# Patient Record
Sex: MALE | Race: OTHER | HISPANIC OR LATINO | URBAN - METROPOLITAN AREA
[De-identification: names, ages, dates, MRNs, and addresses within clinical notes are randomized per-mention and may not be internally consistent; named-entity substitution may affect disease eponyms.]

---

## 2021-01-01 ENCOUNTER — INPATIENT (INPATIENT)
Facility: HOSPITAL | Age: 41
LOS: 6 days | Discharge: HOME | DRG: 433 | End: 2021-04-27
Attending: INTERNAL MEDICINE | Admitting: HOSPITALIST
Payer: MEDICAID

## 2021-01-01 ENCOUNTER — INPATIENT (INPATIENT)
Facility: HOSPITAL | Age: 41
LOS: 16 days | DRG: 870 | End: 2021-06-13
Attending: STUDENT IN AN ORGANIZED HEALTH CARE EDUCATION/TRAINING PROGRAM | Admitting: STUDENT IN AN ORGANIZED HEALTH CARE EDUCATION/TRAINING PROGRAM
Payer: MEDICAID

## 2021-01-01 VITALS
WEIGHT: 160.06 LBS | TEMPERATURE: 99 F | DIASTOLIC BLOOD PRESSURE: 67 MMHG | OXYGEN SATURATION: 100 % | HEART RATE: 112 BPM | HEIGHT: 61 IN | RESPIRATION RATE: 16 BRPM | SYSTOLIC BLOOD PRESSURE: 102 MMHG

## 2021-01-01 VITALS
SYSTOLIC BLOOD PRESSURE: 119 MMHG | HEART RATE: 92 BPM | DIASTOLIC BLOOD PRESSURE: 72 MMHG | WEIGHT: 164.91 LBS | TEMPERATURE: 98 F | RESPIRATION RATE: 22 BRPM | OXYGEN SATURATION: 96 % | HEIGHT: 62 IN

## 2021-01-01 VITALS
SYSTOLIC BLOOD PRESSURE: 116 MMHG | TEMPERATURE: 98 F | RESPIRATION RATE: 18 BRPM | DIASTOLIC BLOOD PRESSURE: 75 MMHG | OXYGEN SATURATION: 98 % | HEART RATE: 96 BPM

## 2021-01-01 DIAGNOSIS — R17 UNSPECIFIED JAUNDICE: ICD-10-CM

## 2021-01-01 DIAGNOSIS — D72.825 BANDEMIA: ICD-10-CM

## 2021-01-01 DIAGNOSIS — E87.1 HYPO-OSMOLALITY AND HYPONATREMIA: ICD-10-CM

## 2021-01-01 DIAGNOSIS — D69.6 THROMBOCYTOPENIA, UNSPECIFIED: ICD-10-CM

## 2021-01-01 DIAGNOSIS — F10.230 ALCOHOL DEPENDENCE WITH WITHDRAWAL, UNCOMPLICATED: ICD-10-CM

## 2021-01-01 DIAGNOSIS — L03.90 CELLULITIS, UNSPECIFIED: ICD-10-CM

## 2021-01-01 DIAGNOSIS — A41.9 SEPSIS, UNSPECIFIED ORGANISM: ICD-10-CM

## 2021-01-01 DIAGNOSIS — N17.9 ACUTE KIDNEY FAILURE, UNSPECIFIED: ICD-10-CM

## 2021-01-01 DIAGNOSIS — M79.89 OTHER SPECIFIED SOFT TISSUE DISORDERS: ICD-10-CM

## 2021-01-01 DIAGNOSIS — R39.9 UNSPECIFIED SYMPTOMS AND SIGNS INVOLVING THE GENITOURINARY SYSTEM: ICD-10-CM

## 2021-01-01 DIAGNOSIS — D64.9 ANEMIA, UNSPECIFIED: ICD-10-CM

## 2021-01-01 DIAGNOSIS — K72.90 HEPATIC FAILURE, UNSPECIFIED WITHOUT COMA: ICD-10-CM

## 2021-01-01 DIAGNOSIS — K70.31 ALCOHOLIC CIRRHOSIS OF LIVER WITH ASCITES: ICD-10-CM

## 2021-01-01 DIAGNOSIS — Z29.9 ENCOUNTER FOR PROPHYLACTIC MEASURES, UNSPECIFIED: ICD-10-CM

## 2021-01-01 LAB
-  AMPICILLIN: SIGNIFICANT CHANGE UP
-  DAPTOMYCIN: SIGNIFICANT CHANGE UP
-  GENTAMICIN SYNERGY: SIGNIFICANT CHANGE UP
-  LINEZOLID: SIGNIFICANT CHANGE UP
-  STREPTOMYCIN SYNERGY: SIGNIFICANT CHANGE UP
-  VANCOMYCIN: SIGNIFICANT CHANGE UP
A1AT SERPL-MCNC: 172 MG/DL — SIGNIFICANT CHANGE UP (ref 90–200)
ALBUMIN FLD-MCNC: 0.3 G/DL — SIGNIFICANT CHANGE UP
ALBUMIN FLD-MCNC: 0.4 G/DL — SIGNIFICANT CHANGE UP
ALBUMIN FLD-MCNC: 0.4 G/DL — SIGNIFICANT CHANGE UP
ALBUMIN FLD-MCNC: 1 G/DL — SIGNIFICANT CHANGE UP
ALBUMIN SERPL ELPH-MCNC: 1.7 G/DL — LOW (ref 3.3–5)
ALBUMIN SERPL ELPH-MCNC: 2 G/DL — LOW (ref 3.3–5)
ALBUMIN SERPL ELPH-MCNC: 2.1 G/DL — LOW (ref 3.3–5)
ALBUMIN SERPL ELPH-MCNC: 2.2 G/DL — LOW (ref 3.3–5)
ALBUMIN SERPL ELPH-MCNC: 2.2 G/DL — LOW (ref 3.3–5)
ALBUMIN SERPL ELPH-MCNC: 2.3 G/DL — LOW (ref 3.3–5)
ALBUMIN SERPL ELPH-MCNC: 2.5 G/DL — LOW (ref 3.3–5)
ALBUMIN SERPL ELPH-MCNC: 2.5 G/DL — LOW (ref 3.3–5)
ALBUMIN SERPL ELPH-MCNC: 2.6 G/DL — LOW (ref 3.3–5)
ALBUMIN SERPL ELPH-MCNC: 2.6 G/DL — LOW (ref 3.3–5)
ALBUMIN SERPL ELPH-MCNC: 2.7 G/DL — LOW (ref 3.3–5)
ALBUMIN SERPL ELPH-MCNC: 2.8 G/DL — LOW (ref 3.3–5)
ALBUMIN SERPL ELPH-MCNC: 2.9 G/DL — LOW (ref 3.3–5)
ALBUMIN SERPL ELPH-MCNC: 3 G/DL — LOW (ref 3.3–5)
ALBUMIN SERPL ELPH-MCNC: 3.1 G/DL — LOW (ref 3.3–5)
ALBUMIN SERPL ELPH-MCNC: 3.2 G/DL — LOW (ref 3.3–5)
ALBUMIN SERPL ELPH-MCNC: 3.3 G/DL — SIGNIFICANT CHANGE UP (ref 3.3–5)
ALP SERPL-CCNC: 125 U/L — HIGH (ref 40–120)
ALP SERPL-CCNC: 125 U/L — HIGH (ref 40–120)
ALP SERPL-CCNC: 126 U/L — HIGH (ref 40–120)
ALP SERPL-CCNC: 146 U/L — HIGH (ref 40–120)
ALP SERPL-CCNC: 147 U/L — HIGH (ref 40–120)
ALP SERPL-CCNC: 149 U/L — HIGH (ref 40–120)
ALP SERPL-CCNC: 156 U/L — HIGH (ref 40–120)
ALP SERPL-CCNC: 158 U/L — HIGH (ref 40–120)
ALP SERPL-CCNC: 166 U/L — HIGH (ref 40–120)
ALP SERPL-CCNC: 171 U/L — HIGH (ref 40–120)
ALP SERPL-CCNC: 172 U/L — HIGH (ref 40–120)
ALP SERPL-CCNC: 173 U/L — HIGH (ref 40–120)
ALP SERPL-CCNC: 183 U/L — HIGH (ref 40–120)
ALP SERPL-CCNC: 184 U/L — HIGH (ref 40–120)
ALP SERPL-CCNC: 191 U/L — HIGH (ref 40–120)
ALP SERPL-CCNC: 193 U/L — HIGH (ref 40–120)
ALP SERPL-CCNC: 197 U/L — HIGH (ref 40–120)
ALP SERPL-CCNC: 199 U/L — HIGH (ref 40–120)
ALP SERPL-CCNC: 201 U/L — HIGH (ref 40–120)
ALP SERPL-CCNC: 208 U/L — HIGH (ref 40–120)
ALP SERPL-CCNC: 214 U/L — HIGH (ref 40–120)
ALP SERPL-CCNC: 227 U/L — HIGH (ref 40–120)
ALP SERPL-CCNC: 242 U/L — HIGH (ref 40–120)
ALP SERPL-CCNC: 260 U/L — HIGH (ref 40–120)
ALP SERPL-CCNC: 276 U/L — HIGH (ref 40–120)
ALP SERPL-CCNC: 277 U/L — HIGH (ref 40–120)
ALP SERPL-CCNC: 284 U/L — HIGH (ref 40–120)
ALP SERPL-CCNC: 296 U/L — HIGH (ref 40–120)
ALP SERPL-CCNC: 304 U/L — HIGH (ref 40–120)
ALP SERPL-CCNC: 304 U/L — HIGH (ref 40–120)
ALT FLD-CCNC: 102 U/L — HIGH (ref 10–45)
ALT FLD-CCNC: 104 U/L — HIGH (ref 10–45)
ALT FLD-CCNC: 105 U/L — HIGH (ref 10–45)
ALT FLD-CCNC: 123 U/L — HIGH (ref 10–45)
ALT FLD-CCNC: 123 U/L — HIGH (ref 10–45)
ALT FLD-CCNC: 134 U/L — HIGH (ref 10–45)
ALT FLD-CCNC: 140 U/L — HIGH (ref 10–45)
ALT FLD-CCNC: 212 U/L — HIGH (ref 10–45)
ALT FLD-CCNC: 24 U/L — SIGNIFICANT CHANGE UP (ref 10–45)
ALT FLD-CCNC: 27 U/L — SIGNIFICANT CHANGE UP (ref 10–45)
ALT FLD-CCNC: 29 U/L — SIGNIFICANT CHANGE UP (ref 10–45)
ALT FLD-CCNC: 32 U/L — SIGNIFICANT CHANGE UP (ref 10–45)
ALT FLD-CCNC: 33 U/L — SIGNIFICANT CHANGE UP (ref 10–45)
ALT FLD-CCNC: 33 U/L — SIGNIFICANT CHANGE UP (ref 10–45)
ALT FLD-CCNC: 34 U/L — SIGNIFICANT CHANGE UP (ref 10–45)
ALT FLD-CCNC: 37 U/L — SIGNIFICANT CHANGE UP (ref 10–45)
ALT FLD-CCNC: 39 U/L — SIGNIFICANT CHANGE UP (ref 10–45)
ALT FLD-CCNC: 43 U/L — SIGNIFICANT CHANGE UP (ref 10–45)
ALT FLD-CCNC: 45 U/L — SIGNIFICANT CHANGE UP (ref 10–45)
ALT FLD-CCNC: 46 U/L — HIGH (ref 10–45)
ALT FLD-CCNC: 54 U/L — HIGH (ref 10–45)
ALT FLD-CCNC: 59 U/L — HIGH (ref 10–45)
ALT FLD-CCNC: 65 U/L — HIGH (ref 10–45)
ALT FLD-CCNC: 65 U/L — HIGH (ref 10–45)
ALT FLD-CCNC: 69 U/L — HIGH (ref 10–45)
ALT FLD-CCNC: 76 U/L — HIGH (ref 10–45)
ALT FLD-CCNC: 85 U/L — HIGH (ref 10–45)
ALT FLD-CCNC: 92 U/L — HIGH (ref 10–45)
ALT FLD-CCNC: 95 U/L — HIGH (ref 10–45)
ALT FLD-CCNC: 97 U/L — HIGH (ref 10–45)
ALT FLD-CCNC: 98 U/L — HIGH (ref 10–45)
ALT FLD-CCNC: <5 U/L — LOW (ref 10–45)
AMMONIA BLD-MCNC: 125 UMOL/L — HIGH (ref 11–55)
AMMONIA BLD-MCNC: 140 UMOL/L — HIGH (ref 11–55)
AMMONIA BLD-MCNC: 145 UMOL/L — HIGH (ref 11–55)
AMMONIA BLD-MCNC: 157 UMOL/L — HIGH (ref 11–55)
AMMONIA BLD-MCNC: 184 UMOL/L — HIGH (ref 11–55)
AMMONIA BLD-MCNC: 214 UMOL/L — HIGH (ref 11–55)
AMMONIA BLD-MCNC: 44 UMOL/L — SIGNIFICANT CHANGE UP (ref 11–55)
AMMONIA BLD-MCNC: 78 UMOL/L — HIGH (ref 11–55)
AMMONIA BLD-MCNC: 92 UMOL/L — HIGH (ref 11–55)
AMPHET UR-MCNC: NEGATIVE — SIGNIFICANT CHANGE UP
ANA PAT FLD IF-IMP: ABNORMAL
ANA TITR SER: ABNORMAL
ANA TITR SER: NEGATIVE — SIGNIFICANT CHANGE UP
ANION GAP SERPL CALC-SCNC: 10 MMOL/L — SIGNIFICANT CHANGE UP (ref 5–17)
ANION GAP SERPL CALC-SCNC: 10 MMOL/L — SIGNIFICANT CHANGE UP (ref 5–17)
ANION GAP SERPL CALC-SCNC: 11 MMOL/L — SIGNIFICANT CHANGE UP (ref 5–17)
ANION GAP SERPL CALC-SCNC: 12 MMOL/L — SIGNIFICANT CHANGE UP (ref 5–17)
ANION GAP SERPL CALC-SCNC: 13 MMOL/L — SIGNIFICANT CHANGE UP (ref 5–17)
ANION GAP SERPL CALC-SCNC: 14 MMOL/L — SIGNIFICANT CHANGE UP (ref 5–17)
ANION GAP SERPL CALC-SCNC: 15 MMOL/L — SIGNIFICANT CHANGE UP (ref 5–17)
ANION GAP SERPL CALC-SCNC: 16 MMOL/L — SIGNIFICANT CHANGE UP (ref 5–17)
ANION GAP SERPL CALC-SCNC: 17 MMOL/L — SIGNIFICANT CHANGE UP (ref 5–17)
ANION GAP SERPL CALC-SCNC: 18 MMOL/L — HIGH (ref 5–17)
ANION GAP SERPL CALC-SCNC: 19 MMOL/L — HIGH (ref 5–17)
ANION GAP SERPL CALC-SCNC: 20 MMOL/L — HIGH (ref 5–17)
ANION GAP SERPL CALC-SCNC: 21 MMOL/L — HIGH (ref 5–17)
ANION GAP SERPL CALC-SCNC: 21 MMOL/L — HIGH (ref 5–17)
ANION GAP SERPL CALC-SCNC: 23 MMOL/L — HIGH (ref 5–17)
ANION GAP SERPL CALC-SCNC: 23 MMOL/L — HIGH (ref 5–17)
ANION GAP SERPL CALC-SCNC: 24 MMOL/L — HIGH (ref 5–17)
ANION GAP SERPL CALC-SCNC: 25 MMOL/L — HIGH (ref 5–17)
ANION GAP SERPL CALC-SCNC: 26 MMOL/L — HIGH (ref 5–17)
ANION GAP SERPL CALC-SCNC: 28 MMOL/L — HIGH (ref 5–17)
ANION GAP SERPL CALC-SCNC: 32 MMOL/L — HIGH (ref 5–17)
ANION GAP SERPL CALC-SCNC: 33 MMOL/L — HIGH (ref 5–17)
ANION GAP SERPL CALC-SCNC: 8 MMOL/L — SIGNIFICANT CHANGE UP (ref 5–17)
ANION GAP SERPL CALC-SCNC: 9 MMOL/L — SIGNIFICANT CHANGE UP (ref 5–17)
ANISOCYTOSIS BLD QL: SLIGHT — SIGNIFICANT CHANGE UP
APPEARANCE UR: ABNORMAL
APPEARANCE UR: CLEAR — SIGNIFICANT CHANGE UP
APTT BLD: 36.5 SEC — HIGH (ref 27.5–35.5)
APTT BLD: 37.3 SEC — HIGH (ref 27.5–35.5)
APTT BLD: 37.8 SEC — HIGH (ref 27.5–35.5)
APTT BLD: 40.6 SEC — HIGH (ref 27.5–35.5)
APTT BLD: 42.6 SEC — HIGH (ref 27.5–35.5)
APTT BLD: 42.7 SEC — HIGH (ref 27.5–35.5)
APTT BLD: 43.4 SEC — HIGH (ref 27.5–35.5)
APTT BLD: 44.7 SEC — HIGH (ref 27.5–35.5)
APTT BLD: 47.4 SEC — HIGH (ref 27.5–35.5)
APTT BLD: 49 SEC — HIGH (ref 27.5–35.5)
APTT BLD: 49.9 SEC — HIGH (ref 27.5–35.5)
APTT BLD: 51.1 SEC — HIGH (ref 27.5–35.5)
APTT BLD: 51.7 SEC — HIGH (ref 27.5–35.5)
APTT BLD: 53.4 SEC — HIGH (ref 27.5–35.5)
APTT BLD: 56.3 SEC — HIGH (ref 27.5–35.5)
APTT BLD: 62.8 SEC — HIGH (ref 27.5–35.5)
APTT BLD: 64.2 SEC — HIGH (ref 27.5–35.5)
APTT BLD: 70.2 SEC — HIGH (ref 27.5–35.5)
APTT BLD: 70.7 SEC — HIGH (ref 27.5–35.5)
AST SERPL-CCNC: 102 U/L — HIGH (ref 10–40)
AST SERPL-CCNC: 106 U/L — HIGH (ref 10–40)
AST SERPL-CCNC: 115 U/L — HIGH (ref 10–40)
AST SERPL-CCNC: 117 U/L — HIGH (ref 10–40)
AST SERPL-CCNC: 118 U/L — HIGH (ref 10–40)
AST SERPL-CCNC: 123 U/L — HIGH (ref 10–40)
AST SERPL-CCNC: 123 U/L — HIGH (ref 10–40)
AST SERPL-CCNC: 144 U/L — HIGH (ref 10–40)
AST SERPL-CCNC: 145 U/L — HIGH (ref 10–40)
AST SERPL-CCNC: 146 U/L — HIGH (ref 10–40)
AST SERPL-CCNC: 159 U/L — HIGH (ref 10–40)
AST SERPL-CCNC: 163 U/L — HIGH (ref 10–40)
AST SERPL-CCNC: 164 U/L — HIGH (ref 10–40)
AST SERPL-CCNC: 170 U/L — HIGH (ref 10–40)
AST SERPL-CCNC: 184 U/L — HIGH (ref 10–40)
AST SERPL-CCNC: 205 U/L — HIGH (ref 10–40)
AST SERPL-CCNC: 223 U/L — HIGH (ref 10–40)
AST SERPL-CCNC: 248 U/L — HIGH (ref 10–40)
AST SERPL-CCNC: 393 U/L — HIGH (ref 10–40)
AST SERPL-CCNC: 56 U/L — HIGH (ref 10–40)
AST SERPL-CCNC: 66 U/L — HIGH (ref 10–40)
AST SERPL-CCNC: 67 U/L — HIGH (ref 10–40)
AST SERPL-CCNC: 681 U/L — HIGH (ref 10–40)
AST SERPL-CCNC: 70 U/L — HIGH (ref 10–40)
AST SERPL-CCNC: 71 U/L — HIGH (ref 10–40)
AST SERPL-CCNC: 79 U/L — HIGH (ref 10–40)
AST SERPL-CCNC: 79 U/L — HIGH (ref 10–40)
AST SERPL-CCNC: 81 U/L — HIGH (ref 10–40)
AST SERPL-CCNC: 84 U/L — HIGH (ref 10–40)
AST SERPL-CCNC: 96 U/L — HIGH (ref 10–40)
AST SERPL-CCNC: 96 U/L — HIGH (ref 10–40)
AST SERPL-CCNC: <5 U/L — LOW (ref 10–40)
AUTO DIFF PNL BLD: NEGATIVE — SIGNIFICANT CHANGE UP
B PERT IGG+IGM PNL SER: ABNORMAL
B PERT IGG+IGM PNL SER: CLEAR — SIGNIFICANT CHANGE UP
B PERT IGG+IGM PNL SER: CLEAR — SIGNIFICANT CHANGE UP
BACTERIA # UR AUTO: 0 — SIGNIFICANT CHANGE UP
BACTERIA # UR AUTO: NEGATIVE — SIGNIFICANT CHANGE UP
BACTERIA # UR AUTO: NEGATIVE — SIGNIFICANT CHANGE UP
BARBITURATES UR SCN-MCNC: NEGATIVE — SIGNIFICANT CHANGE UP
BASE EXCESS BLDA CALC-SCNC: 0.2 MMOL/L — SIGNIFICANT CHANGE UP (ref -2–2)
BASE EXCESS BLDA CALC-SCNC: 1.2 MMOL/L — SIGNIFICANT CHANGE UP (ref -2–2)
BASE EXCESS BLDA CALC-SCNC: 1.3 MMOL/L — SIGNIFICANT CHANGE UP (ref -2–2)
BASE EXCESS BLDV CALC-SCNC: -1.6 MMOL/L — SIGNIFICANT CHANGE UP (ref -2–2)
BASOPHILS # BLD AUTO: 0 K/UL — SIGNIFICANT CHANGE UP (ref 0–0.2)
BASOPHILS # BLD AUTO: 0.12 K/UL — SIGNIFICANT CHANGE UP (ref 0–0.2)
BASOPHILS # BLD AUTO: 0.13 K/UL — SIGNIFICANT CHANGE UP (ref 0–0.2)
BASOPHILS # BLD AUTO: 0.13 K/UL — SIGNIFICANT CHANGE UP (ref 0–0.2)
BASOPHILS # BLD AUTO: 0.28 K/UL — HIGH (ref 0–0.2)
BASOPHILS NFR BLD AUTO: 0 % — SIGNIFICANT CHANGE UP (ref 0–2)
BASOPHILS NFR BLD AUTO: 0.6 % — SIGNIFICANT CHANGE UP (ref 0–2)
BASOPHILS NFR BLD AUTO: 0.6 % — SIGNIFICANT CHANGE UP (ref 0–2)
BASOPHILS NFR BLD AUTO: 0.7 % — SIGNIFICANT CHANGE UP (ref 0–2)
BASOPHILS NFR BLD AUTO: 0.9 % — SIGNIFICANT CHANGE UP (ref 0–2)
BENZODIAZ UR-MCNC: NEGATIVE — SIGNIFICANT CHANGE UP
BILIRUB DIRECT SERPL-MCNC: >10 MG/DL — HIGH (ref 0–0.2)
BILIRUB INDIRECT FLD-MCNC: <16 MG/DL — HIGH (ref 0.2–1)
BILIRUB SERPL-MCNC: 10 MG/DL — HIGH (ref 0.2–1.2)
BILIRUB SERPL-MCNC: 10.6 MG/DL — HIGH (ref 0.2–1.2)
BILIRUB SERPL-MCNC: 11.3 MG/DL — HIGH (ref 0.2–1.2)
BILIRUB SERPL-MCNC: 11.7 MG/DL — HIGH (ref 0.2–1.2)
BILIRUB SERPL-MCNC: 12.5 MG/DL — HIGH (ref 0.2–1.2)
BILIRUB SERPL-MCNC: 14.2 MG/DL — HIGH (ref 0.2–1.2)
BILIRUB SERPL-MCNC: 15.9 MG/DL — HIGH (ref 0.2–1.2)
BILIRUB SERPL-MCNC: 16.9 MG/DL — HIGH (ref 0.2–1.2)
BILIRUB SERPL-MCNC: 17.1 MG/DL — HIGH (ref 0.2–1.2)
BILIRUB SERPL-MCNC: 17.4 MG/DL — HIGH (ref 0.2–1.2)
BILIRUB SERPL-MCNC: 18 MG/DL — HIGH (ref 0.2–1.2)
BILIRUB SERPL-MCNC: 18.5 MG/DL — HIGH (ref 0.2–1.2)
BILIRUB SERPL-MCNC: 19 MG/DL — HIGH (ref 0.2–1.2)
BILIRUB SERPL-MCNC: 19.1 MG/DL — HIGH (ref 0.2–1.2)
BILIRUB SERPL-MCNC: 19.2 MG/DL — HIGH (ref 0.2–1.2)
BILIRUB SERPL-MCNC: 19.3 MG/DL — HIGH (ref 0.2–1.2)
BILIRUB SERPL-MCNC: 19.7 MG/DL — HIGH (ref 0.2–1.2)
BILIRUB SERPL-MCNC: 19.8 MG/DL — HIGH (ref 0.2–1.2)
BILIRUB SERPL-MCNC: 19.9 MG/DL — HIGH (ref 0.2–1.2)
BILIRUB SERPL-MCNC: 20.4 MG/DL — HIGH (ref 0.2–1.2)
BILIRUB SERPL-MCNC: 20.8 MG/DL — HIGH (ref 0.2–1.2)
BILIRUB SERPL-MCNC: 21 MG/DL — HIGH (ref 0.2–1.2)
BILIRUB SERPL-MCNC: 21.9 MG/DL — HIGH (ref 0.2–1.2)
BILIRUB SERPL-MCNC: 22.3 MG/DL — HIGH (ref 0.2–1.2)
BILIRUB SERPL-MCNC: 22.5 MG/DL — HIGH (ref 0.2–1.2)
BILIRUB SERPL-MCNC: 22.8 MG/DL — HIGH (ref 0.2–1.2)
BILIRUB SERPL-MCNC: 23.1 MG/DL — HIGH (ref 0.2–1.2)
BILIRUB SERPL-MCNC: 24 MG/DL — HIGH (ref 0.2–1.2)
BILIRUB SERPL-MCNC: 24.4 MG/DL — HIGH (ref 0.2–1.2)
BILIRUB SERPL-MCNC: 25.5 MG/DL — HIGH (ref 0.2–1.2)
BILIRUB SERPL-MCNC: 26 MG/DL — HIGH (ref 0.2–1.2)
BILIRUB SERPL-MCNC: 26 MG/DL — HIGH (ref 0.2–1.2)
BILIRUB SERPL-MCNC: 9.8 MG/DL — HIGH (ref 0.2–1.2)
BILIRUB UR-MCNC: ABNORMAL
BILIRUB UR-MCNC: NEGATIVE — SIGNIFICANT CHANGE UP
BLD GP AB SCN SERPL QL: NEGATIVE — SIGNIFICANT CHANGE UP
BLD GP AB SCN SERPL QL: NEGATIVE — SIGNIFICANT CHANGE UP
BUN SERPL-MCNC: 10 MG/DL — SIGNIFICANT CHANGE UP (ref 7–23)
BUN SERPL-MCNC: 102 MG/DL — HIGH (ref 7–23)
BUN SERPL-MCNC: 102 MG/DL — HIGH (ref 7–23)
BUN SERPL-MCNC: 103 MG/DL — HIGH (ref 7–23)
BUN SERPL-MCNC: 11 MG/DL — SIGNIFICANT CHANGE UP (ref 7–23)
BUN SERPL-MCNC: 11 MG/DL — SIGNIFICANT CHANGE UP (ref 7–23)
BUN SERPL-MCNC: 116 MG/DL — HIGH (ref 7–23)
BUN SERPL-MCNC: 12 MG/DL — SIGNIFICANT CHANGE UP (ref 7–23)
BUN SERPL-MCNC: 15 MG/DL — SIGNIFICANT CHANGE UP (ref 7–23)
BUN SERPL-MCNC: 16 MG/DL — SIGNIFICANT CHANGE UP (ref 7–23)
BUN SERPL-MCNC: 17 MG/DL — SIGNIFICANT CHANGE UP (ref 7–23)
BUN SERPL-MCNC: 19 MG/DL — SIGNIFICANT CHANGE UP (ref 7–23)
BUN SERPL-MCNC: 20 MG/DL — SIGNIFICANT CHANGE UP (ref 7–23)
BUN SERPL-MCNC: 21 MG/DL — SIGNIFICANT CHANGE UP (ref 7–23)
BUN SERPL-MCNC: 22 MG/DL — SIGNIFICANT CHANGE UP (ref 7–23)
BUN SERPL-MCNC: 23 MG/DL — SIGNIFICANT CHANGE UP (ref 7–23)
BUN SERPL-MCNC: 23 MG/DL — SIGNIFICANT CHANGE UP (ref 7–23)
BUN SERPL-MCNC: 24 MG/DL — HIGH (ref 7–23)
BUN SERPL-MCNC: 26 MG/DL — HIGH (ref 7–23)
BUN SERPL-MCNC: 29 MG/DL — HIGH (ref 7–23)
BUN SERPL-MCNC: 29 MG/DL — HIGH (ref 7–23)
BUN SERPL-MCNC: 32 MG/DL — HIGH (ref 7–23)
BUN SERPL-MCNC: 32 MG/DL — HIGH (ref 7–23)
BUN SERPL-MCNC: 34 MG/DL — HIGH (ref 7–23)
BUN SERPL-MCNC: 35 MG/DL — HIGH (ref 7–23)
BUN SERPL-MCNC: 38 MG/DL — HIGH (ref 7–23)
BUN SERPL-MCNC: 42 MG/DL — HIGH (ref 7–23)
BUN SERPL-MCNC: 43 MG/DL — HIGH (ref 7–23)
BUN SERPL-MCNC: 45 MG/DL — HIGH (ref 7–23)
BUN SERPL-MCNC: 47 MG/DL — HIGH (ref 7–23)
BUN SERPL-MCNC: 50 MG/DL — HIGH (ref 7–23)
BUN SERPL-MCNC: 54 MG/DL — HIGH (ref 7–23)
BUN SERPL-MCNC: 59 MG/DL — HIGH (ref 7–23)
BUN SERPL-MCNC: 60 MG/DL — HIGH (ref 7–23)
BUN SERPL-MCNC: 61 MG/DL — HIGH (ref 7–23)
BUN SERPL-MCNC: 68 MG/DL — HIGH (ref 7–23)
BUN SERPL-MCNC: 68 MG/DL — HIGH (ref 7–23)
BUN SERPL-MCNC: 70 MG/DL — HIGH (ref 7–23)
BUN SERPL-MCNC: 72 MG/DL — HIGH (ref 7–23)
BUN SERPL-MCNC: 81 MG/DL — HIGH (ref 7–23)
BUN SERPL-MCNC: 84 MG/DL — HIGH (ref 7–23)
BUN SERPL-MCNC: 84 MG/DL — HIGH (ref 7–23)
BUN SERPL-MCNC: 88 MG/DL — HIGH (ref 7–23)
BUN SERPL-MCNC: 9 MG/DL — SIGNIFICANT CHANGE UP (ref 7–23)
BUN SERPL-MCNC: 98 MG/DL — HIGH (ref 7–23)
BURR CELLS BLD QL SMEAR: PRESENT — SIGNIFICANT CHANGE UP
C-ANCA SER-ACNC: NEGATIVE — SIGNIFICANT CHANGE UP
C3 SERPL-MCNC: 25 MG/DL — LOW (ref 81–157)
C4 SERPL-MCNC: 2 MG/DL — LOW (ref 13–39)
CA-I SERPL-SCNC: 0.88 MMOL/L — LOW (ref 1.12–1.3)
CALCIUM SERPL-MCNC: 7.1 MG/DL — LOW (ref 8.4–10.5)
CALCIUM SERPL-MCNC: 7.2 MG/DL — LOW (ref 8.4–10.5)
CALCIUM SERPL-MCNC: 7.2 MG/DL — LOW (ref 8.4–10.5)
CALCIUM SERPL-MCNC: 7.3 MG/DL — LOW (ref 8.4–10.5)
CALCIUM SERPL-MCNC: 7.4 MG/DL — LOW (ref 8.4–10.5)
CALCIUM SERPL-MCNC: 7.5 MG/DL — LOW (ref 8.4–10.5)
CALCIUM SERPL-MCNC: 7.6 MG/DL — LOW (ref 8.4–10.5)
CALCIUM SERPL-MCNC: 7.7 MG/DL — LOW (ref 8.4–10.5)
CALCIUM SERPL-MCNC: 7.8 MG/DL — LOW (ref 8.4–10.5)
CALCIUM SERPL-MCNC: 7.9 MG/DL — LOW (ref 8.4–10.5)
CALCIUM SERPL-MCNC: 8 MG/DL — LOW (ref 8.4–10.5)
CALCIUM SERPL-MCNC: 8.1 MG/DL — LOW (ref 8.4–10.5)
CALCIUM SERPL-MCNC: 8.3 MG/DL — LOW (ref 8.4–10.5)
CALCIUM SERPL-MCNC: 8.3 MG/DL — LOW (ref 8.4–10.5)
CALCIUM SERPL-MCNC: 8.4 MG/DL — SIGNIFICANT CHANGE UP (ref 8.4–10.5)
CERULOPLASMIN SERPL-MCNC: 25 MG/DL — SIGNIFICANT CHANGE UP (ref 15–30)
CHLORIDE BLDV-SCNC: 111 MMOL/L — HIGH (ref 96–108)
CHLORIDE SERPL-SCNC: 100 MMOL/L — SIGNIFICANT CHANGE UP (ref 96–108)
CHLORIDE SERPL-SCNC: 101 MMOL/L — SIGNIFICANT CHANGE UP (ref 96–108)
CHLORIDE SERPL-SCNC: 102 MMOL/L — SIGNIFICANT CHANGE UP (ref 96–108)
CHLORIDE SERPL-SCNC: 103 MMOL/L — SIGNIFICANT CHANGE UP (ref 96–108)
CHLORIDE SERPL-SCNC: 105 MMOL/L — SIGNIFICANT CHANGE UP (ref 96–108)
CHLORIDE SERPL-SCNC: 106 MMOL/L — SIGNIFICANT CHANGE UP (ref 96–108)
CHLORIDE SERPL-SCNC: 107 MMOL/L — SIGNIFICANT CHANGE UP (ref 96–108)
CHLORIDE SERPL-SCNC: 107 MMOL/L — SIGNIFICANT CHANGE UP (ref 96–108)
CHLORIDE SERPL-SCNC: 90 MMOL/L — LOW (ref 96–108)
CHLORIDE SERPL-SCNC: 92 MMOL/L — LOW (ref 96–108)
CHLORIDE SERPL-SCNC: 92 MMOL/L — LOW (ref 96–108)
CHLORIDE SERPL-SCNC: 93 MMOL/L — LOW (ref 96–108)
CHLORIDE SERPL-SCNC: 93 MMOL/L — LOW (ref 96–108)
CHLORIDE SERPL-SCNC: 94 MMOL/L — LOW (ref 96–108)
CHLORIDE SERPL-SCNC: 95 MMOL/L — LOW (ref 96–108)
CHLORIDE SERPL-SCNC: 96 MMOL/L — SIGNIFICANT CHANGE UP (ref 96–108)
CHLORIDE SERPL-SCNC: 97 MMOL/L — SIGNIFICANT CHANGE UP (ref 96–108)
CHLORIDE SERPL-SCNC: 97 MMOL/L — SIGNIFICANT CHANGE UP (ref 96–108)
CHLORIDE SERPL-SCNC: 98 MMOL/L — SIGNIFICANT CHANGE UP (ref 96–108)
CHLORIDE SERPL-SCNC: 99 MMOL/L — SIGNIFICANT CHANGE UP (ref 96–108)
CMV DNA CSF QL NAA+PROBE: SIGNIFICANT CHANGE UP
CMV DNA CSF QL NAA+PROBE: SIGNIFICANT CHANGE UP
CO2 BLDA-SCNC: 27 MMOL/L — SIGNIFICANT CHANGE UP (ref 22–30)
CO2 BLDA-SCNC: 28 MMOL/L — SIGNIFICANT CHANGE UP (ref 22–30)
CO2 BLDA-SCNC: 29 MMOL/L — SIGNIFICANT CHANGE UP (ref 22–30)
CO2 BLDV-SCNC: 24 MMOL/L — SIGNIFICANT CHANGE UP (ref 22–30)
CO2 SERPL-SCNC: 11 MMOL/L — LOW (ref 22–31)
CO2 SERPL-SCNC: 12 MMOL/L — LOW (ref 22–31)
CO2 SERPL-SCNC: 13 MMOL/L — LOW (ref 22–31)
CO2 SERPL-SCNC: 14 MMOL/L — LOW (ref 22–31)
CO2 SERPL-SCNC: 15 MMOL/L — LOW (ref 22–31)
CO2 SERPL-SCNC: 16 MMOL/L — LOW (ref 22–31)
CO2 SERPL-SCNC: 17 MMOL/L — LOW (ref 22–31)
CO2 SERPL-SCNC: 17 MMOL/L — LOW (ref 22–31)
CO2 SERPL-SCNC: 18 MMOL/L — LOW (ref 22–31)
CO2 SERPL-SCNC: 19 MMOL/L — LOW (ref 22–31)
CO2 SERPL-SCNC: 20 MMOL/L — LOW (ref 22–31)
CO2 SERPL-SCNC: 21 MMOL/L — LOW (ref 22–31)
CO2 SERPL-SCNC: 22 MMOL/L — SIGNIFICANT CHANGE UP (ref 22–31)
CO2 SERPL-SCNC: 23 MMOL/L — SIGNIFICANT CHANGE UP (ref 22–31)
CO2 SERPL-SCNC: 25 MMOL/L — SIGNIFICANT CHANGE UP (ref 22–31)
CO2 SERPL-SCNC: 25 MMOL/L — SIGNIFICANT CHANGE UP (ref 22–31)
CO2 SERPL-SCNC: 26 MMOL/L — SIGNIFICANT CHANGE UP (ref 22–31)
CO2 SERPL-SCNC: <10 MMOL/L — CRITICAL LOW (ref 22–31)
CO2 SERPL-SCNC: <10 MMOL/L — CRITICAL LOW (ref 22–31)
COCAINE METAB.OTHER UR-MCNC: NEGATIVE — SIGNIFICANT CHANGE UP
COLOR FLD: YELLOW — SIGNIFICANT CHANGE UP
COLOR SPEC: ABNORMAL
COLOR SPEC: ABNORMAL
COLOR SPEC: YELLOW — SIGNIFICANT CHANGE UP
COLOR SPEC: YELLOW — SIGNIFICANT CHANGE UP
COVID-19 SPIKE DOMAIN AB INTERP: POSITIVE
COVID-19 SPIKE DOMAIN AB INTERP: POSITIVE
COVID-19 SPIKE DOMAIN ANTIBODY RESULT: >250 U/ML — HIGH
COVID-19 SPIKE DOMAIN ANTIBODY RESULT: >250 U/ML — HIGH
CREAT ?TM UR-MCNC: 111 MG/DL — SIGNIFICANT CHANGE UP
CREAT ?TM UR-MCNC: 49 MG/DL — SIGNIFICANT CHANGE UP
CREAT SERPL-MCNC: 0.45 MG/DL — LOW (ref 0.5–1.3)
CREAT SERPL-MCNC: 0.47 MG/DL — LOW (ref 0.5–1.3)
CREAT SERPL-MCNC: 0.49 MG/DL — LOW (ref 0.5–1.3)
CREAT SERPL-MCNC: 0.49 MG/DL — LOW (ref 0.5–1.3)
CREAT SERPL-MCNC: 0.5 MG/DL — SIGNIFICANT CHANGE UP (ref 0.5–1.3)
CREAT SERPL-MCNC: 0.52 MG/DL — SIGNIFICANT CHANGE UP (ref 0.5–1.3)
CREAT SERPL-MCNC: 0.53 MG/DL — SIGNIFICANT CHANGE UP (ref 0.5–1.3)
CREAT SERPL-MCNC: 0.55 MG/DL — SIGNIFICANT CHANGE UP (ref 0.5–1.3)
CREAT SERPL-MCNC: 0.71 MG/DL — SIGNIFICANT CHANGE UP (ref 0.5–1.3)
CREAT SERPL-MCNC: 0.75 MG/DL — SIGNIFICANT CHANGE UP (ref 0.5–1.3)
CREAT SERPL-MCNC: 0.87 MG/DL — SIGNIFICANT CHANGE UP (ref 0.5–1.3)
CREAT SERPL-MCNC: 0.89 MG/DL — SIGNIFICANT CHANGE UP (ref 0.5–1.3)
CREAT SERPL-MCNC: 0.99 MG/DL — SIGNIFICANT CHANGE UP (ref 0.5–1.3)
CREAT SERPL-MCNC: 1.02 MG/DL — SIGNIFICANT CHANGE UP (ref 0.5–1.3)
CREAT SERPL-MCNC: 1.02 MG/DL — SIGNIFICANT CHANGE UP (ref 0.5–1.3)
CREAT SERPL-MCNC: 1.03 MG/DL — SIGNIFICANT CHANGE UP (ref 0.5–1.3)
CREAT SERPL-MCNC: 1.04 MG/DL — SIGNIFICANT CHANGE UP (ref 0.5–1.3)
CREAT SERPL-MCNC: 1.04 MG/DL — SIGNIFICANT CHANGE UP (ref 0.5–1.3)
CREAT SERPL-MCNC: 1.06 MG/DL — SIGNIFICANT CHANGE UP (ref 0.5–1.3)
CREAT SERPL-MCNC: 1.07 MG/DL — SIGNIFICANT CHANGE UP (ref 0.5–1.3)
CREAT SERPL-MCNC: 1.08 MG/DL — SIGNIFICANT CHANGE UP (ref 0.5–1.3)
CREAT SERPL-MCNC: 1.12 MG/DL — SIGNIFICANT CHANGE UP (ref 0.5–1.3)
CREAT SERPL-MCNC: 1.13 MG/DL — SIGNIFICANT CHANGE UP (ref 0.5–1.3)
CREAT SERPL-MCNC: 1.18 MG/DL — SIGNIFICANT CHANGE UP (ref 0.5–1.3)
CREAT SERPL-MCNC: 1.41 MG/DL — HIGH (ref 0.5–1.3)
CREAT SERPL-MCNC: 1.47 MG/DL — HIGH (ref 0.5–1.3)
CREAT SERPL-MCNC: 1.57 MG/DL — HIGH (ref 0.5–1.3)
CREAT SERPL-MCNC: 1.72 MG/DL — HIGH (ref 0.5–1.3)
CREAT SERPL-MCNC: 1.83 MG/DL — HIGH (ref 0.5–1.3)
CREAT SERPL-MCNC: 1.92 MG/DL — HIGH (ref 0.5–1.3)
CREAT SERPL-MCNC: 1.93 MG/DL — HIGH (ref 0.5–1.3)
CREAT SERPL-MCNC: 2.03 MG/DL — HIGH (ref 0.5–1.3)
CREAT SERPL-MCNC: 2.15 MG/DL — HIGH (ref 0.5–1.3)
CREAT SERPL-MCNC: 2.2 MG/DL — HIGH (ref 0.5–1.3)
CREAT SERPL-MCNC: 2.21 MG/DL — HIGH (ref 0.5–1.3)
CREAT SERPL-MCNC: 2.21 MG/DL — HIGH (ref 0.5–1.3)
CREAT SERPL-MCNC: 2.25 MG/DL — HIGH (ref 0.5–1.3)
CREAT SERPL-MCNC: 2.3 MG/DL — HIGH (ref 0.5–1.3)
CREAT SERPL-MCNC: 2.31 MG/DL — HIGH (ref 0.5–1.3)
CREAT SERPL-MCNC: 2.4 MG/DL — HIGH (ref 0.5–1.3)
CREAT SERPL-MCNC: 2.42 MG/DL — HIGH (ref 0.5–1.3)
CREAT SERPL-MCNC: 2.45 MG/DL — HIGH (ref 0.5–1.3)
CREAT SERPL-MCNC: 2.6 MG/DL — HIGH (ref 0.5–1.3)
CREAT SERPL-MCNC: 2.69 MG/DL — HIGH (ref 0.5–1.3)
CREAT SERPL-MCNC: 2.79 MG/DL — HIGH (ref 0.5–1.3)
CREAT SERPL-MCNC: 2.89 MG/DL — HIGH (ref 0.5–1.3)
CREAT SERPL-MCNC: 3.07 MG/DL — HIGH (ref 0.5–1.3)
CREAT SERPL-MCNC: 3.13 MG/DL — HIGH (ref 0.5–1.3)
CREAT SERPL-MCNC: 3.43 MG/DL — HIGH (ref 0.5–1.3)
CRP SERPL-MCNC: 91 MG/L — HIGH (ref 0–4)
CRYOGLOB SERPL-MCNC: NEGATIVE — SIGNIFICANT CHANGE UP
CULTURE RESULTS: NO GROWTH — SIGNIFICANT CHANGE UP
CULTURE RESULTS: SIGNIFICANT CHANGE UP
DACRYOCYTES BLD QL SMEAR: SLIGHT — SIGNIFICANT CHANGE UP
DAT POLY-SP REAG RBC QL: NEGATIVE — SIGNIFICANT CHANGE UP
DIFF PNL FLD: ABNORMAL
DIFF PNL FLD: NEGATIVE — SIGNIFICANT CHANGE UP
E FAECIUM DNA BLD POS QL NAA+NON-PROBE: SIGNIFICANT CHANGE UP
EBV EA AB SER IA-ACNC: 5.7 U/ML — SIGNIFICANT CHANGE UP
EBV EA AB TITR SER IF: POSITIVE
EBV EA IGG SER-ACNC: NEGATIVE — SIGNIFICANT CHANGE UP
EBV NA IGG SER IA-ACNC: 94.4 U/ML — HIGH
EBV PATRN SPEC IB-IMP: SIGNIFICANT CHANGE UP
EBV VCA IGG AVIDITY SER QL IA: POSITIVE
EBV VCA IGM SER IA-ACNC: 73.1 U/ML — HIGH
EBV VCA IGM SER IA-ACNC: >750 U/ML — HIGH
EBV VCA IGM TITR FLD: POSITIVE
EOSINOPHIL # BLD AUTO: 0 K/UL — SIGNIFICANT CHANGE UP (ref 0–0.5)
EOSINOPHIL # BLD AUTO: 0 K/UL — SIGNIFICANT CHANGE UP (ref 0–0.5)
EOSINOPHIL # BLD AUTO: 0.08 K/UL — SIGNIFICANT CHANGE UP (ref 0–0.5)
EOSINOPHIL # BLD AUTO: 0.11 K/UL — SIGNIFICANT CHANGE UP (ref 0–0.5)
EOSINOPHIL # BLD AUTO: 0.12 K/UL — SIGNIFICANT CHANGE UP (ref 0–0.5)
EOSINOPHIL # BLD AUTO: 0.18 K/UL — SIGNIFICANT CHANGE UP (ref 0–0.5)
EOSINOPHIL # BLD AUTO: 0.53 K/UL — HIGH (ref 0–0.5)
EOSINOPHIL # BLD AUTO: 0.93 K/UL — HIGH (ref 0–0.5)
EOSINOPHIL NFR BLD AUTO: 0 % — SIGNIFICANT CHANGE UP (ref 0–6)
EOSINOPHIL NFR BLD AUTO: 0 % — SIGNIFICANT CHANGE UP (ref 0–6)
EOSINOPHIL NFR BLD AUTO: 0.4 % — SIGNIFICANT CHANGE UP (ref 0–6)
EOSINOPHIL NFR BLD AUTO: 0.6 % — SIGNIFICANT CHANGE UP (ref 0–6)
EOSINOPHIL NFR BLD AUTO: 0.6 % — SIGNIFICANT CHANGE UP (ref 0–6)
EOSINOPHIL NFR BLD AUTO: 0.9 % — SIGNIFICANT CHANGE UP (ref 0–6)
EOSINOPHIL NFR BLD AUTO: 1.7 % — SIGNIFICANT CHANGE UP (ref 0–6)
EOSINOPHIL NFR BLD AUTO: 3.5 % — SIGNIFICANT CHANGE UP (ref 0–6)
EOSINOPHIL NFR URNS MANUAL: NEGATIVE — SIGNIFICANT CHANGE UP
EPI CELLS # UR: 0 /HPF — SIGNIFICANT CHANGE UP
ERYTHROCYTE [SEDIMENTATION RATE] IN BLOOD: 22 MM/HR — HIGH (ref 0–15)
ETHANOL SERPL-MCNC: 11 MG/DL — HIGH (ref 0–10)
FERRITIN SERPL-MCNC: 537 NG/ML — HIGH (ref 30–400)
FLUID INTAKE SUBSTANCE CLASS: SIGNIFICANT CHANGE UP
FLUID SEGMENTED GRANULOCYTES: 1 % — SIGNIFICANT CHANGE UP
FLUID SEGMENTED GRANULOCYTES: 2 % — SIGNIFICANT CHANGE UP
FLUID SEGMENTED GRANULOCYTES: 3 % — SIGNIFICANT CHANGE UP
FLUID SEGMENTED GRANULOCYTES: 68 % — SIGNIFICANT CHANGE UP
FLUID SEGMENTED GRANULOCYTES: 7 % — SIGNIFICANT CHANGE UP
FOLATE SERPL-MCNC: 6.6 NG/ML — SIGNIFICANT CHANGE UP
FUNGITELL B-D-GLUCAN,  BRONCHIAL LAVAGE: SIGNIFICANT CHANGE UP
FUNGITELL: >500 PG/ML — HIGH
FUNGITELL: >500 PG/ML — HIGH
GALACTOMANNAN AG SERPL-ACNC: 0.04 INDEX — SIGNIFICANT CHANGE UP (ref 0–0.49)
GALACTOMANNAN AG SERPL-ACNC: 0.27 INDEX — SIGNIFICANT CHANGE UP (ref 0–0.49)
GAMMA INTERFERON BACKGROUND BLD IA-ACNC: 0.06 IU/ML — SIGNIFICANT CHANGE UP
GAS PNL BLDA: SIGNIFICANT CHANGE UP
GAS PNL BLDV: 138 MMOL/L — SIGNIFICANT CHANGE UP (ref 135–145)
GAS PNL BLDV: SIGNIFICANT CHANGE UP
GAS PNL BLDV: SIGNIFICANT CHANGE UP
GLUCOSE BLDC GLUCOMTR-MCNC: 102 MG/DL — HIGH (ref 70–99)
GLUCOSE BLDC GLUCOMTR-MCNC: 139 MG/DL — HIGH (ref 70–99)
GLUCOSE BLDC GLUCOMTR-MCNC: 147 MG/DL — HIGH (ref 70–99)
GLUCOSE BLDC GLUCOMTR-MCNC: 148 MG/DL — HIGH (ref 70–99)
GLUCOSE BLDC GLUCOMTR-MCNC: 150 MG/DL — HIGH (ref 70–99)
GLUCOSE BLDC GLUCOMTR-MCNC: 153 MG/DL — HIGH (ref 70–99)
GLUCOSE BLDC GLUCOMTR-MCNC: 155 MG/DL — HIGH (ref 70–99)
GLUCOSE BLDC GLUCOMTR-MCNC: 162 MG/DL — HIGH (ref 70–99)
GLUCOSE BLDC GLUCOMTR-MCNC: 165 MG/DL — HIGH (ref 70–99)
GLUCOSE BLDC GLUCOMTR-MCNC: 178 MG/DL — HIGH (ref 70–99)
GLUCOSE BLDC GLUCOMTR-MCNC: 180 MG/DL — HIGH (ref 70–99)
GLUCOSE BLDC GLUCOMTR-MCNC: 185 MG/DL — HIGH (ref 70–99)
GLUCOSE BLDC GLUCOMTR-MCNC: 185 MG/DL — HIGH (ref 70–99)
GLUCOSE BLDC GLUCOMTR-MCNC: 189 MG/DL — HIGH (ref 70–99)
GLUCOSE BLDC GLUCOMTR-MCNC: 203 MG/DL — HIGH (ref 70–99)
GLUCOSE BLDC GLUCOMTR-MCNC: 205 MG/DL — HIGH (ref 70–99)
GLUCOSE BLDC GLUCOMTR-MCNC: 219 MG/DL — HIGH (ref 70–99)
GLUCOSE BLDC GLUCOMTR-MCNC: 219 MG/DL — HIGH (ref 70–99)
GLUCOSE BLDC GLUCOMTR-MCNC: 224 MG/DL — HIGH (ref 70–99)
GLUCOSE BLDC GLUCOMTR-MCNC: 228 MG/DL — HIGH (ref 70–99)
GLUCOSE BLDC GLUCOMTR-MCNC: 229 MG/DL — HIGH (ref 70–99)
GLUCOSE BLDC GLUCOMTR-MCNC: 230 MG/DL — HIGH (ref 70–99)
GLUCOSE BLDC GLUCOMTR-MCNC: 236 MG/DL — HIGH (ref 70–99)
GLUCOSE BLDC GLUCOMTR-MCNC: 236 MG/DL — HIGH (ref 70–99)
GLUCOSE BLDC GLUCOMTR-MCNC: 253 MG/DL — HIGH (ref 70–99)
GLUCOSE BLDC GLUCOMTR-MCNC: 267 MG/DL — HIGH (ref 70–99)
GLUCOSE BLDC GLUCOMTR-MCNC: 271 MG/DL — HIGH (ref 70–99)
GLUCOSE BLDC GLUCOMTR-MCNC: 290 MG/DL — HIGH (ref 70–99)
GLUCOSE BLDC GLUCOMTR-MCNC: 308 MG/DL — HIGH (ref 70–99)
GLUCOSE BLDC GLUCOMTR-MCNC: 322 MG/DL — HIGH (ref 70–99)
GLUCOSE BLDC GLUCOMTR-MCNC: 328 MG/DL — HIGH (ref 70–99)
GLUCOSE BLDC GLUCOMTR-MCNC: 333 MG/DL — HIGH (ref 70–99)
GLUCOSE BLDC GLUCOMTR-MCNC: 362 MG/DL — HIGH (ref 70–99)
GLUCOSE BLDC GLUCOMTR-MCNC: 75 MG/DL — SIGNIFICANT CHANGE UP (ref 70–99)
GLUCOSE BLDC GLUCOMTR-MCNC: 78 MG/DL — SIGNIFICANT CHANGE UP (ref 70–99)
GLUCOSE BLDC GLUCOMTR-MCNC: 98 MG/DL — SIGNIFICANT CHANGE UP (ref 70–99)
GLUCOSE BLDV-MCNC: 106 MG/DL — HIGH (ref 70–99)
GLUCOSE FLD-MCNC: 117 MG/DL — SIGNIFICANT CHANGE UP
GLUCOSE FLD-MCNC: 119 MG/DL — SIGNIFICANT CHANGE UP
GLUCOSE FLD-MCNC: 125 MG/DL — SIGNIFICANT CHANGE UP
GLUCOSE FLD-MCNC: 192 MG/DL — SIGNIFICANT CHANGE UP
GLUCOSE SERPL-MCNC: 102 MG/DL — HIGH (ref 70–99)
GLUCOSE SERPL-MCNC: 104 MG/DL — HIGH (ref 70–99)
GLUCOSE SERPL-MCNC: 104 MG/DL — HIGH (ref 70–99)
GLUCOSE SERPL-MCNC: 105 MG/DL — HIGH (ref 70–99)
GLUCOSE SERPL-MCNC: 120 MG/DL — HIGH (ref 70–99)
GLUCOSE SERPL-MCNC: 121 MG/DL — HIGH (ref 70–99)
GLUCOSE SERPL-MCNC: 128 MG/DL — HIGH (ref 70–99)
GLUCOSE SERPL-MCNC: 132 MG/DL — HIGH (ref 70–99)
GLUCOSE SERPL-MCNC: 135 MG/DL — HIGH (ref 70–99)
GLUCOSE SERPL-MCNC: 140 MG/DL — HIGH (ref 70–99)
GLUCOSE SERPL-MCNC: 141 MG/DL — HIGH (ref 70–99)
GLUCOSE SERPL-MCNC: 142 MG/DL — HIGH (ref 70–99)
GLUCOSE SERPL-MCNC: 143 MG/DL — HIGH (ref 70–99)
GLUCOSE SERPL-MCNC: 147 MG/DL — HIGH (ref 70–99)
GLUCOSE SERPL-MCNC: 147 MG/DL — HIGH (ref 70–99)
GLUCOSE SERPL-MCNC: 148 MG/DL — HIGH (ref 70–99)
GLUCOSE SERPL-MCNC: 149 MG/DL — HIGH (ref 70–99)
GLUCOSE SERPL-MCNC: 149 MG/DL — HIGH (ref 70–99)
GLUCOSE SERPL-MCNC: 154 MG/DL — HIGH (ref 70–99)
GLUCOSE SERPL-MCNC: 167 MG/DL — HIGH (ref 70–99)
GLUCOSE SERPL-MCNC: 172 MG/DL — HIGH (ref 70–99)
GLUCOSE SERPL-MCNC: 177 MG/DL — HIGH (ref 70–99)
GLUCOSE SERPL-MCNC: 177 MG/DL — HIGH (ref 70–99)
GLUCOSE SERPL-MCNC: 181 MG/DL — HIGH (ref 70–99)
GLUCOSE SERPL-MCNC: 183 MG/DL — HIGH (ref 70–99)
GLUCOSE SERPL-MCNC: 186 MG/DL — HIGH (ref 70–99)
GLUCOSE SERPL-MCNC: 196 MG/DL — HIGH (ref 70–99)
GLUCOSE SERPL-MCNC: 205 MG/DL — HIGH (ref 70–99)
GLUCOSE SERPL-MCNC: 217 MG/DL — HIGH (ref 70–99)
GLUCOSE SERPL-MCNC: 225 MG/DL — HIGH (ref 70–99)
GLUCOSE SERPL-MCNC: 227 MG/DL — HIGH (ref 70–99)
GLUCOSE SERPL-MCNC: 234 MG/DL — HIGH (ref 70–99)
GLUCOSE SERPL-MCNC: 238 MG/DL — HIGH (ref 70–99)
GLUCOSE SERPL-MCNC: 244 MG/DL — HIGH (ref 70–99)
GLUCOSE SERPL-MCNC: 253 MG/DL — HIGH (ref 70–99)
GLUCOSE SERPL-MCNC: 297 MG/DL — HIGH (ref 70–99)
GLUCOSE SERPL-MCNC: 343 MG/DL — HIGH (ref 70–99)
GLUCOSE SERPL-MCNC: 357 MG/DL — HIGH (ref 70–99)
GLUCOSE SERPL-MCNC: 73 MG/DL — SIGNIFICANT CHANGE UP (ref 70–99)
GLUCOSE SERPL-MCNC: 74 MG/DL — SIGNIFICANT CHANGE UP (ref 70–99)
GLUCOSE SERPL-MCNC: 75 MG/DL — SIGNIFICANT CHANGE UP (ref 70–99)
GLUCOSE SERPL-MCNC: 76 MG/DL — SIGNIFICANT CHANGE UP (ref 70–99)
GLUCOSE SERPL-MCNC: 79 MG/DL — SIGNIFICANT CHANGE UP (ref 70–99)
GLUCOSE SERPL-MCNC: 80 MG/DL — SIGNIFICANT CHANGE UP (ref 70–99)
GLUCOSE SERPL-MCNC: 81 MG/DL — SIGNIFICANT CHANGE UP (ref 70–99)
GLUCOSE SERPL-MCNC: 82 MG/DL — SIGNIFICANT CHANGE UP (ref 70–99)
GLUCOSE SERPL-MCNC: 87 MG/DL — SIGNIFICANT CHANGE UP (ref 70–99)
GLUCOSE SERPL-MCNC: 88 MG/DL — SIGNIFICANT CHANGE UP (ref 70–99)
GLUCOSE SERPL-MCNC: 89 MG/DL — SIGNIFICANT CHANGE UP (ref 70–99)
GLUCOSE SERPL-MCNC: 89 MG/DL — SIGNIFICANT CHANGE UP (ref 70–99)
GLUCOSE SERPL-MCNC: 90 MG/DL — SIGNIFICANT CHANGE UP (ref 70–99)
GLUCOSE SERPL-MCNC: 98 MG/DL — SIGNIFICANT CHANGE UP (ref 70–99)
GLUCOSE SERPL-MCNC: 98 MG/DL — SIGNIFICANT CHANGE UP (ref 70–99)
GLUCOSE UR QL: NEGATIVE — SIGNIFICANT CHANGE UP
GRAM STN FLD: SIGNIFICANT CHANGE UP
H CAPSUL AG SER IA-MCNC: SIGNIFICANT CHANGE UP
HAPTOGLOB SERPL-MCNC: 91 MG/DL — SIGNIFICANT CHANGE UP (ref 34–200)
HAV IGM SER-ACNC: SIGNIFICANT CHANGE UP
HAV IGM SER-ACNC: SIGNIFICANT CHANGE UP
HBV CORE AB SER-ACNC: SIGNIFICANT CHANGE UP
HBV CORE IGM SER-ACNC: SIGNIFICANT CHANGE UP
HBV CORE IGM SER-ACNC: SIGNIFICANT CHANGE UP
HBV SURFACE AB SER-ACNC: SIGNIFICANT CHANGE UP
HBV SURFACE AG SER-ACNC: SIGNIFICANT CHANGE UP
HBV SURFACE AG SER-ACNC: SIGNIFICANT CHANGE UP
HCO3 BLDA-SCNC: 26 MMOL/L — SIGNIFICANT CHANGE UP (ref 21–29)
HCO3 BLDA-SCNC: 27 MMOL/L — SIGNIFICANT CHANGE UP (ref 21–29)
HCO3 BLDA-SCNC: 27 MMOL/L — SIGNIFICANT CHANGE UP (ref 21–29)
HCO3 BLDV-SCNC: 22 MMOL/L — SIGNIFICANT CHANGE UP (ref 21–29)
HCT VFR BLD CALC: 20 % — CRITICAL LOW (ref 39–50)
HCT VFR BLD CALC: 21.3 % — LOW (ref 39–50)
HCT VFR BLD CALC: 22.6 % — LOW (ref 39–50)
HCT VFR BLD CALC: 23 % — LOW (ref 39–50)
HCT VFR BLD CALC: 23.2 % — LOW (ref 39–50)
HCT VFR BLD CALC: 23.8 % — LOW (ref 39–50)
HCT VFR BLD CALC: 23.8 % — LOW (ref 39–50)
HCT VFR BLD CALC: 24.1 % — LOW (ref 39–50)
HCT VFR BLD CALC: 24.2 % — LOW (ref 39–50)
HCT VFR BLD CALC: 24.3 % — LOW (ref 39–50)
HCT VFR BLD CALC: 24.4 % — LOW (ref 39–50)
HCT VFR BLD CALC: 24.6 % — LOW (ref 39–50)
HCT VFR BLD CALC: 24.6 % — LOW (ref 39–50)
HCT VFR BLD CALC: 24.7 % — LOW (ref 39–50)
HCT VFR BLD CALC: 24.7 % — LOW (ref 39–50)
HCT VFR BLD CALC: 25 % — LOW (ref 39–50)
HCT VFR BLD CALC: 25.1 % — LOW (ref 39–50)
HCT VFR BLD CALC: 25.3 % — LOW (ref 39–50)
HCT VFR BLD CALC: 25.7 % — LOW (ref 39–50)
HCT VFR BLD CALC: 26.1 % — LOW (ref 39–50)
HCT VFR BLD CALC: 27.3 % — LOW (ref 39–50)
HCT VFR BLD CALC: 27.4 % — LOW (ref 39–50)
HCT VFR BLD CALC: 29.6 % — LOW (ref 39–50)
HCT VFR BLD CALC: 31.9 % — LOW (ref 39–50)
HCT VFR BLD CALC: 31.9 % — LOW (ref 39–50)
HCT VFR BLD CALC: 32.1 % — LOW (ref 39–50)
HCT VFR BLD CALC: 32.4 % — LOW (ref 39–50)
HCT VFR BLD CALC: 32.5 % — LOW (ref 39–50)
HCT VFR BLD CALC: 32.6 % — LOW (ref 39–50)
HCT VFR BLD CALC: 33.9 % — LOW (ref 39–50)
HCT VFR BLDA CALC: 21 % — CRITICAL LOW (ref 39–50)
HCV AB S/CO SERPL IA: 0.17 S/CO — SIGNIFICANT CHANGE UP (ref 0–0.99)
HCV AB S/CO SERPL IA: 0.25 S/CO — SIGNIFICANT CHANGE UP (ref 0–0.99)
HCV AB SERPL-IMP: SIGNIFICANT CHANGE UP
HCV AB SERPL-IMP: SIGNIFICANT CHANGE UP
HGB BLD CALC-MCNC: 6.7 G/DL — CRITICAL LOW (ref 13–17)
HGB BLD-MCNC: 10.5 G/DL — LOW (ref 13–17)
HGB BLD-MCNC: 10.6 G/DL — LOW (ref 13–17)
HGB BLD-MCNC: 10.8 G/DL — LOW (ref 13–17)
HGB BLD-MCNC: 11 G/DL — LOW (ref 13–17)
HGB BLD-MCNC: 11.4 G/DL — LOW (ref 13–17)
HGB BLD-MCNC: 6.7 G/DL — CRITICAL LOW (ref 13–17)
HGB BLD-MCNC: 6.8 G/DL — CRITICAL LOW (ref 13–17)
HGB BLD-MCNC: 7.1 G/DL — LOW (ref 13–17)
HGB BLD-MCNC: 7.5 G/DL — LOW (ref 13–17)
HGB BLD-MCNC: 7.6 G/DL — LOW (ref 13–17)
HGB BLD-MCNC: 7.7 G/DL — LOW (ref 13–17)
HGB BLD-MCNC: 7.8 G/DL — LOW (ref 13–17)
HGB BLD-MCNC: 7.8 G/DL — LOW (ref 13–17)
HGB BLD-MCNC: 7.9 G/DL — LOW (ref 13–17)
HGB BLD-MCNC: 8 G/DL — LOW (ref 13–17)
HGB BLD-MCNC: 8 G/DL — LOW (ref 13–17)
HGB BLD-MCNC: 8.2 G/DL — LOW (ref 13–17)
HGB BLD-MCNC: 8.3 G/DL — LOW (ref 13–17)
HGB BLD-MCNC: 8.5 G/DL — LOW (ref 13–17)
HGB BLD-MCNC: 8.8 G/DL — LOW (ref 13–17)
HGB BLD-MCNC: 9 G/DL — LOW (ref 13–17)
HGB BLD-MCNC: 9.9 G/DL — LOW (ref 13–17)
HIV 1 & 2 AB SERPL IA.RAPID: SIGNIFICANT CHANGE UP
HOROWITZ INDEX BLDA+IHG-RTO: 75 — SIGNIFICANT CHANGE UP
HOROWITZ INDEX BLDA+IHG-RTO: 80 — SIGNIFICANT CHANGE UP
HOROWITZ INDEX BLDA+IHG-RTO: 95 — SIGNIFICANT CHANGE UP
HOROWITZ INDEX BLDV+IHG-RTO: 100 — SIGNIFICANT CHANGE UP
HYALINE CASTS # UR AUTO: 1 /LPF — SIGNIFICANT CHANGE UP (ref 0–2)
HYALINE CASTS # UR AUTO: 1 /LPF — SIGNIFICANT CHANGE UP (ref 0–2)
HYALINE CASTS # UR AUTO: 4 /LPF — HIGH (ref 0–2)
IGA FLD-MCNC: 606 MG/DL — HIGH (ref 84–499)
IGG FLD-MCNC: 1215 MG/DL — SIGNIFICANT CHANGE UP (ref 610–1660)
IGM SERPL-MCNC: 131 MG/DL — SIGNIFICANT CHANGE UP (ref 35–242)
IMM GRANULOCYTES NFR BLD AUTO: 4.1 % — HIGH (ref 0–1.5)
IMM GRANULOCYTES NFR BLD AUTO: 4.7 % — HIGH (ref 0–1.5)
IMM GRANULOCYTES NFR BLD AUTO: 7.5 % — HIGH (ref 0–1.5)
INR BLD: 1.28 RATIO — HIGH (ref 0.88–1.16)
INR BLD: 1.33 RATIO — HIGH (ref 0.88–1.16)
INR BLD: 1.4 RATIO — HIGH (ref 0.88–1.16)
INR BLD: 1.45 RATIO — HIGH (ref 0.88–1.16)
INR BLD: 1.47 RATIO — HIGH (ref 0.88–1.16)
INR BLD: 1.64 RATIO — HIGH (ref 0.88–1.16)
INR BLD: 1.69 RATIO — HIGH (ref 0.88–1.16)
INR BLD: 1.69 RATIO — HIGH (ref 0.88–1.16)
INR BLD: 1.7 RATIO — HIGH (ref 0.88–1.16)
INR BLD: 1.85 RATIO — HIGH (ref 0.88–1.16)
INR BLD: 1.86 RATIO — HIGH (ref 0.88–1.16)
INR BLD: 1.91 RATIO — HIGH (ref 0.88–1.16)
INR BLD: 2.11 RATIO — HIGH (ref 0.88–1.16)
INR BLD: 2.17 RATIO — HIGH (ref 0.88–1.16)
INR BLD: 2.19 RATIO — HIGH (ref 0.88–1.16)
INR BLD: 2.22 RATIO — HIGH (ref 0.88–1.16)
INR BLD: 2.26 RATIO — HIGH (ref 0.88–1.16)
INR BLD: 2.3 RATIO — HIGH (ref 0.88–1.16)
INR BLD: 2.37 RATIO — HIGH (ref 0.88–1.16)
INR BLD: 2.4 RATIO — HIGH (ref 0.88–1.16)
INR BLD: 2.4 RATIO — HIGH (ref 0.88–1.16)
INR BLD: 2.45 RATIO — HIGH (ref 0.88–1.16)
INR BLD: 2.54 RATIO — HIGH (ref 0.88–1.16)
INR BLD: 2.64 RATIO — HIGH (ref 0.88–1.16)
INR BLD: 2.87 RATIO — HIGH (ref 0.88–1.16)
INR BLD: 3.26 RATIO — HIGH (ref 0.88–1.16)
IRON SATN MFR SERPL: 50 % — SIGNIFICANT CHANGE UP (ref 16–55)
IRON SATN MFR SERPL: 66 UG/DL — SIGNIFICANT CHANGE UP (ref 45–165)
KAPPA LC SER QL IFE: 3.7 MG/DL — HIGH (ref 0.33–1.94)
KAPPA/LAMBDA FREE LIGHT CHAIN RATIO, SERUM: 1.04 RATIO — SIGNIFICANT CHANGE UP (ref 0.26–1.65)
KETONES UR-MCNC: NEGATIVE — SIGNIFICANT CHANGE UP
LACTATE BLDV-MCNC: 2.7 MMOL/L — HIGH (ref 0.7–2)
LAMBDA LC SER QL IFE: 3.57 MG/DL — HIGH (ref 0.57–2.63)
LDH SERPL L TO P-CCNC: 196 U/L — SIGNIFICANT CHANGE UP (ref 50–242)
LDH SERPL L TO P-CCNC: 216 U/L — SIGNIFICANT CHANGE UP (ref 50–242)
LDH SERPL L TO P-CCNC: 303 U/L — SIGNIFICANT CHANGE UP
LDH SERPL L TO P-CCNC: 346 U/L — HIGH (ref 50–242)
LDH SERPL L TO P-CCNC: 67 U/L — SIGNIFICANT CHANGE UP
LDH SERPL L TO P-CCNC: 73 U/L — SIGNIFICANT CHANGE UP
LDH SERPL L TO P-CCNC: 77 U/L — SIGNIFICANT CHANGE UP
LDH SERPL L TO P-CCNC: 78 U/L — SIGNIFICANT CHANGE UP
LEGIONELLA AG UR QL: NEGATIVE — SIGNIFICANT CHANGE UP
LEPTOSPIRA AB TITR SER: NEGATIVE — SIGNIFICANT CHANGE UP
LEUKOCYTE ESTERASE UR-ACNC: NEGATIVE — SIGNIFICANT CHANGE UP
LIDOCAIN IGE QN: 343 U/L — HIGH (ref 7–60)
LKM AB SER-ACNC: <20.1 UNITS — SIGNIFICANT CHANGE UP (ref 0–20)
LYMPHOCYTES # BLD AUTO: 0.27 K/UL — LOW (ref 1–3.3)
LYMPHOCYTES # BLD AUTO: 0.71 K/UL — LOW (ref 1–3.3)
LYMPHOCYTES # BLD AUTO: 1 % — LOW (ref 13–44)
LYMPHOCYTES # BLD AUTO: 1.17 K/UL — SIGNIFICANT CHANGE UP (ref 1–3.3)
LYMPHOCYTES # BLD AUTO: 1.41 K/UL — SIGNIFICANT CHANGE UP (ref 1–3.3)
LYMPHOCYTES # BLD AUTO: 1.51 K/UL — SIGNIFICANT CHANGE UP (ref 1–3.3)
LYMPHOCYTES # BLD AUTO: 1.61 K/UL — SIGNIFICANT CHANGE UP (ref 1–3.3)
LYMPHOCYTES # BLD AUTO: 2.01 K/UL — SIGNIFICANT CHANGE UP (ref 1–3.3)
LYMPHOCYTES # BLD AUTO: 2.02 K/UL — SIGNIFICANT CHANGE UP (ref 1–3.3)
LYMPHOCYTES # BLD AUTO: 3.6 % — LOW (ref 13–44)
LYMPHOCYTES # BLD AUTO: 4.4 % — LOW (ref 13–44)
LYMPHOCYTES # BLD AUTO: 5.2 % — LOW (ref 13–44)
LYMPHOCYTES # BLD AUTO: 5.2 % — LOW (ref 13–44)
LYMPHOCYTES # BLD AUTO: 7.8 % — LOW (ref 13–44)
LYMPHOCYTES # BLD AUTO: 9.3 % — LOW (ref 13–44)
LYMPHOCYTES # BLD AUTO: 9.9 % — LOW (ref 13–44)
LYMPHOCYTES # FLD: 23 % — SIGNIFICANT CHANGE UP
LYMPHOCYTES # FLD: 28 % — SIGNIFICANT CHANGE UP
LYMPHOCYTES # FLD: 32 % — SIGNIFICANT CHANGE UP
LYMPHOCYTES # FLD: 50 % — SIGNIFICANT CHANGE UP
LYMPHOCYTES # FLD: 60 % — SIGNIFICANT CHANGE UP
M TB IFN-G BLD-IMP: NEGATIVE — SIGNIFICANT CHANGE UP
M TB IFN-G CD4+ BCKGRND COR BLD-ACNC: 0 IU/ML — SIGNIFICANT CHANGE UP
M TB IFN-G CD4+CD8+ BCKGRND COR BLD-ACNC: 0.01 IU/ML — SIGNIFICANT CHANGE UP
MACROCYTES BLD QL: SIGNIFICANT CHANGE UP
MAGNESIUM SERPL-MCNC: 1.6 MG/DL — SIGNIFICANT CHANGE UP (ref 1.6–2.6)
MAGNESIUM SERPL-MCNC: 1.7 MG/DL — SIGNIFICANT CHANGE UP (ref 1.6–2.6)
MAGNESIUM SERPL-MCNC: 1.8 MG/DL — SIGNIFICANT CHANGE UP (ref 1.6–2.6)
MAGNESIUM SERPL-MCNC: 1.9 MG/DL — SIGNIFICANT CHANGE UP (ref 1.6–2.6)
MAGNESIUM SERPL-MCNC: 2 MG/DL — SIGNIFICANT CHANGE UP (ref 1.6–2.6)
MAGNESIUM SERPL-MCNC: 2 MG/DL — SIGNIFICANT CHANGE UP (ref 1.6–2.6)
MAGNESIUM SERPL-MCNC: 2.1 MG/DL — SIGNIFICANT CHANGE UP (ref 1.6–2.6)
MAGNESIUM SERPL-MCNC: 2.2 MG/DL — SIGNIFICANT CHANGE UP (ref 1.6–2.6)
MAGNESIUM SERPL-MCNC: 2.2 MG/DL — SIGNIFICANT CHANGE UP (ref 1.6–2.6)
MAGNESIUM SERPL-MCNC: 2.3 MG/DL — SIGNIFICANT CHANGE UP (ref 1.6–2.6)
MAGNESIUM SERPL-MCNC: 2.4 MG/DL — SIGNIFICANT CHANGE UP (ref 1.6–2.6)
MAGNESIUM SERPL-MCNC: 2.6 MG/DL — SIGNIFICANT CHANGE UP (ref 1.6–2.6)
MAGNESIUM SERPL-MCNC: 2.7 MG/DL — HIGH (ref 1.6–2.6)
MANUAL SMEAR VERIFICATION: SIGNIFICANT CHANGE UP
MCHC RBC-ENTMCNC: 30.1 GM/DL — LOW (ref 32–36)
MCHC RBC-ENTMCNC: 30.2 PG — SIGNIFICANT CHANGE UP (ref 27–34)
MCHC RBC-ENTMCNC: 30.3 GM/DL — LOW (ref 32–36)
MCHC RBC-ENTMCNC: 30.4 PG — SIGNIFICANT CHANGE UP (ref 27–34)
MCHC RBC-ENTMCNC: 30.8 PG — SIGNIFICANT CHANGE UP (ref 27–34)
MCHC RBC-ENTMCNC: 30.9 GM/DL — LOW (ref 32–36)
MCHC RBC-ENTMCNC: 30.9 PG — SIGNIFICANT CHANGE UP (ref 27–34)
MCHC RBC-ENTMCNC: 31.1 PG — SIGNIFICANT CHANGE UP (ref 27–34)
MCHC RBC-ENTMCNC: 31.1 PG — SIGNIFICANT CHANGE UP (ref 27–34)
MCHC RBC-ENTMCNC: 31.2 GM/DL — LOW (ref 32–36)
MCHC RBC-ENTMCNC: 31.2 PG — SIGNIFICANT CHANGE UP (ref 27–34)
MCHC RBC-ENTMCNC: 31.3 PG — SIGNIFICANT CHANGE UP (ref 27–34)
MCHC RBC-ENTMCNC: 31.3 PG — SIGNIFICANT CHANGE UP (ref 27–34)
MCHC RBC-ENTMCNC: 31.4 PG — SIGNIFICANT CHANGE UP (ref 27–34)
MCHC RBC-ENTMCNC: 31.5 GM/DL — LOW (ref 32–36)
MCHC RBC-ENTMCNC: 31.5 PG — SIGNIFICANT CHANGE UP (ref 27–34)
MCHC RBC-ENTMCNC: 31.5 PG — SIGNIFICANT CHANGE UP (ref 27–34)
MCHC RBC-ENTMCNC: 31.6 GM/DL — LOW (ref 32–36)
MCHC RBC-ENTMCNC: 31.7 PG — SIGNIFICANT CHANGE UP (ref 27–34)
MCHC RBC-ENTMCNC: 31.7 PG — SIGNIFICANT CHANGE UP (ref 27–34)
MCHC RBC-ENTMCNC: 31.8 PG — SIGNIFICANT CHANGE UP (ref 27–34)
MCHC RBC-ENTMCNC: 31.8 PG — SIGNIFICANT CHANGE UP (ref 27–34)
MCHC RBC-ENTMCNC: 31.9 GM/DL — LOW (ref 32–36)
MCHC RBC-ENTMCNC: 31.9 PG — SIGNIFICANT CHANGE UP (ref 27–34)
MCHC RBC-ENTMCNC: 32 GM/DL — SIGNIFICANT CHANGE UP (ref 32–36)
MCHC RBC-ENTMCNC: 32.1 GM/DL — SIGNIFICANT CHANGE UP (ref 32–36)
MCHC RBC-ENTMCNC: 32.1 PG — SIGNIFICANT CHANGE UP (ref 27–34)
MCHC RBC-ENTMCNC: 32.1 PG — SIGNIFICANT CHANGE UP (ref 27–34)
MCHC RBC-ENTMCNC: 32.2 PG — SIGNIFICANT CHANGE UP (ref 27–34)
MCHC RBC-ENTMCNC: 32.3 GM/DL — SIGNIFICANT CHANGE UP (ref 32–36)
MCHC RBC-ENTMCNC: 32.3 GM/DL — SIGNIFICANT CHANGE UP (ref 32–36)
MCHC RBC-ENTMCNC: 32.3 PG — SIGNIFICANT CHANGE UP (ref 27–34)
MCHC RBC-ENTMCNC: 32.4 PG — SIGNIFICANT CHANGE UP (ref 27–34)
MCHC RBC-ENTMCNC: 32.5 GM/DL — SIGNIFICANT CHANGE UP (ref 32–36)
MCHC RBC-ENTMCNC: 32.5 GM/DL — SIGNIFICANT CHANGE UP (ref 32–36)
MCHC RBC-ENTMCNC: 32.5 PG — SIGNIFICANT CHANGE UP (ref 27–34)
MCHC RBC-ENTMCNC: 32.6 GM/DL — SIGNIFICANT CHANGE UP (ref 32–36)
MCHC RBC-ENTMCNC: 32.7 GM/DL — SIGNIFICANT CHANGE UP (ref 32–36)
MCHC RBC-ENTMCNC: 32.7 GM/DL — SIGNIFICANT CHANGE UP (ref 32–36)
MCHC RBC-ENTMCNC: 32.7 PG — SIGNIFICANT CHANGE UP (ref 27–34)
MCHC RBC-ENTMCNC: 32.7 PG — SIGNIFICANT CHANGE UP (ref 27–34)
MCHC RBC-ENTMCNC: 32.8 GM/DL — SIGNIFICANT CHANGE UP (ref 32–36)
MCHC RBC-ENTMCNC: 32.8 PG — SIGNIFICANT CHANGE UP (ref 27–34)
MCHC RBC-ENTMCNC: 32.8 PG — SIGNIFICANT CHANGE UP (ref 27–34)
MCHC RBC-ENTMCNC: 32.9 GM/DL — SIGNIFICANT CHANGE UP (ref 32–36)
MCHC RBC-ENTMCNC: 33 GM/DL — SIGNIFICANT CHANGE UP (ref 32–36)
MCHC RBC-ENTMCNC: 33.1 GM/DL — SIGNIFICANT CHANGE UP (ref 32–36)
MCHC RBC-ENTMCNC: 33.2 GM/DL — SIGNIFICANT CHANGE UP (ref 32–36)
MCHC RBC-ENTMCNC: 33.2 GM/DL — SIGNIFICANT CHANGE UP (ref 32–36)
MCHC RBC-ENTMCNC: 33.3 GM/DL — SIGNIFICANT CHANGE UP (ref 32–36)
MCHC RBC-ENTMCNC: 33.4 GM/DL — SIGNIFICANT CHANGE UP (ref 32–36)
MCHC RBC-ENTMCNC: 33.5 GM/DL — SIGNIFICANT CHANGE UP (ref 32–36)
MCHC RBC-ENTMCNC: 33.5 GM/DL — SIGNIFICANT CHANGE UP (ref 32–36)
MCHC RBC-ENTMCNC: 33.6 GM/DL — SIGNIFICANT CHANGE UP (ref 32–36)
MCHC RBC-ENTMCNC: 33.7 PG — SIGNIFICANT CHANGE UP (ref 27–34)
MCHC RBC-ENTMCNC: 34.5 GM/DL — SIGNIFICANT CHANGE UP (ref 32–36)
MCV RBC AUTO: 100 FL — SIGNIFICANT CHANGE UP (ref 80–100)
MCV RBC AUTO: 100.4 FL — HIGH (ref 80–100)
MCV RBC AUTO: 101.2 FL — HIGH (ref 80–100)
MCV RBC AUTO: 101.2 FL — HIGH (ref 80–100)
MCV RBC AUTO: 101.3 FL — HIGH (ref 80–100)
MCV RBC AUTO: 101.6 FL — HIGH (ref 80–100)
MCV RBC AUTO: 101.6 FL — HIGH (ref 80–100)
MCV RBC AUTO: 102.8 FL — HIGH (ref 80–100)
MCV RBC AUTO: 105 FL — HIGH (ref 80–100)
MCV RBC AUTO: 105.6 FL — HIGH (ref 80–100)
MCV RBC AUTO: 89.7 FL — SIGNIFICANT CHANGE UP (ref 80–100)
MCV RBC AUTO: 90.4 FL — SIGNIFICANT CHANGE UP (ref 80–100)
MCV RBC AUTO: 91.8 FL — SIGNIFICANT CHANGE UP (ref 80–100)
MCV RBC AUTO: 93 FL — SIGNIFICANT CHANGE UP (ref 80–100)
MCV RBC AUTO: 93.1 FL — SIGNIFICANT CHANGE UP (ref 80–100)
MCV RBC AUTO: 93.9 FL — SIGNIFICANT CHANGE UP (ref 80–100)
MCV RBC AUTO: 94.2 FL — SIGNIFICANT CHANGE UP (ref 80–100)
MCV RBC AUTO: 94.4 FL — SIGNIFICANT CHANGE UP (ref 80–100)
MCV RBC AUTO: 96.2 FL — SIGNIFICANT CHANGE UP (ref 80–100)
MCV RBC AUTO: 96.4 FL — SIGNIFICANT CHANGE UP (ref 80–100)
MCV RBC AUTO: 96.4 FL — SIGNIFICANT CHANGE UP (ref 80–100)
MCV RBC AUTO: 96.5 FL — SIGNIFICANT CHANGE UP (ref 80–100)
MCV RBC AUTO: 96.8 FL — SIGNIFICANT CHANGE UP (ref 80–100)
MCV RBC AUTO: 96.9 FL — SIGNIFICANT CHANGE UP (ref 80–100)
MCV RBC AUTO: 98 FL — SIGNIFICANT CHANGE UP (ref 80–100)
MCV RBC AUTO: 98.2 FL — SIGNIFICANT CHANGE UP (ref 80–100)
MCV RBC AUTO: 98.8 FL — SIGNIFICANT CHANGE UP (ref 80–100)
MCV RBC AUTO: 99.6 FL — SIGNIFICANT CHANGE UP (ref 80–100)
MELD SCORE WITH DIALYSIS: 18 POINTS — SIGNIFICANT CHANGE UP
MELD SCORE WITH DIALYSIS: 22 POINTS — SIGNIFICANT CHANGE UP
MELD SCORE WITH DIALYSIS: 23 POINTS — SIGNIFICANT CHANGE UP
MESOTHL CELL # FLD: 10 % — SIGNIFICANT CHANGE UP
MESOTHL CELL # FLD: 11 % — SIGNIFICANT CHANGE UP
MESOTHL CELL # FLD: 3 % — SIGNIFICANT CHANGE UP
MESOTHL CELL # FLD: 4 % — SIGNIFICANT CHANGE UP
METAMYELOCYTES # FLD: 2.7 % — HIGH (ref 0–0)
METHADONE UR-MCNC: NEGATIVE — SIGNIFICANT CHANGE UP
METHOD TYPE: SIGNIFICANT CHANGE UP
METHOD TYPE: SIGNIFICANT CHANGE UP
MITOCHONDRIA AB SER-ACNC: SIGNIFICANT CHANGE UP
MONOCYTES # BLD AUTO: 0.81 K/UL — SIGNIFICANT CHANGE UP (ref 0–0.9)
MONOCYTES # BLD AUTO: 1.46 K/UL — HIGH (ref 0–0.9)
MONOCYTES # BLD AUTO: 1.49 K/UL — HIGH (ref 0–0.9)
MONOCYTES # BLD AUTO: 1.5 K/UL — HIGH (ref 0–0.9)
MONOCYTES # BLD AUTO: 1.61 K/UL — HIGH (ref 0–0.9)
MONOCYTES # BLD AUTO: 1.65 K/UL — HIGH (ref 0–0.9)
MONOCYTES # BLD AUTO: 1.65 K/UL — HIGH (ref 0–0.9)
MONOCYTES # BLD AUTO: 2.13 K/UL — HIGH (ref 0–0.9)
MONOCYTES NFR BLD AUTO: 10.8 % — SIGNIFICANT CHANGE UP (ref 2–14)
MONOCYTES NFR BLD AUTO: 3 % — SIGNIFICANT CHANGE UP (ref 2–14)
MONOCYTES NFR BLD AUTO: 5.2 % — SIGNIFICANT CHANGE UP (ref 2–14)
MONOCYTES NFR BLD AUTO: 6.1 % — SIGNIFICANT CHANGE UP (ref 2–14)
MONOCYTES NFR BLD AUTO: 6.2 % — SIGNIFICANT CHANGE UP (ref 2–14)
MONOCYTES NFR BLD AUTO: 6.9 % — SIGNIFICANT CHANGE UP (ref 2–14)
MONOCYTES NFR BLD AUTO: 7.3 % — SIGNIFICANT CHANGE UP (ref 2–14)
MONOCYTES NFR BLD AUTO: 7.5 % — SIGNIFICANT CHANGE UP (ref 2–14)
MONOS+MACROS # FLD: 26 % — SIGNIFICANT CHANGE UP
MONOS+MACROS # FLD: 4 % — SIGNIFICANT CHANGE UP
MONOS+MACROS # FLD: 40 % — SIGNIFICANT CHANGE UP
MONOS+MACROS # FLD: 56 % — SIGNIFICANT CHANGE UP
MONOS+MACROS # FLD: 72 % — SIGNIFICANT CHANGE UP
MRSA PCR RESULT.: SIGNIFICANT CHANGE UP
MRSA PCR RESULT.: SIGNIFICANT CHANGE UP
MYELOCYTES NFR BLD: 1.8 % — HIGH (ref 0–0)
MYELOCYTES NFR BLD: 4.4 % — HIGH (ref 0–0)
NEUTROPHILS # BLD AUTO: 14.74 K/UL — HIGH (ref 1.8–7.4)
NEUTROPHILS # BLD AUTO: 15.69 K/UL — HIGH (ref 1.8–7.4)
NEUTROPHILS # BLD AUTO: 16.35 K/UL — HIGH (ref 1.8–7.4)
NEUTROPHILS # BLD AUTO: 17 K/UL — HIGH (ref 1.8–7.4)
NEUTROPHILS # BLD AUTO: 20.54 K/UL — HIGH (ref 1.8–7.4)
NEUTROPHILS # BLD AUTO: 23.99 K/UL — HIGH (ref 1.8–7.4)
NEUTROPHILS # BLD AUTO: 24.87 K/UL — HIGH (ref 1.8–7.4)
NEUTROPHILS # BLD AUTO: 26.93 K/UL — HIGH (ref 1.8–7.4)
NEUTROPHILS NFR BLD AUTO: 72.6 % — SIGNIFICANT CHANGE UP (ref 43–77)
NEUTROPHILS NFR BLD AUTO: 73.9 % — SIGNIFICANT CHANGE UP (ref 43–77)
NEUTROPHILS NFR BLD AUTO: 75.9 % — SIGNIFICANT CHANGE UP (ref 43–77)
NEUTROPHILS NFR BLD AUTO: 76.9 % — SIGNIFICANT CHANGE UP (ref 43–77)
NEUTROPHILS NFR BLD AUTO: 78.7 % — HIGH (ref 43–77)
NEUTROPHILS NFR BLD AUTO: 83.5 % — HIGH (ref 43–77)
NEUTROPHILS NFR BLD AUTO: 85.2 % — HIGH (ref 43–77)
NEUTROPHILS NFR BLD AUTO: 91 % — HIGH (ref 43–77)
NEUTS BAND # BLD: 3.5 % — SIGNIFICANT CHANGE UP (ref 0–8)
NEUTS BAND # BLD: 3.5 % — SIGNIFICANT CHANGE UP (ref 0–8)
NEUTS BAND # BLD: 4.4 % — SIGNIFICANT CHANGE UP (ref 0–8)
NEUTS BAND # BLD: 9 % — HIGH (ref 0–8)
NIGHT BLUE STAIN TISS: SIGNIFICANT CHANGE UP
NIGHT BLUE STAIN TISS: SIGNIFICANT CHANGE UP
NITRITE UR-MCNC: NEGATIVE — SIGNIFICANT CHANGE UP
NON-GYNECOLOGICAL CYTOLOGY STUDY: SIGNIFICANT CHANGE UP
NRBC # BLD: 0 /100 WBCS — SIGNIFICANT CHANGE UP (ref 0–0)
NRBC # BLD: 1 /100 WBCS — HIGH (ref 0–0)
NRBC # BLD: 3 /100 WBCS — HIGH (ref 0–0)
NRBC # BLD: 3 /100 WBCS — HIGH (ref 0–0)
NRBC # BLD: 4 /100 WBCS — HIGH (ref 0–0)
NRBC # BLD: 4 /100 WBCS — HIGH (ref 0–0)
NRBC # BLD: 5 /100 WBCS — HIGH (ref 0–0)
OPIATES UR-MCNC: NEGATIVE — SIGNIFICANT CHANGE UP
ORGANISM # SPEC MICROSCOPIC CNT: SIGNIFICANT CHANGE UP
OSMOLALITY SERPL: 287 MOSMOL/KG — SIGNIFICANT CHANGE UP (ref 275–300)
OTHER CELLS CSF MANUAL: 8 ML/DL — LOW (ref 18–22)
OVALOCYTES BLD QL SMEAR: SLIGHT — SIGNIFICANT CHANGE UP
OXYCODONE UR-MCNC: NEGATIVE — SIGNIFICANT CHANGE UP
P JIROVECII DNA L RESP QL NAA+NON-PROBE: POSITIVE
P-ANCA SER-ACNC: NEGATIVE — SIGNIFICANT CHANGE UP
PCO2 BLDA: 49 MMHG — HIGH (ref 32–46)
PCO2 BLDA: 52 MMHG — HIGH (ref 32–46)
PCO2 BLDA: 54 MMHG — HIGH (ref 32–46)
PCO2 BLDV: 38 MMHG — SIGNIFICANT CHANGE UP (ref 35–50)
PCP SPEC-MCNC: SIGNIFICANT CHANGE UP
PCP UR-MCNC: NEGATIVE — SIGNIFICANT CHANGE UP
PH BLDA: 7.32 — LOW (ref 7.35–7.45)
PH BLDA: 7.33 — LOW (ref 7.35–7.45)
PH BLDA: 7.34 — LOW (ref 7.35–7.45)
PH BLDV: 7.4 — SIGNIFICANT CHANGE UP (ref 7.35–7.45)
PH UR: 6 — SIGNIFICANT CHANGE UP (ref 5–8)
PH UR: 6 — SIGNIFICANT CHANGE UP (ref 5–8)
PH UR: 6.5 — SIGNIFICANT CHANGE UP (ref 5–8)
PH UR: 7.5 — SIGNIFICANT CHANGE UP (ref 5–8)
PHOSPHATE SERPL-MCNC: 1.7 MG/DL — LOW (ref 2.5–4.5)
PHOSPHATE SERPL-MCNC: 1.8 MG/DL — LOW (ref 2.5–4.5)
PHOSPHATE SERPL-MCNC: 2.1 MG/DL — LOW (ref 2.5–4.5)
PHOSPHATE SERPL-MCNC: 2.3 MG/DL — LOW (ref 2.5–4.5)
PHOSPHATE SERPL-MCNC: 2.4 MG/DL — LOW (ref 2.5–4.5)
PHOSPHATE SERPL-MCNC: 2.5 MG/DL — SIGNIFICANT CHANGE UP (ref 2.5–4.5)
PHOSPHATE SERPL-MCNC: 2.6 MG/DL — SIGNIFICANT CHANGE UP (ref 2.5–4.5)
PHOSPHATE SERPL-MCNC: 2.9 MG/DL — SIGNIFICANT CHANGE UP (ref 2.5–4.5)
PHOSPHATE SERPL-MCNC: 2.9 MG/DL — SIGNIFICANT CHANGE UP (ref 2.5–4.5)
PHOSPHATE SERPL-MCNC: 3 MG/DL — SIGNIFICANT CHANGE UP (ref 2.5–4.5)
PHOSPHATE SERPL-MCNC: 3.4 MG/DL — SIGNIFICANT CHANGE UP (ref 2.5–4.5)
PHOSPHATE SERPL-MCNC: 3.5 MG/DL — SIGNIFICANT CHANGE UP (ref 2.5–4.5)
PHOSPHATE SERPL-MCNC: 3.5 MG/DL — SIGNIFICANT CHANGE UP (ref 2.5–4.5)
PHOSPHATE SERPL-MCNC: 3.8 MG/DL — SIGNIFICANT CHANGE UP (ref 2.5–4.5)
PHOSPHATE SERPL-MCNC: 3.8 MG/DL — SIGNIFICANT CHANGE UP (ref 2.5–4.5)
PHOSPHATE SERPL-MCNC: 3.9 MG/DL — SIGNIFICANT CHANGE UP (ref 2.5–4.5)
PHOSPHATE SERPL-MCNC: 4 MG/DL — SIGNIFICANT CHANGE UP (ref 2.5–4.5)
PHOSPHATE SERPL-MCNC: 4 MG/DL — SIGNIFICANT CHANGE UP (ref 2.5–4.5)
PHOSPHATE SERPL-MCNC: 4.1 MG/DL — SIGNIFICANT CHANGE UP (ref 2.5–4.5)
PHOSPHATE SERPL-MCNC: 4.2 MG/DL — SIGNIFICANT CHANGE UP (ref 2.5–4.5)
PHOSPHATE SERPL-MCNC: 4.5 MG/DL — SIGNIFICANT CHANGE UP (ref 2.5–4.5)
PHOSPHATE SERPL-MCNC: 4.8 MG/DL — HIGH (ref 2.5–4.5)
PHOSPHATE SERPL-MCNC: 4.8 MG/DL — HIGH (ref 2.5–4.5)
PHOSPHATE SERPL-MCNC: 5.1 MG/DL — HIGH (ref 2.5–4.5)
PHOSPHATE SERPL-MCNC: 5.5 MG/DL — HIGH (ref 2.5–4.5)
PHOSPHATE SERPL-MCNC: 5.5 MG/DL — HIGH (ref 2.5–4.5)
PHOSPHATE SERPL-MCNC: 5.8 MG/DL — HIGH (ref 2.5–4.5)
PHOSPHATE SERPL-MCNC: 5.8 MG/DL — HIGH (ref 2.5–4.5)
PHOSPHATE SERPL-MCNC: 5.9 MG/DL — HIGH (ref 2.5–4.5)
PHOSPHATE SERPL-MCNC: 5.9 MG/DL — HIGH (ref 2.5–4.5)
PHOSPHATE SERPL-MCNC: 6 MG/DL — HIGH (ref 2.5–4.5)
PHOSPHATE SERPL-MCNC: 6.2 MG/DL — HIGH (ref 2.5–4.5)
PHOSPHATE SERPL-MCNC: 6.4 MG/DL — HIGH (ref 2.5–4.5)
PHOSPHATE SERPL-MCNC: 6.4 MG/DL — HIGH (ref 2.5–4.5)
PHOSPHATE SERPL-MCNC: 6.5 MG/DL — HIGH (ref 2.5–4.5)
PHOSPHATE SERPL-MCNC: 6.6 MG/DL — HIGH (ref 2.5–4.5)
PHOSPHATE SERPL-MCNC: 7 MG/DL — HIGH (ref 2.5–4.5)
PHOSPHATE SERPL-MCNC: 7.8 MG/DL — HIGH (ref 2.5–4.5)
PHOSPHATIDYLETHANOL (PETH) - RESULT: NEGATIVE NG/ML — SIGNIFICANT CHANGE UP
PLAT MORPH BLD: NORMAL — SIGNIFICANT CHANGE UP
PLATELET # BLD AUTO: 112 K/UL — LOW (ref 150–400)
PLATELET # BLD AUTO: 121 K/UL — LOW (ref 150–400)
PLATELET # BLD AUTO: 121 K/UL — LOW (ref 150–400)
PLATELET # BLD AUTO: 123 K/UL — LOW (ref 150–400)
PLATELET # BLD AUTO: 142 K/UL — LOW (ref 150–400)
PLATELET # BLD AUTO: 142 K/UL — LOW (ref 150–400)
PLATELET # BLD AUTO: 166 K/UL — SIGNIFICANT CHANGE UP (ref 150–400)
PLATELET # BLD AUTO: 219 K/UL — SIGNIFICANT CHANGE UP (ref 150–400)
PLATELET # BLD AUTO: 227 K/UL — SIGNIFICANT CHANGE UP (ref 150–400)
PLATELET # BLD AUTO: 231 K/UL — SIGNIFICANT CHANGE UP (ref 150–400)
PLATELET # BLD AUTO: 233 K/UL — SIGNIFICANT CHANGE UP (ref 150–400)
PLATELET # BLD AUTO: 245 K/UL — SIGNIFICANT CHANGE UP (ref 150–400)
PLATELET # BLD AUTO: 249 K/UL — SIGNIFICANT CHANGE UP (ref 150–400)
PLATELET # BLD AUTO: 255 K/UL — SIGNIFICANT CHANGE UP (ref 150–400)
PLATELET # BLD AUTO: 257 K/UL — SIGNIFICANT CHANGE UP (ref 150–400)
PLATELET # BLD AUTO: 268 K/UL — SIGNIFICANT CHANGE UP (ref 150–400)
PLATELET # BLD AUTO: 274 K/UL — SIGNIFICANT CHANGE UP (ref 150–400)
PLATELET # BLD AUTO: 280 K/UL — SIGNIFICANT CHANGE UP (ref 150–400)
PLATELET # BLD AUTO: 282 K/UL — SIGNIFICANT CHANGE UP (ref 150–400)
PLATELET # BLD AUTO: 295 K/UL — SIGNIFICANT CHANGE UP (ref 150–400)
PLATELET # BLD AUTO: 297 K/UL — SIGNIFICANT CHANGE UP (ref 150–400)
PLATELET # BLD AUTO: 307 K/UL — SIGNIFICANT CHANGE UP (ref 150–400)
PLATELET # BLD AUTO: 361 K/UL — SIGNIFICANT CHANGE UP (ref 150–400)
PLATELET # BLD AUTO: 66 K/UL — LOW (ref 150–400)
PLATELET # BLD AUTO: 73 K/UL — LOW (ref 150–400)
PLATELET # BLD AUTO: 76 K/UL — LOW (ref 150–400)
PLATELET # BLD AUTO: 77 K/UL — LOW (ref 150–400)
PLATELET # BLD AUTO: 90 K/UL — LOW (ref 150–400)
PLATELET # BLD AUTO: 93 K/UL — LOW (ref 150–400)
PLATELET # BLD AUTO: 96 K/UL — LOW (ref 150–400)
PO2 BLDA: 109 MMHG — HIGH (ref 74–108)
PO2 BLDA: 71 MMHG — LOW (ref 74–108)
PO2 BLDA: 90 MMHG — SIGNIFICANT CHANGE UP (ref 74–108)
PO2 BLDV: 62 MMHG — HIGH (ref 25–45)
POIKILOCYTOSIS BLD QL AUTO: SIGNIFICANT CHANGE UP
POIKILOCYTOSIS BLD QL AUTO: SLIGHT — SIGNIFICANT CHANGE UP
POLYCHROMASIA BLD QL SMEAR: SLIGHT — SIGNIFICANT CHANGE UP
POLYCHROMASIA BLD QL SMEAR: SLIGHT — SIGNIFICANT CHANGE UP
POTASSIUM BLDV-SCNC: 2.8 MMOL/L — CRITICAL LOW (ref 3.5–5.3)
POTASSIUM SERPL-MCNC: 2.8 MMOL/L — CRITICAL LOW (ref 3.5–5.3)
POTASSIUM SERPL-MCNC: 2.9 MMOL/L — CRITICAL LOW (ref 3.5–5.3)
POTASSIUM SERPL-MCNC: 3 MMOL/L — LOW (ref 3.5–5.3)
POTASSIUM SERPL-MCNC: 3 MMOL/L — LOW (ref 3.5–5.3)
POTASSIUM SERPL-MCNC: 3.2 MMOL/L — LOW (ref 3.5–5.3)
POTASSIUM SERPL-MCNC: 3.3 MMOL/L — LOW (ref 3.5–5.3)
POTASSIUM SERPL-MCNC: 3.3 MMOL/L — LOW (ref 3.5–5.3)
POTASSIUM SERPL-MCNC: 3.4 MMOL/L — LOW (ref 3.5–5.3)
POTASSIUM SERPL-MCNC: 3.5 MMOL/L — SIGNIFICANT CHANGE UP (ref 3.5–5.3)
POTASSIUM SERPL-MCNC: 3.6 MMOL/L — SIGNIFICANT CHANGE UP (ref 3.5–5.3)
POTASSIUM SERPL-MCNC: 3.7 MMOL/L — SIGNIFICANT CHANGE UP (ref 3.5–5.3)
POTASSIUM SERPL-MCNC: 3.8 MMOL/L — SIGNIFICANT CHANGE UP (ref 3.5–5.3)
POTASSIUM SERPL-MCNC: 3.9 MMOL/L — SIGNIFICANT CHANGE UP (ref 3.5–5.3)
POTASSIUM SERPL-MCNC: 4 MMOL/L — SIGNIFICANT CHANGE UP (ref 3.5–5.3)
POTASSIUM SERPL-MCNC: 4 MMOL/L — SIGNIFICANT CHANGE UP (ref 3.5–5.3)
POTASSIUM SERPL-MCNC: 4.1 MMOL/L — SIGNIFICANT CHANGE UP (ref 3.5–5.3)
POTASSIUM SERPL-MCNC: 4.1 MMOL/L — SIGNIFICANT CHANGE UP (ref 3.5–5.3)
POTASSIUM SERPL-MCNC: 4.2 MMOL/L — SIGNIFICANT CHANGE UP (ref 3.5–5.3)
POTASSIUM SERPL-MCNC: 4.3 MMOL/L — SIGNIFICANT CHANGE UP (ref 3.5–5.3)
POTASSIUM SERPL-MCNC: 4.5 MMOL/L — SIGNIFICANT CHANGE UP (ref 3.5–5.3)
POTASSIUM SERPL-MCNC: 4.5 MMOL/L — SIGNIFICANT CHANGE UP (ref 3.5–5.3)
POTASSIUM SERPL-MCNC: 4.7 MMOL/L — SIGNIFICANT CHANGE UP (ref 3.5–5.3)
POTASSIUM SERPL-SCNC: 2.8 MMOL/L — CRITICAL LOW (ref 3.5–5.3)
POTASSIUM SERPL-SCNC: 2.9 MMOL/L — CRITICAL LOW (ref 3.5–5.3)
POTASSIUM SERPL-SCNC: 3 MMOL/L — LOW (ref 3.5–5.3)
POTASSIUM SERPL-SCNC: 3 MMOL/L — LOW (ref 3.5–5.3)
POTASSIUM SERPL-SCNC: 3.2 MMOL/L — LOW (ref 3.5–5.3)
POTASSIUM SERPL-SCNC: 3.3 MMOL/L — LOW (ref 3.5–5.3)
POTASSIUM SERPL-SCNC: 3.3 MMOL/L — LOW (ref 3.5–5.3)
POTASSIUM SERPL-SCNC: 3.4 MMOL/L — LOW (ref 3.5–5.3)
POTASSIUM SERPL-SCNC: 3.5 MMOL/L — SIGNIFICANT CHANGE UP (ref 3.5–5.3)
POTASSIUM SERPL-SCNC: 3.6 MMOL/L — SIGNIFICANT CHANGE UP (ref 3.5–5.3)
POTASSIUM SERPL-SCNC: 3.7 MMOL/L — SIGNIFICANT CHANGE UP (ref 3.5–5.3)
POTASSIUM SERPL-SCNC: 3.8 MMOL/L — SIGNIFICANT CHANGE UP (ref 3.5–5.3)
POTASSIUM SERPL-SCNC: 3.9 MMOL/L — SIGNIFICANT CHANGE UP (ref 3.5–5.3)
POTASSIUM SERPL-SCNC: 4 MMOL/L — SIGNIFICANT CHANGE UP (ref 3.5–5.3)
POTASSIUM SERPL-SCNC: 4 MMOL/L — SIGNIFICANT CHANGE UP (ref 3.5–5.3)
POTASSIUM SERPL-SCNC: 4.1 MMOL/L — SIGNIFICANT CHANGE UP (ref 3.5–5.3)
POTASSIUM SERPL-SCNC: 4.1 MMOL/L — SIGNIFICANT CHANGE UP (ref 3.5–5.3)
POTASSIUM SERPL-SCNC: 4.2 MMOL/L — SIGNIFICANT CHANGE UP (ref 3.5–5.3)
POTASSIUM SERPL-SCNC: 4.3 MMOL/L — SIGNIFICANT CHANGE UP (ref 3.5–5.3)
POTASSIUM SERPL-SCNC: 4.5 MMOL/L — SIGNIFICANT CHANGE UP (ref 3.5–5.3)
POTASSIUM SERPL-SCNC: 4.5 MMOL/L — SIGNIFICANT CHANGE UP (ref 3.5–5.3)
POTASSIUM SERPL-SCNC: 4.7 MMOL/L — SIGNIFICANT CHANGE UP (ref 3.5–5.3)
POTASSIUM UR-SCNC: 56 MMOL/L — SIGNIFICANT CHANGE UP
PROCALCITONIN SERPL-MCNC: 0.6 NG/ML — HIGH (ref 0.02–0.1)
PROCALCITONIN SERPL-MCNC: 0.91 NG/ML — HIGH (ref 0.02–0.1)
PROT FLD-MCNC: 0.7 G/DL — SIGNIFICANT CHANGE UP
PROT FLD-MCNC: 0.8 G/DL — SIGNIFICANT CHANGE UP
PROT FLD-MCNC: 0.8 G/DL — SIGNIFICANT CHANGE UP
PROT FLD-MCNC: 1.9 G/DL — SIGNIFICANT CHANGE UP
PROT SERPL-MCNC: 5 G/DL — LOW (ref 6–8.3)
PROT SERPL-MCNC: 5.1 G/DL — LOW (ref 6–8.3)
PROT SERPL-MCNC: 5.2 G/DL — LOW (ref 6–8.3)
PROT SERPL-MCNC: 5.3 G/DL — LOW (ref 6–8.3)
PROT SERPL-MCNC: 5.5 G/DL — LOW (ref 6–8.3)
PROT SERPL-MCNC: 5.6 G/DL — LOW (ref 6–8.3)
PROT SERPL-MCNC: 5.7 G/DL — LOW (ref 6–8.3)
PROT SERPL-MCNC: 5.8 G/DL — LOW (ref 6–8.3)
PROT SERPL-MCNC: 5.9 G/DL — LOW (ref 6–8.3)
PROT SERPL-MCNC: 6 G/DL — SIGNIFICANT CHANGE UP (ref 6–8.3)
PROT SERPL-MCNC: 6.1 G/DL — SIGNIFICANT CHANGE UP (ref 6–8.3)
PROT SERPL-MCNC: 6.2 G/DL — SIGNIFICANT CHANGE UP (ref 6–8.3)
PROT SERPL-MCNC: 6.2 G/DL — SIGNIFICANT CHANGE UP (ref 6–8.3)
PROT UR-MCNC: ABNORMAL
PROT UR-MCNC: NEGATIVE — SIGNIFICANT CHANGE UP
PROTHROM AB SERPL-ACNC: 15.2 SEC — HIGH (ref 10.6–13.6)
PROTHROM AB SERPL-ACNC: 15.8 SEC — HIGH (ref 10.6–13.6)
PROTHROM AB SERPL-ACNC: 16.5 SEC — HIGH (ref 10.6–13.6)
PROTHROM AB SERPL-ACNC: 17.1 SEC — HIGH (ref 10.6–13.6)
PROTHROM AB SERPL-ACNC: 17.3 SEC — HIGH (ref 10.6–13.6)
PROTHROM AB SERPL-ACNC: 19.2 SEC — HIGH (ref 10.6–13.6)
PROTHROM AB SERPL-ACNC: 19.8 SEC — HIGH (ref 10.6–13.6)
PROTHROM AB SERPL-ACNC: 19.8 SEC — HIGH (ref 10.6–13.6)
PROTHROM AB SERPL-ACNC: 19.9 SEC — HIGH (ref 10.6–13.6)
PROTHROM AB SERPL-ACNC: 21.6 SEC — HIGH (ref 10.6–13.6)
PROTHROM AB SERPL-ACNC: 21.7 SEC — HIGH (ref 10.6–13.6)
PROTHROM AB SERPL-ACNC: 22.2 SEC — HIGH (ref 10.6–13.6)
PROTHROM AB SERPL-ACNC: 24.4 SEC — HIGH (ref 10.6–13.6)
PROTHROM AB SERPL-ACNC: 25.1 SEC — HIGH (ref 10.6–13.6)
PROTHROM AB SERPL-ACNC: 25.3 SEC — HIGH (ref 10.6–13.6)
PROTHROM AB SERPL-ACNC: 25.7 SEC — HIGH (ref 10.6–13.6)
PROTHROM AB SERPL-ACNC: 26.1 SEC — HIGH (ref 10.6–13.6)
PROTHROM AB SERPL-ACNC: 26.5 SEC — HIGH (ref 10.6–13.6)
PROTHROM AB SERPL-ACNC: 27.3 SEC — HIGH (ref 10.6–13.6)
PROTHROM AB SERPL-ACNC: 27.6 SEC — HIGH (ref 10.6–13.6)
PROTHROM AB SERPL-ACNC: 27.6 SEC — HIGH (ref 10.6–13.6)
PROTHROM AB SERPL-ACNC: 28.2 SEC — HIGH (ref 10.6–13.6)
PROTHROM AB SERPL-ACNC: 29.2 SEC — HIGH (ref 10.6–13.6)
PROTHROM AB SERPL-ACNC: 30.3 SEC — HIGH (ref 10.6–13.6)
PROTHROM AB SERPL-ACNC: 32.8 SEC — HIGH (ref 10.6–13.6)
PROTHROM AB SERPL-ACNC: 37 SEC — HIGH (ref 10.6–13.6)
QUANT TB PLUS MITOGEN MINUS NIL: 0.7 IU/ML — SIGNIFICANT CHANGE UP
RAPID RVP RESULT: SIGNIFICANT CHANGE UP
RBC # BLD: 2.04 M/UL — LOW (ref 4.2–5.8)
RBC # BLD: 2.14 M/UL — LOW (ref 4.2–5.8)
RBC # BLD: 2.17 M/UL — LOW (ref 4.2–5.8)
RBC # BLD: 2.32 M/UL — LOW (ref 4.2–5.8)
RBC # BLD: 2.37 M/UL — LOW (ref 4.2–5.8)
RBC # BLD: 2.38 M/UL — LOW (ref 4.2–5.8)
RBC # BLD: 2.41 M/UL — LOW (ref 4.2–5.8)
RBC # BLD: 2.43 M/UL — LOW (ref 4.2–5.8)
RBC # BLD: 2.43 M/UL — LOW (ref 4.2–5.8)
RBC # BLD: 2.44 M/UL — LOW (ref 4.2–5.8)
RBC # BLD: 2.45 M/UL — LOW (ref 4.2–5.8)
RBC # BLD: 2.45 M/UL — LOW (ref 4.2–5.8)
RBC # BLD: 2.46 M/UL — LOW (ref 4.2–5.8)
RBC # BLD: 2.46 M/UL — LOW (ref 4.2–5.8)
RBC # BLD: 2.51 M/UL — LOW (ref 4.2–5.8)
RBC # BLD: 2.51 M/UL — LOW (ref 4.2–5.8)
RBC # BLD: 2.54 M/UL — LOW (ref 4.2–5.8)
RBC # BLD: 2.57 M/UL — LOW (ref 4.2–5.8)
RBC # BLD: 2.59 M/UL — LOW (ref 4.2–5.8)
RBC # BLD: 2.61 M/UL — LOW (ref 4.2–5.8)
RBC # BLD: 2.63 M/UL — LOW (ref 4.2–5.8)
RBC # BLD: 2.83 M/UL — LOW (ref 4.2–5.8)
RBC # BLD: 3.07 M/UL — LOW (ref 4.2–5.8)
RBC # BLD: 3.38 M/UL — LOW (ref 4.2–5.8)
RBC # BLD: 3.44 M/UL — LOW (ref 4.2–5.8)
RBC # BLD: 3.45 M/UL — LOW (ref 4.2–5.8)
RBC # BLD: 3.49 M/UL — LOW (ref 4.2–5.8)
RBC # BLD: 3.53 M/UL — LOW (ref 4.2–5.8)
RBC # BLD: 3.53 M/UL — LOW (ref 4.2–5.8)
RBC # BLD: 3.55 M/UL — LOW (ref 4.2–5.8)
RBC # BLD: 3.78 M/UL — LOW (ref 4.2–5.8)
RBC # FLD: 15.7 % — HIGH (ref 10.3–14.5)
RBC # FLD: 15.9 % — HIGH (ref 10.3–14.5)
RBC # FLD: 15.9 % — HIGH (ref 10.3–14.5)
RBC # FLD: 16 % — HIGH (ref 10.3–14.5)
RBC # FLD: 16 % — HIGH (ref 10.3–14.5)
RBC # FLD: 16.1 % — HIGH (ref 10.3–14.5)
RBC # FLD: 16.1 % — HIGH (ref 10.3–14.5)
RBC # FLD: 16.2 % — HIGH (ref 10.3–14.5)
RBC # FLD: 16.5 % — HIGH (ref 10.3–14.5)
RBC # FLD: 16.7 % — HIGH (ref 10.3–14.5)
RBC # FLD: 16.9 % — HIGH (ref 10.3–14.5)
RBC # FLD: 17.8 % — HIGH (ref 10.3–14.5)
RBC # FLD: 19.1 % — HIGH (ref 10.3–14.5)
RBC # FLD: 19.2 % — HIGH (ref 10.3–14.5)
RBC # FLD: 19.5 % — HIGH (ref 10.3–14.5)
RBC # FLD: 19.6 % — HIGH (ref 10.3–14.5)
RBC # FLD: 19.8 % — HIGH (ref 10.3–14.5)
RBC # FLD: 20 % — HIGH (ref 10.3–14.5)
RBC # FLD: 20.3 % — HIGH (ref 10.3–14.5)
RBC # FLD: 20.4 % — HIGH (ref 10.3–14.5)
RBC # FLD: 20.5 % — HIGH (ref 10.3–14.5)
RBC # FLD: 20.5 % — HIGH (ref 10.3–14.5)
RBC # FLD: 20.6 % — HIGH (ref 10.3–14.5)
RBC # FLD: 20.7 % — HIGH (ref 10.3–14.5)
RBC # FLD: 21.2 % — HIGH (ref 10.3–14.5)
RBC # FLD: 21.4 % — HIGH (ref 10.3–14.5)
RBC # FLD: 21.4 % — HIGH (ref 10.3–14.5)
RBC # FLD: 21.6 % — HIGH (ref 10.3–14.5)
RBC BLD AUTO: ABNORMAL
RBC BLD AUTO: ABNORMAL
RBC BLD AUTO: SIGNIFICANT CHANGE UP
RBC BLD AUTO: SIGNIFICANT CHANGE UP
RBC CASTS # UR COMP ASSIST: 2 /HPF — SIGNIFICANT CHANGE UP (ref 0–4)
RCV VOL RI: 103 /UL — HIGH (ref 0–0)
RCV VOL RI: 133 /UL — HIGH (ref 0–0)
RCV VOL RI: 180 /UL — HIGH (ref 0–0)
RCV VOL RI: 223 /UL — HIGH (ref 0–0)
RCV VOL RI: 47 /UL — HIGH (ref 0–0)
RETICS #: 204.4 K/UL — HIGH (ref 25–125)
RETICS/RBC NFR: 5.8 % — HIGH (ref 0.5–2.5)
RH IG SCN BLD-IMP: POSITIVE — SIGNIFICANT CHANGE UP
RH IG SCN BLD-IMP: POSITIVE — SIGNIFICANT CHANGE UP
RHEUMATOID FACT SERPL-ACNC: <10 IU/ML — SIGNIFICANT CHANGE UP (ref 0–13)
S AUREUS DNA NOSE QL NAA+PROBE: SIGNIFICANT CHANGE UP
S AUREUS DNA NOSE QL NAA+PROBE: SIGNIFICANT CHANGE UP
SAO2 % BLDA: 91 % — LOW (ref 92–96)
SAO2 % BLDA: 95 % — SIGNIFICANT CHANGE UP (ref 92–96)
SAO2 % BLDA: 97 % — HIGH (ref 92–96)
SAO2 % BLDV: 89 % — HIGH (ref 67–88)
SARS-COV-2 IGG+IGM SERPL QL IA: >250 U/ML — HIGH
SARS-COV-2 IGG+IGM SERPL QL IA: >250 U/ML — HIGH
SARS-COV-2 IGG+IGM SERPL QL IA: POSITIVE
SARS-COV-2 IGG+IGM SERPL QL IA: POSITIVE
SARS-COV-2 RNA SPEC QL NAA+PROBE: SIGNIFICANT CHANGE UP
SCHISTOCYTES BLD QL AUTO: SLIGHT — SIGNIFICANT CHANGE UP
SMOOTH MUSCLE AB SER-ACNC: ABNORMAL
SODIUM SERPL-SCNC: 124 MMOL/L — LOW (ref 135–145)
SODIUM SERPL-SCNC: 125 MMOL/L — LOW (ref 135–145)
SODIUM SERPL-SCNC: 127 MMOL/L — LOW (ref 135–145)
SODIUM SERPL-SCNC: 129 MMOL/L — LOW (ref 135–145)
SODIUM SERPL-SCNC: 131 MMOL/L — LOW (ref 135–145)
SODIUM SERPL-SCNC: 131 MMOL/L — LOW (ref 135–145)
SODIUM SERPL-SCNC: 132 MMOL/L — LOW (ref 135–145)
SODIUM SERPL-SCNC: 133 MMOL/L — LOW (ref 135–145)
SODIUM SERPL-SCNC: 134 MMOL/L — LOW (ref 135–145)
SODIUM SERPL-SCNC: 135 MMOL/L — SIGNIFICANT CHANGE UP (ref 135–145)
SODIUM SERPL-SCNC: 136 MMOL/L — SIGNIFICANT CHANGE UP (ref 135–145)
SODIUM SERPL-SCNC: 137 MMOL/L — SIGNIFICANT CHANGE UP (ref 135–145)
SODIUM SERPL-SCNC: 138 MMOL/L — SIGNIFICANT CHANGE UP (ref 135–145)
SODIUM SERPL-SCNC: 139 MMOL/L — SIGNIFICANT CHANGE UP (ref 135–145)
SODIUM SERPL-SCNC: 140 MMOL/L — SIGNIFICANT CHANGE UP (ref 135–145)
SODIUM SERPL-SCNC: 141 MMOL/L — SIGNIFICANT CHANGE UP (ref 135–145)
SODIUM SERPL-SCNC: 143 MMOL/L — SIGNIFICANT CHANGE UP (ref 135–145)
SODIUM SERPL-SCNC: 143 MMOL/L — SIGNIFICANT CHANGE UP (ref 135–145)
SODIUM UR-SCNC: 20 MMOL/L — SIGNIFICANT CHANGE UP
SODIUM UR-SCNC: 8 MMOL/L — SIGNIFICANT CHANGE UP
SP GR SPEC: 1.01 — LOW (ref 1.01–1.02)
SP GR SPEC: 1.02 — SIGNIFICANT CHANGE UP (ref 1.01–1.02)
SP GR SPEC: 1.02 — SIGNIFICANT CHANGE UP (ref 1.01–1.02)
SP GR SPEC: 1.05 — HIGH (ref 1.01–1.02)
SPECIMEN SOURCE: SIGNIFICANT CHANGE UP
SPHEROCYTES BLD QL SMEAR: SLIGHT — SIGNIFICANT CHANGE UP
STRONGYLOIDES AB SER-ACNC: NEGATIVE — SIGNIFICANT CHANGE UP
T GONDII IGG SER QL: 54.9 IU/ML — HIGH
T GONDII IGG SER QL: POSITIVE
T GONDII IGM SER QL: <3 AU/ML — SIGNIFICANT CHANGE UP
T GONDII IGM SER QL: NEGATIVE — SIGNIFICANT CHANGE UP
TARGETS BLD QL SMEAR: SIGNIFICANT CHANGE UP
THC UR QL: NEGATIVE — SIGNIFICANT CHANGE UP
TIBC SERPL-MCNC: 132 UG/DL — LOW (ref 220–430)
TOTAL NUCLEATED CELL COUNT, BODY FLUID: 110 /UL — SIGNIFICANT CHANGE UP
TOTAL NUCLEATED CELL COUNT, BODY FLUID: 23 /UL — SIGNIFICANT CHANGE UP
TOTAL NUCLEATED CELL COUNT, BODY FLUID: 26 /UL — SIGNIFICANT CHANGE UP
TOTAL NUCLEATED CELL COUNT, BODY FLUID: 375 /UL — SIGNIFICANT CHANGE UP
TOTAL NUCLEATED CELL COUNT, BODY FLUID: 76 /UL — SIGNIFICANT CHANGE UP
TRIGL SERPL-MCNC: 305 MG/DL — HIGH
TUBE TYPE: SIGNIFICANT CHANGE UP
UIBC SERPL-MCNC: 67 UG/DL — LOW (ref 110–370)
URATE UR-MCNC: 66.9 MG/DL — SIGNIFICANT CHANGE UP
UROBILINOGEN FLD QL: NEGATIVE — SIGNIFICANT CHANGE UP
VANCOMYCIN TROUGH SERPL-MCNC: 18.4 UG/ML — SIGNIFICANT CHANGE UP (ref 10–20)
VANCOMYCIN TROUGH SERPL-MCNC: 22.7 UG/ML — HIGH (ref 10–20)
VARIANT LYMPHS # BLD: 1.8 % — SIGNIFICANT CHANGE UP (ref 0–6)
VIT B12 SERPL-MCNC: 1146 PG/ML — SIGNIFICANT CHANGE UP (ref 232–1245)
VIT B12 SERPL-MCNC: 1675 PG/ML — HIGH (ref 232–1245)
WBC # BLD: 19.38 K/UL — HIGH (ref 3.8–10.5)
WBC # BLD: 19.41 K/UL — HIGH (ref 3.8–10.5)
WBC # BLD: 19.72 K/UL — HIGH (ref 3.8–10.5)
WBC # BLD: 20.24 K/UL — HIGH (ref 3.8–10.5)
WBC # BLD: 20.41 K/UL — HIGH (ref 3.8–10.5)
WBC # BLD: 20.42 K/UL — HIGH (ref 3.8–10.5)
WBC # BLD: 21.59 K/UL — HIGH (ref 3.8–10.5)
WBC # BLD: 24.17 K/UL — HIGH (ref 3.8–10.5)
WBC # BLD: 24.58 K/UL — HIGH (ref 3.8–10.5)
WBC # BLD: 25.2 K/UL — HIGH (ref 3.8–10.5)
WBC # BLD: 25.97 K/UL — HIGH (ref 3.8–10.5)
WBC # BLD: 26.09 K/UL — HIGH (ref 3.8–10.5)
WBC # BLD: 26.27 K/UL — HIGH (ref 3.8–10.5)
WBC # BLD: 26.53 K/UL — HIGH (ref 3.8–10.5)
WBC # BLD: 26.68 K/UL — HIGH (ref 3.8–10.5)
WBC # BLD: 27.03 K/UL — HIGH (ref 3.8–10.5)
WBC # BLD: 27.05 K/UL — HIGH (ref 3.8–10.5)
WBC # BLD: 27.47 K/UL — HIGH (ref 3.8–10.5)
WBC # BLD: 30.95 K/UL — HIGH (ref 3.8–10.5)
WBC # BLD: 33.08 K/UL — HIGH (ref 3.8–10.5)
WBC # BLD: 33.84 K/UL — HIGH (ref 3.8–10.5)
WBC # BLD: 35.1 K/UL — HIGH (ref 3.8–10.5)
WBC # BLD: 37.64 K/UL — HIGH (ref 3.8–10.5)
WBC # BLD: 42.16 K/UL — CRITICAL HIGH (ref 3.8–10.5)
WBC # BLD: 44.88 K/UL — CRITICAL HIGH (ref 3.8–10.5)
WBC # BLD: 63.98 K/UL — CRITICAL HIGH (ref 3.8–10.5)
WBC # BLD: 64.2 K/UL — CRITICAL HIGH (ref 3.8–10.5)
WBC # BLD: 65.28 K/UL — CRITICAL HIGH (ref 3.8–10.5)
WBC # BLD: 65.8 K/UL — CRITICAL HIGH (ref 3.8–10.5)
WBC # BLD: 68.98 K/UL — CRITICAL HIGH (ref 3.8–10.5)
WBC # FLD AUTO: 19.38 K/UL — HIGH (ref 3.8–10.5)
WBC # FLD AUTO: 19.41 K/UL — HIGH (ref 3.8–10.5)
WBC # FLD AUTO: 19.72 K/UL — HIGH (ref 3.8–10.5)
WBC # FLD AUTO: 20.24 K/UL — HIGH (ref 3.8–10.5)
WBC # FLD AUTO: 20.41 K/UL — HIGH (ref 3.8–10.5)
WBC # FLD AUTO: 20.42 K/UL — HIGH (ref 3.8–10.5)
WBC # FLD AUTO: 21.59 K/UL — HIGH (ref 3.8–10.5)
WBC # FLD AUTO: 24.17 K/UL — HIGH (ref 3.8–10.5)
WBC # FLD AUTO: 24.58 K/UL — HIGH (ref 3.8–10.5)
WBC # FLD AUTO: 25.2 K/UL — HIGH (ref 3.8–10.5)
WBC # FLD AUTO: 25.97 K/UL — HIGH (ref 3.8–10.5)
WBC # FLD AUTO: 26.09 K/UL — HIGH (ref 3.8–10.5)
WBC # FLD AUTO: 26.27 K/UL — HIGH (ref 3.8–10.5)
WBC # FLD AUTO: 26.53 K/UL — HIGH (ref 3.8–10.5)
WBC # FLD AUTO: 26.68 K/UL — HIGH (ref 3.8–10.5)
WBC # FLD AUTO: 27.03 K/UL — HIGH (ref 3.8–10.5)
WBC # FLD AUTO: 27.05 K/UL — HIGH (ref 3.8–10.5)
WBC # FLD AUTO: 27.47 K/UL — HIGH (ref 3.8–10.5)
WBC # FLD AUTO: 30.95 K/UL — HIGH (ref 3.8–10.5)
WBC # FLD AUTO: 33.08 K/UL — HIGH (ref 3.8–10.5)
WBC # FLD AUTO: 33.84 K/UL — HIGH (ref 3.8–10.5)
WBC # FLD AUTO: 35.1 K/UL — HIGH (ref 3.8–10.5)
WBC # FLD AUTO: 37.64 K/UL — HIGH (ref 3.8–10.5)
WBC # FLD AUTO: 42.16 K/UL — CRITICAL HIGH (ref 3.8–10.5)
WBC # FLD AUTO: 44.88 K/UL — CRITICAL HIGH (ref 3.8–10.5)
WBC # FLD AUTO: 63.98 K/UL — CRITICAL HIGH (ref 3.8–10.5)
WBC # FLD AUTO: 64.2 K/UL — CRITICAL HIGH (ref 3.8–10.5)
WBC # FLD AUTO: 65.28 K/UL — CRITICAL HIGH (ref 3.8–10.5)
WBC # FLD AUTO: 65.8 K/UL — CRITICAL HIGH (ref 3.8–10.5)
WBC # FLD AUTO: 68.98 K/UL — CRITICAL HIGH (ref 3.8–10.5)
WBC UR QL: 0 /HPF — SIGNIFICANT CHANGE UP (ref 0–5)

## 2021-01-01 PROCEDURE — 99233 SBSQ HOSP IP/OBS HIGH 50: CPT

## 2021-01-01 PROCEDURE — 99233 SBSQ HOSP IP/OBS HIGH 50: CPT | Mod: GC

## 2021-01-01 PROCEDURE — 85610 PROTHROMBIN TIME: CPT

## 2021-01-01 PROCEDURE — 99232 SBSQ HOSP IP/OBS MODERATE 35: CPT

## 2021-01-01 PROCEDURE — 82945 GLUCOSE OTHER FLUID: CPT

## 2021-01-01 PROCEDURE — 76705 ECHO EXAM OF ABDOMEN: CPT

## 2021-01-01 PROCEDURE — 99232 SBSQ HOSP IP/OBS MODERATE 35: CPT | Mod: GC

## 2021-01-01 PROCEDURE — 74183 MRI ABD W/O CNTR FLWD CNTR: CPT

## 2021-01-01 PROCEDURE — 99239 HOSP IP/OBS DSCHRG MGMT >30: CPT

## 2021-01-01 PROCEDURE — 99291 CRITICAL CARE FIRST HOUR: CPT | Mod: 25

## 2021-01-01 PROCEDURE — 93010 ELECTROCARDIOGRAM REPORT: CPT

## 2021-01-01 PROCEDURE — 87040 BLOOD CULTURE FOR BACTERIA: CPT

## 2021-01-01 PROCEDURE — 86706 HEP B SURFACE ANTIBODY: CPT

## 2021-01-01 PROCEDURE — 86704 HEP B CORE ANTIBODY TOTAL: CPT

## 2021-01-01 PROCEDURE — 99223 1ST HOSP IP/OBS HIGH 75: CPT

## 2021-01-01 PROCEDURE — 73630 X-RAY EXAM OF FOOT: CPT | Mod: 26,LT

## 2021-01-01 PROCEDURE — 83690 ASSAY OF LIPASE: CPT

## 2021-01-01 PROCEDURE — 81001 URINALYSIS AUTO W/SCOPE: CPT

## 2021-01-01 PROCEDURE — 86038 ANTINUCLEAR ANTIBODIES: CPT

## 2021-01-01 PROCEDURE — 99291 CRITICAL CARE FIRST HOUR: CPT

## 2021-01-01 PROCEDURE — 87075 CULTR BACTERIA EXCEPT BLOOD: CPT

## 2021-01-01 PROCEDURE — 99285 EMERGENCY DEPT VISIT HI MDM: CPT | Mod: 25

## 2021-01-01 PROCEDURE — U0005: CPT

## 2021-01-01 PROCEDURE — 83930 ASSAY OF BLOOD OSMOLALITY: CPT

## 2021-01-01 PROCEDURE — 82746 ASSAY OF FOLIC ACID SERUM: CPT

## 2021-01-01 PROCEDURE — 82248 BILIRUBIN DIRECT: CPT

## 2021-01-01 PROCEDURE — 76604 US EXAM CHEST: CPT | Mod: 26,GC

## 2021-01-01 PROCEDURE — 88305 TISSUE EXAM BY PATHOLOGIST: CPT | Mod: 26

## 2021-01-01 PROCEDURE — G1004: CPT

## 2021-01-01 PROCEDURE — 86664 EPSTEIN-BARR NUCLEAR ANTIGEN: CPT

## 2021-01-01 PROCEDURE — 86376 MICROSOMAL ANTIBODY EACH: CPT

## 2021-01-01 PROCEDURE — 43753 TX GASTRO INTUB W/ASP: CPT | Mod: GC

## 2021-01-01 PROCEDURE — 99254 IP/OBS CNSLTJ NEW/EST MOD 60: CPT | Mod: GC

## 2021-01-01 PROCEDURE — 36800 INSERTION OF CANNULA: CPT | Mod: GC,59

## 2021-01-01 PROCEDURE — 87205 SMEAR GRAM STAIN: CPT

## 2021-01-01 PROCEDURE — C1729: CPT

## 2021-01-01 PROCEDURE — 93971 EXTREMITY STUDY: CPT | Mod: 26,LT

## 2021-01-01 PROCEDURE — 74177 CT ABD & PELVIS W/CONTRAST: CPT

## 2021-01-01 PROCEDURE — 86703 HIV-1/HIV-2 1 RESULT ANTBDY: CPT

## 2021-01-01 PROCEDURE — 87102 FUNGUS ISOLATION CULTURE: CPT

## 2021-01-01 PROCEDURE — 96374 THER/PROPH/DIAG INJ IV PUSH: CPT | Mod: XU

## 2021-01-01 PROCEDURE — 85730 THROMBOPLASTIN TIME PARTIAL: CPT

## 2021-01-01 PROCEDURE — 88112 CYTOPATH CELL ENHANCE TECH: CPT | Mod: 26

## 2021-01-01 PROCEDURE — 76705 ECHO EXAM OF ABDOMEN: CPT | Mod: 26,RT

## 2021-01-01 PROCEDURE — 49082 ABD PARACENTESIS: CPT

## 2021-01-01 PROCEDURE — 74018 RADEX ABDOMEN 1 VIEW: CPT | Mod: 26

## 2021-01-01 PROCEDURE — 84295 ASSAY OF SERUM SODIUM: CPT

## 2021-01-01 PROCEDURE — 84100 ASSAY OF PHOSPHORUS: CPT

## 2021-01-01 PROCEDURE — 84157 ASSAY OF PROTEIN OTHER: CPT

## 2021-01-01 PROCEDURE — 85025 COMPLETE CBC W/AUTO DIFF WBC: CPT

## 2021-01-01 PROCEDURE — 99223 1ST HOSP IP/OBS HIGH 75: CPT | Mod: GC

## 2021-01-01 PROCEDURE — 82784 ASSAY IGA/IGD/IGG/IGM EACH: CPT

## 2021-01-01 PROCEDURE — 71045 X-RAY EXAM CHEST 1 VIEW: CPT | Mod: 26

## 2021-01-01 PROCEDURE — 49083 ABD PARACENTESIS W/IMAGING: CPT

## 2021-01-01 PROCEDURE — 36620 INSERTION CATHETER ARTERY: CPT | Mod: GC

## 2021-01-01 PROCEDURE — 93970 EXTREMITY STUDY: CPT | Mod: 26

## 2021-01-01 PROCEDURE — 86255 FLUORESCENT ANTIBODY SCREEN: CPT

## 2021-01-01 PROCEDURE — 82728 ASSAY OF FERRITIN: CPT

## 2021-01-01 PROCEDURE — 87086 URINE CULTURE/COLONY COUNT: CPT

## 2021-01-01 PROCEDURE — A9585: CPT

## 2021-01-01 PROCEDURE — 36620 INSERTION CATHETER ARTERY: CPT | Mod: GC,59

## 2021-01-01 PROCEDURE — 85045 AUTOMATED RETICULOCYTE COUNT: CPT

## 2021-01-01 PROCEDURE — 83540 ASSAY OF IRON: CPT

## 2021-01-01 PROCEDURE — 88305 TISSUE EXAM BY PATHOLOGIST: CPT

## 2021-01-01 PROCEDURE — 93010 ELECTROCARDIOGRAM REPORT: CPT | Mod: 59

## 2021-01-01 PROCEDURE — 74177 CT ABD & PELVIS W/CONTRAST: CPT | Mod: 26,MG

## 2021-01-01 PROCEDURE — 80307 DRUG TEST PRSMV CHEM ANLYZR: CPT

## 2021-01-01 PROCEDURE — 86769 SARS-COV-2 COVID-19 ANTIBODY: CPT

## 2021-01-01 PROCEDURE — 93306 TTE W/DOPPLER COMPLETE: CPT | Mod: 26

## 2021-01-01 PROCEDURE — 83735 ASSAY OF MAGNESIUM: CPT

## 2021-01-01 PROCEDURE — 71045 X-RAY EXAM CHEST 1 VIEW: CPT | Mod: 26,77

## 2021-01-01 PROCEDURE — 86381 MITOCHONDRIAL ANTIBODY EACH: CPT

## 2021-01-01 PROCEDURE — 93976 VASCULAR STUDY: CPT | Mod: 26

## 2021-01-01 PROCEDURE — 99255 IP/OBS CONSLTJ NEW/EST HI 80: CPT

## 2021-01-01 PROCEDURE — 89051 BODY FLUID CELL COUNT: CPT

## 2021-01-01 PROCEDURE — 80053 COMPREHEN METABOLIC PANEL: CPT

## 2021-01-01 PROCEDURE — 76705 ECHO EXAM OF ABDOMEN: CPT | Mod: 26,GC

## 2021-01-01 PROCEDURE — 82390 ASSAY OF CERULOPLASMIN: CPT

## 2021-01-01 PROCEDURE — 86663 EPSTEIN-BARR ANTIBODY: CPT

## 2021-01-01 PROCEDURE — 74177 CT ABD & PELVIS W/CONTRAST: CPT | Mod: 26

## 2021-01-01 PROCEDURE — 71275 CT ANGIOGRAPHY CHEST: CPT | Mod: 26

## 2021-01-01 PROCEDURE — U0003: CPT

## 2021-01-01 PROCEDURE — 49083 ABD PARACENTESIS W/IMAGING: CPT | Mod: GC

## 2021-01-01 PROCEDURE — 99253 IP/OBS CNSLTJ NEW/EST LOW 45: CPT | Mod: GC

## 2021-01-01 PROCEDURE — 85027 COMPLETE CBC AUTOMATED: CPT

## 2021-01-01 PROCEDURE — 86665 EPSTEIN-BARR CAPSID VCA: CPT

## 2021-01-01 PROCEDURE — 88112 CYTOPATH CELL ENHANCE TECH: CPT

## 2021-01-01 PROCEDURE — 82103 ALPHA-1-ANTITRYPSIN TOTAL: CPT

## 2021-01-01 PROCEDURE — 93308 TTE F-UP OR LMTD: CPT | Mod: 26,GC

## 2021-01-01 PROCEDURE — 80048 BASIC METABOLIC PNL TOTAL CA: CPT

## 2021-01-01 PROCEDURE — 87070 CULTURE OTHR SPECIMN AEROBIC: CPT

## 2021-01-01 PROCEDURE — 82247 BILIRUBIN TOTAL: CPT

## 2021-01-01 PROCEDURE — 80074 ACUTE HEPATITIS PANEL: CPT

## 2021-01-01 PROCEDURE — 82565 ASSAY OF CREATININE: CPT

## 2021-01-01 PROCEDURE — 71045 X-RAY EXAM CHEST 1 VIEW: CPT

## 2021-01-01 PROCEDURE — 99285 EMERGENCY DEPT VISIT HI MDM: CPT

## 2021-01-01 PROCEDURE — 31624 DX BRONCHOSCOPE/LAVAGE: CPT | Mod: GC

## 2021-01-01 PROCEDURE — 83615 LACTATE (LD) (LDH) ENZYME: CPT

## 2021-01-01 PROCEDURE — 71045 X-RAY EXAM CHEST 1 VIEW: CPT | Mod: 26,76

## 2021-01-01 PROCEDURE — 76705 ECHO EXAM OF ABDOMEN: CPT | Mod: 26

## 2021-01-01 PROCEDURE — 82607 VITAMIN B-12: CPT

## 2021-01-01 PROCEDURE — 74183 MRI ABD W/O CNTR FLWD CNTR: CPT | Mod: 26

## 2021-01-01 PROCEDURE — 76705 ECHO EXAM OF ABDOMEN: CPT | Mod: 26,59

## 2021-01-01 PROCEDURE — 83550 IRON BINDING TEST: CPT

## 2021-01-01 PROCEDURE — P9047: CPT

## 2021-01-01 PROCEDURE — 82042 OTHER SOURCE ALBUMIN QUAN EA: CPT

## 2021-01-01 RX ORDER — LACTULOSE 10 G/15ML
30 SOLUTION ORAL EVERY 6 HOURS
Refills: 0 | Status: DISCONTINUED | OUTPATIENT
Start: 2021-01-01 | End: 2021-01-01

## 2021-01-01 RX ORDER — DAPTOMYCIN 500 MG/10ML
600 INJECTION, POWDER, LYOPHILIZED, FOR SOLUTION INTRAVENOUS
Refills: 0 | Status: DISCONTINUED | OUTPATIENT
Start: 2021-01-01 | End: 2021-01-01

## 2021-01-01 RX ORDER — PHYTONADIONE (VIT K1) 5 MG
5 TABLET ORAL ONCE
Refills: 0 | Status: COMPLETED | OUTPATIENT
Start: 2021-01-01 | End: 2021-01-01

## 2021-01-01 RX ORDER — MEROPENEM 1 G/30ML
INJECTION INTRAVENOUS
Refills: 0 | Status: DISCONTINUED | OUTPATIENT
Start: 2021-01-01 | End: 2021-01-01

## 2021-01-01 RX ORDER — THIAMINE MONONITRATE (VIT B1) 100 MG
100 TABLET ORAL DAILY
Refills: 0 | Status: DISCONTINUED | OUTPATIENT
Start: 2021-01-01 | End: 2021-01-01

## 2021-01-01 RX ORDER — CHOLESTYRAMINE 4 G/9G
4 POWDER, FOR SUSPENSION ORAL
Refills: 0 | Status: DISCONTINUED | OUTPATIENT
Start: 2021-01-01 | End: 2021-01-01

## 2021-01-01 RX ORDER — DIPHENHYDRAMINE HCL 50 MG
25 CAPSULE ORAL ONCE
Refills: 0 | Status: DISCONTINUED | OUTPATIENT
Start: 2021-01-01 | End: 2021-01-01

## 2021-01-01 RX ORDER — KETAMINE HYDROCHLORIDE 100 MG/ML
70 INJECTION INTRAMUSCULAR; INTRAVENOUS ONCE
Refills: 0 | Status: DISCONTINUED | OUTPATIENT
Start: 2021-01-01 | End: 2021-01-01

## 2021-01-01 RX ORDER — IBUPROFEN 200 MG
600 TABLET ORAL ONCE
Refills: 0 | Status: COMPLETED | OUTPATIENT
Start: 2021-01-01 | End: 2021-01-01

## 2021-01-01 RX ORDER — DIPHENHYDRAMINE HCL 50 MG
25 CAPSULE ORAL ONCE
Refills: 0 | Status: COMPLETED | OUTPATIENT
Start: 2021-01-01 | End: 2021-01-01

## 2021-01-01 RX ORDER — VANCOMYCIN HCL 1 G
750 VIAL (EA) INTRAVENOUS ONCE
Refills: 0 | Status: COMPLETED | OUTPATIENT
Start: 2021-01-01 | End: 2021-01-01

## 2021-01-01 RX ORDER — HEPARIN SODIUM 5000 [USP'U]/ML
5000 INJECTION INTRAVENOUS; SUBCUTANEOUS EVERY 8 HOURS
Refills: 0 | Status: DISCONTINUED | OUTPATIENT
Start: 2021-01-01 | End: 2021-01-01

## 2021-01-01 RX ORDER — MIDAZOLAM HYDROCHLORIDE 1 MG/ML
0.02 INJECTION, SOLUTION INTRAMUSCULAR; INTRAVENOUS
Qty: 100 | Refills: 0 | Status: DISCONTINUED | OUTPATIENT
Start: 2021-01-01 | End: 2021-01-01

## 2021-01-01 RX ORDER — DEXTROSE 50 % IN WATER 50 %
25 SYRINGE (ML) INTRAVENOUS ONCE
Refills: 0 | Status: COMPLETED | OUTPATIENT
Start: 2021-01-01 | End: 2021-01-01

## 2021-01-01 RX ORDER — FUROSEMIDE 40 MG
40 TABLET ORAL DAILY
Refills: 0 | Status: DISCONTINUED | OUTPATIENT
Start: 2021-01-01 | End: 2021-01-01

## 2021-01-01 RX ORDER — CEFTRIAXONE 500 MG/1
2000 INJECTION, POWDER, FOR SOLUTION INTRAMUSCULAR; INTRAVENOUS ONCE
Refills: 0 | Status: COMPLETED | OUTPATIENT
Start: 2021-01-01 | End: 2021-01-01

## 2021-01-01 RX ORDER — CASPOFUNGIN ACETATE 7 MG/ML
50 INJECTION, POWDER, LYOPHILIZED, FOR SOLUTION INTRAVENOUS EVERY 24 HOURS
Refills: 0 | Status: DISCONTINUED | OUTPATIENT
Start: 2021-01-01 | End: 2021-01-01

## 2021-01-01 RX ORDER — CALCIUM GLUCONATE 100 MG/ML
1 VIAL (ML) INTRAVENOUS ONCE
Refills: 0 | Status: COMPLETED | OUTPATIENT
Start: 2021-01-01 | End: 2021-01-01

## 2021-01-01 RX ORDER — ALBUMIN HUMAN 25 %
50 VIAL (ML) INTRAVENOUS
Refills: 0 | Status: COMPLETED | OUTPATIENT
Start: 2021-01-01 | End: 2021-01-01

## 2021-01-01 RX ORDER — ALBUMIN HUMAN 25 %
50 VIAL (ML) INTRAVENOUS ONCE
Refills: 0 | Status: COMPLETED | OUTPATIENT
Start: 2021-01-01 | End: 2021-01-01

## 2021-01-01 RX ORDER — MEROPENEM 1 G/30ML
1000 INJECTION INTRAVENOUS ONCE
Refills: 0 | Status: COMPLETED | OUTPATIENT
Start: 2021-01-01 | End: 2021-01-01

## 2021-01-01 RX ORDER — BUMETANIDE 0.25 MG/ML
2 INJECTION INTRAMUSCULAR; INTRAVENOUS ONCE
Refills: 0 | Status: COMPLETED | OUTPATIENT
Start: 2021-01-01 | End: 2021-01-01

## 2021-01-01 RX ORDER — PANTOPRAZOLE SODIUM 20 MG/1
40 TABLET, DELAYED RELEASE ORAL DAILY
Refills: 0 | Status: DISCONTINUED | OUTPATIENT
Start: 2021-01-01 | End: 2021-01-01

## 2021-01-01 RX ORDER — LACTULOSE 10 G/15ML
30 SOLUTION ORAL EVERY 12 HOURS
Refills: 0 | Status: DISCONTINUED | OUTPATIENT
Start: 2021-01-01 | End: 2021-01-01

## 2021-01-01 RX ORDER — VASOPRESSIN 20 [USP'U]/ML
0.04 INJECTION INTRAVENOUS
Qty: 50 | Refills: 0 | Status: DISCONTINUED | OUTPATIENT
Start: 2021-01-01 | End: 2021-01-01

## 2021-01-01 RX ORDER — AZITHROMYCIN 500 MG/1
500 TABLET, FILM COATED ORAL EVERY 24 HOURS
Refills: 0 | Status: DISCONTINUED | OUTPATIENT
Start: 2021-01-01 | End: 2021-01-01

## 2021-01-01 RX ORDER — SODIUM,POTASSIUM PHOSPHATES 278-250MG
2 POWDER IN PACKET (EA) ORAL ONCE
Refills: 0 | Status: COMPLETED | OUTPATIENT
Start: 2021-01-01 | End: 2021-01-01

## 2021-01-01 RX ORDER — POTASSIUM CHLORIDE 20 MEQ
40 PACKET (EA) ORAL ONCE
Refills: 0 | Status: COMPLETED | OUTPATIENT
Start: 2021-01-01 | End: 2021-01-01

## 2021-01-01 RX ORDER — POTASSIUM CHLORIDE 20 MEQ
10 PACKET (EA) ORAL
Refills: 0 | Status: COMPLETED | OUTPATIENT
Start: 2021-01-01 | End: 2021-01-01

## 2021-01-01 RX ORDER — MEROPENEM 1 G/30ML
500 INJECTION INTRAVENOUS EVERY 8 HOURS
Refills: 0 | Status: DISCONTINUED | OUTPATIENT
Start: 2021-01-01 | End: 2021-01-01

## 2021-01-01 RX ORDER — HYDROCORTISONE 20 MG
100 TABLET ORAL ONCE
Refills: 0 | Status: DISCONTINUED | OUTPATIENT
Start: 2021-01-01 | End: 2021-01-01

## 2021-01-01 RX ORDER — DIPHENHYDRAMINE HCL 50 MG
50 CAPSULE ORAL AT BEDTIME
Refills: 0 | Status: DISCONTINUED | OUTPATIENT
Start: 2021-01-01 | End: 2021-01-01

## 2021-01-01 RX ORDER — MEROPENEM 1 G/30ML
1000 INJECTION INTRAVENOUS EVERY 8 HOURS
Refills: 0 | Status: DISCONTINUED | OUTPATIENT
Start: 2021-01-01 | End: 2021-01-01

## 2021-01-01 RX ORDER — SODIUM,POTASSIUM PHOSPHATES 278-250MG
1 POWDER IN PACKET (EA) ORAL ONCE
Refills: 0 | Status: DISCONTINUED | OUTPATIENT
Start: 2021-01-01 | End: 2021-01-01

## 2021-01-01 RX ORDER — INSULIN HUMAN 100 [IU]/ML
6 INJECTION, SOLUTION SUBCUTANEOUS ONCE
Refills: 0 | Status: COMPLETED | OUTPATIENT
Start: 2021-01-01 | End: 2021-01-01

## 2021-01-01 RX ORDER — INSULIN LISPRO 100/ML
8 VIAL (ML) SUBCUTANEOUS ONCE
Refills: 0 | Status: DISCONTINUED | OUTPATIENT
Start: 2021-01-01 | End: 2021-01-01

## 2021-01-01 RX ORDER — POTASSIUM CHLORIDE 20 MEQ
40 PACKET (EA) ORAL EVERY 4 HOURS
Refills: 0 | Status: COMPLETED | OUTPATIENT
Start: 2021-01-01 | End: 2021-01-01

## 2021-01-01 RX ORDER — ALBUMIN HUMAN 25 %
500 VIAL (ML) INTRAVENOUS ONCE
Refills: 0 | Status: COMPLETED | OUTPATIENT
Start: 2021-01-01 | End: 2021-01-01

## 2021-01-01 RX ORDER — SODIUM BICARBONATE 1 MEQ/ML
0.07 SYRINGE (ML) INTRAVENOUS
Qty: 50 | Refills: 0 | Status: DISCONTINUED | OUTPATIENT
Start: 2021-01-01 | End: 2021-01-01

## 2021-01-01 RX ORDER — CEFTRIAXONE 500 MG/1
2000 INJECTION, POWDER, FOR SOLUTION INTRAMUSCULAR; INTRAVENOUS EVERY 24 HOURS
Refills: 0 | Status: DISCONTINUED | OUTPATIENT
Start: 2021-01-01 | End: 2021-01-01

## 2021-01-01 RX ORDER — VANCOMYCIN HCL 1 G
750 VIAL (EA) INTRAVENOUS EVERY 12 HOURS
Refills: 0 | Status: DISCONTINUED | OUTPATIENT
Start: 2021-01-01 | End: 2021-01-01

## 2021-01-01 RX ORDER — FENTANYL CITRATE 50 UG/ML
50 INJECTION INTRAVENOUS ONCE
Refills: 0 | Status: DISCONTINUED | OUTPATIENT
Start: 2021-01-01 | End: 2021-01-01

## 2021-01-01 RX ORDER — POTASSIUM CHLORIDE 20 MEQ
10 PACKET (EA) ORAL ONCE
Refills: 0 | Status: COMPLETED | OUTPATIENT
Start: 2021-01-01 | End: 2021-01-01

## 2021-01-01 RX ORDER — CISATRACURIUM BESYLATE 2 MG/ML
3 INJECTION INTRAVENOUS
Qty: 200 | Refills: 0 | Status: DISCONTINUED | OUTPATIENT
Start: 2021-01-01 | End: 2021-01-01

## 2021-01-01 RX ORDER — FUROSEMIDE 40 MG
40 TABLET ORAL ONCE
Refills: 0 | Status: COMPLETED | OUTPATIENT
Start: 2021-01-01 | End: 2021-01-01

## 2021-01-01 RX ORDER — CASPOFUNGIN ACETATE 7 MG/ML
70 INJECTION, POWDER, LYOPHILIZED, FOR SOLUTION INTRAVENOUS ONCE
Refills: 0 | Status: COMPLETED | OUTPATIENT
Start: 2021-01-01 | End: 2021-01-01

## 2021-01-01 RX ORDER — IBUPROFEN 200 MG
400 TABLET ORAL ONCE
Refills: 0 | Status: COMPLETED | OUTPATIENT
Start: 2021-01-01 | End: 2021-01-01

## 2021-01-01 RX ORDER — SODIUM CHLORIDE 9 MG/ML
3 INJECTION INTRAMUSCULAR; INTRAVENOUS; SUBCUTANEOUS EVERY 8 HOURS
Refills: 0 | Status: DISCONTINUED | OUTPATIENT
Start: 2021-01-01 | End: 2021-01-01

## 2021-01-01 RX ORDER — POTASSIUM CHLORIDE 20 MEQ
20 PACKET (EA) ORAL
Refills: 0 | Status: COMPLETED | OUTPATIENT
Start: 2021-01-01 | End: 2021-01-01

## 2021-01-01 RX ORDER — CASPOFUNGIN ACETATE 7 MG/ML
INJECTION, POWDER, LYOPHILIZED, FOR SOLUTION INTRAVENOUS
Refills: 0 | Status: DISCONTINUED | OUTPATIENT
Start: 2021-01-01 | End: 2021-01-01

## 2021-01-01 RX ORDER — CALCIUM GLUCONATE 100 MG/ML
2 VIAL (ML) INTRAVENOUS ONCE
Refills: 0 | Status: COMPLETED | OUTPATIENT
Start: 2021-01-01 | End: 2021-01-01

## 2021-01-01 RX ORDER — CHLORHEXIDINE GLUCONATE 213 G/1000ML
15 SOLUTION TOPICAL EVERY 12 HOURS
Refills: 0 | Status: DISCONTINUED | OUTPATIENT
Start: 2021-01-01 | End: 2021-01-01

## 2021-01-01 RX ORDER — ACETAMINOPHEN 500 MG
650 TABLET ORAL EVERY 6 HOURS
Refills: 0 | Status: DISCONTINUED | OUTPATIENT
Start: 2021-01-01 | End: 2021-01-01

## 2021-01-01 RX ORDER — PIPERACILLIN AND TAZOBACTAM 4; .5 G/20ML; G/20ML
3.38 INJECTION, POWDER, LYOPHILIZED, FOR SOLUTION INTRAVENOUS ONCE
Refills: 0 | Status: COMPLETED | OUTPATIENT
Start: 2021-01-01 | End: 2021-01-01

## 2021-01-01 RX ORDER — SPIRONOLACTONE 25 MG/1
100 TABLET, FILM COATED ORAL DAILY
Refills: 0 | Status: DISCONTINUED | OUTPATIENT
Start: 2021-01-01 | End: 2021-01-01

## 2021-01-01 RX ORDER — INSULIN LISPRO 100/ML
VIAL (ML) SUBCUTANEOUS EVERY 6 HOURS
Refills: 0 | Status: DISCONTINUED | OUTPATIENT
Start: 2021-01-01 | End: 2021-01-01

## 2021-01-01 RX ORDER — POTASSIUM PHOSPHATE, MONOBASIC POTASSIUM PHOSPHATE, DIBASIC 236; 224 MG/ML; MG/ML
30 INJECTION, SOLUTION INTRAVENOUS ONCE
Refills: 0 | Status: COMPLETED | OUTPATIENT
Start: 2021-01-01 | End: 2021-01-01

## 2021-01-01 RX ORDER — NOREPINEPHRINE BITARTRATE/D5W 8 MG/250ML
0.05 PLASTIC BAG, INJECTION (ML) INTRAVENOUS
Qty: 8 | Refills: 0 | Status: DISCONTINUED | OUTPATIENT
Start: 2021-01-01 | End: 2021-01-01

## 2021-01-01 RX ORDER — MIDAZOLAM HYDROCHLORIDE 1 MG/ML
4 INJECTION, SOLUTION INTRAMUSCULAR; INTRAVENOUS ONCE
Refills: 0 | Status: DISCONTINUED | OUTPATIENT
Start: 2021-01-01 | End: 2021-01-01

## 2021-01-01 RX ORDER — LACTULOSE 10 G/15ML
30 SOLUTION ORAL EVERY 4 HOURS
Refills: 0 | Status: DISCONTINUED | OUTPATIENT
Start: 2021-01-01 | End: 2021-01-01

## 2021-01-01 RX ORDER — ALBUMIN HUMAN 25 %
50 VIAL (ML) INTRAVENOUS EVERY 6 HOURS
Refills: 0 | Status: DISCONTINUED | OUTPATIENT
Start: 2021-01-01 | End: 2021-01-01

## 2021-01-01 RX ORDER — MEROPENEM 1 G/30ML
500 INJECTION INTRAVENOUS EVERY 12 HOURS
Refills: 0 | Status: DISCONTINUED | OUTPATIENT
Start: 2021-01-01 | End: 2021-01-01

## 2021-01-01 RX ORDER — AZITHROMYCIN 500 MG/1
500 TABLET, FILM COATED ORAL ONCE
Refills: 0 | Status: COMPLETED | OUTPATIENT
Start: 2021-01-01 | End: 2021-01-01

## 2021-01-01 RX ORDER — HUMAN INSULIN 100 [IU]/ML
4 INJECTION, SUSPENSION SUBCUTANEOUS EVERY 6 HOURS
Refills: 0 | Status: DISCONTINUED | OUTPATIENT
Start: 2021-01-01 | End: 2021-01-01

## 2021-01-01 RX ORDER — LACTULOSE 10 G/15ML
30 SOLUTION ORAL EVERY 8 HOURS
Refills: 0 | Status: DISCONTINUED | OUTPATIENT
Start: 2021-01-01 | End: 2021-01-01

## 2021-01-01 RX ORDER — VANCOMYCIN HCL 1 G
1000 VIAL (EA) INTRAVENOUS ONCE
Refills: 0 | Status: COMPLETED | OUTPATIENT
Start: 2021-01-01 | End: 2021-01-01

## 2021-01-01 RX ORDER — PHENYLEPHRINE HYDROCHLORIDE 10 MG/ML
0.1 INJECTION INTRAVENOUS
Qty: 160 | Refills: 0 | Status: DISCONTINUED | OUTPATIENT
Start: 2021-01-01 | End: 2021-01-01

## 2021-01-01 RX ORDER — SODIUM CHLORIDE 9 MG/ML
1000 INJECTION, SOLUTION INTRAVENOUS
Refills: 0 | Status: DISCONTINUED | OUTPATIENT
Start: 2021-01-01 | End: 2021-01-01

## 2021-01-01 RX ORDER — VANCOMYCIN HCL 1 G
125 VIAL (EA) INTRAVENOUS EVERY 6 HOURS
Refills: 0 | Status: DISCONTINUED | OUTPATIENT
Start: 2021-01-01 | End: 2021-01-01

## 2021-01-01 RX ORDER — BUMETANIDE 0.25 MG/ML
2 INJECTION INTRAMUSCULAR; INTRAVENOUS
Qty: 20 | Refills: 0 | Status: DISCONTINUED | OUTPATIENT
Start: 2021-01-01 | End: 2021-01-01

## 2021-01-01 RX ORDER — POTASSIUM CHLORIDE 20 MEQ
40 PACKET (EA) ORAL ONCE
Refills: 0 | Status: DISCONTINUED | OUTPATIENT
Start: 2021-01-01 | End: 2021-01-01

## 2021-01-01 RX ORDER — FOLIC ACID 0.8 MG
1 TABLET ORAL
Qty: 30 | Refills: 0
Start: 2021-01-01 | End: 2021-01-01

## 2021-01-01 RX ORDER — PHYTONADIONE (VIT K1) 5 MG
10 TABLET ORAL ONCE
Refills: 0 | Status: COMPLETED | OUTPATIENT
Start: 2021-01-01 | End: 2021-01-01

## 2021-01-01 RX ORDER — FOLIC ACID 0.8 MG
1 TABLET ORAL DAILY
Refills: 0 | Status: DISCONTINUED | OUTPATIENT
Start: 2021-01-01 | End: 2021-01-01

## 2021-01-01 RX ORDER — FUROSEMIDE 40 MG
1 TABLET ORAL
Qty: 0 | Refills: 0 | DISCHARGE
Start: 2021-01-01

## 2021-01-01 RX ORDER — ALBUMIN HUMAN 25 %
100 VIAL (ML) INTRAVENOUS EVERY 6 HOURS
Refills: 0 | Status: DISCONTINUED | OUTPATIENT
Start: 2021-01-01 | End: 2021-01-01

## 2021-01-01 RX ORDER — SPIRONOLACTONE 25 MG/1
4 TABLET, FILM COATED ORAL
Qty: 0 | Refills: 0 | DISCHARGE
Start: 2021-01-01

## 2021-01-01 RX ORDER — PHYTONADIONE (VIT K1) 5 MG
10 TABLET ORAL DAILY
Refills: 0 | Status: COMPLETED | OUTPATIENT
Start: 2021-01-01 | End: 2021-01-01

## 2021-01-01 RX ORDER — SODIUM BICARBONATE 1 MEQ/ML
0.26 SYRINGE (ML) INTRAVENOUS
Qty: 150 | Refills: 0 | Status: DISCONTINUED | OUTPATIENT
Start: 2021-01-01 | End: 2021-01-01

## 2021-01-01 RX ORDER — LIDOCAINE 4 G/100G
1 CREAM TOPICAL ONCE
Refills: 0 | Status: COMPLETED | OUTPATIENT
Start: 2021-01-01 | End: 2021-01-01

## 2021-01-01 RX ORDER — CHLORHEXIDINE GLUCONATE 213 G/1000ML
1 SOLUTION TOPICAL
Refills: 0 | Status: DISCONTINUED | OUTPATIENT
Start: 2021-01-01 | End: 2021-01-01

## 2021-01-01 RX ORDER — VANCOMYCIN HCL 1 G
VIAL (EA) INTRAVENOUS
Refills: 0 | Status: DISCONTINUED | OUTPATIENT
Start: 2021-01-01 | End: 2021-01-01

## 2021-01-01 RX ORDER — CISATRACURIUM BESYLATE 2 MG/ML
20 INJECTION INTRAVENOUS ONCE
Refills: 0 | Status: COMPLETED | OUTPATIENT
Start: 2021-01-01 | End: 2021-01-01

## 2021-01-01 RX ORDER — NOREPINEPHRINE BITARTRATE/D5W 8 MG/250ML
0.2 PLASTIC BAG, INJECTION (ML) INTRAVENOUS
Qty: 32 | Refills: 0 | Status: DISCONTINUED | OUTPATIENT
Start: 2021-01-01 | End: 2021-01-01

## 2021-01-01 RX ORDER — SODIUM BICARBONATE 1 MEQ/ML
50 SYRINGE (ML) INTRAVENOUS
Refills: 0 | Status: COMPLETED | OUTPATIENT
Start: 2021-01-01 | End: 2021-01-01

## 2021-01-01 RX ORDER — VANCOMYCIN HCL 1 G
1250 VIAL (EA) INTRAVENOUS EVERY 8 HOURS
Refills: 0 | Status: DISCONTINUED | OUTPATIENT
Start: 2021-01-01 | End: 2021-01-01

## 2021-01-01 RX ORDER — VANCOMYCIN HCL 1 G
1000 VIAL (EA) INTRAVENOUS EVERY 12 HOURS
Refills: 0 | Status: DISCONTINUED | OUTPATIENT
Start: 2021-01-01 | End: 2021-01-01

## 2021-01-01 RX ORDER — INSULIN LISPRO 100/ML
6 VIAL (ML) SUBCUTANEOUS ONCE
Refills: 0 | Status: COMPLETED | OUTPATIENT
Start: 2021-01-01 | End: 2021-01-01

## 2021-01-01 RX ORDER — SPIRONOLACTONE 25 MG/1
4 TABLET, FILM COATED ORAL
Qty: 120 | Refills: 0
Start: 2021-01-01 | End: 2021-01-01

## 2021-01-01 RX ORDER — FENTANYL CITRATE 50 UG/ML
100 INJECTION INTRAVENOUS ONCE
Refills: 0 | Status: DISCONTINUED | OUTPATIENT
Start: 2021-01-01 | End: 2021-01-01

## 2021-01-01 RX ORDER — FUROSEMIDE 40 MG
1 TABLET ORAL
Qty: 30 | Refills: 0
Start: 2021-01-01 | End: 2021-01-01

## 2021-01-01 RX ORDER — MAGNESIUM SULFATE 500 MG/ML
1 VIAL (ML) INJECTION ONCE
Refills: 0 | Status: COMPLETED | OUTPATIENT
Start: 2021-01-01 | End: 2021-01-01

## 2021-01-01 RX ORDER — VANCOMYCIN HCL 1 G
1000 VIAL (EA) INTRAVENOUS EVERY 8 HOURS
Refills: 0 | Status: DISCONTINUED | OUTPATIENT
Start: 2021-01-01 | End: 2021-01-01

## 2021-01-01 RX ORDER — SODIUM ZIRCONIUM CYCLOSILICATE 10 G/10G
10 POWDER, FOR SUSPENSION ORAL ONCE
Refills: 0 | Status: COMPLETED | OUTPATIENT
Start: 2021-01-01 | End: 2021-01-01

## 2021-01-01 RX ORDER — SODIUM CHLORIDE 9 MG/ML
10 INJECTION INTRAMUSCULAR; INTRAVENOUS; SUBCUTANEOUS
Refills: 0 | Status: DISCONTINUED | OUTPATIENT
Start: 2021-01-01 | End: 2021-01-01

## 2021-01-01 RX ORDER — INSULIN LISPRO 100/ML
VIAL (ML) SUBCUTANEOUS EVERY 4 HOURS
Refills: 0 | Status: DISCONTINUED | OUTPATIENT
Start: 2021-01-01 | End: 2021-01-01

## 2021-01-01 RX ORDER — LACTULOSE 10 G/15ML
20 SOLUTION ORAL
Refills: 0 | Status: DISCONTINUED | OUTPATIENT
Start: 2021-01-01 | End: 2021-01-01

## 2021-01-01 RX ORDER — ALBUMIN HUMAN 25 %
250 VIAL (ML) INTRAVENOUS EVERY 4 HOURS
Refills: 0 | Status: DISCONTINUED | OUTPATIENT
Start: 2021-01-01 | End: 2021-01-01

## 2021-01-01 RX ORDER — DAPTOMYCIN 500 MG/10ML
725 INJECTION, POWDER, LYOPHILIZED, FOR SOLUTION INTRAVENOUS EVERY 24 HOURS
Refills: 0 | Status: DISCONTINUED | OUTPATIENT
Start: 2021-01-01 | End: 2021-01-01

## 2021-01-01 RX ORDER — IPRATROPIUM/ALBUTEROL SULFATE 18-103MCG
3 AEROSOL WITH ADAPTER (GRAM) INHALATION EVERY 6 HOURS
Refills: 0 | Status: DISCONTINUED | OUTPATIENT
Start: 2021-01-01 | End: 2021-01-01

## 2021-01-01 RX ORDER — VANCOMYCIN HCL 1 G
750 VIAL (EA) INTRAVENOUS EVERY 8 HOURS
Refills: 0 | Status: DISCONTINUED | OUTPATIENT
Start: 2021-01-01 | End: 2021-01-01

## 2021-01-01 RX ORDER — LACTULOSE 10 G/15ML
10 SOLUTION ORAL THREE TIMES A DAY
Refills: 0 | Status: DISCONTINUED | OUTPATIENT
Start: 2021-01-01 | End: 2021-01-01

## 2021-01-01 RX ORDER — HYDROCORTISONE 20 MG
100 TABLET ORAL EVERY 8 HOURS
Refills: 0 | Status: DISCONTINUED | OUTPATIENT
Start: 2021-01-01 | End: 2021-01-01

## 2021-01-01 RX ORDER — DEXTROSE 50 % IN WATER 50 %
50 SYRINGE (ML) INTRAVENOUS ONCE
Refills: 0 | Status: COMPLETED | OUTPATIENT
Start: 2021-01-01 | End: 2021-01-01

## 2021-01-01 RX ORDER — CISATRACURIUM BESYLATE 2 MG/ML
3.01 INJECTION INTRAVENOUS
Qty: 200 | Refills: 0 | Status: DISCONTINUED | OUTPATIENT
Start: 2021-01-01 | End: 2021-01-01

## 2021-01-01 RX ORDER — ATOVAQUONE 750 MG/5ML
750 SUSPENSION ORAL EVERY 12 HOURS
Refills: 0 | Status: DISCONTINUED | OUTPATIENT
Start: 2021-01-01 | End: 2021-01-01

## 2021-01-01 RX ORDER — SODIUM CHLORIDE 9 MG/ML
3 INJECTION INTRAMUSCULAR; INTRAVENOUS; SUBCUTANEOUS EVERY 6 HOURS
Refills: 0 | Status: DISCONTINUED | OUTPATIENT
Start: 2021-01-01 | End: 2021-01-01

## 2021-01-01 RX ORDER — MAGNESIUM SULFATE 500 MG/ML
2 VIAL (ML) INJECTION ONCE
Refills: 0 | Status: COMPLETED | OUTPATIENT
Start: 2021-01-01 | End: 2021-01-01

## 2021-01-01 RX ORDER — DEXTROSE 50 % IN WATER 50 %
50 SYRINGE (ML) INTRAVENOUS
Refills: 0 | Status: DISCONTINUED | OUTPATIENT
Start: 2021-01-01 | End: 2021-01-01

## 2021-01-01 RX ORDER — EPINEPHRINE 0.3 MG/.3ML
0.3 INJECTION INTRAMUSCULAR; SUBCUTANEOUS ONCE
Refills: 0 | Status: DISCONTINUED | OUTPATIENT
Start: 2021-01-01 | End: 2021-01-01

## 2021-01-01 RX ORDER — CEFTRIAXONE 500 MG/1
INJECTION, POWDER, FOR SOLUTION INTRAMUSCULAR; INTRAVENOUS
Refills: 0 | Status: DISCONTINUED | OUTPATIENT
Start: 2021-01-01 | End: 2021-01-01

## 2021-01-01 RX ORDER — INSULIN HUMAN 100 [IU]/ML
2 INJECTION, SOLUTION SUBCUTANEOUS
Qty: 100 | Refills: 0 | Status: DISCONTINUED | OUTPATIENT
Start: 2021-01-01 | End: 2021-01-01

## 2021-01-01 RX ORDER — AZITHROMYCIN 500 MG/1
TABLET, FILM COATED ORAL
Refills: 0 | Status: DISCONTINUED | OUTPATIENT
Start: 2021-01-01 | End: 2021-01-01

## 2021-01-01 RX ORDER — PROPOFOL 10 MG/ML
50 INJECTION, EMULSION INTRAVENOUS
Qty: 1000 | Refills: 0 | Status: DISCONTINUED | OUTPATIENT
Start: 2021-01-01 | End: 2021-01-01

## 2021-01-01 RX ORDER — ACETAMINOPHEN 500 MG
1000 TABLET ORAL ONCE
Refills: 0 | Status: DISCONTINUED | OUTPATIENT
Start: 2021-01-01 | End: 2021-01-01

## 2021-01-01 RX ORDER — FAMOTIDINE 10 MG/ML
20 INJECTION INTRAVENOUS ONCE
Refills: 0 | Status: DISCONTINUED | OUTPATIENT
Start: 2021-01-01 | End: 2021-01-01

## 2021-01-01 RX ORDER — SODIUM BICARBONATE 1 MEQ/ML
1300 SYRINGE (ML) INTRAVENOUS THREE TIMES A DAY
Refills: 0 | Status: DISCONTINUED | OUTPATIENT
Start: 2021-01-01 | End: 2021-01-01

## 2021-01-01 RX ORDER — PIPERACILLIN AND TAZOBACTAM 4; .5 G/20ML; G/20ML
3.38 INJECTION, POWDER, LYOPHILIZED, FOR SOLUTION INTRAVENOUS EVERY 8 HOURS
Refills: 0 | Status: DISCONTINUED | OUTPATIENT
Start: 2021-01-01 | End: 2021-01-01

## 2021-01-01 RX ORDER — ALBUMIN HUMAN 25 %
100 VIAL (ML) INTRAVENOUS EVERY 8 HOURS
Refills: 0 | Status: COMPLETED | OUTPATIENT
Start: 2021-01-01 | End: 2021-01-01

## 2021-01-01 RX ORDER — FENTANYL CITRATE 50 UG/ML
2 INJECTION INTRAVENOUS
Qty: 5000 | Refills: 0 | Status: DISCONTINUED | OUTPATIENT
Start: 2021-01-01 | End: 2021-01-01

## 2021-01-01 RX ORDER — POTASSIUM CHLORIDE 20 MEQ
40 PACKET (EA) ORAL EVERY 4 HOURS
Refills: 0 | Status: DISCONTINUED | OUTPATIENT
Start: 2021-01-01 | End: 2021-01-01

## 2021-01-01 RX ORDER — SODIUM BICARBONATE 1 MEQ/ML
0.15 SYRINGE (ML) INTRAVENOUS
Qty: 150 | Refills: 0 | Status: DISCONTINUED | OUTPATIENT
Start: 2021-01-01 | End: 2021-01-01

## 2021-01-01 RX ORDER — PROPOFOL 10 MG/ML
50.05 INJECTION, EMULSION INTRAVENOUS
Qty: 1000 | Refills: 0 | Status: DISCONTINUED | OUTPATIENT
Start: 2021-01-01 | End: 2021-01-01

## 2021-01-01 RX ORDER — ACETAMINOPHEN 500 MG
650 TABLET ORAL ONCE
Refills: 0 | Status: COMPLETED | OUTPATIENT
Start: 2021-01-01 | End: 2021-01-01

## 2021-01-01 RX ORDER — NOREPINEPHRINE BITARTRATE/D5W 8 MG/250ML
0.05 PLASTIC BAG, INJECTION (ML) INTRAVENOUS
Qty: 16 | Refills: 0 | Status: DISCONTINUED | OUTPATIENT
Start: 2021-01-01 | End: 2021-01-01

## 2021-01-01 RX ORDER — PHYTONADIONE (VIT K1) 5 MG
5 TABLET ORAL ONCE
Refills: 0 | Status: DISCONTINUED | OUTPATIENT
Start: 2021-01-01 | End: 2021-01-01

## 2021-01-01 RX ORDER — KETAMINE HYDROCHLORIDE 100 MG/ML
0.25 INJECTION INTRAMUSCULAR; INTRAVENOUS
Qty: 1000 | Refills: 0 | Status: DISCONTINUED | OUTPATIENT
Start: 2021-01-01 | End: 2021-01-01

## 2021-01-01 RX ORDER — CITRIC ACID/SODIUM CITRATE 300-500 MG
30 SOLUTION, ORAL ORAL EVERY 6 HOURS
Refills: 0 | Status: DISCONTINUED | OUTPATIENT
Start: 2021-01-01 | End: 2021-01-01

## 2021-01-01 RX ORDER — ENOXAPARIN SODIUM 100 MG/ML
40 INJECTION SUBCUTANEOUS DAILY
Refills: 0 | Status: DISCONTINUED | OUTPATIENT
Start: 2021-01-01 | End: 2021-01-01

## 2021-01-01 RX ORDER — POTASSIUM CHLORIDE 20 MEQ
10 PACKET (EA) ORAL ONCE
Refills: 0 | Status: DISCONTINUED | OUTPATIENT
Start: 2021-01-01 | End: 2021-01-01

## 2021-01-01 RX ORDER — INSULIN HUMAN 100 [IU]/ML
5 INJECTION, SOLUTION SUBCUTANEOUS ONCE
Refills: 0 | Status: COMPLETED | OUTPATIENT
Start: 2021-01-01 | End: 2021-01-01

## 2021-01-01 RX ADMIN — Medication 520 MILLIGRAM(S): at 09:28

## 2021-01-01 RX ADMIN — Medication 650 MILLIGRAM(S): at 13:10

## 2021-01-01 RX ADMIN — Medication 400 MILLIGRAM(S): at 17:40

## 2021-01-01 RX ADMIN — POTASSIUM PHOSPHATE, MONOBASIC POTASSIUM PHOSPHATE, DIBASIC 83.33 MILLIMOLE(S): 236; 224 INJECTION, SOLUTION INTRAVENOUS at 13:33

## 2021-01-01 RX ADMIN — CHLORHEXIDINE GLUCONATE 1 APPLICATION(S): 213 SOLUTION TOPICAL at 05:18

## 2021-01-01 RX ADMIN — HEPARIN SODIUM 5000 UNIT(S): 5000 INJECTION INTRAVENOUS; SUBCUTANEOUS at 21:21

## 2021-01-01 RX ADMIN — Medication 100 MILLIGRAM(S): at 13:20

## 2021-01-01 RX ADMIN — SPIRONOLACTONE 100 MILLIGRAM(S): 25 TABLET, FILM COATED ORAL at 05:42

## 2021-01-01 RX ADMIN — MIDAZOLAM HYDROCHLORIDE 4 MILLIGRAM(S): 1 INJECTION, SOLUTION INTRAMUSCULAR; INTRAVENOUS at 19:52

## 2021-01-01 RX ADMIN — Medication 4: at 05:11

## 2021-01-01 RX ADMIN — SPIRONOLACTONE 100 MILLIGRAM(S): 25 TABLET, FILM COATED ORAL at 05:57

## 2021-01-01 RX ADMIN — Medication 3 MILLILITER(S): at 00:02

## 2021-01-01 RX ADMIN — LACTULOSE 30 GRAM(S): 10 SOLUTION ORAL at 05:05

## 2021-01-01 RX ADMIN — Medication 1 MILLIGRAM(S): at 11:37

## 2021-01-01 RX ADMIN — CHLORHEXIDINE GLUCONATE 15 MILLILITER(S): 213 SOLUTION TOPICAL at 05:53

## 2021-01-01 RX ADMIN — Medication 40 MILLIEQUIVALENT(S): at 12:15

## 2021-01-01 RX ADMIN — CHLORHEXIDINE GLUCONATE 15 MILLILITER(S): 213 SOLUTION TOPICAL at 05:31

## 2021-01-01 RX ADMIN — KETAMINE HYDROCHLORIDE 1.82 MG/KG/HR: 100 INJECTION INTRAMUSCULAR; INTRAVENOUS at 19:53

## 2021-01-01 RX ADMIN — Medication 2 DROP(S): at 21:22

## 2021-01-01 RX ADMIN — Medication 8: at 11:22

## 2021-01-01 RX ADMIN — MEROPENEM 100 MILLIGRAM(S): 1 INJECTION INTRAVENOUS at 17:42

## 2021-01-01 RX ADMIN — MIDAZOLAM HYDROCHLORIDE 4 MILLIGRAM(S): 1 INJECTION, SOLUTION INTRAMUSCULAR; INTRAVENOUS at 22:45

## 2021-01-01 RX ADMIN — Medication 102 MILLIGRAM(S): at 09:42

## 2021-01-01 RX ADMIN — AZITHROMYCIN 250 MILLIGRAM(S): 500 TABLET, FILM COATED ORAL at 16:57

## 2021-01-01 RX ADMIN — Medication 40 MILLIGRAM(S): at 14:44

## 2021-01-01 RX ADMIN — Medication 1 MILLIGRAM(S): at 11:11

## 2021-01-01 RX ADMIN — KETAMINE HYDROCHLORIDE 1.82 MG/KG/HR: 100 INJECTION INTRAMUSCULAR; INTRAVENOUS at 09:26

## 2021-01-01 RX ADMIN — PANTOPRAZOLE SODIUM 40 MILLIGRAM(S): 20 TABLET, DELAYED RELEASE ORAL at 12:00

## 2021-01-01 RX ADMIN — Medication 30 MILLILITER(S): at 00:26

## 2021-01-01 RX ADMIN — LACTULOSE 30 GRAM(S): 10 SOLUTION ORAL at 13:38

## 2021-01-01 RX ADMIN — PIPERACILLIN AND TAZOBACTAM 25 GRAM(S): 4; .5 INJECTION, POWDER, LYOPHILIZED, FOR SOLUTION INTRAVENOUS at 21:14

## 2021-01-01 RX ADMIN — BUMETANIDE 20 MG/HR: 0.25 INJECTION INTRAMUSCULAR; INTRAVENOUS at 17:33

## 2021-01-01 RX ADMIN — KETAMINE HYDROCHLORIDE 1.82 MG/KG/HR: 100 INJECTION INTRAMUSCULAR; INTRAVENOUS at 16:03

## 2021-01-01 RX ADMIN — Medication 50 MILLIEQUIVALENT(S): at 15:01

## 2021-01-01 RX ADMIN — LACTULOSE 30 GRAM(S): 10 SOLUTION ORAL at 14:02

## 2021-01-01 RX ADMIN — Medication 1 TABLET(S): at 12:50

## 2021-01-01 RX ADMIN — Medication 650 MILLIGRAM(S): at 18:45

## 2021-01-01 RX ADMIN — Medication 2: at 23:09

## 2021-01-01 RX ADMIN — HEPARIN SODIUM 5000 UNIT(S): 5000 INJECTION INTRAVENOUS; SUBCUTANEOUS at 21:55

## 2021-01-01 RX ADMIN — PIPERACILLIN AND TAZOBACTAM 200 GRAM(S): 4; .5 INJECTION, POWDER, LYOPHILIZED, FOR SOLUTION INTRAVENOUS at 06:38

## 2021-01-01 RX ADMIN — HEPARIN SODIUM 5000 UNIT(S): 5000 INJECTION INTRAVENOUS; SUBCUTANEOUS at 13:36

## 2021-01-01 RX ADMIN — Medication 100 MILLIGRAM(S): at 12:04

## 2021-01-01 RX ADMIN — POTASSIUM PHOSPHATE, MONOBASIC POTASSIUM PHOSPHATE, DIBASIC 83.33 MILLIMOLE(S): 236; 224 INJECTION, SOLUTION INTRAVENOUS at 09:53

## 2021-01-01 RX ADMIN — HUMAN INSULIN 4 UNIT(S): 100 INJECTION, SUSPENSION SUBCUTANEOUS at 11:22

## 2021-01-01 RX ADMIN — Medication 2 DROP(S): at 17:51

## 2021-01-01 RX ADMIN — Medication 2 DROP(S): at 01:08

## 2021-01-01 RX ADMIN — LACTULOSE 30 GRAM(S): 10 SOLUTION ORAL at 13:39

## 2021-01-01 RX ADMIN — MEROPENEM 100 MILLIGRAM(S): 1 INJECTION INTRAVENOUS at 17:50

## 2021-01-01 RX ADMIN — LACTULOSE 30 GRAM(S): 10 SOLUTION ORAL at 17:18

## 2021-01-01 RX ADMIN — LIDOCAINE 1 PATCH: 4 CREAM TOPICAL at 00:05

## 2021-01-01 RX ADMIN — Medication 1 MILLIGRAM(S): at 12:31

## 2021-01-01 RX ADMIN — MEROPENEM 100 MILLIGRAM(S): 1 INJECTION INTRAVENOUS at 17:14

## 2021-01-01 RX ADMIN — ENOXAPARIN SODIUM 40 MILLIGRAM(S): 100 INJECTION SUBCUTANEOUS at 13:21

## 2021-01-01 RX ADMIN — Medication 4: at 21:23

## 2021-01-01 RX ADMIN — LACTULOSE 30 GRAM(S): 10 SOLUTION ORAL at 01:27

## 2021-01-01 RX ADMIN — Medication 2: at 13:03

## 2021-01-01 RX ADMIN — Medication 1 TABLET(S): at 12:52

## 2021-01-01 RX ADMIN — Medication 650 MILLIGRAM(S): at 23:52

## 2021-01-01 RX ADMIN — Medication 4: at 18:13

## 2021-01-01 RX ADMIN — AZITHROMYCIN 250 MILLIGRAM(S): 500 TABLET, FILM COATED ORAL at 15:24

## 2021-01-01 RX ADMIN — SPIRONOLACTONE 100 MILLIGRAM(S): 25 TABLET, FILM COATED ORAL at 10:56

## 2021-01-01 RX ADMIN — Medication 472.5 MILLIGRAM(S): at 23:21

## 2021-01-01 RX ADMIN — Medication 40 MILLIGRAM(S): at 10:57

## 2021-01-01 RX ADMIN — CHLORHEXIDINE GLUCONATE 15 MILLILITER(S): 213 SOLUTION TOPICAL at 05:43

## 2021-01-01 RX ADMIN — Medication 4: at 14:31

## 2021-01-01 RX ADMIN — Medication 400 MILLIGRAM(S): at 00:03

## 2021-01-01 RX ADMIN — Medication 166.67 MILLIGRAM(S): at 23:44

## 2021-01-01 RX ADMIN — Medication 2 DROP(S): at 17:09

## 2021-01-01 RX ADMIN — Medication 1 MILLIGRAM(S): at 12:50

## 2021-01-01 RX ADMIN — PIPERACILLIN AND TAZOBACTAM 25 GRAM(S): 4; .5 INJECTION, POWDER, LYOPHILIZED, FOR SOLUTION INTRAVENOUS at 11:07

## 2021-01-01 RX ADMIN — Medication 1 MILLIGRAM(S): at 12:04

## 2021-01-01 RX ADMIN — Medication 1 MILLIGRAM(S): at 14:01

## 2021-01-01 RX ADMIN — Medication 50 MILLILITER(S): at 05:10

## 2021-01-01 RX ADMIN — PROPOFOL 21.8 MICROGRAM(S)/KG/MIN: 10 INJECTION, EMULSION INTRAVENOUS at 19:53

## 2021-01-01 RX ADMIN — Medication 2 DROP(S): at 05:31

## 2021-01-01 RX ADMIN — Medication 100 MILLIGRAM(S): at 11:21

## 2021-01-01 RX ADMIN — HEPARIN SODIUM 5000 UNIT(S): 5000 INJECTION INTRAVENOUS; SUBCUTANEOUS at 21:24

## 2021-01-01 RX ADMIN — Medication 40 MILLIGRAM(S): at 18:28

## 2021-01-01 RX ADMIN — Medication 1 TABLET(S): at 12:44

## 2021-01-01 RX ADMIN — LACTULOSE 20 GRAM(S): 10 SOLUTION ORAL at 06:10

## 2021-01-01 RX ADMIN — Medication 40 MILLIGRAM(S): at 05:43

## 2021-01-01 RX ADMIN — Medication 6: at 13:37

## 2021-01-01 RX ADMIN — Medication 4: at 02:27

## 2021-01-01 RX ADMIN — PROPOFOL 21.8 MICROGRAM(S)/KG/MIN: 10 INJECTION, EMULSION INTRAVENOUS at 21:48

## 2021-01-01 RX ADMIN — KETAMINE HYDROCHLORIDE 1.82 MG/KG/HR: 100 INJECTION INTRAMUSCULAR; INTRAVENOUS at 11:28

## 2021-01-01 RX ADMIN — FENTANYL CITRATE 7.26 MICROGRAM(S)/KG/HR: 50 INJECTION INTRAVENOUS at 04:31

## 2021-01-01 RX ADMIN — Medication 40 MILLIEQUIVALENT(S): at 08:22

## 2021-01-01 RX ADMIN — Medication 100 GRAM(S): at 21:49

## 2021-01-01 RX ADMIN — Medication 50 MILLILITER(S): at 07:18

## 2021-01-01 RX ADMIN — CHLORHEXIDINE GLUCONATE 15 MILLILITER(S): 213 SOLUTION TOPICAL at 17:28

## 2021-01-01 RX ADMIN — KETAMINE HYDROCHLORIDE 1.82 MG/KG/HR: 100 INJECTION INTRAMUSCULAR; INTRAVENOUS at 23:04

## 2021-01-01 RX ADMIN — PANTOPRAZOLE SODIUM 40 MILLIGRAM(S): 20 TABLET, DELAYED RELEASE ORAL at 12:47

## 2021-01-01 RX ADMIN — BUMETANIDE 2 MILLIGRAM(S): 0.25 INJECTION INTRAMUSCULAR; INTRAVENOUS at 21:15

## 2021-01-01 RX ADMIN — PROPOFOL 21.8 MICROGRAM(S)/KG/MIN: 10 INJECTION, EMULSION INTRAVENOUS at 16:03

## 2021-01-01 RX ADMIN — Medication 250 MILLIGRAM(S): at 08:30

## 2021-01-01 RX ADMIN — Medication 50 MILLILITER(S): at 13:21

## 2021-01-01 RX ADMIN — KETAMINE HYDROCHLORIDE 1.82 MG/KG/HR: 100 INJECTION INTRAMUSCULAR; INTRAVENOUS at 19:29

## 2021-01-01 RX ADMIN — Medication 30 MILLILITER(S): at 05:33

## 2021-01-01 RX ADMIN — PIPERACILLIN AND TAZOBACTAM 25 GRAM(S): 4; .5 INJECTION, POWDER, LYOPHILIZED, FOR SOLUTION INTRAVENOUS at 14:29

## 2021-01-01 RX ADMIN — Medication 50 MILLIEQUIVALENT(S): at 17:28

## 2021-01-01 RX ADMIN — Medication 650 MILLIGRAM(S): at 07:14

## 2021-01-01 RX ADMIN — Medication 2 DROP(S): at 01:19

## 2021-01-01 RX ADMIN — Medication 1 TABLET(S): at 13:44

## 2021-01-01 RX ADMIN — Medication 100 MILLIGRAM(S): at 12:30

## 2021-01-01 RX ADMIN — Medication 3 MILLILITER(S): at 00:32

## 2021-01-01 RX ADMIN — Medication 1 MILLIGRAM(S): at 12:08

## 2021-01-01 RX ADMIN — Medication 2: at 17:30

## 2021-01-01 RX ADMIN — Medication 50 MILLILITER(S): at 21:13

## 2021-01-01 RX ADMIN — CHLORHEXIDINE GLUCONATE 1 APPLICATION(S): 213 SOLUTION TOPICAL at 05:09

## 2021-01-01 RX ADMIN — LACTULOSE 30 GRAM(S): 10 SOLUTION ORAL at 05:19

## 2021-01-01 RX ADMIN — Medication 1 MILLIGRAM(S): at 11:07

## 2021-01-01 RX ADMIN — Medication 125 MILLIGRAM(S): at 23:08

## 2021-01-01 RX ADMIN — Medication 40 MILLIGRAM(S): at 17:19

## 2021-01-01 RX ADMIN — Medication 1 MILLIGRAM(S): at 11:23

## 2021-01-01 RX ADMIN — Medication 1 MILLIGRAM(S): at 12:39

## 2021-01-01 RX ADMIN — CASPOFUNGIN ACETATE 260 MILLIGRAM(S): 7 INJECTION, POWDER, LYOPHILIZED, FOR SOLUTION INTRAVENOUS at 18:02

## 2021-01-01 RX ADMIN — Medication 1 TABLET(S): at 12:47

## 2021-01-01 RX ADMIN — CHLORHEXIDINE GLUCONATE 15 MILLILITER(S): 213 SOLUTION TOPICAL at 05:19

## 2021-01-01 RX ADMIN — Medication 2 DROP(S): at 13:36

## 2021-01-01 RX ADMIN — HEPARIN SODIUM 5000 UNIT(S): 5000 INJECTION INTRAVENOUS; SUBCUTANEOUS at 05:33

## 2021-01-01 RX ADMIN — BUMETANIDE 20 MG/HR: 0.25 INJECTION INTRAMUSCULAR; INTRAVENOUS at 17:27

## 2021-01-01 RX ADMIN — Medication 50 MILLILITER(S): at 05:44

## 2021-01-01 RX ADMIN — Medication 650 MILLIGRAM(S): at 20:45

## 2021-01-01 RX ADMIN — Medication 40 MILLIGRAM(S): at 12:16

## 2021-01-01 RX ADMIN — FENTANYL CITRATE 7.26 MICROGRAM(S)/KG/HR: 50 INJECTION INTRAVENOUS at 20:52

## 2021-01-01 RX ADMIN — LACTULOSE 30 GRAM(S): 10 SOLUTION ORAL at 13:56

## 2021-01-01 RX ADMIN — BUMETANIDE 20 MG/HR: 0.25 INJECTION INTRAMUSCULAR; INTRAVENOUS at 08:35

## 2021-01-01 RX ADMIN — Medication 2 DROP(S): at 09:59

## 2021-01-01 RX ADMIN — Medication 100 MILLIGRAM(S): at 11:11

## 2021-01-01 RX ADMIN — Medication 1 MILLIGRAM(S): at 11:53

## 2021-01-01 RX ADMIN — Medication 3.4 MICROGRAM(S)/KG/MIN: at 04:25

## 2021-01-01 RX ADMIN — Medication 100 GRAM(S): at 07:23

## 2021-01-01 RX ADMIN — Medication 3 MILLILITER(S): at 05:31

## 2021-01-01 RX ADMIN — Medication 20 MILLIEQUIVALENT(S): at 14:30

## 2021-01-01 RX ADMIN — SODIUM ZIRCONIUM CYCLOSILICATE 10 GRAM(S): 10 POWDER, FOR SUSPENSION ORAL at 02:42

## 2021-01-01 RX ADMIN — LIDOCAINE 1 PATCH: 4 CREAM TOPICAL at 13:24

## 2021-01-01 RX ADMIN — Medication 2 DROP(S): at 01:27

## 2021-01-01 RX ADMIN — HEPARIN SODIUM 5000 UNIT(S): 5000 INJECTION INTRAVENOUS; SUBCUTANEOUS at 21:49

## 2021-01-01 RX ADMIN — CISATRACURIUM BESYLATE 20 MILLIGRAM(S): 2 INJECTION INTRAVENOUS at 12:09

## 2021-01-01 RX ADMIN — Medication 125 MILLILITER(S): at 14:42

## 2021-01-01 RX ADMIN — SODIUM CHLORIDE 125 MILLILITER(S): 9 INJECTION, SOLUTION INTRAVENOUS at 16:51

## 2021-01-01 RX ADMIN — Medication 650 MILLIGRAM(S): at 05:32

## 2021-01-01 RX ADMIN — Medication 6.81 MICROGRAM(S)/KG/MIN: at 21:49

## 2021-01-01 RX ADMIN — Medication 1 TABLET(S): at 13:21

## 2021-01-01 RX ADMIN — Medication 30 MILLILITER(S): at 23:50

## 2021-01-01 RX ADMIN — Medication 650 MILLIGRAM(S): at 14:39

## 2021-01-01 RX ADMIN — Medication 40 MILLIGRAM(S): at 05:10

## 2021-01-01 RX ADMIN — Medication 50 MILLILITER(S): at 09:57

## 2021-01-01 RX ADMIN — PIPERACILLIN AND TAZOBACTAM 25 GRAM(S): 4; .5 INJECTION, POWDER, LYOPHILIZED, FOR SOLUTION INTRAVENOUS at 12:08

## 2021-01-01 RX ADMIN — FENTANYL CITRATE 7.26 MICROGRAM(S)/KG/HR: 50 INJECTION INTRAVENOUS at 19:29

## 2021-01-01 RX ADMIN — AZITHROMYCIN 500 MILLIGRAM(S): 500 TABLET, FILM COATED ORAL at 16:54

## 2021-01-01 RX ADMIN — Medication 525 MILLIGRAM(S): at 13:11

## 2021-01-01 RX ADMIN — Medication 3 MILLILITER(S): at 11:51

## 2021-01-01 RX ADMIN — Medication 13.6 MICROGRAM(S)/KG/MIN: at 16:28

## 2021-01-01 RX ADMIN — Medication 2 DROP(S): at 13:17

## 2021-01-01 RX ADMIN — Medication 472.5 MILLIGRAM(S): at 11:59

## 2021-01-01 RX ADMIN — PHENYLEPHRINE HYDROCHLORIDE 1.36 MICROGRAM(S)/KG/MIN: 10 INJECTION INTRAVENOUS at 12:31

## 2021-01-01 RX ADMIN — PROPOFOL 21.8 MICROGRAM(S)/KG/MIN: 10 INJECTION, EMULSION INTRAVENOUS at 09:27

## 2021-01-01 RX ADMIN — Medication 1 MILLIGRAM(S): at 11:29

## 2021-01-01 RX ADMIN — Medication 3 MILLILITER(S): at 06:06

## 2021-01-01 RX ADMIN — Medication 20 MILLIEQUIVALENT(S): at 13:21

## 2021-01-01 RX ADMIN — Medication 3 MILLILITER(S): at 06:09

## 2021-01-01 RX ADMIN — CHLORHEXIDINE GLUCONATE 1 APPLICATION(S): 213 SOLUTION TOPICAL at 05:36

## 2021-01-01 RX ADMIN — Medication 3 MILLILITER(S): at 05:42

## 2021-01-01 RX ADMIN — Medication 400 MILLIGRAM(S): at 02:21

## 2021-01-01 RX ADMIN — LACTULOSE 30 GRAM(S): 10 SOLUTION ORAL at 18:32

## 2021-01-01 RX ADMIN — KETAMINE HYDROCHLORIDE 1.82 MG/KG/HR: 100 INJECTION INTRAMUSCULAR; INTRAVENOUS at 04:35

## 2021-01-01 RX ADMIN — LACTULOSE 30 GRAM(S): 10 SOLUTION ORAL at 21:21

## 2021-01-01 RX ADMIN — Medication 1 TABLET(S): at 10:57

## 2021-01-01 RX ADMIN — HUMAN INSULIN 4 UNIT(S): 100 INJECTION, SUSPENSION SUBCUTANEOUS at 06:05

## 2021-01-01 RX ADMIN — Medication 367.5 MILLIGRAM(S): at 21:23

## 2021-01-01 RX ADMIN — Medication 50 MILLILITER(S): at 05:05

## 2021-01-01 RX ADMIN — Medication 75 MEQ/KG/HR: at 22:49

## 2021-01-01 RX ADMIN — Medication 2 DROP(S): at 17:15

## 2021-01-01 RX ADMIN — Medication 1 TABLET(S): at 11:36

## 2021-01-01 RX ADMIN — Medication 3 MILLILITER(S): at 17:15

## 2021-01-01 RX ADMIN — ENOXAPARIN SODIUM 40 MILLIGRAM(S): 100 INJECTION SUBCUTANEOUS at 11:19

## 2021-01-01 RX ADMIN — Medication 3 MILLILITER(S): at 23:22

## 2021-01-01 RX ADMIN — Medication 250 MILLIGRAM(S): at 18:40

## 2021-01-01 RX ADMIN — HEPARIN SODIUM 5000 UNIT(S): 5000 INJECTION INTRAVENOUS; SUBCUTANEOUS at 21:17

## 2021-01-01 RX ADMIN — PIPERACILLIN AND TAZOBACTAM 25 GRAM(S): 4; .5 INJECTION, POWDER, LYOPHILIZED, FOR SOLUTION INTRAVENOUS at 11:09

## 2021-01-01 RX ADMIN — Medication 400 MILLIGRAM(S): at 21:15

## 2021-01-01 RX ADMIN — Medication 472.5 MILLIGRAM(S): at 11:40

## 2021-01-01 RX ADMIN — MEROPENEM 100 MILLIGRAM(S): 1 INJECTION INTRAVENOUS at 21:07

## 2021-01-01 RX ADMIN — PANTOPRAZOLE SODIUM 40 MILLIGRAM(S): 20 TABLET, DELAYED RELEASE ORAL at 17:26

## 2021-01-01 RX ADMIN — Medication 1 TABLET(S): at 11:10

## 2021-01-01 RX ADMIN — HEPARIN SODIUM 5000 UNIT(S): 5000 INJECTION INTRAVENOUS; SUBCUTANEOUS at 17:02

## 2021-01-01 RX ADMIN — Medication 525 MILLIGRAM(S): at 05:47

## 2021-01-01 RX ADMIN — Medication 2 DROP(S): at 01:02

## 2021-01-01 RX ADMIN — PIPERACILLIN AND TAZOBACTAM 25 GRAM(S): 4; .5 INJECTION, POWDER, LYOPHILIZED, FOR SOLUTION INTRAVENOUS at 10:01

## 2021-01-01 RX ADMIN — Medication 2: at 05:53

## 2021-01-01 RX ADMIN — Medication 2 DROP(S): at 17:25

## 2021-01-01 RX ADMIN — Medication 3 MILLILITER(S): at 17:45

## 2021-01-01 RX ADMIN — Medication 50 MILLILITER(S): at 00:51

## 2021-01-01 RX ADMIN — Medication 3 MILLILITER(S): at 05:43

## 2021-01-01 RX ADMIN — Medication 100 MILLIGRAM(S): at 17:46

## 2021-01-01 RX ADMIN — CHLORHEXIDINE GLUCONATE 1 APPLICATION(S): 213 SOLUTION TOPICAL at 06:02

## 2021-01-01 RX ADMIN — Medication 100 MILLIGRAM(S): at 11:12

## 2021-01-01 RX ADMIN — ENOXAPARIN SODIUM 40 MILLIGRAM(S): 100 INJECTION SUBCUTANEOUS at 13:45

## 2021-01-01 RX ADMIN — Medication 40 MILLIGRAM(S): at 05:54

## 2021-01-01 RX ADMIN — Medication 50 MILLILITER(S): at 18:00

## 2021-01-01 RX ADMIN — Medication 40 MILLIGRAM(S): at 01:02

## 2021-01-01 RX ADMIN — Medication 40 MILLIEQUIVALENT(S): at 16:50

## 2021-01-01 RX ADMIN — Medication 125 MILLIGRAM(S): at 17:14

## 2021-01-01 RX ADMIN — BUMETANIDE 20 MG/HR: 0.25 INJECTION INTRAMUSCULAR; INTRAVENOUS at 03:16

## 2021-01-01 RX ADMIN — Medication 30 MILLILITER(S): at 11:36

## 2021-01-01 RX ADMIN — Medication 1 TABLET(S): at 12:39

## 2021-01-01 RX ADMIN — KETAMINE HYDROCHLORIDE 1.82 MG/KG/HR: 100 INJECTION INTRAMUSCULAR; INTRAVENOUS at 12:46

## 2021-01-01 RX ADMIN — CHLORHEXIDINE GLUCONATE 15 MILLILITER(S): 213 SOLUTION TOPICAL at 18:46

## 2021-01-01 RX ADMIN — Medication 20 MILLIEQUIVALENT(S): at 12:44

## 2021-01-01 RX ADMIN — MIDAZOLAM HYDROCHLORIDE 1.45 MG/KG/HR: 1 INJECTION, SOLUTION INTRAMUSCULAR; INTRAVENOUS at 19:52

## 2021-01-01 RX ADMIN — Medication 522.5 MILLIGRAM(S): at 23:00

## 2021-01-01 RX ADMIN — Medication 1 MILLIGRAM(S): at 10:57

## 2021-01-01 RX ADMIN — Medication 520 MILLIGRAM(S): at 18:22

## 2021-01-01 RX ADMIN — Medication 2: at 06:02

## 2021-01-01 RX ADMIN — PIPERACILLIN AND TAZOBACTAM 25 GRAM(S): 4; .5 INJECTION, POWDER, LYOPHILIZED, FOR SOLUTION INTRAVENOUS at 20:51

## 2021-01-01 RX ADMIN — Medication 2 DROP(S): at 01:09

## 2021-01-01 RX ADMIN — Medication 1 TABLET(S): at 11:21

## 2021-01-01 RX ADMIN — Medication 50 MILLIEQUIVALENT(S): at 10:31

## 2021-01-01 RX ADMIN — Medication 522.5 MILLIGRAM(S): at 10:32

## 2021-01-01 RX ADMIN — CHLORHEXIDINE GLUCONATE 15 MILLILITER(S): 213 SOLUTION TOPICAL at 17:09

## 2021-01-01 RX ADMIN — Medication 50 GRAM(S): at 02:27

## 2021-01-01 RX ADMIN — Medication 3 MILLILITER(S): at 11:39

## 2021-01-01 RX ADMIN — LACTULOSE 30 GRAM(S): 10 SOLUTION ORAL at 21:16

## 2021-01-01 RX ADMIN — Medication 3 MILLILITER(S): at 11:22

## 2021-01-01 RX ADMIN — KETAMINE HYDROCHLORIDE 1.82 MG/KG/HR: 100 INJECTION INTRAMUSCULAR; INTRAVENOUS at 00:09

## 2021-01-01 RX ADMIN — PIPERACILLIN AND TAZOBACTAM 25 GRAM(S): 4; .5 INJECTION, POWDER, LYOPHILIZED, FOR SOLUTION INTRAVENOUS at 10:04

## 2021-01-01 RX ADMIN — Medication 102 MILLIGRAM(S): at 01:02

## 2021-01-01 RX ADMIN — Medication 102 MILLIGRAM(S): at 14:34

## 2021-01-01 RX ADMIN — Medication 3 MILLILITER(S): at 05:39

## 2021-01-01 RX ADMIN — PIPERACILLIN AND TAZOBACTAM 25 GRAM(S): 4; .5 INJECTION, POWDER, LYOPHILIZED, FOR SOLUTION INTRAVENOUS at 23:26

## 2021-01-01 RX ADMIN — Medication 2 DROP(S): at 10:11

## 2021-01-01 RX ADMIN — Medication 4: at 05:27

## 2021-01-01 RX ADMIN — ENOXAPARIN SODIUM 40 MILLIGRAM(S): 100 INJECTION SUBCUTANEOUS at 12:04

## 2021-01-01 RX ADMIN — Medication 400 MILLIGRAM(S): at 20:07

## 2021-01-01 RX ADMIN — MEROPENEM 100 MILLIGRAM(S): 1 INJECTION INTRAVENOUS at 17:26

## 2021-01-01 RX ADMIN — MEROPENEM 100 MILLIGRAM(S): 1 INJECTION INTRAVENOUS at 05:10

## 2021-01-01 RX ADMIN — BUMETANIDE 10 MG/HR: 0.25 INJECTION INTRAMUSCULAR; INTRAVENOUS at 14:52

## 2021-01-01 RX ADMIN — Medication 40 MILLIGRAM(S): at 05:50

## 2021-01-01 RX ADMIN — PIPERACILLIN AND TAZOBACTAM 25 GRAM(S): 4; .5 INJECTION, POWDER, LYOPHILIZED, FOR SOLUTION INTRAVENOUS at 02:32

## 2021-01-01 RX ADMIN — CHLORHEXIDINE GLUCONATE 15 MILLILITER(S): 213 SOLUTION TOPICAL at 05:33

## 2021-01-01 RX ADMIN — Medication 1 MILLIGRAM(S): at 12:44

## 2021-01-01 RX ADMIN — Medication 2 DROP(S): at 11:12

## 2021-01-01 RX ADMIN — FENTANYL CITRATE 100 MICROGRAM(S): 50 INJECTION INTRAVENOUS at 06:46

## 2021-01-01 RX ADMIN — PROPOFOL 21.8 MICROGRAM(S)/KG/MIN: 10 INJECTION, EMULSION INTRAVENOUS at 21:13

## 2021-01-01 RX ADMIN — MEROPENEM 100 MILLIGRAM(S): 1 INJECTION INTRAVENOUS at 14:31

## 2021-01-01 RX ADMIN — Medication 102 MILLIGRAM(S): at 14:25

## 2021-01-01 RX ADMIN — Medication 100 MILLIEQUIVALENT(S): at 00:42

## 2021-01-01 RX ADMIN — Medication 1 TABLET(S): at 12:14

## 2021-01-01 RX ADMIN — BUMETANIDE 15 MG/HR: 0.25 INJECTION INTRAMUSCULAR; INTRAVENOUS at 04:25

## 2021-01-01 RX ADMIN — Medication 1 MILLIGRAM(S): at 12:52

## 2021-01-01 RX ADMIN — Medication 30 MILLILITER(S): at 05:41

## 2021-01-01 RX ADMIN — CHLORHEXIDINE GLUCONATE 1 APPLICATION(S): 213 SOLUTION TOPICAL at 05:20

## 2021-01-01 RX ADMIN — Medication 100 MILLIGRAM(S): at 21:07

## 2021-01-01 RX ADMIN — LACTULOSE 30 GRAM(S): 10 SOLUTION ORAL at 03:20

## 2021-01-01 RX ADMIN — Medication 1 MILLIGRAM(S): at 11:09

## 2021-01-01 RX ADMIN — Medication 200 GRAM(S): at 19:27

## 2021-01-01 RX ADMIN — ENOXAPARIN SODIUM 40 MILLIGRAM(S): 100 INJECTION SUBCUTANEOUS at 11:11

## 2021-01-01 RX ADMIN — Medication 520 MILLIGRAM(S): at 01:44

## 2021-01-01 RX ADMIN — Medication 100 MILLIGRAM(S): at 12:44

## 2021-01-01 RX ADMIN — KETAMINE HYDROCHLORIDE 70 MILLIGRAM(S): 100 INJECTION INTRAMUSCULAR; INTRAVENOUS at 08:09

## 2021-01-01 RX ADMIN — MEROPENEM 100 MILLIGRAM(S): 1 INJECTION INTRAVENOUS at 05:20

## 2021-01-01 RX ADMIN — Medication 367.5 MILLIGRAM(S): at 21:17

## 2021-01-01 RX ADMIN — Medication 1 TABLET(S): at 11:52

## 2021-01-01 RX ADMIN — LACTULOSE 30 GRAM(S): 10 SOLUTION ORAL at 05:36

## 2021-01-01 RX ADMIN — Medication 62.5 MILLIMOLE(S): at 09:40

## 2021-01-01 RX ADMIN — Medication 1300 MILLIGRAM(S): at 14:45

## 2021-01-01 RX ADMIN — HEPARIN SODIUM 5000 UNIT(S): 5000 INJECTION INTRAVENOUS; SUBCUTANEOUS at 06:01

## 2021-01-01 RX ADMIN — LACTULOSE 30 GRAM(S): 10 SOLUTION ORAL at 10:29

## 2021-01-01 RX ADMIN — MIDAZOLAM HYDROCHLORIDE 1.45 MG/KG/HR: 1 INJECTION, SOLUTION INTRAMUSCULAR; INTRAVENOUS at 22:48

## 2021-01-01 RX ADMIN — Medication 50 MILLILITER(S): at 15:30

## 2021-01-01 RX ADMIN — Medication 250 MILLILITER(S): at 22:46

## 2021-01-01 RX ADMIN — Medication 30 MILLILITER(S): at 05:09

## 2021-01-01 RX ADMIN — Medication 100 MILLIGRAM(S): at 12:15

## 2021-01-01 RX ADMIN — LACTULOSE 30 GRAM(S): 10 SOLUTION ORAL at 05:31

## 2021-01-01 RX ADMIN — HEPARIN SODIUM 5000 UNIT(S): 5000 INJECTION INTRAVENOUS; SUBCUTANEOUS at 22:16

## 2021-01-01 RX ADMIN — CASPOFUNGIN ACETATE 260 MILLIGRAM(S): 7 INJECTION, POWDER, LYOPHILIZED, FOR SOLUTION INTRAVENOUS at 17:27

## 2021-01-01 RX ADMIN — HEPARIN SODIUM 5000 UNIT(S): 5000 INJECTION INTRAVENOUS; SUBCUTANEOUS at 13:17

## 2021-01-01 RX ADMIN — KETAMINE HYDROCHLORIDE 1.82 MG/KG/HR: 100 INJECTION INTRAMUSCULAR; INTRAVENOUS at 16:31

## 2021-01-01 RX ADMIN — CASPOFUNGIN ACETATE 260 MILLIGRAM(S): 7 INJECTION, POWDER, LYOPHILIZED, FOR SOLUTION INTRAVENOUS at 12:44

## 2021-01-01 RX ADMIN — CISATRACURIUM BESYLATE 13.1 MICROGRAM(S)/KG/MIN: 2 INJECTION INTRAVENOUS at 13:05

## 2021-01-01 RX ADMIN — LACTULOSE 30 GRAM(S): 10 SOLUTION ORAL at 05:56

## 2021-01-01 RX ADMIN — Medication 3 MILLILITER(S): at 23:18

## 2021-01-01 RX ADMIN — Medication 75 MEQ/KG/HR: at 21:48

## 2021-01-01 RX ADMIN — Medication 600 MILLIGRAM(S): at 18:47

## 2021-01-01 RX ADMIN — Medication 40 MILLIGRAM(S): at 11:46

## 2021-01-01 RX ADMIN — Medication 40 MILLIGRAM(S): at 22:48

## 2021-01-01 RX ADMIN — Medication 40 MILLIEQUIVALENT(S): at 17:09

## 2021-01-01 RX ADMIN — SODIUM CHLORIDE 3 MILLILITER(S): 9 INJECTION INTRAMUSCULAR; INTRAVENOUS; SUBCUTANEOUS at 22:29

## 2021-01-01 RX ADMIN — Medication 3 MILLILITER(S): at 11:20

## 2021-01-01 RX ADMIN — LIDOCAINE 1 PATCH: 4 CREAM TOPICAL at 07:42

## 2021-01-01 RX ADMIN — PROPOFOL 21.8 MICROGRAM(S)/KG/MIN: 10 INJECTION, EMULSION INTRAVENOUS at 08:36

## 2021-01-01 RX ADMIN — CHLORHEXIDINE GLUCONATE 15 MILLILITER(S): 213 SOLUTION TOPICAL at 17:25

## 2021-01-01 RX ADMIN — Medication 650 MILLIGRAM(S): at 08:29

## 2021-01-01 RX ADMIN — PHENYLEPHRINE HYDROCHLORIDE 1.36 MICROGRAM(S)/KG/MIN: 10 INJECTION INTRAVENOUS at 12:16

## 2021-01-01 RX ADMIN — ENOXAPARIN SODIUM 40 MILLIGRAM(S): 100 INJECTION SUBCUTANEOUS at 12:50

## 2021-01-01 RX ADMIN — PANTOPRAZOLE SODIUM 40 MILLIGRAM(S): 20 TABLET, DELAYED RELEASE ORAL at 11:22

## 2021-01-01 RX ADMIN — Medication 600 MILLIGRAM(S): at 19:47

## 2021-01-01 RX ADMIN — CHLORHEXIDINE GLUCONATE 1 APPLICATION(S): 213 SOLUTION TOPICAL at 05:31

## 2021-01-01 RX ADMIN — Medication 367.5 MILLIGRAM(S): at 10:52

## 2021-01-01 RX ADMIN — PIPERACILLIN AND TAZOBACTAM 25 GRAM(S): 4; .5 INJECTION, POWDER, LYOPHILIZED, FOR SOLUTION INTRAVENOUS at 03:47

## 2021-01-01 RX ADMIN — Medication 3 MILLILITER(S): at 17:12

## 2021-01-01 RX ADMIN — Medication 100 MILLIGRAM(S): at 12:39

## 2021-01-01 RX ADMIN — Medication 650 MILLIGRAM(S): at 18:59

## 2021-01-01 RX ADMIN — Medication 3 MILLILITER(S): at 17:40

## 2021-01-01 RX ADMIN — PROPOFOL 21.8 MICROGRAM(S)/KG/MIN: 10 INJECTION, EMULSION INTRAVENOUS at 10:32

## 2021-01-01 RX ADMIN — Medication 1300 MILLIGRAM(S): at 10:53

## 2021-01-01 RX ADMIN — LACTULOSE 30 GRAM(S): 10 SOLUTION ORAL at 05:58

## 2021-01-01 RX ADMIN — Medication 520 MILLIGRAM(S): at 10:10

## 2021-01-01 RX ADMIN — MEROPENEM 100 MILLIGRAM(S): 1 INJECTION INTRAVENOUS at 17:02

## 2021-01-01 RX ADMIN — Medication 50 MILLIEQUIVALENT(S): at 18:19

## 2021-01-01 RX ADMIN — Medication 650 MILLIGRAM(S): at 00:16

## 2021-01-01 RX ADMIN — CHLORHEXIDINE GLUCONATE 1 APPLICATION(S): 213 SOLUTION TOPICAL at 05:55

## 2021-01-01 RX ADMIN — Medication 650 MILLIGRAM(S): at 19:00

## 2021-01-01 RX ADMIN — Medication 520 MILLIGRAM(S): at 02:55

## 2021-01-01 RX ADMIN — Medication 650 MILLIGRAM(S): at 05:58

## 2021-01-01 RX ADMIN — PROPOFOL 21.8 MICROGRAM(S)/KG/MIN: 10 INJECTION, EMULSION INTRAVENOUS at 23:04

## 2021-01-01 RX ADMIN — PROPOFOL 21.8 MICROGRAM(S)/KG/MIN: 10 INJECTION, EMULSION INTRAVENOUS at 13:29

## 2021-01-01 RX ADMIN — MEROPENEM 100 MILLIGRAM(S): 1 INJECTION INTRAVENOUS at 11:41

## 2021-01-01 RX ADMIN — PANTOPRAZOLE SODIUM 40 MILLIGRAM(S): 20 TABLET, DELAYED RELEASE ORAL at 11:21

## 2021-01-01 RX ADMIN — Medication 3 MILLILITER(S): at 11:34

## 2021-01-01 RX ADMIN — Medication 3 MILLILITER(S): at 05:45

## 2021-01-01 RX ADMIN — LACTULOSE 30 GRAM(S): 10 SOLUTION ORAL at 05:59

## 2021-01-01 RX ADMIN — Medication 200 GRAM(S): at 18:52

## 2021-01-01 RX ADMIN — PIPERACILLIN AND TAZOBACTAM 25 GRAM(S): 4; .5 INJECTION, POWDER, LYOPHILIZED, FOR SOLUTION INTRAVENOUS at 18:20

## 2021-01-01 RX ADMIN — PIPERACILLIN AND TAZOBACTAM 25 GRAM(S): 4; .5 INJECTION, POWDER, LYOPHILIZED, FOR SOLUTION INTRAVENOUS at 14:02

## 2021-01-01 RX ADMIN — Medication 1 MILLIGRAM(S): at 11:36

## 2021-01-01 RX ADMIN — Medication 30 MILLILITER(S): at 17:30

## 2021-01-01 RX ADMIN — CHOLESTYRAMINE 4 GRAM(S): 4 POWDER, FOR SUSPENSION ORAL at 21:19

## 2021-01-01 RX ADMIN — KETAMINE HYDROCHLORIDE 1.82 MG/KG/HR: 100 INJECTION INTRAMUSCULAR; INTRAVENOUS at 08:35

## 2021-01-01 RX ADMIN — LACTULOSE 30 GRAM(S): 10 SOLUTION ORAL at 17:08

## 2021-01-01 RX ADMIN — Medication 40 MILLIGRAM(S): at 17:06

## 2021-01-01 RX ADMIN — Medication 520 MILLIGRAM(S): at 18:00

## 2021-01-01 RX ADMIN — CISATRACURIUM BESYLATE 13.1 MICROGRAM(S)/KG/MIN: 2 INJECTION INTRAVENOUS at 23:03

## 2021-01-01 RX ADMIN — Medication 100 MILLIGRAM(S): at 05:18

## 2021-01-01 RX ADMIN — Medication 2: at 13:57

## 2021-01-01 RX ADMIN — Medication 125 MILLIGRAM(S): at 11:40

## 2021-01-01 RX ADMIN — Medication 40 MILLIGRAM(S): at 05:19

## 2021-01-01 RX ADMIN — Medication 1 TABLET(S): at 11:29

## 2021-01-01 RX ADMIN — DAPTOMYCIN 124 MILLIGRAM(S): 500 INJECTION, POWDER, LYOPHILIZED, FOR SOLUTION INTRAVENOUS at 21:54

## 2021-01-01 RX ADMIN — VASOPRESSIN 2.4 UNIT(S)/MIN: 20 INJECTION INTRAVENOUS at 19:29

## 2021-01-01 RX ADMIN — Medication 3 MILLILITER(S): at 17:32

## 2021-01-01 RX ADMIN — CASPOFUNGIN ACETATE 260 MILLIGRAM(S): 7 INJECTION, POWDER, LYOPHILIZED, FOR SOLUTION INTRAVENOUS at 17:25

## 2021-01-01 RX ADMIN — Medication 125 MEQ/KG/HR: at 12:30

## 2021-01-01 RX ADMIN — Medication 650 MILLIGRAM(S): at 18:18

## 2021-01-01 RX ADMIN — HEPARIN SODIUM 5000 UNIT(S): 5000 INJECTION INTRAVENOUS; SUBCUTANEOUS at 05:30

## 2021-01-01 RX ADMIN — Medication 2 DROP(S): at 05:52

## 2021-01-01 RX ADMIN — HEPARIN SODIUM 5000 UNIT(S): 5000 INJECTION INTRAVENOUS; SUBCUTANEOUS at 17:25

## 2021-01-01 RX ADMIN — Medication 50 MILLIEQUIVALENT(S): at 12:31

## 2021-01-01 RX ADMIN — LACTULOSE 30 GRAM(S): 10 SOLUTION ORAL at 10:11

## 2021-01-01 RX ADMIN — FENTANYL CITRATE 100 MICROGRAM(S): 50 INJECTION INTRAVENOUS at 21:25

## 2021-01-01 RX ADMIN — MEROPENEM 100 MILLIGRAM(S): 1 INJECTION INTRAVENOUS at 05:54

## 2021-01-01 RX ADMIN — Medication 1 TABLET(S): at 11:07

## 2021-01-01 RX ADMIN — Medication 50 MILLILITER(S): at 00:30

## 2021-01-01 RX ADMIN — HEPARIN SODIUM 5000 UNIT(S): 5000 INJECTION INTRAVENOUS; SUBCUTANEOUS at 05:36

## 2021-01-01 RX ADMIN — Medication 50 MILLIEQUIVALENT(S): at 16:31

## 2021-01-01 RX ADMIN — PIPERACILLIN AND TAZOBACTAM 25 GRAM(S): 4; .5 INJECTION, POWDER, LYOPHILIZED, FOR SOLUTION INTRAVENOUS at 20:07

## 2021-01-01 RX ADMIN — Medication 40 MILLIGRAM(S): at 06:05

## 2021-01-01 RX ADMIN — CISATRACURIUM BESYLATE 20 MILLIGRAM(S): 2 INJECTION INTRAVENOUS at 14:50

## 2021-01-01 RX ADMIN — Medication 40 MILLIGRAM(S): at 17:09

## 2021-01-01 RX ADMIN — Medication 6.81 MICROGRAM(S)/KG/MIN: at 19:53

## 2021-01-01 RX ADMIN — LACTULOSE 30 GRAM(S): 10 SOLUTION ORAL at 05:33

## 2021-01-01 RX ADMIN — Medication 50 MILLILITER(S): at 16:44

## 2021-01-01 RX ADMIN — Medication 50 MILLILITER(S): at 11:41

## 2021-01-01 RX ADMIN — Medication 250 MILLIGRAM(S): at 05:08

## 2021-01-01 RX ADMIN — Medication 40 MILLIGRAM(S): at 17:50

## 2021-01-01 RX ADMIN — Medication 400 MILLIGRAM(S): at 01:21

## 2021-01-01 RX ADMIN — Medication 2 DROP(S): at 21:06

## 2021-01-01 RX ADMIN — HEPARIN SODIUM 5000 UNIT(S): 5000 INJECTION INTRAVENOUS; SUBCUTANEOUS at 05:19

## 2021-01-01 RX ADMIN — Medication 50 MILLILITER(S): at 17:10

## 2021-01-01 RX ADMIN — Medication 6: at 06:02

## 2021-01-01 RX ADMIN — Medication 100 GRAM(S): at 01:04

## 2021-01-01 RX ADMIN — Medication 13.6 MICROGRAM(S)/KG/MIN: at 23:04

## 2021-01-01 RX ADMIN — SODIUM CHLORIDE 3 MILLILITER(S): 9 INJECTION INTRAMUSCULAR; INTRAVENOUS; SUBCUTANEOUS at 06:09

## 2021-01-01 RX ADMIN — PANTOPRAZOLE SODIUM 40 MILLIGRAM(S): 20 TABLET, DELAYED RELEASE ORAL at 11:11

## 2021-01-01 RX ADMIN — Medication 50 MILLIEQUIVALENT(S): at 19:42

## 2021-01-01 RX ADMIN — Medication 2: at 15:29

## 2021-01-01 RX ADMIN — Medication 13.6 MICROGRAM(S)/KG/MIN: at 18:01

## 2021-01-01 RX ADMIN — Medication 650 MILLIGRAM(S): at 15:59

## 2021-01-01 RX ADMIN — Medication 1 MILLIGRAM(S): at 12:46

## 2021-01-01 RX ADMIN — LACTULOSE 30 GRAM(S): 10 SOLUTION ORAL at 05:09

## 2021-01-01 RX ADMIN — Medication 400 MILLIGRAM(S): at 01:01

## 2021-01-01 RX ADMIN — PIPERACILLIN AND TAZOBACTAM 25 GRAM(S): 4; .5 INJECTION, POWDER, LYOPHILIZED, FOR SOLUTION INTRAVENOUS at 13:21

## 2021-01-01 RX ADMIN — KETAMINE HYDROCHLORIDE 1.82 MG/KG/HR: 100 INJECTION INTRAMUSCULAR; INTRAVENOUS at 12:31

## 2021-01-01 RX ADMIN — Medication 3 MILLILITER(S): at 23:40

## 2021-01-01 RX ADMIN — Medication 400 MILLIGRAM(S): at 18:10

## 2021-01-01 RX ADMIN — Medication 250 MILLIGRAM(S): at 22:33

## 2021-01-01 RX ADMIN — BUMETANIDE 20 MG/HR: 0.25 INJECTION INTRAMUSCULAR; INTRAVENOUS at 13:09

## 2021-01-01 RX ADMIN — Medication 4: at 23:04

## 2021-01-01 RX ADMIN — Medication 2: at 17:51

## 2021-01-01 RX ADMIN — LACTULOSE 30 GRAM(S): 10 SOLUTION ORAL at 17:19

## 2021-01-01 RX ADMIN — Medication 50 MILLILITER(S): at 05:33

## 2021-01-01 RX ADMIN — Medication 30 MILLILITER(S): at 12:15

## 2021-01-01 RX ADMIN — LACTULOSE 30 GRAM(S): 10 SOLUTION ORAL at 11:09

## 2021-01-01 RX ADMIN — Medication 100 MILLIGRAM(S): at 13:02

## 2021-01-01 RX ADMIN — Medication 100 MILLIEQUIVALENT(S): at 23:33

## 2021-01-01 RX ADMIN — PIPERACILLIN AND TAZOBACTAM 25 GRAM(S): 4; .5 INJECTION, POWDER, LYOPHILIZED, FOR SOLUTION INTRAVENOUS at 06:10

## 2021-01-01 RX ADMIN — Medication 650 MILLIGRAM(S): at 17:40

## 2021-01-01 RX ADMIN — PIPERACILLIN AND TAZOBACTAM 25 GRAM(S): 4; .5 INJECTION, POWDER, LYOPHILIZED, FOR SOLUTION INTRAVENOUS at 18:01

## 2021-01-01 RX ADMIN — SPIRONOLACTONE 100 MILLIGRAM(S): 25 TABLET, FILM COATED ORAL at 06:39

## 2021-01-01 RX ADMIN — LACTULOSE 30 GRAM(S): 10 SOLUTION ORAL at 05:42

## 2021-01-01 RX ADMIN — SODIUM CHLORIDE 3 MILLILITER(S): 9 INJECTION INTRAMUSCULAR; INTRAVENOUS; SUBCUTANEOUS at 15:09

## 2021-01-01 RX ADMIN — Medication 50 MILLILITER(S): at 22:37

## 2021-01-01 RX ADMIN — CHLORHEXIDINE GLUCONATE 15 MILLILITER(S): 213 SOLUTION TOPICAL at 06:01

## 2021-01-01 RX ADMIN — Medication 525 MILLIGRAM(S): at 06:18

## 2021-01-01 RX ADMIN — Medication 250 MILLIGRAM(S): at 22:29

## 2021-01-01 RX ADMIN — LACTULOSE 30 GRAM(S): 10 SOLUTION ORAL at 13:28

## 2021-01-01 RX ADMIN — FENTANYL CITRATE 7.26 MICROGRAM(S)/KG/HR: 50 INJECTION INTRAVENOUS at 12:31

## 2021-01-01 RX ADMIN — Medication 100 MILLIGRAM(S): at 11:22

## 2021-01-01 RX ADMIN — Medication 50 MILLILITER(S): at 23:33

## 2021-01-01 RX ADMIN — PHENYLEPHRINE HYDROCHLORIDE 1.36 MICROGRAM(S)/KG/MIN: 10 INJECTION INTRAVENOUS at 19:28

## 2021-01-01 RX ADMIN — Medication 1 TABLET(S): at 12:04

## 2021-01-01 RX ADMIN — Medication 2 DROP(S): at 17:04

## 2021-01-01 RX ADMIN — Medication 400 MILLIGRAM(S): at 14:58

## 2021-01-01 RX ADMIN — PANTOPRAZOLE SODIUM 40 MILLIGRAM(S): 20 TABLET, DELAYED RELEASE ORAL at 11:29

## 2021-01-01 RX ADMIN — PIPERACILLIN AND TAZOBACTAM 25 GRAM(S): 4; .5 INJECTION, POWDER, LYOPHILIZED, FOR SOLUTION INTRAVENOUS at 05:51

## 2021-01-01 RX ADMIN — Medication 6 UNIT(S): at 11:39

## 2021-01-01 RX ADMIN — Medication 3 MILLILITER(S): at 23:48

## 2021-01-01 RX ADMIN — Medication 367.5 MILLIGRAM(S): at 21:03

## 2021-01-01 RX ADMIN — Medication 2 PACKET(S): at 13:45

## 2021-01-01 RX ADMIN — Medication 4: at 13:28

## 2021-01-01 RX ADMIN — HEPARIN SODIUM 5000 UNIT(S): 5000 INJECTION INTRAVENOUS; SUBCUTANEOUS at 05:09

## 2021-01-01 RX ADMIN — CHOLESTYRAMINE 4 GRAM(S): 4 POWDER, FOR SUSPENSION ORAL at 09:50

## 2021-01-01 RX ADMIN — Medication 525 MILLIGRAM(S): at 14:08

## 2021-01-01 RX ADMIN — Medication 2: at 00:55

## 2021-01-01 RX ADMIN — KETAMINE HYDROCHLORIDE 1.82 MG/KG/HR: 100 INJECTION INTRAMUSCULAR; INTRAVENOUS at 20:09

## 2021-01-01 RX ADMIN — Medication 650 MILLIGRAM(S): at 06:26

## 2021-01-01 RX ADMIN — Medication 102 MILLIGRAM(S): at 12:52

## 2021-01-01 RX ADMIN — PIPERACILLIN AND TAZOBACTAM 25 GRAM(S): 4; .5 INJECTION, POWDER, LYOPHILIZED, FOR SOLUTION INTRAVENOUS at 02:52

## 2021-01-01 RX ADMIN — Medication 50 MILLILITER(S): at 23:44

## 2021-01-01 RX ADMIN — Medication 1 TABLET(S): at 11:09

## 2021-01-01 RX ADMIN — Medication 50 MILLILITER(S): at 22:45

## 2021-01-01 RX ADMIN — Medication 100 MILLIGRAM(S): at 11:36

## 2021-01-01 RX ADMIN — Medication 40 MILLIEQUIVALENT(S): at 13:33

## 2021-01-01 RX ADMIN — Medication 50 MILLILITER(S): at 18:25

## 2021-01-01 RX ADMIN — LACTULOSE 30 GRAM(S): 10 SOLUTION ORAL at 01:08

## 2021-01-01 RX ADMIN — Medication 166.67 MILLIGRAM(S): at 07:25

## 2021-01-01 RX ADMIN — MIDAZOLAM HYDROCHLORIDE 4 MILLIGRAM(S): 1 INJECTION, SOLUTION INTRAMUSCULAR; INTRAVENOUS at 06:46

## 2021-01-01 RX ADMIN — HEPARIN SODIUM 5000 UNIT(S): 5000 INJECTION INTRAVENOUS; SUBCUTANEOUS at 14:52

## 2021-01-01 RX ADMIN — Medication 50 MILLILITER(S): at 11:07

## 2021-01-01 RX ADMIN — Medication 2 DROP(S): at 21:02

## 2021-01-01 RX ADMIN — CASPOFUNGIN ACETATE 260 MILLIGRAM(S): 7 INJECTION, POWDER, LYOPHILIZED, FOR SOLUTION INTRAVENOUS at 14:08

## 2021-01-01 RX ADMIN — PIPERACILLIN AND TAZOBACTAM 25 GRAM(S): 4; .5 INJECTION, POWDER, LYOPHILIZED, FOR SOLUTION INTRAVENOUS at 03:58

## 2021-01-01 RX ADMIN — LACTULOSE 30 GRAM(S): 10 SOLUTION ORAL at 17:55

## 2021-01-01 RX ADMIN — FENTANYL CITRATE 100 MICROGRAM(S): 50 INJECTION INTRAVENOUS at 20:52

## 2021-01-01 RX ADMIN — SODIUM CHLORIDE 125 MILLILITER(S): 9 INJECTION, SOLUTION INTRAVENOUS at 12:41

## 2021-01-01 RX ADMIN — CHLORHEXIDINE GLUCONATE 15 MILLILITER(S): 213 SOLUTION TOPICAL at 05:09

## 2021-01-01 RX ADMIN — POTASSIUM PHOSPHATE, MONOBASIC POTASSIUM PHOSPHATE, DIBASIC 83.33 MILLIMOLE(S): 236; 224 INJECTION, SOLUTION INTRAVENOUS at 20:40

## 2021-01-01 RX ADMIN — Medication 40 MILLIGRAM(S): at 17:27

## 2021-01-01 RX ADMIN — Medication 125 MILLIGRAM(S): at 11:45

## 2021-01-01 RX ADMIN — INSULIN HUMAN 6 UNIT(S): 100 INJECTION, SOLUTION SUBCUTANEOUS at 02:33

## 2021-01-01 RX ADMIN — Medication 50 MILLILITER(S): at 16:10

## 2021-01-01 RX ADMIN — PIPERACILLIN AND TAZOBACTAM 25 GRAM(S): 4; .5 INJECTION, POWDER, LYOPHILIZED, FOR SOLUTION INTRAVENOUS at 21:44

## 2021-01-01 RX ADMIN — Medication 1 MILLIGRAM(S): at 13:21

## 2021-01-01 RX ADMIN — CHOLESTYRAMINE 4 GRAM(S): 4 POWDER, FOR SUSPENSION ORAL at 13:44

## 2021-01-01 RX ADMIN — CHLORHEXIDINE GLUCONATE 15 MILLILITER(S): 213 SOLUTION TOPICAL at 05:35

## 2021-01-01 RX ADMIN — CHOLESTYRAMINE 4 GRAM(S): 4 POWDER, FOR SUSPENSION ORAL at 21:13

## 2021-01-01 RX ADMIN — ENOXAPARIN SODIUM 40 MILLIGRAM(S): 100 INJECTION SUBCUTANEOUS at 12:44

## 2021-01-01 RX ADMIN — Medication 25 MILLIGRAM(S): at 14:57

## 2021-01-01 RX ADMIN — LACTULOSE 30 GRAM(S): 10 SOLUTION ORAL at 06:01

## 2021-01-01 RX ADMIN — Medication 3 MILLILITER(S): at 11:04

## 2021-01-01 RX ADMIN — Medication 40 MILLIGRAM(S): at 05:53

## 2021-01-01 RX ADMIN — CHLORHEXIDINE GLUCONATE 15 MILLILITER(S): 213 SOLUTION TOPICAL at 18:15

## 2021-01-01 RX ADMIN — Medication 2 DROP(S): at 05:18

## 2021-01-01 RX ADMIN — PIPERACILLIN AND TAZOBACTAM 25 GRAM(S): 4; .5 INJECTION, POWDER, LYOPHILIZED, FOR SOLUTION INTRAVENOUS at 20:20

## 2021-01-01 RX ADMIN — MEROPENEM 100 MILLIGRAM(S): 1 INJECTION INTRAVENOUS at 21:49

## 2021-01-01 RX ADMIN — Medication 1 MILLIGRAM(S): at 11:08

## 2021-01-01 RX ADMIN — KETAMINE HYDROCHLORIDE 1.82 MG/KG/HR: 100 INJECTION INTRAMUSCULAR; INTRAVENOUS at 08:20

## 2021-01-01 RX ADMIN — PIPERACILLIN AND TAZOBACTAM 25 GRAM(S): 4; .5 INJECTION, POWDER, LYOPHILIZED, FOR SOLUTION INTRAVENOUS at 05:41

## 2021-01-01 RX ADMIN — Medication 2: at 17:29

## 2021-01-01 RX ADMIN — Medication 650 MILLIGRAM(S): at 05:36

## 2021-01-01 RX ADMIN — Medication 250 MILLIGRAM(S): at 13:11

## 2021-01-01 RX ADMIN — MIDAZOLAM HYDROCHLORIDE 4 MILLIGRAM(S): 1 INJECTION, SOLUTION INTRAMUSCULAR; INTRAVENOUS at 22:08

## 2021-01-01 RX ADMIN — CHLORHEXIDINE GLUCONATE 15 MILLILITER(S): 213 SOLUTION TOPICAL at 05:18

## 2021-01-01 RX ADMIN — PIPERACILLIN AND TAZOBACTAM 25 GRAM(S): 4; .5 INJECTION, POWDER, LYOPHILIZED, FOR SOLUTION INTRAVENOUS at 05:56

## 2021-01-01 RX ADMIN — MEROPENEM 100 MILLIGRAM(S): 1 INJECTION INTRAVENOUS at 05:32

## 2021-01-01 RX ADMIN — Medication 1 TABLET(S): at 11:08

## 2021-01-01 RX ADMIN — Medication 1 MILLIGRAM(S): at 11:20

## 2021-01-01 RX ADMIN — Medication 2 DROP(S): at 06:01

## 2021-01-01 RX ADMIN — PANTOPRAZOLE SODIUM 40 MILLIGRAM(S): 20 TABLET, DELAYED RELEASE ORAL at 11:38

## 2021-01-01 RX ADMIN — Medication 20 MILLIEQUIVALENT(S): at 18:47

## 2021-01-01 RX ADMIN — CEFTRIAXONE 100 MILLIGRAM(S): 500 INJECTION, POWDER, FOR SOLUTION INTRAMUSCULAR; INTRAVENOUS at 08:04

## 2021-01-01 RX ADMIN — Medication 2 DROP(S): at 21:17

## 2021-01-01 RX ADMIN — KETAMINE HYDROCHLORIDE 1.82 MG/KG/HR: 100 INJECTION INTRAMUSCULAR; INTRAVENOUS at 12:26

## 2021-01-01 RX ADMIN — Medication 3 MILLILITER(S): at 00:10

## 2021-01-01 RX ADMIN — Medication 13.6 MICROGRAM(S)/KG/MIN: at 17:15

## 2021-01-01 RX ADMIN — Medication 13.6 MICROGRAM(S)/KG/MIN: at 09:00

## 2021-01-01 RX ADMIN — Medication 1 TABLET(S): at 11:37

## 2021-01-01 RX ADMIN — Medication 650 MILLIGRAM(S): at 23:59

## 2021-01-01 RX ADMIN — Medication 4: at 05:33

## 2021-01-01 RX ADMIN — Medication 2 DROP(S): at 09:39

## 2021-01-01 RX ADMIN — LACTULOSE 30 GRAM(S): 10 SOLUTION ORAL at 17:06

## 2021-01-01 RX ADMIN — CHLORHEXIDINE GLUCONATE 15 MILLILITER(S): 213 SOLUTION TOPICAL at 17:13

## 2021-01-01 RX ADMIN — Medication 40 MILLIGRAM(S): at 17:01

## 2021-01-01 RX ADMIN — LACTULOSE 30 GRAM(S): 10 SOLUTION ORAL at 21:02

## 2021-01-01 RX ADMIN — Medication 102 MILLIGRAM(S): at 12:33

## 2021-01-01 RX ADMIN — Medication 100 MILLIGRAM(S): at 12:47

## 2021-01-01 RX ADMIN — Medication 40 MILLIGRAM(S): at 05:45

## 2021-01-01 RX ADMIN — Medication 2 DROP(S): at 13:28

## 2021-01-01 RX ADMIN — Medication 200 GRAM(S): at 12:16

## 2021-01-01 RX ADMIN — BUMETANIDE 20 MG/HR: 0.25 INJECTION INTRAMUSCULAR; INTRAVENOUS at 23:04

## 2021-01-01 RX ADMIN — CASPOFUNGIN ACETATE 260 MILLIGRAM(S): 7 INJECTION, POWDER, LYOPHILIZED, FOR SOLUTION INTRAVENOUS at 18:30

## 2021-01-01 RX ADMIN — MEROPENEM 100 MILLIGRAM(S): 1 INJECTION INTRAVENOUS at 05:18

## 2021-01-01 RX ADMIN — Medication 50 MILLIEQUIVALENT(S): at 11:20

## 2021-01-01 RX ADMIN — LACTULOSE 30 GRAM(S): 10 SOLUTION ORAL at 01:13

## 2021-01-01 RX ADMIN — Medication 3 MILLILITER(S): at 17:28

## 2021-01-01 RX ADMIN — Medication 40 MILLIGRAM(S): at 05:55

## 2021-01-01 RX ADMIN — Medication 650 MILLIGRAM(S): at 00:30

## 2021-01-01 RX ADMIN — Medication 1 TABLET(S): at 14:01

## 2021-01-01 RX ADMIN — Medication 2: at 23:05

## 2021-01-01 RX ADMIN — Medication 25 MILLIGRAM(S): at 14:58

## 2021-01-01 RX ADMIN — INSULIN HUMAN 5 UNIT(S): 100 INJECTION, SOLUTION SUBCUTANEOUS at 13:48

## 2021-01-01 RX ADMIN — Medication 2 DROP(S): at 14:09

## 2021-01-01 RX ADMIN — Medication 650 MILLIGRAM(S): at 06:02

## 2021-01-01 RX ADMIN — Medication 1 TABLET(S): at 11:23

## 2021-01-01 RX ADMIN — CASPOFUNGIN ACETATE 260 MILLIGRAM(S): 7 INJECTION, POWDER, LYOPHILIZED, FOR SOLUTION INTRAVENOUS at 23:31

## 2021-01-01 RX ADMIN — Medication 50 MILLILITER(S): at 00:25

## 2021-01-01 RX ADMIN — MEROPENEM 100 MILLIGRAM(S): 1 INJECTION INTRAVENOUS at 17:06

## 2021-01-01 RX ADMIN — LACTULOSE 30 GRAM(S): 10 SOLUTION ORAL at 22:44

## 2021-01-01 RX ADMIN — Medication 100 MILLIGRAM(S): at 11:40

## 2021-01-01 RX ADMIN — Medication 1 MILLIGRAM(S): at 11:14

## 2021-01-01 RX ADMIN — Medication 40 MILLIGRAM(S): at 05:35

## 2021-01-01 RX ADMIN — Medication 50 MILLIGRAM(S): at 21:41

## 2021-01-01 RX ADMIN — Medication 50 MILLILITER(S): at 05:58

## 2021-01-01 RX ADMIN — Medication 40 MILLIGRAM(S): at 05:57

## 2021-01-01 RX ADMIN — Medication 1 TABLET(S): at 12:08

## 2021-01-01 RX ADMIN — Medication 100 MILLIGRAM(S): at 13:44

## 2021-01-01 RX ADMIN — Medication 50 MILLILITER(S): at 11:37

## 2021-01-01 RX ADMIN — KETAMINE HYDROCHLORIDE 1.82 MG/KG/HR: 100 INJECTION INTRAMUSCULAR; INTRAVENOUS at 17:27

## 2021-01-01 RX ADMIN — KETAMINE HYDROCHLORIDE 1.82 MG/KG/HR: 100 INJECTION INTRAMUSCULAR; INTRAVENOUS at 21:48

## 2021-01-01 RX ADMIN — Medication 2 DROP(S): at 13:43

## 2021-01-01 RX ADMIN — Medication 2 DROP(S): at 05:32

## 2021-01-01 RX ADMIN — Medication 650 MILLIGRAM(S): at 13:55

## 2021-01-01 RX ADMIN — Medication 1 TABLET(S): at 12:31

## 2021-01-01 RX ADMIN — Medication 3 MILLILITER(S): at 17:17

## 2021-01-01 RX ADMIN — Medication 525 MILLIGRAM(S): at 21:47

## 2021-01-01 RX ADMIN — CHLORHEXIDINE GLUCONATE 1 APPLICATION(S): 213 SOLUTION TOPICAL at 05:43

## 2021-01-01 RX ADMIN — PROPOFOL 21.8 MICROGRAM(S)/KG/MIN: 10 INJECTION, EMULSION INTRAVENOUS at 10:11

## 2021-01-01 RX ADMIN — Medication 100 MILLIGRAM(S): at 12:50

## 2021-01-01 RX ADMIN — Medication 650 MILLIGRAM(S): at 17:18

## 2021-01-01 RX ADMIN — Medication 50 MILLILITER(S): at 05:55

## 2021-01-01 RX ADMIN — Medication 100 MILLIEQUIVALENT(S): at 23:34

## 2021-01-01 RX ADMIN — Medication 250 MILLILITER(S): at 23:12

## 2021-01-01 RX ADMIN — CHLORHEXIDINE GLUCONATE 15 MILLILITER(S): 213 SOLUTION TOPICAL at 17:06

## 2021-01-01 RX ADMIN — Medication 367.5 MILLIGRAM(S): at 10:11

## 2021-01-01 RX ADMIN — Medication 40 MILLIGRAM(S): at 05:56

## 2021-01-01 RX ADMIN — Medication 2 DROP(S): at 09:42

## 2021-01-01 RX ADMIN — LACTULOSE 30 GRAM(S): 10 SOLUTION ORAL at 10:28

## 2021-01-01 RX ADMIN — Medication 13.6 MICROGRAM(S)/KG/MIN: at 19:29

## 2021-01-01 RX ADMIN — MEROPENEM 100 MILLIGRAM(S): 1 INJECTION INTRAVENOUS at 13:02

## 2021-01-01 RX ADMIN — Medication 25 MILLILITER(S): at 02:40

## 2021-01-01 RX ADMIN — LACTULOSE 30 GRAM(S): 10 SOLUTION ORAL at 17:25

## 2021-01-01 RX ADMIN — PROPOFOL 21.8 MICROGRAM(S)/KG/MIN: 10 INJECTION, EMULSION INTRAVENOUS at 17:50

## 2021-01-01 RX ADMIN — CHOLESTYRAMINE 4 GRAM(S): 4 POWDER, FOR SUSPENSION ORAL at 11:19

## 2021-01-01 RX ADMIN — PIPERACILLIN AND TAZOBACTAM 25 GRAM(S): 4; .5 INJECTION, POWDER, LYOPHILIZED, FOR SOLUTION INTRAVENOUS at 02:48

## 2021-01-01 RX ADMIN — Medication 50 MILLIEQUIVALENT(S): at 12:29

## 2021-01-01 RX ADMIN — Medication 100 MILLIGRAM(S): at 11:28

## 2021-01-01 RX ADMIN — Medication 1 MILLIGRAM(S): at 13:44

## 2021-01-01 RX ADMIN — Medication 166.67 MILLIGRAM(S): at 13:41

## 2021-01-01 RX ADMIN — INSULIN HUMAN 3 UNIT(S)/HR: 100 INJECTION, SOLUTION SUBCUTANEOUS at 13:49

## 2021-01-01 RX ADMIN — Medication 250 MILLIGRAM(S): at 08:50

## 2021-01-01 RX ADMIN — Medication 3 MILLILITER(S): at 17:11

## 2021-01-01 RX ADMIN — Medication 20 MILLIEQUIVALENT(S): at 17:40

## 2021-01-01 RX ADMIN — MEROPENEM 100 MILLIGRAM(S): 1 INJECTION INTRAVENOUS at 05:36

## 2021-01-01 RX ADMIN — Medication 40 MILLIEQUIVALENT(S): at 01:05

## 2021-01-01 RX ADMIN — Medication 40 MILLIEQUIVALENT(S): at 08:23

## 2021-01-01 RX ADMIN — Medication 3 MILLILITER(S): at 23:24

## 2021-01-01 RX ADMIN — Medication 50 MILLILITER(S): at 17:15

## 2021-01-01 RX ADMIN — CHLORHEXIDINE GLUCONATE 15 MILLILITER(S): 213 SOLUTION TOPICAL at 17:03

## 2021-01-01 RX ADMIN — FENTANYL CITRATE 7.26 MICROGRAM(S)/KG/HR: 50 INJECTION INTRAVENOUS at 08:20

## 2021-01-01 RX ADMIN — Medication 2 DROP(S): at 21:55

## 2021-01-01 RX ADMIN — SPIRONOLACTONE 100 MILLIGRAM(S): 25 TABLET, FILM COATED ORAL at 05:55

## 2021-01-01 RX ADMIN — Medication 3 MILLILITER(S): at 11:55

## 2021-01-01 RX ADMIN — KETAMINE HYDROCHLORIDE 1.82 MG/KG/HR: 100 INJECTION INTRAMUSCULAR; INTRAVENOUS at 10:32

## 2021-01-01 RX ADMIN — Medication 8: at 01:28

## 2021-01-01 RX ADMIN — Medication 30 MILLILITER(S): at 17:20

## 2021-01-01 RX ADMIN — Medication 3 MILLILITER(S): at 11:32

## 2021-01-01 RX ADMIN — MEROPENEM 100 MILLIGRAM(S): 1 INJECTION INTRAVENOUS at 05:44

## 2021-01-01 RX ADMIN — LACTULOSE 30 GRAM(S): 10 SOLUTION ORAL at 11:11

## 2021-01-01 RX ADMIN — BUMETANIDE 20 MG/HR: 0.25 INJECTION INTRAMUSCULAR; INTRAVENOUS at 22:25

## 2021-01-01 RX ADMIN — PIPERACILLIN AND TAZOBACTAM 25 GRAM(S): 4; .5 INJECTION, POWDER, LYOPHILIZED, FOR SOLUTION INTRAVENOUS at 18:02

## 2021-01-01 RX ADMIN — SODIUM CHLORIDE 3 MILLILITER(S): 9 INJECTION INTRAMUSCULAR; INTRAVENOUS; SUBCUTANEOUS at 23:55

## 2021-01-01 RX ADMIN — HEPARIN SODIUM 5000 UNIT(S): 5000 INJECTION INTRAVENOUS; SUBCUTANEOUS at 05:52

## 2021-01-01 RX ADMIN — Medication 3 MILLILITER(S): at 05:16

## 2021-01-01 RX ADMIN — PROPOFOL 21.8 MICROGRAM(S)/KG/MIN: 10 INJECTION, EMULSION INTRAVENOUS at 12:26

## 2021-01-01 RX ADMIN — Medication 650 MILLIGRAM(S): at 00:32

## 2021-01-01 RX ADMIN — LACTULOSE 30 GRAM(S): 10 SOLUTION ORAL at 09:32

## 2021-01-01 RX ADMIN — HEPARIN SODIUM 5000 UNIT(S): 5000 INJECTION INTRAVENOUS; SUBCUTANEOUS at 13:39

## 2021-01-01 RX ADMIN — Medication 3 MILLILITER(S): at 17:26

## 2021-01-01 RX ADMIN — HEPARIN SODIUM 5000 UNIT(S): 5000 INJECTION INTRAVENOUS; SUBCUTANEOUS at 21:02

## 2021-01-01 RX ADMIN — MEROPENEM 100 MILLIGRAM(S): 1 INJECTION INTRAVENOUS at 21:27

## 2021-01-01 RX ADMIN — Medication 101 MILLIGRAM(S): at 12:14

## 2021-01-01 RX ADMIN — Medication 250 MILLIGRAM(S): at 05:35

## 2021-01-01 RX ADMIN — CHLORHEXIDINE GLUCONATE 15 MILLILITER(S): 213 SOLUTION TOPICAL at 17:19

## 2021-01-01 RX ADMIN — SPIRONOLACTONE 100 MILLIGRAM(S): 25 TABLET, FILM COATED ORAL at 05:43

## 2021-01-01 RX ADMIN — Medication 50 MILLILITER(S): at 17:07

## 2021-01-01 RX ADMIN — Medication 1 MILLIGRAM(S): at 12:15

## 2021-01-01 RX ADMIN — LACTULOSE 10 GRAM(S): 10 SOLUTION ORAL at 13:44

## 2021-01-01 RX ADMIN — Medication 20 MILLIEQUIVALENT(S): at 11:11

## 2021-01-01 RX ADMIN — Medication 525 MILLIGRAM(S): at 21:55

## 2021-01-01 RX ADMIN — Medication 3 MILLILITER(S): at 03:40

## 2021-01-01 RX ADMIN — Medication 1 TABLET(S): at 11:11

## 2021-01-01 RX ADMIN — LACTULOSE 30 GRAM(S): 10 SOLUTION ORAL at 06:39

## 2021-01-01 RX ADMIN — SODIUM CHLORIDE 3 MILLILITER(S): 9 INJECTION INTRAMUSCULAR; INTRAVENOUS; SUBCUTANEOUS at 05:42

## 2021-01-01 RX ADMIN — SODIUM CHLORIDE 125 MILLILITER(S): 9 INJECTION, SOLUTION INTRAVENOUS at 20:53

## 2021-01-01 RX ADMIN — BUMETANIDE 10 MG/HR: 0.25 INJECTION INTRAMUSCULAR; INTRAVENOUS at 23:00

## 2021-01-01 RX ADMIN — PIPERACILLIN AND TAZOBACTAM 200 GRAM(S): 4; .5 INJECTION, POWDER, LYOPHILIZED, FOR SOLUTION INTRAVENOUS at 16:50

## 2021-01-01 RX ADMIN — CHLORHEXIDINE GLUCONATE 1 APPLICATION(S): 213 SOLUTION TOPICAL at 05:34

## 2021-01-01 RX ADMIN — Medication 40 MILLIGRAM(S): at 05:31

## 2021-01-01 RX ADMIN — LACTULOSE 30 GRAM(S): 10 SOLUTION ORAL at 12:08

## 2021-01-01 RX ADMIN — Medication 30 MILLILITER(S): at 00:55

## 2021-01-01 RX ADMIN — Medication 125 MILLIGRAM(S): at 05:18

## 2021-04-20 NOTE — ED ADULT NURSE REASSESSMENT NOTE - NS ED NURSE REASSESS COMMENT FT1
Pt AAOx3. VSS. Provided dinner. No s/s distress or discomfort. Pt states "I am just very tired". Right 18g IVL site patent and wdl. Pending medication from pharmacy. Pt states he will provide urine sample.

## 2021-04-20 NOTE — H&P ADULT - NSHPSOCIALHISTORY_GEN_ALL_CORE
Lives at home with wife  Previously worked in construction  Alcohol use heavy clinton over past year, up to 1-2 bottles vodka  No smoking or other illiciut

## 2021-04-20 NOTE — PATIENT PROFILE ADULT - NSPROGENSOURCEINFO_GEN_A_NUR
Discontinue Regimen: T-Sal Otc Regimen: DHS Zinc shampoo alternating with Ketoconazole shampoo- wash scalp every other day x 1-2 weeks then weekly Detail Level: Zone patient

## 2021-04-20 NOTE — ED PROVIDER NOTE - CARE PLAN
Principal Discharge DX:	Jaundice   Principal Discharge DX:	Jaundice  Secondary Diagnosis:	Hyperbilirubinemia  Secondary Diagnosis:	Bandemia

## 2021-04-20 NOTE — ED PROVIDER NOTE - PROGRESS NOTE DETAILS
Attending note (Gray): Bandemia of 9; concerning now more for infection so blood cultures ordered, empiric antibiortics with zosyn started.  CT read pending; called radiology, no pancreatic mass, liver shows severe cirrhotic changes; gb with some nonspecific wall thickening (less likely this is cholecystitis given lack of tenderness; but out of abundance of caution will further evaluate with RUQ US). Attending note (Gray): Abd distension with WC 19, so paracentesis performed.  Not febrile or otherwise indicating infectious source, and abdomen, while distended, is nontender (arguing against SBP or other acute intraabdominal infection).  Concern mostly for ascites due to cirrhosis.

## 2021-04-20 NOTE — ED PROVIDER NOTE - CLINICAL SUMMARY MEDICAL DECISION MAKING FREE TEXT BOX
41yo male AA p/w progressive jaundice, abdominal distension, pruritus, occasional epigastric pain. Significant jaundice and abdominal distension on exam, NT abdomen. Afebrile. Low suspicion for SBP given slow progression and no fever. Malignant/obstructive process vs alcohol liver failure. Labs, benadryl, CXR, CTAP and admit.

## 2021-04-20 NOTE — H&P ADULT - PROBLEM SELECTOR PLAN 1
Patient with heavy alcohol consumption found to have cirrhotic liver morphology, varices, and ascites with coagulopathy concerning for new diagnosis of cirrhosis. S/p on admission SBP SAAG 2.1, Tp 0.8, TNC 20s  - 4/20: MDF 33 MELD 22  - start lactulose, titrate to 3-4 BM/day  - CTAP without evidence of biliary obstruction/pancreatitis  - will order serologies for chronic liver disease  - CTAP with IVC on 4/20 no intra-abdominal thrombi  - hepatology consulted  - avoid hepatoxins and sedating meds Patient with heavy alcohol consumption found to have cirrhotic liver morphology, varices, and ascites with coagulopathy concerning for new diagnosis of cirrhosis. S/p on admission SBP SAAG 2.1, Tp 0.8, TNC 20s  - 4/20: MDF 33 MELD 22  - start lactulose, titrate to 3-4 BM/day  - CTAP without evidence of biliary obstruction/pancreatitis  - f/u hepatitis panel, EBV, CMV  - f/u chronic lliver disease workup (sent)  - CTAP with IVC on 4/20 no intra-abdominal thrombi  - hepatology consulted  - avoid hepatoxins and sedating meds

## 2021-04-20 NOTE — H&P ADULT - PROBLEM SELECTOR PLAN 5
Mildly heterogeneous enhancement, with an ill-defined area of decreased attenuation in the upper pole of the left kidney measuring 1.4 cm  - f/u abdominal US

## 2021-04-20 NOTE — ED PROVIDER NOTE - OBJECTIVE STATEMENT
41yo male AA (last drink 17 days ago) p/w 2 weeks worsening jaundice, abdominal distension, occasional itching of the skin and occasional epigastric pains. Denies fever, N/V/D, dyspnea, h/o HIV/hepatitis.

## 2021-04-20 NOTE — ED PROVIDER NOTE - NS ED MD EM SELECTION
[id number] Comprehensive Purse String (Intermediate) Text: Given the location of the defect and the characteristics of the surrounding skin a purse string intermediate closure was deemed most appropriate.  Undermining was performed circumfirentially around the surgical defect.  A purse string suture was then placed and tightened.

## 2021-04-20 NOTE — H&P ADULT - NSHPPHYSICALEXAM_GEN_ALL_CORE
Vital Signs Last 24 Hrs  T(C): 36.6 (20 Apr 2021 16:56), Max: 37.2 (20 Apr 2021 12:07)  T(F): 97.9 (20 Apr 2021 16:56), Max: 98.9 (20 Apr 2021 12:07)  HR: 75 (20 Apr 2021 16:56) (75 - 92)  BP: 103/68 (20 Apr 2021 16:56) (103/68 - 133/80)  BP(mean): --  RR: 18 (20 Apr 2021 16:56) (18 - 22)  SpO2: 94% (20 Apr 2021 16:56) (94% - 96%)

## 2021-04-20 NOTE — H&P ADULT - PROBLEM SELECTOR PLAN 2
Patient with heavy alcohol use, per patient last use 2 weeks ago but BAL on admission 11. Per patient no prior admission for complicated alcohol withdrawal and no prior admission records here  - start ativan PRN symptom triggered CIWA  - start MV, thiamine, folate  - SW consult

## 2021-04-20 NOTE — H&P ADULT - PROBLEM SELECTOR PLAN 3
Patient with WBC 19 with 10% bands, without other SIRS criteria or overt localizing source  - cover for now with Zosyn  - CXR with clear lungs  - obtain blood ctx and uctx  - prelim paracentesis without evidence of SBP, f/u ctx  - COVID neg

## 2021-04-20 NOTE — ED ADULT NURSE NOTE - OBJECTIVE STATEMENT
Male 40 years old with no past medical history, alert and oriented x 4 came in for jaundice. Pt reports generalized jaundiced, abdominal distention and edema to both lower extremities associated with mild itching started 15 days ago. Pt admits drinking a pint of vodka everyday, last drink 17 days ago. Abdomen mildly tender to touch. Pain is sharp non radiating 6/10. Was admitted in [facility] for 5 days. Denies fever, chills, N/V, diarrhea, chest pain or sob. Evaluated by MD. Will continue to reassess.

## 2021-04-20 NOTE — H&P ADULT - ASSESSMENT
40M with alcohol misuse who presents with 2 weeks of jaundice and abdominal distention, found to have likely new decompensated liver cirrhosis in setting of alcohol

## 2021-04-20 NOTE — ED PROVIDER NOTE - ATTENDING CONTRIBUTION TO CARE
Attending Statement (HARPER Castellano MD):    HPI: 39y/o M with h/o alcohol abuse, presenting with worsening yellowing of his skin and abdominal distension over the past 2 weeks; states that he sometimes has some occasional epigastric pain (chronically) but at present no pain and just feels that his abdomen is enlarged.  No CP/SOB, no fever/chills, no nausea/vomiting.    Review of Systems:  -General: no fever or chills  -ENT: no congestion, no difficulty swallowing  -Pulmonary: no cough, no shortness of breath  -Cardiac: no chest pain, no palpitations  -Gastrointestinal: see hpi  -Genitourinary: no blood or pain with urination  -Musculoskeletal: no back or neck pain  -Skin: no rashes  -Endocrine: No h/o diabetes or thyroid disease  -Neurologic: No focal weakness or numbness    All else negative unless otherwise specified elsewhere in this note.    PSH/PMH as noted above    On Physical Exam:  General: Significant jaundice, speaking clearly in full sentences and without difficulty; cooperative with exam  HEENT: PERRL, + scleral icterus, MMM  Neck: no neck tenderness, no nuchal rigidity  Cardiac: normal s1, s2; RRR; no MGR  Lungs: CTABL  Abdomen: abdominal distension; nontender  : no bladder tenderness or distension  Skin: intact, no rash  Extremities: no peripheral edema, no gross deformities  Neuro: no gross neurologic deficits    MDM: 40M p/w painless jaundice, nontender abdomen; given ho/ alcohol use, concern for alcoholic cirrhosis / hepatic failure; less likely but also concerning for pancreatic mass/obstruction; obtain labs: cbc (to evaluate for leukocytosis or anemia), CMP (to evaluate for electrolyte abnormalities or renal/liver dysfunction), pt/inr; lipase (to evaluate for pancreatitis); will obtain CT AP w/ iv contrast to further evaluate. Likely to require admission for further evaluation / management.

## 2021-04-20 NOTE — H&P ADULT - HISTORY OF PRESENT ILLNESS
40M with alcohol misuse who presents with 2 weeks of jaundice and abdominal distention. Patient endorses over past year had been drinking heavily particularly last few months- up to several bottle of vodka. Per patient had started having epigastric abdominal pain, distention, and itching x 2 weeks prior to admission. He endorses last drinks 2 weeks ago. He denies fevers, chills, headache, cough, dyspnea, nausea, vomiting, diarrhea, or dysuria. He endorses feeling generalized weakness. Last BM per patient was an hour ago. Per patient, no outpatient medical follow up. No prior admission to [facility]. Attempted to call wife at number listed, no answer.     In ER, afebrile, HR 70-80s, , saturating well on RA. Given benadryl, motrin, librium.   Labs notable for WBC 19, 10% bandemia, Hb 11.4, plt 330, INR 1.25. CMP , , , Tb 26 (Db > 10). BAL 11.   s/p diagnostic tap: SAAG 2.1, Tp 0.8, TNC 20s.   CTAP showing cirrhotic liver morphology no biliary obstruction, varices but no thrombi,

## 2021-04-20 NOTE — ED PROVIDER NOTE - PHYSICAL EXAMINATION
Physical Exam:  Gen: significant jaundice throughout, NAD, AOx3, non-toxic appearing, able to ambulate without assistance  Head: NCAT  HEENT: EOMI, PEERLA, yellow conjunctiva, tongue midline  Lung: CTAB, no respiratory distress, no wheezes/rhonchi/rales B/L, speaking in full sentences  CV: RRR, no murmurs, rubs or gallops, distal pulses 2+ b/l  Abd: soft, NT, distended, no guarding, no rigidity, no rebound tenderness, no CVA tenderness   Skin: Warm, well perfused, no rash, no leg swelling  Psych: normal affect, calm

## 2021-04-20 NOTE — ED PROVIDER NOTE - SHIFT CHANGE DETAILS
Attending note (Gray): I have endorsed this patient to the incoming physician, including clinical history, physical exam, current results and assessment/plan.

## 2021-04-21 NOTE — PROGRESS NOTE ADULT - SUBJECTIVE AND OBJECTIVE BOX
Andre Reyes, M.D.  Pager: [phone]  Office: [phone]    Patient is a 40y old  Male who presents with a chief complaint of jaundice, malaise (20 Apr 2021 18:59)          SUBJECTIVE / OVERNIGHT EVENTS:    No acute overnight events.    ROS: (  ) Fever, (  )Chills,  (  )Nausea/Vomiting, (  ) Cough, (  )Shortness of breath, (  )Chest Pain    MEDICATIONS  (STANDING):  enoxaparin Injectable 40 milliGRAM(s) SubCutaneous daily  folic acid 1 milliGRAM(s) Oral daily  lactulose Syrup 20 Gram(s) Oral two times a day  multivitamin 1 Tablet(s) Oral daily  piperacillin/tazobactam IVPB.. 3.375 Gram(s) IV Intermittent every 8 hours  thiamine 100 milliGRAM(s) Oral daily    MEDICATIONS  (PRN):  LORazepam     Tablet 1 milliGRAM(s) Oral every 2 hours PRN CIWA-Ar score increase by 2 points and a total score of 7 or less  LORazepam     Tablet 1 milliGRAM(s) Oral every 1 hour PRN CIWA-Ar score 8 or greater          T(C): 36.9 (04-21 @ 12:18), Max: 37 (04-20 @ 20:05)   HR: 79   BP: 125/77   RR: 18   SpO2: 95%    PHYSICAL EXAM:    CONSTITUTIONAL: NAD, well-developed, well-groomed  EYES: PERRLA; conjunctiva and sclera clear  ENMT: Moist oral mucosa, no pharyngeal injection or exudates; normal dentition  NECK: Supple, no palpable masses; no thyromegaly  RESPIRATORY: Normal respiratory effort; lungs are clear to auscultation bilaterally  CARDIOVASCULAR: Regular rate and rhythm, normal S1 and S2, no murmur/rub/gallop; No lower extremity edema; Peripheral pulses are 2+ bilaterally  ABDOMEN: Nontender to palpation, normoactive bowel sounds, no rebound/guarding; No hepatosplenomegaly  MUSCULOSKELETAL:  Normal gait; no clubbing or cyanosis of digits; no joint swelling or tenderness to palpation  PSYCH: A+O to person, place, and time; affect appropriate  NEUROLOGY: CN 2-12 are intact and symmetric; no gross sensory deficits   SKIN: No rashes; no palpable lesions      LABS:                        11.4   19.72 )-----------( 361      ( 20 Apr 2021 13:15 )             33.9      04-21    134<L>  |  105  |  10  ----------------------------<  89  3.4<L>   |  20<L>  |  0.49<L>    Ca    7.6<L>      21 Apr 2021 06:50  Phos  1.2     04-20  Mg     2.1     04-20    TPro  5.6<L>  /  Alb  2.3<L>  /  TBili  24.4<H>  /  DBili  x   /  AST  223<H>  /  ALT  123<H>  /  AlkPhos  193<H>  04-21       CAPILLARY BLOOD GLUCOSE          RADIOLOGY & ADDITIONAL TESTS:    Imaging Personally Reviewed:  Consultant(s) Notes Reviewed:    Care Discussed with Consultants/Other Providers:   Andre Reyes, M.D.  Pager: [phone]  Office: [phone]    Patient is a 40y old  Male who presents with a chief complaint of jaundice, malaise (20 Apr 2021 18:59)        SUBJECTIVE / OVERNIGHT EVENTS:    No acute overnight events.  no abd pain.  no tremors    ROS: ( - ) Fever, ( - )Chills,  ( - )Nausea/Vomiting, ( - ) Cough, ( - )Shortness of breath, ( - )Chest Pain    MEDICATIONS  (STANDING):  enoxaparin Injectable 40 milliGRAM(s) SubCutaneous daily  folic acid 1 milliGRAM(s) Oral daily  lactulose Syrup 20 Gram(s) Oral two times a day  multivitamin 1 Tablet(s) Oral daily  piperacillin/tazobactam IVPB.. 3.375 Gram(s) IV Intermittent every 8 hours  thiamine 100 milliGRAM(s) Oral daily    MEDICATIONS  (PRN):  LORazepam     Tablet 1 milliGRAM(s) Oral every 2 hours PRN CIWA-Ar score increase by 2 points and a total score of 7 or less  LORazepam     Tablet 1 milliGRAM(s) Oral every 1 hour PRN CIWA-Ar score 8 or greater          T(C): 36.9 (04-21 @ 12:18), Max: 37 (04-20 @ 20:05)   HR: 79   BP: 125/77   RR: 18   SpO2: 95%    PHYSICAL EXAM:    CONSTITUTIONAL: NAD, well-developed, well-groomed  EYES: PERRLA; conjunctiva and sclera clear  ENMT: Moist oral mucosa, no pharyngeal injection or exudates; normal dentition  NECK: Supple, no palpable masses; no thyromegaly  RESPIRATORY: Normal respiratory effort; lungs are clear to auscultation bilaterally  CARDIOVASCULAR: Regular rate and rhythm, normal S1 and S2, no murmur/rub/gallop; No lower extremity edema; Peripheral pulses are 2+ bilaterally  ABDOMEN: Nontender to palpation, normoactive bowel sounds, no rebound/guarding; No hepatosplenomegaly  MUSCULOSKELETAL:  Normal gait; no clubbing or cyanosis of digits; no joint swelling or tenderness to palpation  PSYCH: A+O to person, place, and time; affect appropriate  NEUROLOGY: CN 2-12 are intact and symmetric; no gross sensory deficits   SKIN: No rashes; jaundice      LABS:                        11.4   19.72 )-----------( 361      ( 20 Apr 2021 13:15 )             33.9      04-21    134<L>  |  105  |  10  ----------------------------<  89  3.4<L>   |  20<L>  |  0.49<L>    Ca    7.6<L>      21 Apr 2021 06:50  Phos  1.2     04-20  Mg     2.1     04-20    TPro  5.6<L>  /  Alb  2.3<L>  /  TBili  24.4<H>  /  DBili  x   /  AST  223<H>  /  ALT  123<H>  /  AlkPhos  193<H>  04-21       CAPILLARY BLOOD GLUCOSE          RADIOLOGY & ADDITIONAL TESTS:    Imaging Personally Reviewed:  Consultant(s) Notes Reviewed:    Care Discussed with Consultants/Other Providers:

## 2021-04-21 NOTE — CONSULT NOTE ADULT - ASSESSMENT
41yo M with PMH of EtOH abuse who presents with jaundice and abdominal distention x 2 weeks, found to have cirrhosis and elevated liver tests.    # Elevated liver tests - ALT/AST ratio in the settings of ongoing (positive BAL) EtOH use suggestive of EtOH hepatitis (vs. DILI/AI/viral hepatitis). DF 36. No signs of acute liver failure. Potentially might benefit from steroids use if infectious workup is negative.  # Cirrhosis morphology - suggestive of underlying cirrhosis, suspected 2/2 EtOH use. MELD-Na 22 (4/21). Possible liver transplant candidate if able to remain abstinent from EtOH. Appears to have good social support.   # Active EtOH use - some mild tremor on exam, otherwise no signs of withdrawal. On CIWAS.    Recommendations:  - Infectious workup - Blood, urine cultures. Negative for SBP  - S/p diagnostic paracentesis  - If above negative will plan for a course of steroids  - CIWAS with ativan taper (please avoid librium given prolonged half life in liver dysfunction)  - Avoid ibuprofen  - Daily CMP, CBC, INR  - Thiamine, folate  - Social work consult    Thank you for involving us in the care of this patient. Please reach out if any further questions.    Ishmael Cash, PGY-4  Hepatology Fellow    Available on [employer] Teams  Pager [phone] ([facility]) or [phone] ([facility])  After 5PM/Weekends, please contact the on-call GI fellow: [phone]  Available through [employer] Teams   39yo M with PMH of EtOH abuse who presents with jaundice and abdominal distention x 2 weeks, found to have cirrhosis and elevated liver tests.    # Elevated liver tests - ALT/AST ratio in the settings of ongoing (positive BAL) EtOH use suggestive of EtOH hepatitis (vs. DILI/AI/viral hepatitis). DF 36. No signs of acute liver failure. Potentially might benefit from steroids use if infectious workup is negative.  # Cirrhosis morphology - suggestive of underlying cirrhosis, suspected 2/2 EtOH use. MELD-Na 22 (4/21). Possible liver transplant candidate if able to remain abstinent from EtOH. Appears to have good social support.   # Active EtOH use - some mild tremor on exam, otherwise no signs of withdrawal. On CIWAS.    Recommendations:  - Infectious workup - Blood, urine cultures. Negative for SBP  - S/p diagnostic paracentesis, needs LVP with 1:2 albumin replacement  - Furosemide 40 mg po daily  - Spironolactone 100 mg po daily  - If above negative will plan for a course of steroids  - CIWAS with ativan taper (please avoid librium given prolonged half life in liver dysfunction)  - Avoid ibuprofen or other NSAIDs -- will worsen fluid retention and can lead to GINNY  - Daily CMP, CBC, INR  - Thiamine, folate  - Social work consult  - Nutrition consult    Thank you for involving us in the care of this patient. We will continue to follow.    Ishmael Cash, PGY-4  Hepatology Fellow    Available on [employer] Teams  Pager [phone] ([facility]) or [phone] ([facility])  After 5PM/Weekends, please contact the on-call GI fellow: [phone]  Available through [employer] Teams

## 2021-04-21 NOTE — CONSULT NOTE ADULT - ATTENDING COMMENTS
39 yo M with AUD, admitted with recent onset of jaundice and abdominal distension secondary to presumed severe alcoholic hepatitis with ascites, also currently on CIWA protocol for alcohol withdrawal syndrome. Urinalysis negative, CXR negative, and diagnostic paracentesis yesterday without evidence of SBP. Current mDF 33. Once blood cultures negative x48h, then will tentatively plan to start trial of prednisolone 40 mg po daily. Given stable renal function, can start diuretics today. Recommend against his receiving any further Librium given impaired hepatic clearance; should rely on short-acting benzodiazepines only as needed for withdrawal. Recommend against any further NSAIDs given risk of GINNY and worsened fluid retention.    Please don't hesitate to call with any questions or concerns.    Devon Goodwin M.D., Ph.D.  Transplant Hepatology  Cell: [phone]

## 2021-04-21 NOTE — PROGRESS NOTE ADULT - PROBLEM SELECTOR PLAN 2
Patient with heavy alcohol use, per patient last use 2 weeks ago but BAL on admission 11. Per patient no prior admission for complicated alcohol withdrawal and no prior admission records here  - start ativan PRN symptom triggered CIWA  - start MV, thiamine, folate  - SW consult low risk CIWA protocol for now  - ativan PRN symptom triggered CIWA  - c/w MV, thiamine, folate  - SW consult

## 2021-04-21 NOTE — ED PROCEDURE NOTE - ATTENDING CONTRIBUTION TO CARE
I have participated in and supervised all key portions of the above procedures and agree with the above documentation. EVA Castellano MD

## 2021-04-21 NOTE — PROGRESS NOTE ADULT - PROBLEM SELECTOR PLAN 1
Patient with heavy alcohol consumption found to have cirrhotic liver morphology, varices, and ascites with coagulopathy concerning for new diagnosis of cirrhosis. S/p on admission SBP SAAG 2.1, Tp 0.8, TNC 20s  - 4/20: MDF 33 MELD 22  - start lactulose, titrate to 3-4 BM/day  - CTAP without evidence of biliary obstruction/pancreatitis  - f/u hepatitis panel, EBV, CMV  - f/u chronic lliver disease workup (sent)  - CTAP with IVC on 4/20 no intra-abdominal thrombi  - hepatology consulted  - avoid hepatoxins and sedating meds - c/w lactulose, titrate to 3-4 BM/day  - CTAP without evidence of biliary obstruction/pancreatitis  - f/u hepatitis panel, EBV, CMV  - f/u chronic lliver disease workup (sent)  - CTAP with IVC on 4/20 no intra-abdominal thrombi  - hepatology consulted  - diagnostic, therapeutic paracentesis pending  - avoid hepatoxins and sedating meds

## 2021-04-22 NOTE — PROCEDURE NOTE - GENERAL PROCEDURE DETAILS
on Presentation, pt has redness on the RLQ. RLQ > LLQ ascites fluid. Drained using a 5french drainer. Removed 2100cc of yellow fluid. Specimen sent. Albumin given

## 2021-04-22 NOTE — PROGRESS NOTE ADULT - PROBLEM SELECTOR PLAN 6
.  DVT: lovenox SQ  Diet: reg  Ambulate with assistance DVT: lovenox SQ  Diet: reg  Ambulate with assistance

## 2021-04-22 NOTE — PROGRESS NOTE ADULT - PROBLEM SELECTOR PLAN 3
Patient with WBC 19 with 10% bands, without other SIRS criteria or overt localizing source  - cover for now with Zosyn  - CXR with clear lungs  - obtain blood ctx and uctx  - prelim paracentesis without evidence of SBP, f/u ctx  - COVID neg Patient with WBC 19 with 10% bands, without other SIRS criteria or overt localizing source  - c/w Zosyn  - CXR with clear lungs  - obtain blood ctx and uctx  - prelim paracentesis without evidence of SBP, f/u ctx  - COVID neg

## 2021-04-22 NOTE — PROGRESS NOTE ADULT - ASSESSMENT
41yo M with PMH of EtOH abuse who presents with jaundice and abdominal distention x 2 weeks, found to have cirrhosis and elevated liver tests.    # Elevated liver tests - ALT/AST ratio in the settings of ongoing (positive BAL) EtOH use suggestive of EtOH hepatitis (vs. DILI/AI/viral hepatitis). DF 36. No signs of acute liver failure. Potentially might benefit from steroids use if infectious workup is negative.  # Cirrhosis morphology - suggestive of underlying cirrhosis, suspected 2/2 EtOH use. MELD-Na 22 (4/21). Possible liver transplant candidate if able to remain abstinent from EtOH. Appears to have good social support.   # Active EtOH use - some mild tremor on exam, otherwise no signs of withdrawal. On CIWAS.    Recommendations:  - Infectious workup - Blood, urine cultures. Negative for SBP  - S/p diagnostic paracentesis, needs LVP with 1:2 albumin replacement  - Furosemide 40 mg po daily  - Spironolactone 100 mg po daily  - If above negative will plan for a course of steroids  - CIWAS with ativan taper (please avoid librium given prolonged half life in liver dysfunction)  - Avoid ibuprofen or other NSAIDs -- will worsen fluid retention and can lead to GINNY  - No need for lactulose  - Daily CMP, CBC, INR  - Thiamine, folate  - Social work consult  - Nutrition consult    Thank you for involving us in the care of this patient. We will continue to follow.    Ishmael Cash, PGY-4  Hepatology Fellow    Available on [employer] Teams  Pager [phone] ([facility]) or [phone] ([facility])  After 5PM/Weekends, please contact the on-call GI fellow: [phone]  Available through [employer] Teams

## 2021-04-22 NOTE — PROGRESS NOTE ADULT - SUBJECTIVE AND OBJECTIVE BOX
Andre Reyes, M.D.  Pager: [phone]  Office: [phone]    Patient is a 40y old  Male who presents with a chief complaint of jaundice, malaise (22 Apr 2021 10:23)          SUBJECTIVE / OVERNIGHT EVENTS:    No acute overnight events.    ROS: (  ) Fever, (  )Chills,  (  )Nausea/Vomiting, (  ) Cough, (  )Shortness of breath, (  )Chest Pain    MEDICATIONS  (STANDING):  enoxaparin Injectable 40 milliGRAM(s) SubCutaneous daily  folic acid 1 milliGRAM(s) Oral daily  furosemide    Tablet 40 milliGRAM(s) Oral daily  multivitamin 1 Tablet(s) Oral daily  piperacillin/tazobactam IVPB.. 3.375 Gram(s) IV Intermittent every 8 hours  potassium chloride    Tablet ER 20 milliEquivalent(s) Oral every 2 hours  spironolactone 100 milliGRAM(s) Oral daily  thiamine 100 milliGRAM(s) Oral daily    MEDICATIONS  (PRN):  LORazepam     Tablet 1 milliGRAM(s) Oral every 2 hours PRN CIWA-Ar score increase by 2 points and a total score of 7 or less  LORazepam     Tablet 1 milliGRAM(s) Oral every 1 hour PRN CIWA-Ar score 8 or greater          T(C): 37.2 (04-22 @ 08:30), Max: 37.4 (04-21 @ 16:20)   HR: 92   BP: 117/76   RR: 19   SpO2: 94%    PHYSICAL EXAM:    CONSTITUTIONAL: NAD, well-developed, well-groomed  EYES: PERRLA; conjunctiva and sclera clear  ENMT: Moist oral mucosa, no pharyngeal injection or exudates; normal dentition  NECK: Supple, no palpable masses; no thyromegaly  RESPIRATORY: Normal respiratory effort; lungs are clear to auscultation bilaterally  CARDIOVASCULAR: Regular rate and rhythm, normal S1 and S2, no murmur/rub/gallop; No lower extremity edema; Peripheral pulses are 2+ bilaterally  ABDOMEN: Nontender to palpation, normoactive bowel sounds, no rebound/guarding; No hepatosplenomegaly  MUSCULOSKELETAL:  Normal gait; no clubbing or cyanosis of digits; no joint swelling or tenderness to palpation  PSYCH: A+O to person, place, and time; affect appropriate  NEUROLOGY: CN 2-12 are intact and symmetric; no gross sensory deficits   SKIN: No rashes; no palpable lesions      LABS:                        11.0   19.41 )-----------( 280      ( 22 Apr 2021 06:52 )             31.9      04-22    136  |  106  |  10  ----------------------------<  105<H>  3.0<L>   |  20<L>  |  0.47<L>    Ca    7.8<L>      22 Apr 2021 11:00    TPro  6.0  /  Alb  2.3<L>  /  TBili  25.5<H>  /  DBili  x   /  AST  205<H>  /  ALT  123<H>  /  AlkPhos  208<H>  04-22       CAPILLARY BLOOD GLUCOSE          RADIOLOGY & ADDITIONAL TESTS:    Imaging Personally Reviewed:  Consultant(s) Notes Reviewed:    Care Discussed with Consultants/Other Providers:   Andre Reyes, M.D.  Pager: [phone]  Office: [phone]    Patient is a 40y old  Male who presents with a chief complaint of jaundice, malaise (22 Apr 2021 10:23)          SUBJECTIVE / OVERNIGHT EVENTS:    No acute overnight events.  pt with abd discomfort and early satiety    ROS: ( - ) Fever, ( - )Chills,  ( - )Nausea/Vomiting, ( - ) Cough, ( - )Shortness of breath, (  -)Chest Pain    MEDICATIONS  (STANDING):  enoxaparin Injectable 40 milliGRAM(s) SubCutaneous daily  folic acid 1 milliGRAM(s) Oral daily  furosemide    Tablet 40 milliGRAM(s) Oral daily  multivitamin 1 Tablet(s) Oral daily  piperacillin/tazobactam IVPB.. 3.375 Gram(s) IV Intermittent every 8 hours  potassium chloride    Tablet ER 20 milliEquivalent(s) Oral every 2 hours  spironolactone 100 milliGRAM(s) Oral daily  thiamine 100 milliGRAM(s) Oral daily    MEDICATIONS  (PRN):  LORazepam     Tablet 1 milliGRAM(s) Oral every 2 hours PRN CIWA-Ar score increase by 2 points and a total score of 7 or less  LORazepam     Tablet 1 milliGRAM(s) Oral every 1 hour PRN CIWA-Ar score 8 or greater          T(C): 37.2 (04-22 @ 08:30), Max: 37.4 (04-21 @ 16:20)   HR: 92   BP: 117/76   RR: 19   SpO2: 94%    PHYSICAL EXAM:    CONSTITUTIONAL: NAD, well-developed, well-groomed  EYES: PERRLA; conjunctiva and scleral icterus  ENMT: Moist oral mucosa, no pharyngeal injection or exudates; normal dentition  NECK: Supple, no palpable masses; no thyromegaly  RESPIRATORY: Normal respiratory effort; lungs are clear to auscultation bilaterally  CARDIOVASCULAR: Regular rate and rhythm, normal S1 and S2, no murmur/rub/gallop; 3+ LE edema; Peripheral pulses are 2+ bilaterally  ABDOMEN: Nontender to palpation, normoactive bowel sounds, no rebound/guarding; No hepatosplenomegaly  MUSCULOSKELETAL:  Normal gait; no clubbing or cyanosis of digits; no joint swelling or tenderness to palpation  PSYCH: A+O to person, place, and time; affect appropriate  NEUROLOGY: CN 2-12 are intact and symmetric; no gross sensory deficits   SKIN: No rashes; no palpable lesions; jaundice      LABS:                        11.0   19.41 )-----------( 280      ( 22 Apr 2021 06:52 )             31.9      04-22    136  |  106  |  10  ----------------------------<  105<H>  3.0<L>   |  20<L>  |  0.47<L>    Ca    7.8<L>      22 Apr 2021 11:00    TPro  6.0  /  Alb  2.3<L>  /  TBili  25.5<H>  /  DBili  x   /  AST  205<H>  /  ALT  123<H>  /  AlkPhos  208<H>  04-22       CAPILLARY BLOOD GLUCOSE          RADIOLOGY & ADDITIONAL TESTS:    Imaging Personally Reviewed:  Consultant(s) Notes Reviewed:    Care Discussed with Consultants/Other Providers:

## 2021-04-22 NOTE — PROGRESS NOTE ADULT - PROBLEM SELECTOR PLAN 1
- c/w lactulose, titrate to 3-4 BM/day  - CTAP without evidence of biliary obstruction/pancreatitis  - f/u hepatitis panel, EBV, CMV  - f/u chronic lliver disease workup (sent)  - CTAP with IVC on 4/20 no intra-abdominal thrombi  - hepatology consulted  - diagnostic, therapeutic paracentesis pending  - avoid hepatoxins and sedating meds - c/w lactulose, titrate to 3-4 BM/day  - CTAP without evidence of biliary obstruction/pancreatitis  - f/u chronic lliver disease workup (sent)  - CTAP with IVC on 4/20 no intra-abdominal thrombi  - hepatology consulted  - therapeutic paracentesis to be done today  - avoid hepatoxins and sedating meds  - acute alcoholic hepatitis --> awaiting negative cultures before starting steroids.   - start lasix and spironolactone

## 2021-04-22 NOTE — PROGRESS NOTE ADULT - PROBLEM SELECTOR PLAN 2
low risk CIWA protocol for now  - ativan PRN symptom triggered CIWA  - c/w MV, thiamine, folate  - SW consult

## 2021-04-22 NOTE — CONSULT NOTE ADULT - SUBJECTIVE AND OBJECTIVE BOX
Interventional Radiology    Evaluate for Procedure: Paracentesis    HPI: This is a 40 year old male with PMH of EtOH abuse who presents with jaundice and abdominal distention x 2 weeks, found to have cirrhosis and elevated liver tests referred to IR for therapeutic paracentesis.    Allergies: NKDA  Medications (Abx/Cardiac/Anticoagulation/Blood Products)  enoxaparin Injectable: 40 milliGRAM(s) SubCutaneous (04-21 @ 11:11)  piperacillin/tazobactam IVPB..: 25 mL/Hr IV Intermittent (04-22 @ 05:41)  piperacillin/tazobactam IVPB...: 200 mL/Hr IV Intermittent (04-20 @ 16:50)    Data:  T(C): 37.3  HR: 81  BP: 106/65  RR: 18  SpO2: 94%    -WBC 19.41 / HgB 11.0 / Hct 31.9 / Plt 280  -Na 121 / Cl 94 / BUN 10 / Glucose 80  -K 3.1 / CO2 20 / Cr 0.48  - / Alk Phos 204 / T.Bili 23.9  -INR 1.36 / PTT --    Radiology:   < from: US Abdomen Upper Quadrant Right (04.20.21 @ 18:50) >  Cirrhotic liver.  Mild ascites.  < end of copied text >      Assessment/Plan: 40 year old male with new onset ascites referred to IR for therapeutic paracentesis.    -Na 121; pending repeat BMP can plan for therapeutic paracentesis later today 4/22 with a 1:2 albumin replacement  -Please place IR procedure order under VIPUL Serra  -D/w hepatology and primary team    Contact IR with any questions or concerns at [phone]. [phone]

## 2021-04-22 NOTE — PROGRESS NOTE ADULT - SUBJECTIVE AND OBJECTIVE BOX
Chief Complaint:  Patient is a 40y old  Male who presents with a chief complaint of jaundice, malaise (2021 14:33)      Interval Events:   - No acute events  - Did not require ativan overnight    Allergies:  No Known Allergies        Hospital Medications:  enoxaparin Injectable 40 milliGRAM(s) SubCutaneous daily  folic acid 1 milliGRAM(s) Oral daily  lactulose Syrup 20 Gram(s) Oral two times a day  LORazepam     Tablet 1 milliGRAM(s) Oral every 2 hours PRN  LORazepam     Tablet 1 milliGRAM(s) Oral every 1 hour PRN  multivitamin 1 Tablet(s) Oral daily  piperacillin/tazobactam IVPB.. 3.375 Gram(s) IV Intermittent every 8 hours  thiamine 100 milliGRAM(s) Oral daily      PMHX/PSHX:  No pertinent past medical history    Alcohol abuse    No significant past surgical history        Family history:  No pertinent family history in first degree relatives        ROS:   General:  No wt loss, fevers, chills, night sweats, + fatigue  Eyes:  Good vision, no reported pain  ENT:  No sore throat, pain, runny nose, dysphagia  CV:  No pain, palpitations, hypo/hypertension  Pulm:  No dyspnea, cough, tachypnea, wheezing  GI:  No pain, No nausea, No vomiting, No diarrhea, No constipation, No weight loss, No fever, No pruritis, No rectal bleeding, No tarry stools, No dysphagia,  :  No pain, bleeding, incontinence, nocturia  Muscle:  No pain, weakness  Neuro:  No weakness, tingling, memory problems  Psych:  No fatigue, insomnia, mood problems, depression  Endocrine:  No polyuria, polydipsia, cold/heat intolerance  Heme:  No petechiae, ecchymosis, easy bruisability  Skin:  No rash, tattoos, scars, edema        PHYSICAL EXAM:   Vital Signs:  Vital Signs Last 24 Hrs  T(C): 37.3 (2021 05:02), Max: 37.4 (2021 16:20)  T(F): 99.1 (2021 05:02), Max: 99.4 (2021 16:20)  HR: 81 (2021 05:02) (78 - 88)  BP: 106/65 (2021 05:02) (106/65 - 131/83)  BP(mean): --  RR: 18 (2021 05:02) (18 - 18)  SpO2: 94% (2021 05:02) (94% - 97%)  Daily     Daily     General:  No wt loss, fevers, chills, night sweats, + fatigue  Eyes:  Good vision, no reported pain  ENT:  No sore throat, pain, runny nose, dysphagia  CV:  No pain, palpitations, hypo/hypertension  Pulm:  No dyspnea, cough, tachypnea, wheezing  GI:  No pain, No nausea, No vomiting, No diarrhea, No constipation, No weight loss, No fever, No pruritis, No rectal bleeding, No tarry stools, No dysphagia,  :  No pain, bleeding, incontinence, nocturia  Muscle:  No pain, weakness  Neuro:  No weakness, tingling, memory problems  Psych:  No fatigue, insomnia, mood problems, depression  Endocrine:  No polyuria, polydipsia, cold/heat intolerance  Heme:  No petechiae, ecchymosis, easy bruisability  Skin:  No rash, tattoos, scars, edema    PHYSICAL EXAM:   GENERAL:  No acute distress  HEENT:  Normocephalic/atraumatic, + scleral icterus  CHEST:  No accessory muscle use  HEART:  Regular rate and rhythm  ABDOMEN:  Soft, non-tender, distended, normoactive bowel sounds, hepatomegaly  EXTREMITIES: ++ pitting edema bilaterally  SKIN:  Jaundiced, No rash/erythema/ecchymoses/petechiae/wounds/abscess/warm/dry  NEURO:  Alert and oriented x 3, no asterixis    LABS:                        11.0   19.41 )-----------( 280      ( 2021 06:52 )             31.9     Mean Cell Volume: 90.4 fl (21 @ 06:52)    -    134<L>  |  105  |  10  ----------------------------<  89  3.4<L>   |  20<L>  |  0.49<L>    Ca    7.6<L>      2021 06:50  Phos  1.2     04-20  Mg     2.1     04-20    TPro  5.6<L>  /  Alb  2.3<L>  /  TBili  24.4<H>  /  DBili  x   /  AST  223<H>  /  ALT  123<H>  /  AlkPhos  193<H>  04-21    LIVER FUNCTIONS - ( 2021 06:50 )  Alb: 2.3 g/dL / Pro: 5.6 g/dL / ALK PHOS: 193 U/L / ALT: 123 U/L / AST: 223 U/L / GGT: x           PT/INR - ( 2021 06:46 )   PT: 16.9 sec;   INR: 1.43 ratio         PTT - ( 2021 13:27 )  PTT:37.3 sec  Urinalysis Basic - ( 2021 20:37 )    Color: Dark Yellow / Appearance: Clear / S.053 / pH: x  Gluc: x / Ketone: Negative  / Bili: Large >10 / Urobili: Negative   Blood: x / Protein: Trace / Nitrite: Negative   Leuk Esterase: Negative / RBC: 2 /hpf / WBC 0 /HPF   Sq Epi: x / Non Sq Epi: 0 /hpf / Bacteria: 0.0                              11.0   19.41 )-----------( 280      ( 2021 06:52 )             31.9                         11.4   19.72 )-----------( 361      ( 2021 13:15 )             33.9       Imaging:

## 2021-04-23 NOTE — PROGRESS NOTE ADULT - ASSESSMENT
41yo M with PMH of EtOH abuse who presents with jaundice and abdominal distention x 2 weeks, found to have cirrhosis and elevated liver tests.    # Elevated liver tests - ALT/AST ratio in the settings of ongoing (positive BAL) EtOH use suggestive of EtOH hepatitis (vs. DILI/AI/viral hepatitis). DF 40. No signs of acute liver failure. Negative infectious workup. Will start steroids today.  # Cirrhosis morphology - suggestive of underlying cirrhosis, suspected 2/2 EtOH use. MELD-Na 22 (4/21). Possible liver transplant candidate if able to remain abstinent from EtOH. Appears to have good social support.   # Active EtOH use - some mild tremor on exam, otherwise no signs of withdrawal. On CIWAS.    Recommendations:  - Please start prednisolone 40mg daily  - Will calculate Lille score day 4/7  - Furosemide 40 mg po daily  - Spironolactone 100 mg po daily  - CIWAS with ativan taper (please avoid librium given prolonged half life in liver dysfunction)  - Avoid ibuprofen or other NSAIDs -- will worsen fluid retention and can lead to GINNY  - No need for lactulose  - Daily CMP, CBC, INR  - Thiamine, folate  - Social work consult  - Nutrition consult    Please await attending's attestation for final recommendations.   Thank you for involving us in the care of this patient. We will continue to follow.    Ishmael Cash, PGY-4  Hepatology Fellow    Available on [employer] Teams  Pager [phone] ([facility]) or [phone] ([facility])  After 5PM/Weekends, please contact the on-call GI fellow: [phone]  Available through [employer] Teams     39yo M with PMH of EtOH abuse who presents with jaundice and abdominal distention x 2 weeks, found to have cirrhosis and elevated liver tests.    # Elevated liver tests - ALT/AST ratio in the settings of ongoing (positive BAL) EtOH use suggestive of EtOH hepatitis (vs. DILI/AI/viral hepatitis). DF 40. No signs of acute liver failure. Negative infectious workup. Plan to enroll patient into the EtOH hepatitis treatment study ([id number])  # Cirrhosis morphology - suggestive of underlying cirrhosis, suspected 2/2 EtOH use. MELD-Na 22 (4/21). Possible liver transplant candidate if able to remain abstinent from EtOH. Appears to have good social support.   # Active EtOH use - some mild tremor on exam, otherwise no signs of withdrawal. On CIWAS.    Recommendations:  - Will enroll in clinical trial on Monday  - Furosemide 40 mg po daily  - Spironolactone 100 mg po daily  - CIWAS with ativan taper (please avoid librium given prolonged half life in liver dysfunction)  - Avoid ibuprofen or other NSAIDs -- will worsen fluid retention and can lead to GINNY  - No need for lactulose  - Daily CMP, CBC, INR  - Thiamine, folate  - Social work consult  - Nutrition consult    Please await attending's attestation for final recommendations.   Thank you for involving us in the care of this patient. We will continue to follow.    Ishmael Cash, PGY-4  Hepatology Fellow    Available on [employer] Teams  Pager [phone] ([facility]) or [phone] ([facility])  After 5PM/Weekends, please contact the on-call GI fellow: [phone]  Available through [employer] Teams

## 2021-04-23 NOTE — PROGRESS NOTE ADULT - PROBLEM SELECTOR PLAN 5
Mildly heterogeneous enhancement, with an ill-defined area of decreased attenuation in the upper pole of the left kidney measuring 1.4 cm  - f/u abdominal US Mildly heterogeneous enhancement, with an ill-defined area of decreased attenuation in the upper pole of the left kidney measuring 1.4 cm  - awaiting MRI of the kidney for better eval of left renal mass.

## 2021-04-23 NOTE — PROGRESS NOTE ADULT - SUBJECTIVE AND OBJECTIVE BOX
Chief Complaint:  Patient is a 40y old  Male who presents with a chief complaint of jaundice, malaise (22 Apr 2021 14:30)      Interval Events:   - s/p paracentesis, 2.1L removed  - No acute events    Allergies:  No Known Allergies      Hospital Medications:  enoxaparin Injectable 40 milliGRAM(s) SubCutaneous daily  folic acid 1 milliGRAM(s) Oral daily  furosemide    Tablet 40 milliGRAM(s) Oral daily  LORazepam     Tablet 1 milliGRAM(s) Oral every 2 hours PRN  LORazepam     Tablet 1 milliGRAM(s) Oral every 1 hour PRN  multivitamin 1 Tablet(s) Oral daily  piperacillin/tazobactam IVPB.. 3.375 Gram(s) IV Intermittent every 8 hours  spironolactone 100 milliGRAM(s) Oral daily  thiamine 100 milliGRAM(s) Oral daily      PMHX/PSHX:  No pertinent past medical history    Alcohol abuse    No significant past surgical history        Family history:  No pertinent family history in first degree relatives        ROS:   General:  No wt loss, fevers, chills, night sweats, + fatigue  Eyes:  Good vision, no reported pain  ENT:  No sore throat, pain, runny nose, dysphagia  CV:  No pain, palpitations, hypo/hypertension  Pulm:  No dyspnea, cough, tachypnea, wheezing  GI:  No pain, No nausea, No vomiting, No diarrhea, No constipation, No weight loss, No fever, No pruritis, No rectal bleeding, No tarry stools, No dysphagia,  :  No pain, bleeding, incontinence, nocturia  Muscle:  No pain, weakness  Neuro:  No weakness, tingling, memory problems  Psych:  No fatigue, insomnia, mood problems, depression  Endocrine:  No polyuria, polydipsia, cold/heat intolerance  Heme:  No petechiae, ecchymosis, easy bruisability  Skin:  No rash, tattoos, scars, edema    PHYSICAL EXAM:   Vital Signs:  Vital Signs Last 24 Hrs  T(C): 37.1 (23 Apr 2021 04:56), Max: 37.8 (22 Apr 2021 20:57)  T(F): 98.7 (23 Apr 2021 04:56), Max: 100.1 (22 Apr 2021 20:57)  HR: 80 (23 Apr 2021 04:56) (76 - 88)  BP: 118/73 (23 Apr 2021 04:56) (101/59 - 124/76)  BP(mean): --  RR: 18 (23 Apr 2021 04:56) (18 - 19)  SpO2: 95% (23 Apr 2021 04:56) (95% - 95%)  Daily     Daily     GENERAL:  No acute distress  HEENT:  Normocephalic/atraumatic, + scleral icterus  CHEST:  No accessory muscle use  HEART:  Regular rate and rhythm  ABDOMEN:  Soft, non-tender, distended, normoactive bowel sounds, hepatomegaly  EXTREMITIES: ++ pitting edema bilaterally  SKIN:  Jaundiced, No rash/erythema/ecchymoses/petechiae/wounds/abscess/warm/dry  NEURO:  Alert and oriented x 3, no asterixis    LABS:                        10.8   19.38 )-----------( 274      ( 23 Apr 2021 06:05 )             32.6     Mean Cell Volume: 91.8 fl (04-23-21 @ 06:05)    04-23    136  |  107  |  10  ----------------------------<  81  3.4<L>   |  21<L>  |  0.47<L>    Ca    7.7<L>      23 Apr 2021 06:06    TPro  5.8<L>  /  Alb  2.3<L>  /  TBili  24.0<H>  /  DBili  x   /  AST  170<H>  /  ALT  105<H>  /  AlkPhos  191<H>  04-23    LIVER FUNCTIONS - ( 23 Apr 2021 06:06 )  Alb: 2.3 g/dL / Pro: 5.8 g/dL / ALK PHOS: 191 U/L / ALT: 105 U/L / AST: 170 U/L / GGT: x           PT/INR - ( 23 Apr 2021 06:05 )   PT: 16.5 sec;   INR: 1.40 ratio                                     10.8   19.38 )-----------( 274      ( 23 Apr 2021 06:05 )             32.6                         11.0   19.41 )-----------( 280      ( 22 Apr 2021 06:52 )             31.9                         11.4   19.72 )-----------( 361      ( 20 Apr 2021 13:15 )             33.9       Imaging:

## 2021-04-23 NOTE — PROGRESS NOTE ADULT - PROBLEM SELECTOR PLAN 1
- c/w lactulose, titrate to 3-4 BM/day  - CTAP without evidence of biliary obstruction/pancreatitis  - f/u chronic lliver disease workup (sent)  - CTAP with IVC on 4/20 no intra-abdominal thrombi  - hepatology consulted  - therapeutic paracentesis to be done today  - avoid hepatoxins and sedating meds  - acute alcoholic hepatitis --> awaiting negative cultures before starting steroids.   - start lasix and spironolactone - c/w lactulose, titrate to 3-4 BM/day  - CTAP without evidence of biliary obstruction/pancreatitis  - CTAP with IVC on 4/20 no intra-abdominal thrombi  - hepatology f/u appreciated  - therapeutic paracentesis to be done today  - avoid hepatoxins and sedating meds  - acute alcoholic hepatitis --> awaiting negative cultures before starting steroids.   - start lasix and spironolactone  - I d/w patient the basics about enrolling in Hepatology trial [id number] RCT, pt is interested in enlisting into the trial. further details of the trial to be explained to the patient by the hepatology team. will need to monitor off abx over the weekend to monitor for infection process with plans to start the infusion on Monday

## 2021-04-23 NOTE — PROGRESS NOTE ADULT - PROBLEM SELECTOR PLAN 3
Patient with WBC 19 with 10% bands, without other SIRS criteria or overt localizing source  - c/w Zosyn  - CXR with clear lungs  - obtain blood ctx and uctx  - prelim paracentesis without evidence of SBP, f/u ctx  - COVID neg

## 2021-04-23 NOTE — PROGRESS NOTE ADULT - SUBJECTIVE AND OBJECTIVE BOX
Andre Reyes, M.D.  Pager: [phone]  Office: [phone]    Patient is a 40y old  Male who presents with a chief complaint of jaundice, malaise (23 Apr 2021 08:48)          SUBJECTIVE / OVERNIGHT EVENTS:    No acute overnight events.    ROS: (  ) Fever, (  )Chills,  (  )Nausea/Vomiting, (  ) Cough, (  )Shortness of breath, (  )Chest Pain    MEDICATIONS  (STANDING):  enoxaparin Injectable 40 milliGRAM(s) SubCutaneous daily  folic acid 1 milliGRAM(s) Oral daily  furosemide    Tablet 40 milliGRAM(s) Oral daily  multivitamin 1 Tablet(s) Oral daily  potassium chloride    Tablet ER 20 milliEquivalent(s) Oral every 2 hours  spironolactone 100 milliGRAM(s) Oral daily  thiamine 100 milliGRAM(s) Oral daily    MEDICATIONS  (PRN):  LORazepam     Tablet 1 milliGRAM(s) Oral every 2 hours PRN CIWA-Ar score increase by 2 points and a total score of 7 or less  LORazepam     Tablet 1 milliGRAM(s) Oral every 1 hour PRN CIWA-Ar score 8 or greater          T(C): 37.1 (04-23 @ 13:17), Max: 37.8 (04-22 @ 20:57)   HR: 92   BP: 123/85   RR: 18   SpO2: 97%    PHYSICAL EXAM:    CONSTITUTIONAL: NAD, well-developed, well-groomed  EYES: PERRLA; conjunctiva and sclera clear  ENMT: Moist oral mucosa, no pharyngeal injection or exudates; normal dentition  NECK: Supple, no palpable masses; no thyromegaly  RESPIRATORY: Normal respiratory effort; lungs are clear to auscultation bilaterally  CARDIOVASCULAR: Regular rate and rhythm, normal S1 and S2, no murmur/rub/gallop; No lower extremity edema; Peripheral pulses are 2+ bilaterally  ABDOMEN: Nontender to palpation, normoactive bowel sounds, no rebound/guarding; No hepatosplenomegaly  MUSCULOSKELETAL:  Normal gait; no clubbing or cyanosis of digits; no joint swelling or tenderness to palpation  PSYCH: A+O to person, place, and time; affect appropriate  NEUROLOGY: CN 2-12 are intact and symmetric; no gross sensory deficits   SKIN: No rashes; no palpable lesions      LABS:                        10.8   19.38 )-----------( 274      ( 23 Apr 2021 06:05 )             32.6      04-23    136  |  107  |  10  ----------------------------<  81  3.4<L>   |  21<L>  |  0.47<L>    Ca    7.7<L>      23 Apr 2021 06:06    TPro  5.8<L>  /  Alb  2.3<L>  /  TBili  24.0<H>  /  DBili  x   /  AST  170<H>  /  ALT  105<H>  /  AlkPhos  191<H>  04-23       CAPILLARY BLOOD GLUCOSE          RADIOLOGY & ADDITIONAL TESTS:    Imaging Personally Reviewed:  Consultant(s) Notes Reviewed:    Care Discussed with Consultants/Other Providers:   Andre Reyes, M.D.  Pager: [phone]  Office: [phone]    Patient is a 40y old  Male who presents with a chief complaint of jaundice, malaise (23 Apr 2021 08:48)    I spoke with patient in our mutually shared language of [place]      SUBJECTIVE / OVERNIGHT EVENTS:    No acute overnight events.  No new complaints    ROS: ( - ) Fever, ( - )Chills,  ( - )Nausea/Vomiting, ( - ) Cough, ( - )Shortness of breath, ( - )Chest Pain    MEDICATIONS  (STANDING):  enoxaparin Injectable 40 milliGRAM(s) SubCutaneous daily  folic acid 1 milliGRAM(s) Oral daily  furosemide    Tablet 40 milliGRAM(s) Oral daily  multivitamin 1 Tablet(s) Oral daily  potassium chloride    Tablet ER 20 milliEquivalent(s) Oral every 2 hours  spironolactone 100 milliGRAM(s) Oral daily  thiamine 100 milliGRAM(s) Oral daily    MEDICATIONS  (PRN):  LORazepam     Tablet 1 milliGRAM(s) Oral every 2 hours PRN CIWA-Ar score increase by 2 points and a total score of 7 or less  LORazepam     Tablet 1 milliGRAM(s) Oral every 1 hour PRN CIWA-Ar score 8 or greater          T(C): 37.1 (04-23 @ 13:17), Max: 37.8 (04-22 @ 20:57)   HR: 92   BP: 123/85   RR: 18   SpO2: 97%    PHYSICAL EXAM:    CONSTITUTIONAL: NAD, well-developed, well-groomed  EYES: PERRLA;  sclera icterus   ENMT: Moist oral mucosa, no pharyngeal injection or exudates; normal dentition  NECK: Supple, no palpable masses; no thyromegaly  RESPIRATORY: Normal respiratory effort; lungs are clear to auscultation bilaterally  CARDIOVASCULAR: Regular rate and rhythm, normal S1 and S2, no murmur/rub/gallop; 2+ LE edema; Peripheral pulses are 2+ bilaterally  ABDOMEN: soft, minimally tender, normoactive bowel sounds, no rebound/guarding; No hepatosplenomegaly  MUSCULOSKELETAL:  Normal gait; no clubbing or cyanosis of digits; no joint swelling or tenderness to palpation  PSYCH: A+O to person, place, and time; affect appropriate  NEUROLOGY: CN 2-12 are intact and symmetric; no gross sensory deficits   SKIN: No rashes; no palpable lesions      LABS:                        10.8   19.38 )-----------( 274      ( 23 Apr 2021 06:05 )             32.6      04-23    136  |  107  |  10  ----------------------------<  81  3.4<L>   |  21<L>  |  0.47<L>    Ca    7.7<L>      23 Apr 2021 06:06    TPro  5.8<L>  /  Alb  2.3<L>  /  TBili  24.0<H>  /  DBili  x   /  AST  170<H>  /  ALT  105<H>  /  AlkPhos  191<H>  04-23       CAPILLARY BLOOD GLUCOSE          RADIOLOGY & ADDITIONAL TESTS:    Imaging Personally Reviewed:  Consultant(s) Notes Reviewed:    Care Discussed with Consultants/Other Providers:

## 2021-04-24 NOTE — DIETITIAN INITIAL EVALUATION ADULT. - PERTINENT LABORATORY DATA
04-24 @ 06:35: Na 137, BUN 10, Cr 0.45<L>, BG 82, K+ 3.7, Phos --, Mg --, Alk Phos 173<H>, ALT/SGPT 98<H>, AST/SGOT 163<H>

## 2021-04-24 NOTE — PROGRESS NOTE ADULT - SUBJECTIVE AND OBJECTIVE BOX
Celso Bo MD  Division of Hospital Medicine  Cell: [phone]  Pager: [phone]  Office: [phone]    Patient is a 40y old  Male who presents with a chief complaint of jaundice, malaise (24 Apr 2021 14:04)        SUBJECTIVE / OVERNIGHT EVENTS: Patient reports having left thigh pain yesterday when he was in the MRI machine. Now resolved. Denies any breathing problems.       MEDICATIONS  (STANDING):  enoxaparin Injectable 40 milliGRAM(s) SubCutaneous daily  folic acid 1 milliGRAM(s) Oral daily  furosemide    Tablet 40 milliGRAM(s) Oral daily  multivitamin 1 Tablet(s) Oral daily  spironolactone 100 milliGRAM(s) Oral daily  thiamine 100 milliGRAM(s) Oral daily    MEDICATIONS  (PRN):      Vital Signs Last 24 Hrs  T(C): 36.9 (24 Apr 2021 21:21), Max: 37.4 (24 Apr 2021 12:36)  T(F): 98.5 (24 Apr 2021 21:21), Max: 99.3 (24 Apr 2021 12:36)  HR: 77 (24 Apr 2021 21:21) (74 - 82)  BP: 123/66 (24 Apr 2021 21:21) (101/62 - 123/66)  BP(mean): --  RR: 18 (24 Apr 2021 21:21) (18 - 18)  SpO2: 96% (24 Apr 2021 21:21) (95% - 98%)  CAPILLARY BLOOD GLUCOSE        I&O's Summary    23 Apr 2021 07:01  -  24 Apr 2021 07:00  --------------------------------------------------------  IN: 60 mL / OUT: 0 mL / NET: 60 mL    24 Apr 2021 07:01  -  24 Apr 2021 22:20  --------------------------------------------------------  IN: 240 mL / OUT: 0 mL / NET: 240 mL          PHYSICAL EXAM:   GENERAL: NAD,  HEAD:  Atraumatic, Normocephalic  EYES: conjunctiva and sclera jaundiced  NECK: Supple  CHEST/LUNG: Clear to auscultation bilaterally; No wheeze  HEART: S1S2 normal. Regular rate and rhythm; No murmurs, rubs, or gallops  ABDOMEN: Soft, Nontender, distended; Bowel sounds present  EXTREMITIES:  1+ LE edema  PSYCH/Neuro: AAOx3. Non-focal.   SKIN: jaundiced.       LABS:                        10.8   20.24 )-----------( 257      ( 24 Apr 2021 10:42 )             32.1     04-24    137  |  107  |  10  ----------------------------<  82  3.7   |  21<L>  |  0.45<L>    Ca    7.4<L>      24 Apr 2021 06:35    TPro  5.5<L>  /  Alb  2.1<L>  /  TBili  22.8<H>  /  DBili  x   /  AST  163<H>  /  ALT  98<H>  /  AlkPhos  173<H>  04-24    PT/INR - ( 24 Apr 2021 08:18 )   PT: 17.0 sec;   INR: 1.44 ratio         m< from: MR Abdomen w/wo IV Cont (04.23.21 @ 20:17) >  IMPRESSION:    Wedge shaped left upper pole hypoenhancing, nonmasslike region may represent infarction or focal pyelonephritis.    Hepatomegaly with early cirrhotic features. No evidence of HCC. Splenomegaly. Moderate ascites.    < end of copied text >        RADIOLOGY & ADDITIONAL TESTS:    Imaging Personally Reviewed: MRI report.   Consultant(s) Notes Reviewed:  Hepatology  Care Discussed with Consultants/Other Providers:

## 2021-04-24 NOTE — PROGRESS NOTE ADULT - ASSESSMENT
41yo M with PMH of EtOH abuse who presents with jaundice and abdominal distention x 2 weeks, found to have cirrhosis and elevated liver tests.    # Elevated liver tests - ALT/AST ratio in the settings of ongoing (positive BAL) EtOH use suggestive of EtOH hepatitis (vs. DILI/AI/viral hepatitis). DF 40. No signs of acute liver failure. Negative infectious workup. Plan to enroll patient into the EtOH hepatitis treatment study ([id number])  # Cirrhosis morphology - suggestive of underlying cirrhosis, suspected 2/2 EtOH use. MELD-Na 22 (4/21). Possible liver transplant candidate if able to remain abstinent from EtOH. Appears to have good social support.   # Active EtOH use - some mild tremor on exam, otherwise no signs of withdrawal. On CIWAS.    Recommendations:  - Will enroll in clinical trial on Monday  - Furosemide 40 mg po daily  - Spironolactone 100 mg po daily  - CIWAS with ativan taper (please avoid librium given prolonged half life in liver dysfunction)  - Avoid ibuprofen or other NSAIDs -- will worsen fluid retention and can lead to GINNY  - No need for lactulose  - Daily CMP, CBC, INR  - Thiamine, folate  - Social work consult  - Nutrition consult    Please await attending's attestation for final recommendations.   Thank you for involving us in the care of this patient. We will continue to follow.    Ishmael Cash, PGY-4  Hepatology Fellow    Available on [employer] Teams  Pager [phone] ([facility]) or [phone] ([facility])  After 5PM/Weekends, please contact the on-call GI fellow: [phone]  Available through [employer] Teams

## 2021-04-24 NOTE — DIETITIAN INITIAL EVALUATION ADULT. - ADD RECOMMEND
Consider diet change to low sodium. Continue micronutrient supplementation as currently ordered. Reinforce nutrition education as needed - RD remains available. Provide encouragement with PO intake, menu selections, and assistance with meals as needed. Continue to monitor PO intake, labs, weights, BM, skin, clinical course.

## 2021-04-24 NOTE — DIETITIAN INITIAL EVALUATION ADULT. - ORAL INTAKE PTA/DIET HISTORY
PTA pt reports good appetite and PO intake, pt states he was only unable to tolerate PO when he stopped drinking. Denies following therapeutic diet PTA. Endorses in recent weeks drinking 1 bottle of Vodka daily. Denies micronutrient supplementation PTA. NKFA. Denies any difficulty chewing/swallowing.

## 2021-04-24 NOTE — DIETITIAN INITIAL EVALUATION ADULT. - OTHER INFO
Pt unsure of any weight changes but reports he feels his arms have gotten smaller and his abdomen is larger. Note pt with ascites s/p paracentesis 2.1L removed 4/22. Pt reports UBW 175lb. Pt with dosing wt of 191.1lb - wt likely falsely elevated 2/2 fluid retention.    Pt denies any N/V, last BM 4/22. Pt reports good appetite and PO intake since admission, consuming 100% of meals. RD provided low sodium nutrition therapy. Recommended limited salt intake. Reviewed foods high in salt and amount of salt recommended per day. Discussed nutrition label reading. Reviewed recommended foods within therapeutic diet. Encouraged well balanced meals. Provided handout at bedside with education provided. Dietitian remains available.

## 2021-04-24 NOTE — DIETITIAN INITIAL EVALUATION ADULT. - REASON INDICATOR FOR ASSESSMENT
Consult received for nutrition assessment.  Information obtained from pt, EMR. Pt Persian speaking,  services utilized ID# [id number].

## 2021-04-24 NOTE — PROGRESS NOTE ADULT - PROBLEM SELECTOR PLAN 2
low risk CIWA protocol for now  - s/p ativan PRN symptom triggered CIWA  - c/w MV, thiamine, folate  - SW consult

## 2021-04-24 NOTE — PROGRESS NOTE ADULT - PROBLEM SELECTOR PLAN 5
Mildly heterogeneous enhancement, with an ill-defined area of decreased attenuation in the upper pole of the left kidney measuring 1.4 cm  - MRI of the kidney showed Wedge shaped left upper pole hypoenhancing, nonmasslike region may represent infarction or focal pyelonephritis.  -Unclear how to interpret above results MRI. Hold off on any abx at this time.

## 2021-04-24 NOTE — PROGRESS NOTE ADULT - PROBLEM SELECTOR PLAN 3
Patient with WBC 19 with 10% bands, without other SIRS criteria or overt localizing source  - s/p Zosyn - stopped 4/23.   - CXR with clear lungs  - obtain blood ctx and uctx - NGTD  - prelim paracentesis without evidence of SBP, f/u ctx - NGTD  - COVID neg

## 2021-04-24 NOTE — DIETITIAN INITIAL EVALUATION ADULT. - PERTINENT MEDS FT
MEDICATIONS  (STANDING):  enoxaparin Injectable 40 milliGRAM(s) SubCutaneous daily  folic acid 1 milliGRAM(s) Oral daily  furosemide    Tablet 40 milliGRAM(s) Oral daily  multivitamin 1 Tablet(s) Oral daily  spironolactone 100 milliGRAM(s) Oral daily  thiamine 100 milliGRAM(s) Oral daily

## 2021-04-24 NOTE — PROGRESS NOTE ADULT - PROBLEM SELECTOR PLAN 6
DVT PPx: lovenox SQ  Diet: Regular - consider fluid restriction and low Na.   Ambulate with assistance

## 2021-04-24 NOTE — DIETITIAN INITIAL EVALUATION ADULT. - PROBLEM SELECTOR PLAN 1
Patient with heavy alcohol consumption found to have cirrhotic liver morphology, varices, and ascites with coagulopathy concerning for new diagnosis of cirrhosis. S/p on admission SBP SAAG 2.1, Tp 0.8, TNC 20s  - 4/20: MDF 33 MELD 22  - start lactulose, titrate to 3-4 BM/day  - CTAP without evidence of biliary obstruction/pancreatitis  - f/u hepatitis panel, EBV, CMV  - f/u chronic lliver disease workup (sent)  - CTAP with IVC on 4/20 no intra-abdominal thrombi  - hepatology consulted  - avoid hepatoxins and sedating meds

## 2021-04-24 NOTE — DIETITIAN INITIAL EVALUATION ADULT. - CHIEF COMPLAINT
The patient is a 40M with alcohol misuse who presents with 2 weeks of jaundice and abdominal distention, found to have likely new decompensated liver cirrhosis in setting of alcohol

## 2021-04-24 NOTE — PROGRESS NOTE ADULT - SUBJECTIVE AND OBJECTIVE BOX
Chief Complaint:  Patient is a 40y old  Male who presents with a chief complaint of jaundice, malaise (23 Apr 2021 14:50)      Interval Events:   - No acute events  - Negative infectious workup so far    Allergies:  No Known Allergies      Hospital Medications:  enoxaparin Injectable 40 milliGRAM(s) SubCutaneous daily  folic acid 1 milliGRAM(s) Oral daily  furosemide    Tablet 40 milliGRAM(s) Oral daily  multivitamin 1 Tablet(s) Oral daily  spironolactone 100 milliGRAM(s) Oral daily  thiamine 100 milliGRAM(s) Oral daily      PMHX/PSHX:  No pertinent past medical history    Alcohol abuse    No significant past surgical history        Family history:  No pertinent family history in first degree relatives        ROS:   General:  No wt loss, fevers, chills, night sweats, + fatigue  Eyes:  Good vision, no reported pain  ENT:  No sore throat, pain, runny nose, dysphagia  CV:  No pain, palpitations, hypo/hypertension  Pulm:  No dyspnea, cough, tachypnea, wheezing  GI:  No pain, No nausea, No vomiting, No diarrhea, No constipation, No weight loss, No fever, No pruritis, No rectal bleeding, No tarry stools, No dysphagia,  :  No pain, bleeding, incontinence, nocturia  Muscle:  No pain, weakness  Neuro:  No weakness, tingling, memory problems  Psych:  No fatigue, insomnia, mood problems, depression  Endocrine:  No polyuria, polydipsia, cold/heat intolerance  Heme:  No petechiae, ecchymosis, easy bruisability  Skin:  No rash, tattoos, scars, edema    PHYSICAL EXAM:   Vital Signs:  Vital Signs Last 24 Hrs  T(C): 37.1 (24 Apr 2021 04:46), Max: 37.4 (23 Apr 2021 20:24)  T(F): 98.7 (24 Apr 2021 04:46), Max: 99.4 (23 Apr 2021 20:24)  HR: 75 (24 Apr 2021 04:46) (74 - 92)  BP: 103/70 (24 Apr 2021 04:46) (101/62 - 123/85)  BP(mean): --  RR: 18 (24 Apr 2021 04:46) (18 - 18)  SpO2: 95% (24 Apr 2021 04:46) (95% - 97%)  Daily     Daily     GENERAL:  No acute distress  HEENT:  Normocephalic/atraumatic, + scleral icterus  CHEST:  No accessory muscle use  HEART:  Regular rate and rhythm  ABDOMEN:  Soft, non-tender, distended, normoactive bowel sounds, hepatomegaly  EXTREMITIES: ++ pitting edema bilaterally  SKIN:  Jaundiced, No rash/erythema/ecchymoses/petechiae/wounds/abscess/warm/dry  NEURO:  Alert and oriented x 3, no asterixis    LABS:                        10.8   19.38 )-----------( 274      ( 23 Apr 2021 06:05 )             32.6       04-24    137  |  107  |  10  ----------------------------<  82  3.7   |  21<L>  |  0.45<L>    Ca    7.4<L>      24 Apr 2021 06:35    TPro  5.5<L>  /  Alb  2.1<L>  /  TBili  22.8<H>  /  DBili  x   /  AST  163<H>  /  ALT  98<H>  /  AlkPhos  173<H>  04-24    LIVER FUNCTIONS - ( 24 Apr 2021 06:35 )  Alb: 2.1 g/dL / Pro: 5.5 g/dL / ALK PHOS: 173 U/L / ALT: 98 U/L / AST: 163 U/L / GGT: x           PT/INR - ( 23 Apr 2021 06:05 )   PT: 16.5 sec;   INR: 1.40 ratio                                     10.8   19.38 )-----------( 274      ( 23 Apr 2021 06:05 )             32.6                         11.0   19.41 )-----------( 280      ( 22 Apr 2021 06:52 )             31.9       Imaging:

## 2021-04-24 NOTE — PROGRESS NOTE ADULT - PROBLEM SELECTOR PLAN 1
- off lactulose per hepatology.   - CTAP without evidence of biliary obstruction/pancreatitis  - CTAP with IVC on 4/20 no intra-abdominal thrombi  - hepatology f/u appreciated  - therapeutic paracentesis done 4/22.   - avoid hepatoxins and sedating meds  - acute alcoholic hepatitis --> was awaiting negative cultures before starting steroids. -Cultures thus far NGTD.   - c/w lasix and spironolactone  -patient was told the basics about enrolling in Hepatology trial [id number] RCT, pt was interested in enlisting into the trial. further details of the trial to be explained to the patient by the hepatology team. will need to continue to monitor off abx over the weekend to monitor for infection process with plans to start the infusion on Monday.

## 2021-04-25 NOTE — PROGRESS NOTE ADULT - ASSESSMENT
41yo M with PMH of EtOH abuse who presents with jaundice and abdominal distention x 2 weeks, found to have cirrhosis and elevated liver tests.    # Elevated liver tests - ALT/AST ratio in the settings of ongoing (positive BAL) EtOH use suggestive of EtOH hepatitis (vs. DILI/AI/viral hepatitis). DF 40. No signs of acute liver failure. Negative infectious workup. Plan to enroll patient into the EtOH hepatitis treatment study ([id number])  # Cirrhosis morphology - suggestive of underlying cirrhosis, suspected 2/2 EtOH use. MELD-Na 22 (4/21). Possible liver transplant candidate if able to remain abstinent from EtOH. Appears to have good social support.   # Active EtOH use - some mild tremor on exam, otherwise no signs of withdrawal. On CIWAS.    Recommendations:  - Will enroll in clinical trial on Monday  - Furosemide 40 mg po daily  - Spironolactone 100 mg po daily  - CIWAS with ativan taper (please avoid librium given prolonged half life in liver dysfunction)  - Avoid ibuprofen or other NSAIDs -- will worsen fluid retention and can lead to GINNY  - No need for lactulose  - Daily CMP, CBC, INR  - Thiamine, folate  - Social work consult  - Nutrition consult    Please await attending's attestation for final recommendations.   Thank you for involving us in the care of this patient. We will continue to follow.    Ishmael Cash, PGY-4  Hepatology Fellow    Available on [employer] Teams  Pager [phone] ([facility]) or [phone] ([facility])  After 5PM/Weekends, please contact the on-call GI fellow: [phone]  Available through [employer] Teams

## 2021-04-25 NOTE — PROGRESS NOTE ADULT - PROBLEM SELECTOR PLAN 1
- off lactulose per hepatology.   - CTAP without evidence of biliary obstruction/pancreatitis  - CTAP with IVC on 4/20 no intra-abdominal thrombi  - hepatology f/u appreciated  - therapeutic paracentesis done 4/22.   - avoid hepatotoxins and sedating meds  - acute alcoholic hepatitis --> was awaiting negative cultures before starting steroids. -Cultures thus far NGTD.   - c/w lasix and spironolactone  -patient was told the basics about enrolling in Hepatology trial [id number] RCT, pt was interested in enlisting into the trial. further details of the trial to be explained to the patient by the hepatology team. will need to continue to monitor off abx over the weekend to monitor for infection process with plans to start the infusion on Monday.  -Patient reports pruritus, which is worse at bedtime. Likely due to cholestasis. Will start cholestyramine 4gm BID for now.  -If patient continues to have left thigh pain, consider further imaging with US duplex, etc.

## 2021-04-25 NOTE — PROGRESS NOTE ADULT - SUBJECTIVE AND OBJECTIVE BOX
Celso Bo MD  Division of Hospital Medicine  Cell: [phone]  Pager: [phone]  Office: [phone]    Patient is a 40y old  Male who presents with a chief complaint of jaundice, malaise (25 Apr 2021 13:11)        SUBJECTIVE / OVERNIGHT EVENTS: Patient reports bad itching of his skin, worse at bedtime. He reports LE swelling. Has an episode of left thigh pain overnight.       MEDICATIONS  (STANDING):  cholestyramine Powder (Sugar-Free) 4 Gram(s) Oral two times a day  enoxaparin Injectable 40 milliGRAM(s) SubCutaneous daily  folic acid 1 milliGRAM(s) Oral daily  furosemide    Tablet 40 milliGRAM(s) Oral daily  multivitamin 1 Tablet(s) Oral daily  spironolactone 100 milliGRAM(s) Oral daily  thiamine 100 milliGRAM(s) Oral daily    MEDICATIONS  (PRN):  diphenhydrAMINE 50 milliGRAM(s) Oral at bedtime PRN Rash and/or Itching      Vital Signs Last 24 Hrs  T(C): 37.2 (25 Apr 2021 13:44), Max: 37.2 (25 Apr 2021 13:44)  T(F): 98.9 (25 Apr 2021 13:44), Max: 98.9 (25 Apr 2021 13:44)  HR: 79 (25 Apr 2021 13:44) (74 - 79)  BP: 114/74 (25 Apr 2021 13:44) (106/63 - 123/66)  BP(mean): --  RR: 18 (25 Apr 2021 13:44) (18 - 18)  SpO2: 96% (25 Apr 2021 13:44) (95% - 96%)  CAPILLARY BLOOD GLUCOSE        I&O's Summary    24 Apr 2021 07:01  -  25 Apr 2021 07:00  --------------------------------------------------------  IN: 360 mL / OUT: 0 mL / NET: 360 mL          PHYSICAL EXAM:   GENERAL: NAD, well-developed  HEAD:  Atraumatic, Normocephalic  EYES: conjunctiva and sclera icteric.   NECK: Supple   CHEST/LUNG: Clear to auscultation bilaterally; No wheeze  HEART: S1S2 normal. Regular rate and rhythm; No murmurs, rubs, or gallops  ABDOMEN: Soft, Nontender, distended; Bowel sounds present  EXTREMITIES:  1+ LE edema  PSYCH/Neuro: AAOx3. Non-focal.   SKIN: Jaundiced.       LABS:                        10.5   20.41 )-----------( 249      ( 25 Apr 2021 06:19 )             31.9     04-25    134<L>  |  103  |  11  ----------------------------<  76  3.7   |  20<L>  |  0.49<L>    Ca    7.8<L>      25 Apr 2021 06:19  Phos  1.7     04-25  Mg     1.8     04-25    TPro  5.8<L>  /  Alb  2.2<L>  /  TBili  23.1<H>  /  DBili  x   /  AST  159<H>  /  ALT  104<H>  /  AlkPhos  183<H>  04-25    PT/INR - ( 24 Apr 2021 08:18 )   PT: 17.0 sec;   INR: 1.44 ratio               RADIOLOGY & ADDITIONAL TESTS:    Imaging Personally Reviewed:  Consultant(s) Notes Reviewed:  Hepatology  Care Discussed with Consultants/Other Providers:

## 2021-04-25 NOTE — PROGRESS NOTE ADULT - SUBJECTIVE AND OBJECTIVE BOX
Chief Complaint:  Patient is a 40y old  Male who presents with a chief complaint of jaundice, malaise (24 Apr 2021 14:04)      Interval Events:   - No acute events    Allergies:  No Known Allergies        Hospital Medications:  enoxaparin Injectable 40 milliGRAM(s) SubCutaneous daily  folic acid 1 milliGRAM(s) Oral daily  furosemide    Tablet 40 milliGRAM(s) Oral daily  multivitamin 1 Tablet(s) Oral daily  spironolactone 100 milliGRAM(s) Oral daily  thiamine 100 milliGRAM(s) Oral daily      PMHX/PSHX:  No pertinent past medical history    Alcohol abuse    No significant past surgical history        Family history:  No pertinent family history in first degree relatives        ROS:   General:  No wt loss, fevers, chills, night sweats, + fatigue  Eyes:  Good vision, no reported pain  ENT:  No sore throat, pain, runny nose, dysphagia  CV:  No pain, palpitations, hypo/hypertension  Pulm:  No dyspnea, cough, tachypnea, wheezing  GI:  No pain, No nausea, No vomiting, No diarrhea, No constipation, No weight loss, No fever, No pruritis, No rectal bleeding, No tarry stools, No dysphagia,  :  No pain, bleeding, incontinence, nocturia  Muscle:  No pain, weakness  Neuro:  No weakness, tingling, memory problems  Psych:  No fatigue, insomnia, mood problems, depression  Endocrine:  No polyuria, polydipsia, cold/heat intolerance  Heme:  No petechiae, ecchymosis, easy bruisability  Skin:  No rash, tattoos, scars, edema      PHYSICAL EXAM:   Vital Signs:  Vital Signs Last 24 Hrs  T(C): 37 (25 Apr 2021 05:26), Max: 37.4 (24 Apr 2021 12:36)  T(F): 98.6 (25 Apr 2021 05:26), Max: 99.3 (24 Apr 2021 12:36)  HR: 74 (25 Apr 2021 05:26) (74 - 82)  BP: 106/63 (25 Apr 2021 05:26) (106/63 - 123/66)  BP(mean): --  RR: 18 (25 Apr 2021 05:26) (18 - 18)  SpO2: 95% (25 Apr 2021 05:26) (95% - 98%)  Daily     Daily     GENERAL:  No acute distress  HEENT:  Normocephalic/atraumatic, + scleral icterus  CHEST:  No accessory muscle use  HEART:  Regular rate and rhythm  ABDOMEN:  Soft, non-tender, distended, normoactive bowel sounds, hepatomegaly  EXTREMITIES: ++ pitting edema bilaterally  SKIN:  Jaundiced, No rash/erythema/ecchymoses/petechiae/wounds/abscess/warm/dry  NEURO:  Alert and oriented x 3, no asterixis    LABS:                        10.5   20.41 )-----------( 249      ( 25 Apr 2021 06:19 )             31.9     Mean Cell Volume: 94.4 fl (04-25-21 @ 06:19)    04-25    134<L>  |  103  |  11  ----------------------------<  76  3.7   |  20<L>  |  0.49<L>    Ca    7.8<L>      25 Apr 2021 06:19  Phos  1.7     04-25  Mg     1.8     04-25    TPro  5.8<L>  /  Alb  2.2<L>  /  TBili  23.1<H>  /  DBili  x   /  AST  159<H>  /  ALT  104<H>  /  AlkPhos  183<H>  04-25    LIVER FUNCTIONS - ( 25 Apr 2021 06:19 )  Alb: 2.2 g/dL / Pro: 5.8 g/dL / ALK PHOS: 183 U/L / ALT: 104 U/L / AST: 159 U/L / GGT: x           PT/INR - ( 24 Apr 2021 08:18 )   PT: 17.0 sec;   INR: 1.44 ratio                                     10.5   20.41 )-----------( 249      ( 25 Apr 2021 06:19 )             31.9                         10.8   20.24 )-----------( 257      ( 24 Apr 2021 10:42 )             32.1                         10.8   19.38 )-----------( 274      ( 23 Apr 2021 06:05 )             32.6       Imaging:

## 2021-04-25 NOTE — PROGRESS NOTE ADULT - PROBLEM SELECTOR PLAN 2
low risk CIWA protocol for now  - s/p ativan PRN symptom triggered CIWA  - c/w MVI, thiamine, folate  - SW consult

## 2021-04-26 NOTE — PROGRESS NOTE ADULT - PROBLEM SELECTOR PROBLEM 5
Abnormal finding of kidney

## 2021-04-26 NOTE — PROGRESS NOTE ADULT - ASSESSMENT
41yo M with PMH of EtOH abuse who presents with jaundice and abdominal distention x 2 weeks, found to have cirrhosis and elevated liver tests.    # Elevated liver tests - ALT/AST ratio in the settings of ongoing (positive BAL) EtOH use suggestive of EtOH hepatitis (vs. DILI/AI/viral hepatitis). DF 40. No signs of acute liver failure. Negative infectious workup. Plan to enroll patient into the EtOH hepatitis treatment study ([id number])  # Cirrhosis morphology - suggestive of underlying cirrhosis, suspected 2/2 EtOH use. MELD-Na 22 (4/21). Possible liver transplant candidate if able to remain abstinent from EtOH. Appears to have good social support.   # Active EtOH use - some mild tremor on exam, otherwise no signs of withdrawal. On CIWAS.    Recommendations:  - STAT INR now  - Will enroll in clinical trial today  - Furosemide 40 mg po daily  - Spironolactone 100 mg po daily  - CIWAS with ativan taper (please avoid librium given prolonged half life in liver dysfunction)  - Avoid ibuprofen or other NSAIDs -- will worsen fluid retention and can lead to GINNY  - No need for lactulose  - Daily CMP, CBC, INR  - Thiamine, folate  - Social work consult  - Nutrition consult    Please await attending's attestation for final recommendations.   Thank you for involving us in the care of this patient. We will continue to follow.    Ishmael Cash, PGY-4  Hepatology Fellow    Available on [employer] Teams  Pager [phone] ([facility]) or [phone] ([facility])  After 5PM/Weekends, please contact the on-call GI fellow: [phone]  Available through [employer] Teams

## 2021-04-26 NOTE — CHART NOTE - NSCHARTNOTEFT_GEN_A_CORE
Patient consented for DUR-928 RCT +/- placebo vs steroids.  Inclusions/exclusions reviewed.  MELD 23.  Plan on dosing this afternoon. Can be discharged as early as tomorrow at noon but if he remains inpatient, plan on additional dose on Wednesday afternoon.  Will follow closely in clinic. Patient consented for DUR-928 RCT +/- placebo vs steroids.  Inclusions/exclusions reviewed.  MELD 23.  No hepatic encephalopathy, mild ascites.  Plan on dosing this afternoon. Can be discharged as early as tomorrow at noon but if he remains inpatient, plan on additional dose on Wednesday afternoon.  Will follow closely in clinic. Patient consented for DUR-928 RCT +/- placebo vs steroids.  Inclusions/exclusions reviewed.  MELD 23.  Maddrey's DF is 46.  No hepatic encephalopathy, mild ascites.  Plan on dosing this afternoon. Can be discharged as early as tomorrow at noon but if he remains inpatient, plan on additional dose on Wednesday afternoon.  Will follow closely in clinic.

## 2021-04-26 NOTE — PROGRESS NOTE ADULT - PROBLEM SELECTOR PLAN 5
Mildly heterogeneous enhancement, with an ill-defined area of decreased attenuation in the upper pole of the left kidney measuring 1.4 cm  - MRI of the kidney showed Wedge shaped left upper pole hypoenhancing, nonmasslike region may represent infarction or focal pyelonephritis. Also no mention of a portal vein thromobosis.   -Unclear how to interpret above results MRI. Hold off on any abx or therapeutic A/C at this time.

## 2021-04-26 NOTE — PROGRESS NOTE ADULT - SUBJECTIVE AND OBJECTIVE BOX
Chief Complaint:  Patient is a 40y old  Male who presents with a chief complaint of jaundice, malaise (25 Apr 2021 13:11)      Interval Events:   - No acute events  - Planned to start the trial today    Allergies:  No Known Allergies        Hospital Medications:  cholestyramine Powder (Sugar-Free) 4 Gram(s) Oral two times a day  diphenhydrAMINE 50 milliGRAM(s) Oral at bedtime PRN  enoxaparin Injectable 40 milliGRAM(s) SubCutaneous daily  folic acid 1 milliGRAM(s) Oral daily  furosemide    Tablet 40 milliGRAM(s) Oral daily  multivitamin 1 Tablet(s) Oral daily  spironolactone 100 milliGRAM(s) Oral daily  thiamine 100 milliGRAM(s) Oral daily      PMHX/PSHX:  No pertinent past medical history    Alcohol abuse    No significant past surgical history        Family history:  No pertinent family history in first degree relatives        ROS:   General:  No wt loss, fevers, chills, night sweats, + fatigue  Eyes:  Good vision, no reported pain  ENT:  No sore throat, pain, runny nose, dysphagia  CV:  No pain, palpitations, hypo/hypertension  Pulm:  No dyspnea, cough, tachypnea, wheezing  GI:  No pain, No nausea, No vomiting, No diarrhea, No constipation, No weight loss, No fever, No pruritis, No rectal bleeding, No tarry stools, No dysphagia,  :  No pain, bleeding, incontinence, nocturia  Muscle:  No pain, weakness  Neuro:  No weakness, tingling, memory problems  Psych:  No fatigue, insomnia, mood problems, depression  Endocrine:  No polyuria, polydipsia, cold/heat intolerance  Heme:  No petechiae, ecchymosis, easy bruisability  Skin:  No rash, tattoos, scars, edema    PHYSICAL EXAM:   Vital Signs:  Vital Signs Last 24 Hrs  T(C): 37.4 (26 Apr 2021 05:00), Max: 37.4 (26 Apr 2021 05:00)  T(F): 99.4 (26 Apr 2021 05:00), Max: 99.4 (26 Apr 2021 05:00)  HR: 82 (26 Apr 2021 05:00) (79 - 84)  BP: 119/77 (26 Apr 2021 05:00) (114/74 - 123/77)  BP(mean): --  RR: 18 (26 Apr 2021 05:00) (18 - 18)  SpO2: 96% (26 Apr 2021 05:00) (95% - 96%)  Daily     Daily     GENERAL:  No acute distress  HEENT:  Normocephalic/atraumatic, + scleral icterus  CHEST:  No accessory muscle use  HEART:  Regular rate and rhythm  ABDOMEN:  Soft, non-tender, distended, normoactive bowel sounds, hepatomegaly  EXTREMITIES: ++ pitting edema bilaterally  SKIN:  Jaundiced, No rash/erythema/ecchymoses/petechiae/wounds/abscess/warm/dry  NEURO:  Alert and oriented x 3, no asterixis    LABS:                        10.8   21.59 )-----------( 245      ( 26 Apr 2021 06:22 )             32.5     Mean Cell Volume: 93.1 fl (04-26-21 @ 06:22)    04-26    132<L>  |  101  |  12  ----------------------------<  79  3.9   |  19<L>  |  0.53    Ca    7.4<L>      26 Apr 2021 06:22  Phos  1.7     04-25  Mg     1.8     04-25    TPro  5.7<L>  /  Alb  2.1<L>  /  TBili  22.3<H>  /  DBili  x   /  AST  146<H>  /  ALT  97<H>  /  AlkPhos  184<H>  04-26    LIVER FUNCTIONS - ( 26 Apr 2021 06:22 )  Alb: 2.1 g/dL / Pro: 5.7 g/dL / ALK PHOS: 184 U/L / ALT: 97 U/L / AST: 146 U/L / GGT: x                                       10.8   21.59 )-----------( 245      ( 26 Apr 2021 06:22 )             32.5                         10.5   20.41 )-----------( 249      ( 25 Apr 2021 06:19 )             31.9                         10.8   20.24 )-----------( 257      ( 24 Apr 2021 10:42 )             32.1       Imaging:

## 2021-04-26 NOTE — PROGRESS NOTE ADULT - PROBLEM SELECTOR PLAN 4
Hb 11 normocytic  - no overt bleeding  - check iron studies, b12, folate, retic in AM
Hb 11 normocytic  - no overt bleeding  - f/p iron studies, b12, folate, retics
Hb 11 normocytic  - no overt bleeding  likely in setting ofl iver dysfunction.
Hb 11 normocytic  - no overt bleeding  - check iron studies, b12, folate, retic in AM
Hb 11 normocytic  - no overt bleeding  - check iron studies, b12, folate, retic in AM
Hb 11 normocytic  - no overt bleeding  - f/p iron studies, b12, folate, retics

## 2021-04-26 NOTE — PROGRESS NOTE ADULT - PROBLEM SELECTOR PROBLEM 1
Alcoholic cirrhosis of liver with ascites

## 2021-04-26 NOTE — PROGRESS NOTE ADULT - SUBJECTIVE AND OBJECTIVE BOX
Adelso Holman DO  Division of Hospital Medicine  Pager: [phone]  Office: [phone]    Patient is a 40y old  Male who presents with a chief complaint of jaundice, malaise (25 Apr 2021 13:11)        SUBJECTIVE / OVERNIGHT EVENTS: Still wtih pruritis overnight, otherwise No other acute complaints. No chest pain, fever, chills, abdominal pain, n/v/d/c. No furhter abdominal bloating.       MEDICATIONS  (STANDING):  cholestyramine Powder (Sugar-Free) 4 Gram(s) Oral two times a day  enoxaparin Injectable 40 milliGRAM(s) SubCutaneous daily  folic acid 1 milliGRAM(s) Oral daily  furosemide    Tablet 40 milliGRAM(s) Oral daily  multivitamin 1 Tablet(s) Oral daily  spironolactone 100 milliGRAM(s) Oral daily  thiamine 100 milliGRAM(s) Oral daily    MEDICATIONS  (PRN):  diphenhydrAMINE 50 milliGRAM(s) Oral at bedtime PRN Rash and/or Itching      Vital Signs Last 24 Hrs  Vital Signs Last 24 Hrs  T(C): 37.2 (26 Apr 2021 12:24), Max: 37.4 (26 Apr 2021 05:00)  T(F): 99 (26 Apr 2021 12:24), Max: 99.4 (26 Apr 2021 05:00)  HR: 83 (26 Apr 2021 12:24) (79 - 84)  BP: 116/83 (26 Apr 2021 12:24) (114/74 - 123/77)  BP(mean): --  RR: 18 (26 Apr 2021 12:24) (18 - 18)  SpO2: 96% (26 Apr 2021 12:24) (95% - 96%)  CAPILLARY BLOOD GLUCOSE        I&O's Summary    24 Apr 2021 07:01  -  25 Apr 2021 07:00  --------------------------------------------------------  IN: 360 mL / OUT: 0 mL / NET: 360 mL          PHYSICAL EXAM:   GENERAL: NAD, well-developed  HEAD:  Atraumatic, Normocephalic  EYES: conjunctiva and sclera icteric.   NECK: Supple   CHEST/LUNG: Clear to auscultation bilaterally; No wheeze  HEART: S1S2 normal. Regular rate and rhythm; No murmurs, rubs, or gallops  ABDOMEN: Soft, Nontender, distended; Bowel sounds present  EXTREMITIES:  1+ LE edema  PSYCH/Neuro: AAOx3. Non-focal.   SKIN: Jaundiced.       LABS:                                 10.8   21.59 )-----------( 245      ( 26 Apr 2021 06:22 )             32.5   04-26    132<L>  |  101  |  12  ----------------------------<  79  3.9   |  19<L>  |  0.53    Ca    7.4<L>      26 Apr 2021 06:22  Phos  1.8     04-26  Mg     1.7     04-26    TPro  5.7<L>  /  Alb  2.1<L>  /  TBili  22.3<H>  /  DBili  x   /  AST  146<H>  /  ALT  97<H>  /  AlkPhos  184<H>  04-26          RADIOLOGY & ADDITIONAL TESTS:    Imaging Personally Reviewed:  Consultant(s) Notes Reviewed:  Hepatology  Care Discussed with Consultants/Other Providers: hepatology fellow

## 2021-04-26 NOTE — PROGRESS NOTE ADULT - PROBLEM SELECTOR PLAN 1
- off lactulose per hepatology.   - CTAP without evidence of biliary obstruction/pancreatitis  - CTAP with IVC on 4/20 no intra-abdominal thrombi  - hepatology f/u appreciated  - therapeutic paracentesis done 4/22.   - avoid hepatotoxins and sedating meds  - acute alcoholic hepatitis --> to start clinical trial today.   - c/w lasix and spironolactone  -patient was told the basics about enrolling in Hepatology trial DUR-928 RCT, pt was interested in enlisting into the trial. further details of the trial to be explained to the patient by the hepatology team. will need to continue to monitor off abx over the weekend to monitor for infection process with plans to start the infusion on Monday.  -Patient reports pruritus, which is worse at bedtime. Likely due to cholestasis. Will start cholestyramine 4gm BID for now.

## 2021-04-27 NOTE — DISCHARGE NOTE PROVIDER - CARE PROVIDER_API CALL
[place])  Gastroenterology  [address] [place] [address]  Phone: [phone]  Fax: [phone]  Follow Up Time:

## 2021-04-27 NOTE — DISCHARGE NOTE NURSING/CASE MANAGEMENT/SOCIAL WORK - PATIENT PORTAL LINK FT
You can access the FollowMyHealth Patient Portal offered by [facility] by registering at the following website: [email or web address]. By joining [employer]’s FollowMyHealth portal, you will also be able to view your health information using other applications (apps) compatible with our system.

## 2021-04-27 NOTE — PROGRESS NOTE ADULT - NSICDXPILOT_GEN_ALL_CORE
[place]

## 2021-04-27 NOTE — CHART NOTE - NSCHARTNOTEFT_GEN_A_CORE
Patient seen and evaluated. Patient without any asterixis, worsening abdominal distension, n/v/d/c.  Discussed pesronally with hepatology resident and attending who cleared patient for discharge. Since on clinical trial, the hepatology team will arrange all follow up with patient. Patient to be discharged on lasix, spironolactone and cholestyromine, priced out by CM/SW.  D/C planning 36 minutes. Patient seen and evaluated. Patient without any asterixis, worsening abdominal distension, n/v/d/c.  Discussed pesronally with hepatology resident and attending who cleared patient for discharge. Since on clinical trial, the hepatology team will arrange all follow up with patient. Patient to be discharged on lasix, spironolactone and cholestyromine, priced out by CM/SW.  I personally counselled patient on the importance of not drinking one drop of any alcohol or has high risk of returning back to the hospital and dying.  Was also given [place] alcohol resources. D/C planning 36 minutes.

## 2021-04-27 NOTE — DISCHARGE NOTE PROVIDER - NSDCCPCAREPLAN_GEN_ALL_CORE_FT
PRINCIPAL DISCHARGE DIAGNOSIS  Diagnosis: Alcoholic cirrhosis of liver with ascites  Assessment and Plan of Treatment: Alcoholic cirrhosis of liver with ascites       PRINCIPAL DISCHARGE DIAGNOSIS  Diagnosis: Alcoholic cirrhosis of liver with ascites  Assessment and Plan of Treatment: Alcoholic cirrhosis of liver with ascites  Continue Lasix and Spirolactone  Follow with your doctor as planned  Also cobtinue multi vitamin and Folic acid       PRINCIPAL DISCHARGE DIAGNOSIS  Diagnosis: Alcoholic cirrhosis of liver with ascites  Assessment and Plan of Treatment: Alcoholic cirrhosis of liver with ascites  Continue Lasix and Spirolactone  Follow with your doctor as planned for DUR-928 RCT trial  Also cobtinue multi vitamin and Folic acid       PRINCIPAL DISCHARGE DIAGNOSIS  Diagnosis: Alcoholic cirrhosis of liver with ascites  Assessment and Plan of Treatment: Alcoholic cirrhosis of liver with ascites  Continue Lasix and Spirolactone  Follow with your Dr. Michele as planned for DUR-928 RCT trial  Also cobtinue multi vitamin and Folic acid  Dr. Michele will call you with further instructions.

## 2021-04-27 NOTE — PROGRESS NOTE ADULT - REASON FOR ADMISSION
jaundice, malaise

## 2021-04-27 NOTE — DISCHARGE NOTE PROVIDER - NSDCMRMEDTOKEN_GEN_ALL_CORE_FT
furosemide 40 mg oral tablet: 1 tab(s) orally once a day  spironolactone 25 mg oral tablet: 4 tab(s) orally once a day   folic acid 1 mg oral tablet: 1 tab(s) orally once a day  furosemide 40 mg oral tablet: 1 tab(s) orally once a day  furosemide 40 mg oral tablet: 1 tab(s) orally once a day  Multiple Vitamins oral tablet: 1 tab(s) orally once a day  spironolactone 25 mg oral tablet: 4 tab(s) orally once a day   folic acid 1 mg oral tablet: 1 tab(s) orally once a day  furosemide 40 mg oral tablet: 1 tab(s) orally once a day  Multiple Vitamins oral tablet: 1 tab(s) orally once a day  spironolactone 25 mg oral tablet: 4 tab(s) orally once a day

## 2021-04-27 NOTE — PROGRESS NOTE ADULT - ATTENDING COMMENTS
39 yo M with AUD, admitted with recent onset of jaundice and abdominal distension secondary to presumed severe alcoholic hepatitis with ascites. Infectious work-up negative thus far, and will tentatively plan to start trial of prednisolone therapy tomorrow if blood cultures remain negative and if his AH severity scores remain high. Planned for LVP today with 1:2 albumin replacement. Lactulose held today due to profuse diarrhea, also without any hepatic encephalopathy.    Please don't hesitate to call with any questions or concerns.    Devon Goodwin M.D., Ph.D.  Transplant Hepatology  Cell: [phone] .
41yo M with hx of alcohol abuse presenting with jaundice and abdominal distention x 2 weeks, last drink on April 9th, 2021.   Findings consistent with alcoholic hepatitis.  Off abx, no concerns of ongoing infection.  Enroll in DUR-928 trial.
Patient seen and examined with liver team. I agree with the plan as above.  He appears stable. For possible enrollment in alcoholic hepatitis trial
S/p enrollment in DUR_928 on 4/26 and dosing.  Tolerated infusion well.  Discharge on steroids vs placebo pill x 28 days.  F/u in hepatology clinic in 1 week.
Patient seen and examined with the liver team. I agree with the plan as above.  For possible enrollment in the alcoholic hepatitis study tomorrow
41 yo M with AUD, admitted with recent onset of jaundice and abdominal distension secondary to probable severe alcoholic hepatitis with ascites. Infectious work-up negative thus far, and will tentatively plan to enroll in DUR-928 RCT on Monday if he remains free of infection.    Please don't hesitate to call with any questions or concerns.    Devon Goodwin M.D., Ph.D.  Transplant Hepatology  Cell: [phone]

## 2021-04-27 NOTE — PROGRESS NOTE ADULT - ASSESSMENT
39yo M with PMH of EtOH abuse who presents with jaundice and abdominal distention x 2 weeks, found to have cirrhosis and elevated liver tests.    # Elevated liver tests - ALT/AST ratio in the settings of ongoing (positive BAL) EtOH use suggestive of EtOH hepatitis (vs. DILI/AI/viral hepatitis). DF 40. No signs of acute liver failure. Negative infectious workup. Plan to enroll patient into the EtOH hepatitis treatment study ([id number])  # Cirrhosis morphology - suggestive of underlying cirrhosis, suspected 2/2 EtOH use. MELD-Na 22 (4/21). Possible liver transplant candidate if able to remain abstinent from EtOH. Appears to have good social support.   # Active EtOH use - some mild tremor on exam, otherwise no signs of withdrawal. On CIWAS.    Recommendations:  - Hepatology will arrange for f/u  - Daily oral medication per trial protocol  - Furosemide 40 mg po daily  - Spironolactone 100 mg po daily  - Avoid ibuprofen or other NSAIDs -- will worsen fluid retention and can lead to GINNY  - No need for lactulose  - Daily CMP, CBC, INR  - Thiamine, folate  - Social work consult  - Nutrition consult    Thank you for involving us in the care of this patient. We will continue to follow.    Ishmael Cash, PGY-4  Hepatology Fellow    Available on [employer] Teams  Pager [phone] ([facility]) or [phone] ([facility])  After 5PM/Weekends, please contact the on-call GI fellow: [phone]  Available through [employer] Teams

## 2021-04-27 NOTE — PROGRESS NOTE ADULT - SUBJECTIVE AND OBJECTIVE BOX
Chief Complaint:  Patient is a 40y old  Male who presents with a chief complaint of jaundice, malaise (27 Apr 2021 11:26)      Interval Events:   - No acute events    Allergies:  No Known Allergies        Hospital Medications:  cholestyramine Powder (Sugar-Free) 4 Gram(s) Oral two times a day  diphenhydrAMINE 50 milliGRAM(s) Oral at bedtime PRN  diphenhydrAMINE   Injectable 25 milliGRAM(s) IV Push once PRN  enoxaparin Injectable 40 milliGRAM(s) SubCutaneous daily  EPINEPHrine     1 mG/mL Injectable 0.3 milliGRAM(s) IntraMuscular once PRN  famotidine Injectable 20 milliGRAM(s) IV Push once PRN  folic acid 1 milliGRAM(s) Oral daily  furosemide    Tablet 40 milliGRAM(s) Oral daily  hydrocortisone sodium succinate Injectable 100 milliGRAM(s) IV Push once PRN  investigational medication - general 3 milliGRAM(s) Oral daily  investigational medication - general 32 milliGRAM(s) Oral daily  multivitamin 1 Tablet(s) Oral daily  spironolactone 100 milliGRAM(s) Oral daily  thiamine 100 milliGRAM(s) Oral daily      PMHX/PSHX:  No pertinent past medical history    Alcohol abuse    No significant past surgical history        Family history:  No pertinent family history in first degree relatives        ROS:   General:  No wt loss, fevers, chills, night sweats, + fatigue  Eyes:  Good vision, no reported pain  ENT:  No sore throat, pain, runny nose, dysphagia  CV:  No pain, palpitations, hypo/hypertension  Pulm:  No dyspnea, cough, tachypnea, wheezing  GI:  No pain, No nausea, No vomiting, No diarrhea, No constipation, No weight loss, No fever, No pruritis, No rectal bleeding, No tarry stools, No dysphagia,  :  No pain, bleeding, incontinence, nocturia  Muscle:  No pain, weakness  Neuro:  No weakness, tingling, memory problems  Psych:  No fatigue, insomnia, mood problems, depression  Endocrine:  No polyuria, polydipsia, cold/heat intolerance  Heme:  No petechiae, ecchymosis, easy bruisability  Skin:  No rash, tattoos, scars, edema    PHYSICAL EXAM:   Vital Signs:  Vital Signs Last 24 Hrs  T(C): 36.7 (27 Apr 2021 04:31), Max: 37.3 (26 Apr 2021 20:44)  T(F): 98.1 (27 Apr 2021 04:31), Max: 99.2 (26 Apr 2021 20:44)  HR: 69 (27 Apr 2021 04:31) (69 - 94)  BP: 106/67 (27 Apr 2021 04:31) (106/67 - 129/85)  BP(mean): --  RR: 18 (27 Apr 2021 04:31) (18 - 18)  SpO2: 95% (27 Apr 2021 04:31) (95% - 96%)  Daily     Daily     GENERAL:  No acute distress  HEENT:  Normocephalic/atraumatic, + scleral icterus  CHEST:  No accessory muscle use  HEART:  Regular rate and rhythm  ABDOMEN:  Soft, non-tender, distended, normoactive bowel sounds, hepatomegaly  EXTREMITIES: ++ pitting edema bilaterally  SKIN:  Jaundiced, No rash/erythema/ecchymoses/petechiae/wounds/abscess/warm/dry  NEURO:  Alert and oriented x 3, no asterixis      LABS:                        10.6   20.42 )-----------( 231      ( 27 Apr 2021 06:15 )             32.4     Mean Cell Volume: 94.2 fl (04-27-21 @ 06:15)    04-27    132<L>  |  102  |  15  ----------------------------<  128<H>  3.9   |  18<L>  |  0.50    Ca    7.5<L>      27 Apr 2021 06:14  Phos  1.8     04-26  Mg     1.7     04-26    TPro  6.0  /  Alb  2.2<L>  /  TBili  20.4<H>  /  DBili  x   /  AST  117<H>  /  ALT  92<H>  /  AlkPhos  166<H>  04-27    LIVER FUNCTIONS - ( 27 Apr 2021 06:14 )  Alb: 2.2 g/dL / Pro: 6.0 g/dL / ALK PHOS: 166 U/L / ALT: 92 U/L / AST: 117 U/L / GGT: x           PT/INR - ( 27 Apr 2021 06:15 )   PT: 17.1 sec;   INR: 1.45 ratio         PTT - ( 26 Apr 2021 09:50 )  PTT:40.6 sec                            10.6   20.42 )-----------( 231      ( 27 Apr 2021 06:15 )             32.4                         10.8   21.59 )-----------( 245      ( 26 Apr 2021 06:22 )             32.5                         10.5   20.41 )-----------( 249      ( 25 Apr 2021 06:19 )             31.9       Imaging:

## 2021-04-27 NOTE — DISCHARGE NOTE PROVIDER - HOSPITAL COURSE
40M with alcohol misuse who presents with 2 weeks of jaundice and abdominal distention, found to have likely new decompensated liver cirrhosis in setting of alcohol     Problem/Plan - 1:  ·  Problem: Alcoholic cirrhosis of liver with ascites.  Plan: - off lactulose per hepatology.   - CTAP without evidence of biliary obstruction/pancreatitis  - CTAP with IVC on 4/20 no intra-abdominal thrombi  - hepatology f/u appreciated  - therapeutic paracentesis done 4/22.   - avoid hepatotoxins and sedating meds  - acute alcoholic hepatitis --> to start clinical trial today.   - c/w lasix and spironolactone  -patient was told the basics about enrolling in Hepatology trial [id number] RCT, pt was interested in enlisting into the trial. further details of the trial to be explained to the patient by the hepatology team. will need to continue to monitor off abx over the weekend to monitor for infection process with plans to start the infusion on Monday.  -Patient reports pruritus, which is worse at bedtime. Likely due to cholestasis. Will start cholestyramine 4gm BID for now.    40M with alcohol misuse who presents with 2 weeks of jaundice and abdominal distention, found to have likely new decompensated liver cirrhosis in setting of alcohol. Patient ruled out for SBP, infectious etiology and started on clinical trial ran by Dr. Michele. Patient is cleared by hepatology for discharge with close outpatient follow up with Dr. Michele to continue clinical trial. Per hepatology, the clinical trial team will arange all follow up appointments. Patietn to be discharged on lasix and spironolactone. Counselled on alcohol cessation.

## 2021-04-27 NOTE — PROGRESS NOTE ADULT - PROVIDER SPECIALTY LIST ADULT
Hepatology
Hepatology
Hospitalist
Hospitalist
Hepatology
Hospitalist

## 2021-05-11 NOTE — PROGRESS NOTE ADULT - PROBLEM/PLAN-3
Patient has acp on file and it is the most recent copy.
DISPLAY PLAN FREE TEXT

## 2021-05-27 PROBLEM — F10.10 ALCOHOL ABUSE, UNCOMPLICATED: Chronic | Status: ACTIVE | Noted: 2021-01-01

## 2021-05-27 NOTE — H&P ADULT - NSHPPHYSICALEXAM_GEN_ALL_CORE
CONSTITUTIONAL: No acute distress. Awake and alert.  HEAD: No evidence of trauma. Structures WNL.  EYES: +PERRL. +EOMI. No scleral icterus. No conjunctival injection.  ENT: Moist oral mucosa. No erythema. No pharyngeal exudates.   NECK: Supple. Appropriate ROM. No stiffness. No masses or lymphadenopathy.  RESPIRATORY: CTAB. No wheezes, rales, or rhonchi. No accessory muscle use. No apparent respiratory distress.  CARDIOVASCULAR: +S1/S2. No audible S3/S4. Regular rate and rhythm. No murmurs, rubs, or gallops. 2+ radial pulses x b/l UE; 2+ DP pulses x b/l LE.   GASTROINTESTINAL: +Distended, fluid wave. Soft, nontender. +BS. No rebound or guarding.   BACK: No spinal or paraspinal tenderness. No CVA tenderness.  EXTREMITY: +b/l LE edema. +LLE erythema, severe TTP of dorsal L foot. L foot has pinpoint lesions with serous drainage. ROM L foot limited by pain   MUSCULOSKELETAL: Spontaneous movement in all extremities.  DERMATOLOGICAL: +Jaundice  NEUROLOGICAL: CN 2-12 grossly intact. No focal deficits. Sensation intact x 4EXT. A&Ox3 (oriented to person, place, and time).  PSYCHIATRIC: Appropriate affect. CONSTITUTIONAL: No acute distress. Awake and alert.  HEAD: No evidence of trauma. Structures WNL.  EYES: +PERRL. +EOMI. No scleral icterus. No conjunctival injection.  ENT: Moist oral mucosa. No erythema. No pharyngeal exudates.   NECK: Supple. Appropriate ROM. No stiffness. No masses or lymphadenopathy.  RESPIRATORY: CTAB. No wheezes, rales, or rhonchi. No accessory muscle use. No apparent respiratory distress.  CARDIOVASCULAR: +S1/S2. No audible S3/S4. Regular rate and rhythm. No murmurs, rubs, or gallops. 2+ radial pulses x b/l UE; 2+ DP pulses x b/l LE.   GASTROINTESTINAL: +Distended, fluid wave. Soft, nontender. +BS. No rebound or guarding.   BACK: No spinal or paraspinal tenderness. No CVA tenderness.  EXTREMITY: +b/l LE edema. +LLE erythema, severe TTP of dorsal L foot. L foot has pinpoint lesions with serous drainage. ROM L foot limited by pain   MUSCULOSKELETAL: Spontaneous movement in all extremities.  DERMATOLOGICAL: +Jaundice  NEUROLOGICAL: CN 2-12 grossly intact. A&Ox3 (oriented to person, place, and time). +Asterixis on exam  PSYCHIATRIC: Appropriate affect.

## 2021-05-27 NOTE — CONSULT NOTE ADULT - ASSESSMENT
39yo M with PMH of EtOH abuse who presents with jaundice and abdominal distention x 2 weeks, found to have cirrhosis and elevated liver tests.    # Elevated liver tests - ALT/AST ratio in the settings of ongoing (positive BAL in April) EtOH use with recent admission for alc hep; overall downtrending. Concern for possible infection given leukocytosis and may have been on steroids for alc hep in clinical trial.  # Cirrhotic morphology on imaging- suggestive of underlying cirrhosis, suspected 2/2 EtOH use. MELD-Na 30. ssible liver transplant candidate if able to remain abstinent from EtOH. Appears to have good social support.   - ascites - present  - varices - unknown  - HCC - no focal lesions on imaging  - HE - +asterixis  # Active EtOH use  # Leukocytosis  # Hyponatremia      Recommendations:  - start albumin 25% 100cc q6  - infectious w/u - blood cx, urine cx  - diag/therapeutic para - Check cell count, albumin, protein, LDH, glucose, gram stain and culture, and cytology   - would cover with broad spectrum antibiotics at this time until infectious w/u complete  - fluid restriction to 1L/day  - lactulose 30cc q6, titrate to 3-4BM/day  - rifaximin 550 BID  - will notify Dr Michele/clinical trial of admission      Gabriela Figueroa PGY-4  Gastroenterology Fellow  Pager #[phone] (JANE) or [phone] (NS)  Available on [employer] Teams.  Please contact on-call GI fellow via answering service ([phone]) after 5pm and before 8am, and on weekends.            39yo M with PMH of EtOH abuse who presents with jaundice and abdominal distention x 2 weeks, found to have cirrhosis and elevated liver tests.    # Elevated liver tests - ALT/AST ratio in the settings of ongoing (positive BAL in April) EtOH use with recent admission for alc hep; overall downtrending. Concern for possible infection given leukocytosis and may have been on steroids for alc hep in clinical trial.  # Cirrhotic morphology on imaging- suggestive of underlying cirrhosis, suspected 2/2 EtOH use. MELD-Na 30. ssible liver transplant candidate if able to remain abstinent from EtOH. Appears to have good social support.   - ascites - present  - varices - unknown  - HCC - no focal lesions on imaging CT 4/20 (not multiphase)  - HE - +asterixis  # Active EtOH use  # Leukocytosis  # Hyponatremia      Recommendations:  - start albumin 25% 100cc q6  - infectious w/u - blood cx, urine cx  - diag/therapeutic para - Check cell count, albumin, protein, LDH, glucose, gram stain and culture, and cytology   - would cover with broad spectrum antibiotics at this time until infectious w/u complete  - fluid restriction to 1L/day  - lactulose 30cc q6, titrate to 3-4BM/day  - rifaximin 550 BID  - will notify Dr Michele/clinical trial of admission      Gabriela Figueroa PGY-4  Gastroenterology Fellow  Pager #[phone] (JANE) or [phone] (NS)  Available on [employer] Teams.  Please contact on-call GI fellow via answering service ([phone]) after 5pm and before 8am, and on weekends.

## 2021-05-27 NOTE — ED PROVIDER NOTE - CLINICAL SUMMARY MEDICAL DECISION MAKING FREE TEXT BOX
Sharita Robles MD: 42yo M with PMH of alcoholic cirrhosis who presents with LLE swelling and pain worsening in the last 3 days. Afebrile. LLE>RLE in circumference with +calf tenderness and erythema concerning for DVT. Low suspicion of cellulitis as erythema involved entire leg. Will get labs, venous doppler of LLE to assess for DVT, likely admit as pt has difficulty ambulating due to swelling and pain.

## 2021-05-27 NOTE — ED PROVIDER NOTE - NS ED ROS FT
General: denies fever, chills  HENT: denies nasal congestion, sore throat, rhinorrhea  Eyes: denies vision changes  CV: denies chest pain  Resp: denies difficulty breathing, cough  Abdominal: denies nausea, vomiting, diarrhea, abdominal pain, blood in stool, dark stool  : denies pain with urination  MSK: +LLE pain and swelling   Neuro: denies headaches, numbness, tingling, dizziness, lightheadedness.  Skin: denies new rashes  Endocrine: denies recent weight loss

## 2021-05-27 NOTE — H&P ADULT - NSHPLABSRESULTS_GEN_ALL_CORE
EKG tracing reviewed - NSR@67bpm, no acute ischemic changes   LE doppler reviewe d- no LLE DVT, no collection seen

## 2021-05-27 NOTE — CONSULT NOTE ADULT - ASSESSMENT
----------------------------------------------------------  Interventional Radiology Brief Consult Note  -----------------------------------------------------------    Reason for Referral: Paracentesis    Clinical Summary: 41y M PMHx EtOH abuse (last drink early April) c/b decompensated hepatic cirrhosis p/w progressively worsening b/l LE swelling a/w LLE foot pain. Pt was recently admitted 4/20-4/27 for alcoholic hepatitis and decompensated liver cirrhosis. Last para 4/22/21- 2.1 L removed neg for SBP. Discharged on lasix 40 mg qd and spironolactone 100 mg qd. He was also enrolled in a clinical trial with Dr. Michele (steroids vs placebo). Pt notes he follows with a GI "on [address]" but [facility]. Of note pt states he was recently admitted to [facility] last week Wednesday to Sunday for "blackness" of his R hand and a "problem" on his R side. Pt is a poor historian and was unable to describe what treatment he received but says he did not receive antibiotics or a paracentesis. He says he left Sunday- unclear if discharged or AMA. He notes that since his discharge he has noted his legs getting more swollen and it is difficult for him to walk. Denies trauma, fevers.      Vitals:  T(C): 36.9 (05-27-21 @ 12:24), Max: 37.2 (05-27-21 @ 03:30)  HR: 91 (05-27-21 @ 12:24) (89 - 112)  BP: 109/74 (05-27-21 @ 12:24) (97/62 - 122/69)  RR: 18 (05-27-21 @ 12:24) (16 - 18)  SpO2: 96% (05-27-21 @ 12:24) (96% - 100%)    Labs:           9.9  27.03)-----(66     (05-27-21 @ 03:34)         29.6     124 | 93 | 21  --------------------< 88     (05-27-21 @ 10:31)  3.9 | 19 | 0.71       PT: 21.7<H> 05-27-21 @ 03:34  aPTT: 36.5<H> 05-27-21 @ 03:34   INR: 1.86<H> 05-27-21 @ 03:34      Assessment: 41y Male with cirrhosis and fluid on recent ultrasound.    Recommendations:  - Plan for 5/27/2021   - Paracentesis   - Place order under Dr. Chris Zepeda swab    ----------------------------------------------------------  Interventional Radiology Brief Consult Note  -----------------------------------------------------------    Reason for Referral: Paracentesis    Clinical Summary: 41y M PMHx EtOH abuse (last drink early April) c/b decompensated hepatic cirrhosis p/w progressively worsening b/l LE swelling a/w LLE foot pain. Pt was recently admitted 4/20-4/27 for alcoholic hepatitis and decompensated liver cirrhosis. Last para 4/22/21- 2.1 L removed neg for SBP. Discharged on lasix 40 mg qd and spironolactone 100 mg qd. He was also enrolled in a clinical trial with Dr. Michele (steroids vs placebo). Pt notes he follows with a GI "on [address]" but [facility] [place] [facility]. Of note pt states he was recently admitted to [facility] last week Wednesday to Sunday for "blackness" of his R hand and a "problem" on his R side. Pt is a poor historian and was unable to describe what treatment he received but says he did not receive antibiotics or a paracentesis. He says he left Sunday- unclear if discharged or AMA. He notes that since his discharge he has noted his legs getting more swollen and it is difficult for him to walk. Denies trauma, fevers.      Vitals:  T(C): 36.9 (05-27-21 @ 12:24), Max: 37.2 (05-27-21 @ 03:30)  HR: 91 (05-27-21 @ 12:24) (89 - 112)  BP: 109/74 (05-27-21 @ 12:24) (97/62 - 122/69)  RR: 18 (05-27-21 @ 12:24) (16 - 18)  SpO2: 96% (05-27-21 @ 12:24) (96% - 100%)    Labs:           9.9  27.03)-----(66     (05-27-21 @ 03:34)         29.6     124 | 93 | 21  --------------------< 88     (05-27-21 @ 10:31)  3.9 | 19 | 0.71       PT: 21.7<H> 05-27-21 @ 03:34  aPTT: 36.5<H> 05-27-21 @ 03:34   INR: 1.86<H> 05-27-21 @ 03:34      Assessment: 41y Male with cirrhosis and fluid on recent ultrasound.    Recommendations:  - Plan for 5/27/2021 for a paracentesis  - Place order under Dr. Chris Zepeda swab

## 2021-05-27 NOTE — H&P ADULT - NSHPSOCIALHISTORY_GEN_ALL_CORE
Lives at home with wife  Previously worked in construction  Previous heavy alc use- last drink early April   Denies smoking  Denies illicit drug use

## 2021-05-27 NOTE — H&P ADULT - PROBLEM SELECTOR PLAN 1
LLE cellulitis with erythema up to knee. pinpoint lesions on dorsum of foot with serous drainage, no ulcer, abrasion, skin breakdown. TTP. Duplex neg for DVT. s/p vanc x1 in ED. CRP elevated (95).   - f/u ESR  - c/w IV vanc (5/27- )   - f/u MRSA swab- deescalate if neg   - no fx or c/f osteo on foot xray  - podiatry consulted- no surgical intervention. likely 2/2 fluid overload- rec compressive therapy  - consider ID consult if unresolving with above tx

## 2021-05-27 NOTE — H&P ADULT - PROBLEM SELECTOR PLAN 5
DVT ppx: hold chemical DVT ppx given thrombocytopenia   Diet: fluid restricted, low sodium Hypervolemic hyponatremia likely 2/2 cirrhosis.l Baseline Na 130-135. Asx  - Continue to trend BMP  - diurese per hepatology  - fluid restricted diet

## 2021-05-27 NOTE — ED PROVIDER NOTE - PROGRESS NOTE DETAILS
Sharita Robles MD: No evidence of DVT on US. Leukocytosis with left shift in setting of LLE swelling and erythema may be cellulitic in nature. Will treat with IV vancomycin as pt was recently admitted to the hospital. Will admit to medicine for IV abx and difficulty ambulating.

## 2021-05-27 NOTE — CONSULT NOTE ADULT - SUBJECTIVE AND OBJECTIVE BOX
Patient is a 41y old  Male who presents with a chief complaint of bilateral pedal swelling    HPI: 40yo M with PMH of alcoholic cirrhosis who presents with b/l LE swelling and pain worsening in the last 3 days. Pt states that swelling is worse in LLE. There is pain in LLE making it difficult for him to ambulate. Denies trauma, fever, chills, numbness, tingling, weakness, CP, SOB, hx of clotting disorders, recent hospitalizations, bloody stools. Compliant with lasix and spironolactone.       PAST MEDICAL & SURGICAL HISTORY:  Alcohol abuse    Cirrhosis    No significant past surgical history        MEDICATIONS  (STANDING):    MEDICATIONS  (PRN):      Allergies    No Known Allergies    Intolerances        VITALS:    Vital Signs Last 24 Hrs  T(C): 36.9 (27 May 2021 05:10), Max: 37.2 (27 May 2021 03:30)  T(F): 98.5 (27 May 2021 05:10), Max: 99 (27 May 2021 03:30)  HR: 90 (27 May 2021 05:10) (89 - 112)  BP: 122/69 (27 May 2021 05:10) (102/67 - 122/69)  BP(mean): 85 (27 May 2021 05:10) (85 - 89)  RR: 16 (27 May 2021 05:10) (16 - 16)  SpO2: 97% (27 May 2021 05:10) (97% - 100%)    LABS:                          9.9    27.03 )-----------( 66       ( 27 May 2021 03:34 )             29.6       05-27    125<L>  |  93<L>  |  23  ----------------------------<  89  4.2   |  20<L>  |  0.87    Ca    8.0<L>      27 May 2021 03:34    TPro  5.5<L>  /  Alb  2.0<L>  /  TBili  18.5<H>  /  DBili  x   /  AST  118<H>  /  ALT  102<H>  /  AlkPhos  296<H>  05-27      CAPILLARY BLOOD GLUCOSE          PT/INR - ( 27 May 2021 03:34 )   PT: 21.7 sec;   INR: 1.86 ratio         PTT - ( 27 May 2021 03:34 )  PTT:36.5 sec    LOWER EXTREMITY PHYSICAL EXAM:    Vascular: DP/PT non-pitting, B/L, CFT <3 seconds B/L, Temperature gradient L foot warm to warm, R foot warm to cool, B/L, bilateral LE +2 pitting edema to the level of proximal tibia  Neuro: Epicritic sensation intact to the level of digits, B/L.  Musculoskeletal/Ortho: pain on elevation of both lower extremities   Skin: L foot dorsal forefoot excoriations along with serous drainage with pinpoint 0.1x0.1cm lesions to the level of dermis, no malodor, etiology 2/2 fluid overload     RADIOLOGY & ADDITIONAL STUDIES:

## 2021-05-27 NOTE — ED PROVIDER NOTE - CARE PLAN
Principal Discharge DX:	Leg swelling   Principal Discharge DX:	Cellulitis of left lower extremity  Secondary Diagnosis:	Leg swelling

## 2021-05-27 NOTE — ED PROVIDER NOTE - PHYSICAL EXAMINATION
CONSTITUTIONAL: middle-aged male, resting comfortably in no acute distress  HEAD: Normocephalic; atraumatic  EYES: Normal inspection, EOMI, scleral icterus   ENMT: External appears normal; normal oropharynx  NECK: Supple; non-tender; no cervical lymphadenopathy  CARD: RRR; no audible murmurs, rubs, or gallops  RESP: No respiratory distress, lungs ctab/l  ABD: Soft, non-distended; non-tender; no rebound or guarding  EXT:  4+ b/l LE pitting edema, +L calf tenderness, LLE circumference > RLE  SKIN: jaundiced, diffuse erythema and warmth of LLE  NEURO: aaox3, moving all extremities spontaneously

## 2021-05-27 NOTE — ED ADULT NURSE NOTE - OBJECTIVE STATEMENT
40 yo Yakut speaking male AAOX3 with hx of alcohol abuse and cirrhosis presents to ED for lower leg swelling and pain x3 days. As per pt, 3 days of leg swelling, worsening on left side as per pt. Upon assessment, b/l LE extremities appear swollen but right side more then left. Redness noted to b/l LE, +2 pitting edema, skin noted to be weeping to left shin. No bleeding or bruising noted. Denies CP, SOB, weakness, fevers, chills, n/v/d. Safety measures maintained with bed in low position and side rails up.

## 2021-05-27 NOTE — H&P ADULT - PROBLEM SELECTOR PLAN 2
h/o heavy EtOH use with prior admission for decompensated hepatic cirrhosis s/p para 4/22- 2.1 L removed. ammonia 44 (wnl). MELD 30   - c/w lactulose (titrate to 3-4 BM/day)  - c/w rifaximin  - grossly distended on exam  - f/u Abd US for pocket  - IR consulted for para  - assess ascitic fluid for SBP given elevated WBC (unclear if 2/2 infection vs possible steroids from clinical trial)- low suspicion for infection given afebrile and HD stable but CTM closely   - Consult hepatology- appreciate recs

## 2021-05-27 NOTE — H&P ADULT - HISTORY OF PRESENT ILLNESS
41y M PMHx EtOH abuse (last drink early April) c/b decompensated hepatic cirrhosis p/w progressively worsening b/l LE swelling a/w LLE foot pain. Pt was recently admitted 4/20-4/27 for alcoholic hepatitis and decompensated liver cirrhosis. Last para 4/22/21- 2.1 L removed neg for SBP. Discharged on lasix 40 mg qd and spironolactone 100 mg qd. He was also enrolled in a clinical trial with Dr. Michele (steroids vs placebo). Pt notes he follows with a GI "on [address]" but he does not have a chart in [facility]. Of note pt states he was recently admitted to [facility] last week Wednesday to Sunday for "blackness" of his R hand and a "problem" on his R side. Pt is a poor historian and was unable to describe what treatment he received but says he did not receive antibiotics or a paracentesis. He says he left Sunday- unclear if discharged or AMA. He notes that since his discharge he has noted his legs getting more swollen and it is difficult for him to walk. Denies trauma, fevers.    ED course:  afeb, HR 80-110s, -120/60-70, RR 16, 97% on RA  Received 1g IV vanc in ED

## 2021-05-27 NOTE — ED PROVIDER NOTE - ATTENDING CONTRIBUTION TO CARE
Pt w hx cirrhosis here w bilateral leg swelling L>R. pt denies fever, trauma to legs, sob. on exam, pt does have significant pitting edema of both legs with L leg bigger than R, also with some diffuse warmth and erythema. vitals stable. w/u significant for leukocytosis, neg DVT study. will give abx to cover suspected cellulitis. pt will be admitted for further care.

## 2021-05-27 NOTE — ED ADULT NURSE NOTE - ED STAT RN HANDOFF DETAILS
Report received from night nurse Janice Moser RN. TBA. No bed yet. Resting quietly at present. IVL intact without sx of infilt. +Jaundice. Edema lower legs bilat. Left lower leg and foot swollen and erythematous.

## 2021-05-27 NOTE — H&P ADULT - ASSESSMENT
41y M PMHx EtOH abuse (last drink early April) c/b decompensated hepatic cirrhosis p/w progressively worsening b/l LE swelling a/w LLE foot pain likely 2/2 cellulitis

## 2021-05-27 NOTE — H&P ADULT - PROBLEM SELECTOR PLAN 4
Hypervolemic hyponatremia likely 2/2 cirrhosis.l Baseline Na 130-135. Asx  - Continue to trend BMP  - diurese per hepatology  - fluid restricted diet DDx includes dilutional (but Hgb around b/l with no s/s active bleeding, HD stable) vs active infection (afebrile, HD stable) on top of underlying cirrhosis. baseline plt around 200-250.   - No s/s active bleeding  - Trend H/H  - continue to monitor, unclear if receiving medication as part of clinical trail that is affecting plts  - transfuse plt <10 if afebrile, <15 if febrile, <50 if bleeding

## 2021-05-27 NOTE — CONSULT NOTE ADULT - SUBJECTIVE AND OBJECTIVE BOX
Chief Complaint:  Patient is a 41y old  Male who presents with a chief complaint of cellulitis (27 May 2021 11:23)      HPI: 41y M PMHx EtOH abuse (last drink early April) c/b decompensated cirrhosis p/w progressively worsening b/l LE swelling a/w LLE foot pain. Pt was recently admitted 4/20-4/27 for alcoholic hepatitis and decompensated liver cirrhosis, enrolled in alc hep trial. Last para 4/22/21- 2.1 L removed neg for SBP. Discharged on lasix 40 mg qd and spironolactone 100 mg qd. Of note pt states he was recently admitted to [facility] last week Wednesday to Sunday for "blackness" of his R hand and a "problem" on his R side. Pt is a poor historian and was unable to describe what treatment he received but says he did not receive antibiotics or a paracentesis. He says he left Sunday- unclear if discharged or AMA. He notes that since his discharge he has noted his legs getting more swollen and it is difficult for him to walk. Denies trauma, fevers.        Allergies:  No Known Allergies      Home Medications:    Hospital Medications:  acetaminophen   Tablet .. 650 milliGRAM(s) Oral every 6 hours PRN  folic acid 1 milliGRAM(s) Oral daily  lactulose Syrup 10 Gram(s) Oral three times a day  multivitamin 1 Tablet(s) Oral daily  rifAXIMin 550 milliGRAM(s) Oral two times a day  vancomycin  IVPB 1250 milliGRAM(s) IV Intermittent every 8 hours      PMHX/PSHX:  No pertinent past medical history    Alcohol abuse    Cirrhosis    No significant past surgical history        Family history:  No pertinent family history in first degree relatives        Social History:     ROS:     General:  No weight loss, fevers, chills, night sweats, fatigue  Eyes:  No vision changes, no yellowing of eyes   ENT:  No throat pain, runny nose  CV:  No chest pain, palpitations  Resp:  No SOB, cough, wheezing  GI:  See HPI  :  No burning with urination, no hematuria   Muscle:  No muscle pain, weakness  Neuro:  No numbness/tingling, memory problems  Psych:  No fatigue, insomnia, mood problems  Heme:  No easy bruisability  Skin:  No rash, itching       PHYSICAL EXAM:     GENERAL:  Appears stated age, well-groomed, well-nourished, no distress  HEENT:  NC/AT,  conjunctivae clear and pink,  no JVD  CHEST:  Full & symmetric excursion, no increased effort, breath sounds clear  HEART:  Regular rhythm, S1, S2, no murmur/rub/S3/S4, no abdominal bruit, no edema  ABDOMEN:  Soft, non-tender, non-distended, normoactive bowel sounds,  no masses ,  EXTREMITIES:  no cyanosis,clubbing or edema  SKIN:  No rash/erythema/ecchymoses/petechiae/wounds/abscess/warm/dry  NEURO:  Alert, oriented    Vital Signs:  Vital Signs Last 24 Hrs  T(C): 36.9 (27 May 2021 12:24), Max: 37.2 (27 May 2021 03:30)  T(F): 98.5 (27 May 2021 12:24), Max: 99 (27 May 2021 03:30)  HR: 91 (27 May 2021 12:24) (89 - 112)  BP: 109/74 (27 May 2021 12:24) (97/62 - 122/69)  BP(mean): 85 (27 May 2021 05:10) (85 - 89)  RR: 18 (27 May 2021 12:24) (16 - 18)  SpO2: 96% (27 May 2021 12:24) (96% - 100%)  Daily Height in cm: 154.94 (27 May 2021 02:55)    Daily     LABS:                        9.9    27.03 )-----------( 66       ( 27 May 2021 03:34 )             29.6     05-27    124<L>  |  93<L>  |  21  ----------------------------<  88  3.9   |  19<L>  |  0.71    Ca    7.8<L>      27 May 2021 10:31    TPro  5.2<L>  /  Alb  1.7<L>  /  TBili  17.4<H>  /  DBili  >10.0<H>  /  AST  115<H>  /  ALT  95<H>  /  AlkPhos  277<H>  05-27    LIVER FUNCTIONS - ( 27 May 2021 10:31 )  Alb: 1.7 g/dL / Pro: 5.2 g/dL / ALK PHOS: 277 U/L / ALT: 95 U/L / AST: 115 U/L / GGT: x           PT/INR - ( 27 May 2021 03:34 )   PT: 21.7 sec;   INR: 1.86 ratio         PTT - ( 27 May 2021 03:34 )  PTT:36.5 sec    Amylase Serum--      Lipase serum--       [id number]      Imaging:             Chief Complaint:  Patient is a 41y old  Male who presents with a chief complaint of cellulitis (27 May 2021 11:23)      HPI: 41y M PMHx EtOH abuse (last drink early April) c/b decompensated cirrhosis p/w progressively worsening b/l LE swelling a/w LLE foot pain. Pt was recently admitted 4/20-4/27 for alcoholic hepatitis and decompensated liver cirrhosis, enrolled in alc hep trial. Last para 4/22/21- 2.1 L removed neg for SBP. Discharged on lasix 40 mg qd and spironolactone 100 mg qd. Of note pt states he was recently admitted to [facility] last week Wednesday to Sunday for "blackness" of his R hand and a "problem" on his R side. Pt is a poor historian and was unable to describe what treatment he received but says he did not receive antibiotics or a paracentesis. He says he left Sunday- unclear if discharged or AMA. He notes that since his discharge he has noted his legs getting more swollen and it is difficult for him to walk. Denies trauma, fevers.        Allergies:  No Known Allergies      Home Medications:    Hospital Medications:  acetaminophen   Tablet .. 650 milliGRAM(s) Oral every 6 hours PRN  folic acid 1 milliGRAM(s) Oral daily  lactulose Syrup 10 Gram(s) Oral three times a day  multivitamin 1 Tablet(s) Oral daily  rifAXIMin 550 milliGRAM(s) Oral two times a day  vancomycin  IVPB 1250 milliGRAM(s) IV Intermittent every 8 hours      PMHX/PSHX:  No pertinent past medical history    Alcohol abuse    Cirrhosis    No significant past surgical history        Family history:  No pertinent family history in first degree relatives        Social History:     ROS:     General:  No weight loss, fevers, chills, night sweats, fatigue  Eyes:  No vision changes, no yellowing of eyes   ENT:  No throat pain, runny nose  CV:  No chest pain, palpitations  Resp:  No SOB, cough, wheezing  GI:  See HPI  :  No burning with urination, no hematuria   Muscle:  No muscle pain, weakness  Neuro:  No numbness/tingling, memory problems  Psych:  No fatigue, insomnia, mood problems  Heme:  No easy bruisability  Skin:  No rash, itching       PHYSICAL EXAM:     GENERAL:  no distress  HEENT:  NC/AT,  conjunctivae clear and pink, scleral icterus  CHEST:  Full & symmetric excursion, no increased effort  HEART:  Regular rhythm, rate  ABDOMEN:  Soft, non-tender, distended  EXTREMITIES:  no cyanosis, clubbing or edema  SKIN:  jaundice  NEURO:  Alert, oriented, +asterixis    Vital Signs:  Vital Signs Last 24 Hrs  T(C): 36.9 (27 May 2021 12:24), Max: 37.2 (27 May 2021 03:30)  T(F): 98.5 (27 May 2021 12:24), Max: 99 (27 May 2021 03:30)  HR: 91 (27 May 2021 12:24) (89 - 112)  BP: 109/74 (27 May 2021 12:24) (97/62 - 122/69)  BP(mean): 85 (27 May 2021 05:10) (85 - 89)  RR: 18 (27 May 2021 12:24) (16 - 18)  SpO2: 96% (27 May 2021 12:24) (96% - 100%)  Daily Height in cm: 154.94 (27 May 2021 02:55)    Daily     LABS:                        9.9    27.03 )-----------( 66       ( 27 May 2021 03:34 )             29.6     05-27    124<L>  |  93<L>  |  21  ----------------------------<  88  3.9   |  19<L>  |  0.71    Ca    7.8<L>      27 May 2021 10:31    TPro  5.2<L>  /  Alb  1.7<L>  /  TBili  17.4<H>  /  DBili  >10.0<H>  /  AST  115<H>  /  ALT  95<H>  /  AlkPhos  277<H>  05-27    LIVER FUNCTIONS - ( 27 May 2021 10:31 )  Alb: 1.7 g/dL / Pro: 5.2 g/dL / ALK PHOS: 277 U/L / ALT: 95 U/L / AST: 115 U/L / GGT: x           PT/INR - ( 27 May 2021 03:34 )   PT: 21.7 sec;   INR: 1.86 ratio         PTT - ( 27 May 2021 03:34 )  PTT:36.5 sec    Amylase Serum--      Lipase serum--       [id number]      Imaging:             Chief Complaint:  Patient is a 41y old  Male who presents with a chief complaint of cellulitis (27 May 2021 11:23)      HPI: 41y M PMHx EtOH abuse (last drink early April) c/b decompensated cirrhosis p/w progressively worsening b/l LE swelling a/w LLE foot pain. Pt was recently admitted 4/20-4/27 for alcoholic hepatitis and decompensated liver cirrhosis, enrolled in alc hep trial. Last para 4/22/21- 2.1 L removed neg for SBP. Discharged on lasix 40 mg qd and spironolactone 100 mg qd.  He notes that since his discharge he has noted his legs getting more swollen and it is difficult for him to walk. Denies trauma, fevers.        Allergies:  No Known Allergies      Home Medications:    Hospital Medications:  acetaminophen   Tablet .. 650 milliGRAM(s) Oral every 6 hours PRN  folic acid 1 milliGRAM(s) Oral daily  lactulose Syrup 10 Gram(s) Oral three times a day  multivitamin 1 Tablet(s) Oral daily  rifAXIMin 550 milliGRAM(s) Oral two times a day  vancomycin  IVPB 1250 milliGRAM(s) IV Intermittent every 8 hours      PMHX/PSHX:  No pertinent past medical history    Alcohol abuse    Cirrhosis    No significant past surgical history        Family history:  No pertinent family history in first degree relatives        Social History:     ROS:     General:  No weight loss, fevers, chills, night sweats, fatigue  Eyes:  No vision changes, no yellowing of eyes   ENT:  No throat pain, runny nose  CV:  No chest pain, palpitations  Resp:  No SOB, cough, wheezing  GI:  See HPI  :  No burning with urination, no hematuria   Muscle:  No muscle pain, weakness  Neuro:  No numbness/tingling, memory problems  Psych:  No fatigue, insomnia, mood problems  Heme:  No easy bruisability  Skin:  No rash, itching       PHYSICAL EXAM:     GENERAL:  no distress  HEENT:  NC/AT,  conjunctivae clear and pink, scleral icterus  CHEST:  Full & symmetric excursion, no increased effort  HEART:  Regular rhythm, rate  ABDOMEN:  Soft, non-tender, distended  EXTREMITIES:  no cyanosis, clubbing or edema  SKIN:  jaundice  NEURO:  Alert, oriented, +asterixis    Vital Signs:  Vital Signs Last 24 Hrs  T(C): 36.9 (27 May 2021 12:24), Max: 37.2 (27 May 2021 03:30)  T(F): 98.5 (27 May 2021 12:24), Max: 99 (27 May 2021 03:30)  HR: 91 (27 May 2021 12:24) (89 - 112)  BP: 109/74 (27 May 2021 12:24) (97/62 - 122/69)  BP(mean): 85 (27 May 2021 05:10) (85 - 89)  RR: 18 (27 May 2021 12:24) (16 - 18)  SpO2: 96% (27 May 2021 12:24) (96% - 100%)  Daily Height in cm: 154.94 (27 May 2021 02:55)    Daily     LABS:                        9.9    27.03 )-----------( 66       ( 27 May 2021 03:34 )             29.6     05-27    124<L>  |  93<L>  |  21  ----------------------------<  88  3.9   |  19<L>  |  0.71    Ca    7.8<L>      27 May 2021 10:31    TPro  5.2<L>  /  Alb  1.7<L>  /  TBili  17.4<H>  /  DBili  >10.0<H>  /  AST  115<H>  /  ALT  95<H>  /  AlkPhos  277<H>  05-27    LIVER FUNCTIONS - ( 27 May 2021 10:31 )  Alb: 1.7 g/dL / Pro: 5.2 g/dL / ALK PHOS: 277 U/L / ALT: 95 U/L / AST: 115 U/L / GGT: x           PT/INR - ( 27 May 2021 03:34 )   PT: 21.7 sec;   INR: 1.86 ratio         PTT - ( 27 May 2021 03:34 )  PTT:36.5 sec    Amylase Serum--      Lipase serum--       [id number]      Imaging:

## 2021-05-27 NOTE — CONSULT NOTE ADULT - ASSESSMENT
41M with PMHx alcohol cirrhosis with bilateral pedal swelling & LLE Cellulitis   - Pt seen and evaluated   - Afebrile, WBC 27, likely elevated 2/2 steroid therapy in conjunction to LLE cellulitis   - L foot dorsal forefoot excoriations along with serous drainage with pinpoint 0.1x0.1cm lesions to the level of dermis, no malodor, etiology 2/2 fluid overload   - L foot dorsal excoriations were thoroughly evaluated to determine for any areas of probing / fluctuance / abscess formation with only evidence of extensive fluid collection  - Would recommend compressive therapy  to alleviate lower extremity fluid retention  - No acute podiatric surgical intervention  - Recommend ID consultation   - d/w attending

## 2021-05-27 NOTE — ED ADULT NURSE NOTE - NSIMPLEMENTINTERV_GEN_ALL_ED
Implemented All Universal Safety Interventions:  [place] to call system. Call bell, personal items and telephone within reach. Instruct patient to call for assistance. Room bathroom lighting operational. Non-slip footwear when patient is off stretcher. Physically safe environment: no spills, clutter or unnecessary equipment. Stretcher in lowest position, wheels locked, appropriate side rails in place.

## 2021-05-27 NOTE — ED ADULT NURSE REASSESSMENT NOTE - NS ED NURSE REASSESS COMMENT FT1
1045 Pt awake and alert. blood work sent. Awaiting bed. Ace wraps applied by Dr to both feet and ankles. no c/o pain at present. voiding well gill urine

## 2021-05-27 NOTE — H&P ADULT - ATTENDING COMMENTS
discussed above plan with resident on rounds. patient seen and examined. states LLE pain and redness improved  labs and imaging reviewed.  I agree with the above findings, assessment, and plan with the following additions and exceptions:  LLE swelling and erythema likely due to cellulitis - patient admits to tripping on foot about 1 week ago. LE doppler negative for DVT and no collection seen. Xray negative for fracture. podiatry consult  c/w vancomycin for now, check vanco trough  MRSA swab  cirrhosis with ascites - hepatology consult, check abd us, IR consult for paracentesis   asterixis on exam - started on lactulose

## 2021-05-27 NOTE — ED PROVIDER NOTE - OBJECTIVE STATEMENT
42yo M with PMH of alcoholic cirrhosis who presents with b/l LE swelling and pain worsening in the last 3 days. Pt states that swelling is worse in LLE. There is pain in LLE making it difficult for him to ambulate. Denies trauma, fever, chills, numbness, tingling, weakness, CP, SOB, hx of clotting disorders, recent hospitalizations, bloody stools. Compliant with lasix and spironolactone.      Cameron [id number]

## 2021-05-27 NOTE — ED ADULT TRIAGE NOTE - IDEAL BODY WEIGHT(KG)
SUBJECTIVE:  Antonia is a 36 year old female who presents to urgent care with 1 week of suprapubic abdominal pain and cramping, vaginal pain that radiates to her rectum, and dysuria.  Pain she experiences is worse with urinating usually and sometimes worse with the change of position and movements.  She experienced similar symptoms about a month ago and was diagnosed with UTI and treated with Macrobid her symptoms resolved.  She is also reporting some blood on the TP but she is getting her menstrual period. She also feels bloated.  She denies any fevers, chills, flank pain or back pain.  She denies any history of hemorrhoids.  She did take 1 Pyridium and 1 nitrofurantoin that she had leftover from previous infection .  Chief Complaint   Patient presents with     Abdominal Pain     Lower since Wednesday. Comes and goes either before or after period. Treated for UTI in the past. Dysuria.      ROS: as stated in HPI, otherwise negative    No past medical history on file.   Social History     Socioeconomic History     Marital status:      Spouse name: Not on file     Number of children: Not on file     Years of education: Not on file     Highest education level: Not on file   Occupational History     Not on file   Social Needs     Financial resource strain: Not on file     Food insecurity:     Worry: Not on file     Inability: Not on file     Transportation needs:     Medical: Not on file     Non-medical: Not on file   Tobacco Use     Smoking status: Never Smoker     Smokeless tobacco: Never Used   Substance and Sexual Activity     Alcohol use: Not on file     Drug use: Not on file     Sexual activity: Not on file   Lifestyle     Physical activity:     Days per week: Not on file     Minutes per session: Not on file     Stress: Not on file   Relationships     Social connections:     Talks on phone: Not on file     Gets together: Not on file     Attends Yarsani service: Not on file     Active member of club or  organization: Not on file     Attends meetings of clubs or organizations: Not on file     Relationship status: Not on file     Intimate partner violence:     Fear of current or ex partner: Not on file     Emotionally abused: Not on file     Physically abused: Not on file     Forced sexual activity: Not on file   Other Topics Concern     Not on file   Social History Narrative     Not on file          Current Outpatient Medications:      phenazopyridine (PYRIDIUM) 100 MG tablet, Take 1 tablet (100 mg) by mouth 3 times daily as needed (Patient not taking: Reported on 9/16/2019), Disp: 12 tablet, Rfl: 0     OBJECTIVE:  /84 (BP Location: Right arm, Patient Position: Chair, Cuff Size: Adult Regular)   Pulse 77   Temp 98.8  F (37.1  C) (Oral)   Resp 12   Wt 53.5 kg (118 lb)   LMP 11/04/2019   SpO2 100%   BMI 23.05 kg/m     GENERAL APPEARANCE: Appears well and in no acute distress  HEENT: HEAD: NCAT, EOMI, Moist mucous membranes  ABD: distended, suprapubic discomfort but no other tenderness appreciated. No masses. No CVA tenderness  LUNGS: No respiratory distress   NUERO: AOx3, normal mentation  PSYCH: normal mood and affect    Results for orders placed or performed in visit on 11/29/19   Urine Microscopic     Status: Abnormal   Result Value Ref Range    WBC Urine 0 - 5 OTO5^0 - 5 /HPF    RBC Urine O - 2 OTO2^O - 2 /HPF    Squamous Epithelial /LPF Urine Moderate (A) FEW^Few /LPF    Bacteria Urine Few (A) NEG^Negative /HPF    Calcium Oxalate Few (A) NEG^Negative /HPF    Mucous Urine Present (A) NEG^Negative /LPF        ASSESSMENT/PLAN:  Antonia was seen today for abdominal pain.    Diagnoses and all orders for this visit:    Dysuria  -     UA reflex to Microscopic and Culture      1) UA is not suggestive of acute cystitis.  However, patient did take recent antibiotics and Pyridium which could be throwing off her results.  Given the presentation is identical to the previous UTI presentation I want to start her on  a new antibiotic and treat accordingly.  The calcium oxalate finding in the UA may be concerning for kidney stone but given how little pain and lack of classic kidney stone presentation I do not think it is necessary for her to go to the ER and get a CT scan.  Patient will go to the ER if she experiences significantly more or new pain, fevers chills or new onset symptoms.  I have advised her to follow-up with an OB/GYN next week as she is interested in getting an ultrasound for her menstrual cramping and other symptoms.  We discussed the proper administration and to take the entire course of antibiotics along with side effects.   This case was discussed with Dr. Panfilo De Leon PA-C     52

## 2021-05-27 NOTE — H&P ADULT - NSHPREVIEWOFSYSTEMS_GEN_ALL_CORE
REVIEW OF SYSTEMS:    CONSTITUTIONAL: No weakness, fevers or chills  EYES/ENT: No visual changes;  No vertigo or throat pain   NECK: No pain or stiffness  RESPIRATORY: No cough, wheezing, hemoptysis; No shortness of breath  CARDIOVASCULAR: No chest pain or palpitations  GASTROINTESTINAL: No abdominal or epigastric pain. No nausea, vomiting, or hematemesis; No diarrhea or constipation. No melena or hematochezia.  GENITOURINARY: No dysuria, frequency or hematuria  NEUROLOGICAL: No numbness or weakness  SKIN: +b/l LE swelling, LLE erythema. No itching, burning, rashes, or lesions   All other review of systems is negative unless indicated above.

## 2021-05-27 NOTE — H&P ADULT - PROBLEM SELECTOR PLAN 3
SCr .87 on admission. baseline SCr .5. Likely prerenal 2/2 hypoperfusion i/s/o decompensated cirrhosis  - avoid nephrotoxic meds  - c/w diuresis per hepatology recs (may need albumin assist if low UOP)  - c/w para  - Monitor BMP  - monitor I/O  - bladder scans may be inaccurate given ascites

## 2021-05-28 NOTE — PROGRESS NOTE ADULT - SUBJECTIVE AND OBJECTIVE BOX
Authored by Johny Jama MD ([phone]) [facility]    SUBJECTIVE / OVERNIGHT EVENTS: NAEON. This am pt is sitting up in bed, eating bfast, NAD. Notes his leg pain is "better". Notes he had 2 BM o/n and needs to go again shortly. Denies HA, cp, SOB, abd pain.     acetaminophen   Tablet ..   650 milliGRAM(s) Oral (05-28-21 @ 05:58)    albumin human 25% IVPB   50 mL/Hr IV Intermittent (05-28-21 @ 05:58)   50 mL/Hr IV Intermittent (05-27-21 @ 22:37)    cefTRIAXone   IVPB   100 mL/Hr IV Intermittent (05-28-21 @ 08:04)    folic acid   1 milliGRAM(s) Oral (05-27-21 @ 12:39)    lactulose Syrup   10 Gram(s) Oral (05-27-21 @ 13:44)    lactulose Syrup   30 Gram(s) Oral (05-28-21 @ 05:59)   30 Gram(s) Oral (05-27-21 @ 22:44)    multivitamin   1 Tablet(s) Oral (05-27-21 @ 12:39)    rifAXIMin   550 milliGRAM(s) Oral (05-28-21 @ 05:58)   550 milliGRAM(s) Oral (05-27-21 @ 17:49)    vancomycin  IVPB   166.67 mL/Hr IV Intermittent (05-28-21 @ 07:25)   166.67 mL/Hr IV Intermittent (05-27-21 @ 23:44)   166.67 mL/Hr IV Intermittent (05-27-21 @ 13:41)    MEDICATIONS  (STANDING):  albumin human 25% IVPB 100 milliLiter(s) IV Intermittent every 6 hours  cefTRIAXone   IVPB      folic acid 1 milliGRAM(s) Oral daily  lactulose Syrup 30 Gram(s) Oral every 6 hours  multivitamin 1 Tablet(s) Oral daily  rifAXIMin 550 milliGRAM(s) Oral two times a day  vancomycin  IVPB 1000 milliGRAM(s) IV Intermittent every 8 hours    MEDICATIONS  (PRN):  acetaminophen   Tablet .. 650 milliGRAM(s) Oral every 6 hours PRN Temp greater or equal to 38C (100.4F), Mild Pain (1 - 3)    I&O's Summary    28 May 2021 07:01  -  28 May 2021 09:50  --------------------------------------------------------  IN: 50 mL / OUT: 0 mL / NET: 50 mL    PHYSICAL EXAM:  Vital Signs Last 24 Hrs  T(C): 37.2 (28 May 2021 00:31), Max: 37.2 (28 May 2021 00:31)  T(F): 98.9 (28 May 2021 00:31), Max: 98.9 (28 May 2021 00:31)  HR: 100 (28 May 2021 00:31) (91 - 103)  BP: 100/65 (28 May 2021 00:31) (99/64 - 109/74)  BP(mean): --  RR: 18 (28 May 2021 00:31) (16 - 18)  SpO2: 94% (28 May 2021 00:31) (94% - 99%)    GENERAL: No acute distress  HEAD:  Atraumatic, Normocephalic  EYES: EOMI, PERRLA, conjunctiva and sclera clear  NECK: Supple, no lymphadenopathy, no JVD  CHEST/LUNG: CTAB; No wheezes, rales, or rhonchi  HEART: Regular rate and rhythm; No murmurs, rubs, or gallops  ABDOMEN: Soft, non-tender. +DIstended. normal bowel sounds, no organomegaly  EXTREMITIES:  +b/l 2+ Pitting edema. improved erythema of LLE- mildly TTP. b/l feet wrapped  NEUROLOGY: A&O x 3, no focal deficits  SKIN: +Jaundiced    LABS:                        9.0    26.68 )-----------( 76       ( 28 May 2021 06:25 )             27.3     05-28    127<L>  |  94<L>  |  20  ----------------------------<  80  3.6   |  18<L>  |  0.75    Ca    7.8<L>      28 May 2021 06:26  Phos  2.9     05-28  Mg     1.8     05-28    TPro  5.7<L>  /  Alb  2.1<L>  /  TBili  19.0<H>  /  DBili  x   /  AST  102<H>  /  ALT  85<H>  /  AlkPhos  276<H>  05-28    PT/INR - ( 28 May 2021 06:26 )   PT: 21.6 sec;   INR: 1.85 ratio      PTT - ( 28 May 2021 06:26 )  PTT:37.8 sec    Culture - Blood (collected 27 May 2021 08:47)  Source: .Blood Blood-Venous  Preliminary Report (28 May 2021 09:01):    No growth to date.    Culture - Blood (collected 27 May 2021 08:47)  Source: .Blood Blood-Peripheral  Preliminary Report (28 May 2021 09:01):    No growth to date.

## 2021-05-28 NOTE — PROGRESS NOTE ADULT - PROBLEM SELECTOR PLAN 4
DDx includes dilutional (but Hgb around b/l with no s/s active bleeding, HD stable) vs active infection (afebrile, HD stable) on top of underlying cirrhosis. baseline plt around 200-250.   - No s/s active bleeding  - Trend H/H  - continue to monitor, unclear if receiving medication as part of clinical trail that is affecting plts  - transfuse plt <10 if afebrile, <15 if febrile, <50 if bleeding

## 2021-05-28 NOTE — PROGRESS NOTE ADULT - PROBLEM SELECTOR PLAN 1
LLE cellulitis with erythema up to knee. pinpoint lesions on dorsum of foot with serous drainage, no ulcer, abrasion, skin breakdown. TTP. Duplex neg for DVT. s/p vanc x1 in ED. CRP elevated (95).   - f/u ESR  - c/w IV vanc (5/27- )   - f/u MRSA swab- deescalate if neg   - f/u blood cx  - no fx or c/f osteo on foot xray  - podiatry consulted- no surgical intervention. likely 2/2 fluid overload- rec compressive therapy  - consider ID consult if unresolving with above tx

## 2021-05-28 NOTE — PROGRESS NOTE ADULT - PROBLEM SELECTOR PLAN 2
h/o heavy EtOH use with prior admission for decompensated hepatic cirrhosis s/p para 4/22- 2.1 L removed. ammonia 44 (wnl). MELD 30   - c/w lactulose (titrate to 3-4 BM/day)  - c/w rifaximin  - grossly distended on exam  - Abd US 5/27 shows mild-mod ascites  - IR consulted for para  - assess ascitic fluid for SBP (Check cell count, albumin, protein, LDH, glucose, gram stain and culture, and cytology)-   (unclear if 2/2 infection vs possible steroids from clinical trial)- low suspicion for infection given afebrile and HD stable but CTM closely   - c/w ceftriaxone (5/28- ) for SBP ppx until infectious workup completed  - f/u blood cx, UCx  - Consult hepatology- appreciate recs

## 2021-05-28 NOTE — PROGRESS NOTE ADULT - ASSESSMENT
39yo M with PMH of EtOH abuse who presents with jaundice and abdominal distention x 2 weeks, found to have cirrhosis and elevated liver tests.    # Elevated liver tests - ALT/AST ratio in the settings of ongoing (positive BAL in April) EtOH use with recent admission for alc hep; overall downtrending. Concern for possible infection given leukocytosis and may have been on steroids for alc hep in clinical trial.  # Cirrhotic morphology on imaging- suggestive of underlying cirrhosis, suspected 2/2 EtOH use. MELD-Na 30. ssible liver transplant candidate if able to remain abstinent from EtOH. Appears to have good social support.   - ascites - present  - varices - unknown  - HCC - no focal lesions on imaging CT 4/20 (not multiphase)  - HE - +asterixis  # Active EtOH use  # Leukocytosis  # Hyponatremia      Recommendations:  - improved Na - recommend lasix 40mg IV x1 today  - c/w albumin 25% 100cc q6  - infectious w/u - blood cx, urine cx  - diag/therapeutic para - Check cell count, albumin, protein, LDH, glucose, gram stain and culture, and cytology   - c/w antibiotics  - fluid restriction to 1L/day  - lactulose 30cc q6, titrate to 3-4BM/day  - rifaximin 550 BID  - rest of care per primary team      Gabriela Figueroa PGY-4  Gastroenterology Fellow  Pager #[phone] (JANE) or [phone] (NS)  Available on [employer] Teams.  Please contact on-call GI fellow via answering service ([phone]) after 5pm and before 8am, and on weekends.

## 2021-05-28 NOTE — PROGRESS NOTE ADULT - ASSESSMENT
41M with PMHx alcohol cirrhosis with bilateral pedal swelling & LLE Cellulitis   - Pt seen and evaluated   - No acute pedal signs of infection noted, slight region of excoriation LLE now with improved appearance  - No intervention, recommend abx per ID  - continue compressive dressings  - reconsult as needed if any new or worsening podiatric issues

## 2021-05-28 NOTE — PRE PROCEDURE NOTE - PRE PROCEDURE EVALUATION
Interventional Radiology    HPI: 41y Male with decompensated cirrhosis with recurrent ascites presents to IR for diagnostic and therapeutic paracentesis.    Allergies: NKDA    Medications (Abx/Cardiac/Anticoagulation/Blood Products)  cefTRIAXone   IVPB: 100 mL/Hr IV Intermittent (05-28 @ 08:04)  rifAXIMin: 550 milliGRAM(s) Oral (05-28 @ 05:58)  vancomycin  IVPB: 166.67 mL/Hr IV Intermittent (05-28 @ 07:25)  vancomycin  IVPB.: 250 mL/Hr IV Intermittent (05-27 @ 05:08)    Data:    T(C): 37.2  HR: 100  BP: 100/65  RR: 18  SpO2: 94%    Exam  General: No acute distress  Chest: Non labored breathing  Abdomen: Non-distended  Extremities: No swelling, warm    -WBC 26.68 / HgB 9.0 / Hct 27.3 / Plt 76  -Na 127 / Cl 94 / BUN 20 / Glucose 80  -K 3.6 / CO2 18 / Cr 0.75  -ALT 85 / Alk Phos 276 / T.Bili 19.0  -INR1.85      Plan: 41y Male presents for diagnostic and therapeutic paracentesis  -Risks/Benefits/alternatives explained with the patient and witnessed informed consent obtained.

## 2021-05-28 NOTE — PROGRESS NOTE ADULT - PROBLEM SELECTOR PLAN 3
SCr .87 on admission. baseline SCr .5. Likely prerenal 2/2 hypoperfusion i/s/o decompensated cirrhosis. need to r/o sepsis (SBP)  - avoid nephrotoxic meds  - c/w albumin per hepatology   - c/w para  - f/u blood cx, UCx  - Monitor BMP  - monitor I/O  - bladder scans may be inaccurate given ascites

## 2021-05-28 NOTE — PROGRESS NOTE ADULT - SUBJECTIVE AND OBJECTIVE BOX
Patient is a 41y old  Male who presents with a chief complaint of cellulitis (28 May 2021 10:45)       INTERVAL HPI/OVERNIGHT EVENTS:  Patient seen and evaluated at bedside.  Pt is resting comfortable in NAD.    Allergies    No Known Allergies    Intolerances        Vital Signs Last 24 Hrs  T(C): 36.9 (28 May 2021 14:34), Max: 37.2 (28 May 2021 00:31)  T(F): 98.5 (28 May 2021 14:34), Max: 98.9 (28 May 2021 00:31)  HR: 80 (28 May 2021 14:34) (80 - 103)  BP: 98/56 (28 May 2021 14:34) (98/56 - 108/65)  BP(mean): --  RR: 18 (28 May 2021 14:34) (16 - 18)  SpO2: 98% (28 May 2021 14:34) (94% - 99%)    LABS:                        9.0    26.68 )-----------( 76       ( 28 May 2021 06:25 )             27.3     05-28    127<L>  |  94<L>  |  20  ----------------------------<  80  3.6   |  18<L>  |  0.75    Ca    7.8<L>      28 May 2021 06:26  Phos  2.9     05-28  Mg     1.8     05-28    TPro  5.7<L>  /  Alb  2.1<L>  /  TBili  19.0<H>  /  DBili  x   /  AST  102<H>  /  ALT  85<H>  /  AlkPhos  276<H>  05-28    PT/INR - ( 28 May 2021 06:26 )   PT: 21.6 sec;   INR: 1.85 ratio         PTT - ( 28 May 2021 06:26 )  PTT:37.8 sec    CAPILLARY BLOOD GLUCOSE          Lower Extremity Physical Exam:    Vascular: DP/PT non-pitting, B/L, CFT <3 seconds B/L, Temperature gradient L foot warm to warm, R foot warm to cool, B/L, bilateral LE +2 pitting edema to the level of proximal tibia  Neuro: Epicritic sensation intact to the level of digits, B/L.  Musculoskeletal/Ortho: pain on elevation of both lower extremities   Skin: L foot dorsal forefoot excoriations along with serous drainage with pinpoint 0.1x0.1cm lesions to the level of dermis, no malodor, etiology 2/2 fluid overload     RADIOLOGY & ADDITIONAL TESTS:

## 2021-05-28 NOTE — PROGRESS NOTE ADULT - PROBLEM SELECTOR PLAN 5
"[facility], [place]   Psychiatric Progress Note      Impression:   This is a 15 year old female admitted for SI.  We are adjusting medications to target mood and anxiety.  We are also working with the patient on therapeutic skill building and communication with her mom.          Diagnoses and Plan:     Principal Diagnosis: MDD, moderate, recurrent  Unit: 3CW  Attending: Tanvir  Medications: risks/benefits discussed with guardian/patient  - Prozac 30 mg daily (increased 10/3/2018)  - Benadryl 25 mg hs prn for insomnia  - Vitamin D3 2000 units daily  - melatonin 3 mg hs prn for insomnia  Laboratory/Imaging:  - Upreg neg and UDS + for cannabis  - CBC wnl  - CMP wnl except Ca 8.7  - Lipids wnl except HDL 42  - TSH wnl  - Vitamin D 24  - Chlamydia neg  - Gonorrhea neg  Consults:  Nutrition:  \"PEDIATRIC NUTRITION STATUS VALIDATION  Unable to assess at this time due to limited anthropometric hx.   NUTRITION DIAGNOSIS:  Predicted inadequate nutrient intake related to decreased appetite and variable PO intake as evidenced by anticipated PO intake < estimated needs.   INTERVENTIONS  Nutrition Prescription  Delmi to meet 100% assessed nutrition needs via PO intake.  Nutrition Education:   Discussed nutrition history and PO since admission. Discussed importance of adequate nutrition intakes and variety in meals/snacks. Pt declined need for nutrition supplements and/or snacks at this time, stating she anticipates she will eat better during admission. Encouraged adequate intakes of meals, fluids, and snacks.   Implementation:  Meals/ Snack - encouraged po intake  Goals  1. PO intake to meet >75% assessed nutrition needs.  2. Weight maintenance surrounding hospitalization.   FOLLOW UP/MONITORING  Food and Beverage intake   Anthropometric measurements   RECOMMENDATIONS  1. Encourage intakes at TID meals + prn snacks available on the unit. If concern for inadequate PO, consider trial of oral " "nutrition supplements (i.e. Boost Plus or Boost Breeze) to optimize intakes and help meet nutrition needs.  2. Monitor weights at least weekly.   Romy Delgadillo RD, LD  Unit Pager: [phone]\"    Patient will be treated in therapeutic milieu with appropriate individual and group therapies as described.  Family Assessment reviewed    Secondary psychiatric diagnoses of concern this admission:  Unspecified Anxiety  R/O NELLA  Parent Child Relational Problem (father)    Medical diagnoses to be addressed this admission:   Seasonal Allergies - monitor needs  Vitamin D Insufficiency - supplementing    Relevant psychosocial stressors: family dynamics and school    Legal Status: Voluntary    Safety Assessment:   Checks: Status 15  Precautions: None  Pt has not required locked seclusion or restraints in the past 24 hours to maintain safety, please refer to RN documentation for further details.    The risks, benefits, alternatives and side effects have been discussed and are understood by the patient and other caregivers.     Anticipated Disposition/Discharge Date: October 6-8  Target symptoms to stabilize: SI, SIB, irritable, depressed, neurovegetative symptoms, sleep issues, poor frustration tolerance and substance use  Target disposition: home, return to school, psychiatrist and therapist specializing in dual diagnosis    Attestation:  Patient has been seen and evaluated by me,  DIEGO Yañez CNP          Interim History:   The patient's care was discussed with the treatment team and chart notes were reviewed.    Side effects to medication: denies  Sleep: slept through the night  Intake: eating/drinking without difficulty  Groups: attending groups and participating  Peer interactions: gets along well with peers    Adelita reports she is doing great. She is very motivated to do whatever necessary to be discharged tomorrow.  She will go to her mom, aunt or other family members if she begins having SI again after she " "leaves the hospital.  As a last resort, she will call the crisis line. She plans to attend individual therapy with a therapist that specializes in dual diagnosis.  She and her mom will start family therapy as soon as they have insurance. She is also planning on getting involved in either boxing or dancing. She wants to get involved in an activity and form some positive relationships. She may cut down on the amount of hours she works at the [employer] to reduce stress.  She is going to call her school counselor today to set a meeting for when she returns to school.     The 10 point Review of Systems is negative other than noted in the HPI         Medications:       cholecalciferol  2,000 Units Oral Daily     FLUoxetine (PROzac) capsule 30 mg  30 mg Oral Daily             Allergies:     Allergies   Allergen Reactions     Amoxicillin Hives     Penicillins Hives            Psychiatric Examination:   /70  Pulse 78  Temp 99.4  F (37.4  C)  Resp 16  Ht 1.676 m (5' 6\")  Wt 48.1 kg (106 lb)  LMP 08/27/2018 (Exact Date)  SpO2 100%  BMI 17.11 kg/m2  Weight is 106 lbs 0 oz  Body mass index is 17.11 kg/(m^2).    Appearance:  awake, alert, adequately groomed and casually dressed  Attitude:  cooperative  Eye Contact:  good  Mood:  \"great but impatient\"  Affect:  appropriate and in normal range and mood congruent  Speech:  clear, coherent  Psychomotor Behavior:  no evidence of tardive dyskinesia, dystonia, or tics, fidgeting and intact station, gait and muscle tone  Thought Process:  logical and linear  Associations:  no loose associations  Thought Content:  no evidence of suicidal ideation or homicidal ideation, no evidence of psychotic thought and thoughts of self-harm, which are denied  Insight:  fair  Judgment:  fair  Oriented to:  time, person, and place  Attention Span and Concentration:  intact  Recent and Remote Memory:  intact  Language: Able to read and write  Fund of Knowledge: appropriate  Muscle Strength and " Tone: normal  Gait and Station: Normal         Labs:   No results found for this or any previous visit (from the past 24 hour(s)).   Hypervolemic hyponatremia likely 2/2 cirrhosis. Baseline Na 130-135. Asx  - Continue to trend BMP  - diurese per hepatology  - fluid restricted diet

## 2021-05-28 NOTE — PROGRESS NOTE ADULT - SUBJECTIVE AND OBJECTIVE BOX
Chief Complaint:  Patient is a 41y old  Male who presents with a chief complaint of cellulitis (28 May 2021 07:43)      Interval Events:       Hospital Medications:  acetaminophen   Tablet .. 650 milliGRAM(s) Oral every 6 hours PRN  albumin human 25% IVPB 100 milliLiter(s) IV Intermittent every 6 hours  cefTRIAXone   IVPB      folic acid 1 milliGRAM(s) Oral daily  lactulose Syrup 30 Gram(s) Oral every 6 hours  multivitamin 1 Tablet(s) Oral daily  rifAXIMin 550 milliGRAM(s) Oral two times a day  vancomycin  IVPB 1000 milliGRAM(s) IV Intermittent every 8 hours      PMHX/PSHX:  No pertinent past medical history    Alcohol abuse    Cirrhosis    No significant past surgical history            ROS:     General:  No weight loss, fevers, chills, night sweats, fatigue   Eyes:  No vision changes  ENT:  No sore throat, pain, runny nose  CV:  No chest pain, palpitations, dizziness   Resp:  No SOB, cough, wheezing  GI:  See HPI  :  No burning with urination, hematuria  Muscle:  No pain, weakness  Neuro:  No weakness/tingling, memory problems  Psych:  No fatigue, insomnia, mood problems, depression  Heme:  No easy bruisability  Skin:  No rash, edema      PHYSICAL EXAM:     GENERAL:  Well developed, no distress  HEENT:  NC/AT,  conjunctivae clear, sclera anicteric  CHEST:  Full & symmetric excursion, no increased effort w/ respirations  HEART:  Regular rhythm & rate  ABDOMEN:  Soft, non-tender, non-distended  EXTREMITIES:  no LE  edema  SKIN:  No rash/erythema/ecchymoses/petechiae/wounds/jaundice  NEURO:  Alert, oriented    Vital Signs:  Vital Signs Last 24 Hrs  T(C): 37.2 (28 May 2021 00:31), Max: 37.2 (28 May 2021 00:31)  T(F): 98.9 (28 May 2021 00:31), Max: 98.9 (28 May 2021 00:31)  HR: 100 (28 May 2021 00:31) (91 - 103)  BP: 100/65 (28 May 2021 00:31) (99/64 - 109/74)  BP(mean): --  RR: 18 (28 May 2021 00:31) (16 - 18)  SpO2: 94% (28 May 2021 00:31) (94% - 99%)  Daily Height in cm: 158 (28 May 2021 10:38)    Daily     LABS:                        9.0    26.68 )-----------( 76       ( 28 May 2021 06:25 )             27.3     05-28    127<L>  |  94<L>  |  20  ----------------------------<  80  3.6   |  18<L>  |  0.75    Ca    7.8<L>      28 May 2021 06:26  Phos  2.9     05-28  Mg     1.8     05-28    TPro  5.7<L>  /  Alb  2.1<L>  /  TBili  19.0<H>  /  DBili  x   /  AST  102<H>  /  ALT  85<H>  /  AlkPhos  276<H>  05-28    LIVER FUNCTIONS - ( 28 May 2021 06:26 )  Alb: 2.1 g/dL / Pro: 5.7 g/dL / ALK PHOS: 276 U/L / ALT: 85 U/L / AST: 102 U/L / GGT: x           PT/INR - ( 28 May 2021 06:26 )   PT: 21.6 sec;   INR: 1.85 ratio         PTT - ( 28 May 2021 06:26 )  PTT:37.8 sec        Imaging:             Chief Complaint:  Patient is a 41y old  Male who presents with a chief complaint of cellulitis (28 May 2021 07:43)      Interval Events:       Hospital Medications:  acetaminophen   Tablet .. 650 milliGRAM(s) Oral every 6 hours PRN  albumin human 25% IVPB 100 milliLiter(s) IV Intermittent every 6 hours  cefTRIAXone   IVPB      folic acid 1 milliGRAM(s) Oral daily  lactulose Syrup 30 Gram(s) Oral every 6 hours  multivitamin 1 Tablet(s) Oral daily  rifAXIMin 550 milliGRAM(s) Oral two times a day  vancomycin  IVPB 1000 milliGRAM(s) IV Intermittent every 8 hours      PMHX/PSHX:  No pertinent past medical history    Alcohol abuse    Cirrhosis    No significant past surgical history            ROS:     General:  No weight loss, fevers, chills, night sweats, fatigue   Eyes:  No vision changes  ENT:  No sore throat, pain, runny nose  CV:  No chest pain, palpitations, dizziness   Resp:  No SOB, cough, wheezing  GI:  See HPI  :  No burning with urination, hematuria  Muscle:  No pain, weakness  Neuro:  No weakness/tingling, memory problems  Psych:  No fatigue, insomnia, mood problems, depression  Heme:  No easy bruisability  Skin:  No rash, edema      PHYSICAL EXAM:   GENERAL:  no distress  HEENT:  NC/AT,  conjunctivae clear and pink, scleral icterus  CHEST:  Full & symmetric excursion, no increased effort  HEART:  Regular rhythm, rate  ABDOMEN:  Soft, non-tender, distended  EXTREMITIES:  no cyanosis, clubbing or edema  SKIN:  jaundice  NEURO:  Alert, oriented, +asterixis    Vital Signs:  Vital Signs Last 24 Hrs  T(C): 37.2 (28 May 2021 00:31), Max: 37.2 (28 May 2021 00:31)  T(F): 98.9 (28 May 2021 00:31), Max: 98.9 (28 May 2021 00:31)  HR: 100 (28 May 2021 00:31) (91 - 103)  BP: 100/65 (28 May 2021 00:31) (99/64 - 109/74)  BP(mean): --  RR: 18 (28 May 2021 00:31) (16 - 18)  SpO2: 94% (28 May 2021 00:31) (94% - 99%)  Daily Height in cm: 158 (28 May 2021 10:38)    Daily     LABS:                        9.0    26.68 )-----------( 76       ( 28 May 2021 06:25 )             27.3     05-28    127<L>  |  94<L>  |  20  ----------------------------<  80  3.6   |  18<L>  |  0.75    Ca    7.8<L>      28 May 2021 06:26  Phos  2.9     05-28  Mg     1.8     05-28    TPro  5.7<L>  /  Alb  2.1<L>  /  TBili  19.0<H>  /  DBili  x   /  AST  102<H>  /  ALT  85<H>  /  AlkPhos  276<H>  05-28    LIVER FUNCTIONS - ( 28 May 2021 06:26 )  Alb: 2.1 g/dL / Pro: 5.7 g/dL / ALK PHOS: 276 U/L / ALT: 85 U/L / AST: 102 U/L / GGT: x           PT/INR - ( 28 May 2021 06:26 )   PT: 21.6 sec;   INR: 1.85 ratio         PTT - ( 28 May 2021 06:26 )  PTT:37.8 sec        Imaging:

## 2021-05-29 NOTE — PROGRESS NOTE ADULT - PROBLEM SELECTOR PLAN 3
SCr .87 on admission. baseline SCr .5. Likely prerenal 2/2 hypoperfusion i/s/o decompensated cirrhosis. need to r/o sepsis (SBP)  - avoid nephrotoxic meds  - c/w albumin per hepatology   - s/p para 5/28- 2.3 L removed  - blood cx 5/27- ngtd   - Monitor BMP  - monitor I/O  - bladder scans may be inaccurate given ascites h/o heavy EtOH use with prior admission for decompensated hepatic cirrhosis s/p para 4/22- 2.1 L removed. ammonia 44 (wnl). MELD 30   - c/w lactulose (titrate to 3-4 BM/day)  - c/w rifaximin  - grossly distended on exam  - Abd US 5/27 shows mild-mod ascites- s/p para 2.3 L removed 5/28  - ascitic fluid neg for SBP. SAAG 1.7, ascitic protin .7 c/w cirrhosis   - febrile 5/28 o/n- broadened from ceftriaxone (5/28) to zosyn (5/29- )  - c/w zosyn (5/29- ) for SBP ppx until infectious workup completed  - blood cx 5/27- ngtd   - hepatology following- appreciate recs

## 2021-05-29 NOTE — PROGRESS NOTE ADULT - PROBLEM SELECTOR PLAN 1
LLE cellulitis with erythema up to knee. pinpoint lesions on dorsum of foot with serous drainage, no ulcer, abrasion, skin breakdown. TTP. Duplex neg for DVT. s/p vanc x1 in ED. CRP elevated (95).   - c/w IV vanc (5/27- )   - f/u MRSA swab- deescalate if neg   - blood cx 5/27- ngtd   - no fx or c/f osteo on foot xray  - podiatry consulted- no surgical intervention. likely 2/2 fluid overload- rec compressive therapy  - consider ID consult if unresolving with above tx febrile 5/28 o/n to 101.3 w/elev WBC, tachypnea, tachycardia. elev WBC may be i/s/o steroids from clinical trial. Other sources include LLE cellulitis (improving, no open wound/ulcer) vs PNA (O2 93%, tachypneic o/n) vs UTI. Ascitic fluid neg for SBP 5/28.  - blood cx 5/27- ngtd   - f/u blood cx 5/29  - f/u UCx  - f/u fungal cx   - c/w IV vanc (5/27- )  - ceftriaxone (5/28) broadened to zosyn (5/29- ) given fever o/n  - f/u MRSA swab- deescalate if neg  - no fx or c/f osteo on foot xray  - if continues to be febrile, consider ID consult

## 2021-05-29 NOTE — PROGRESS NOTE ADULT - PROBLEM SELECTOR PLAN 4
DDx includes dilutional (but Hgb around b/l with no s/s active bleeding, HD stable) vs active infection (afebrile, HD stable) on top of underlying cirrhosis. baseline plt around 200-250.   - No s/s active bleeding  - Trend H/H  - continue to monitor, unclear if receiving medication as part of clinical trail that is affecting plts  - transfuse plt <10 if afebrile, <15 if febrile, <50 if bleeding SCr .87 on admission. baseline SCr .5. Likely prerenal 2/2 hypoperfusion i/s/o decompensated cirrhosis. need to r/o sepsis (SBP)  - avoid nephrotoxic meds  - c/w albumin per hepatology   - s/p para 5/28- 2.3 L removed  - blood cx 5/27- ngtd   - Monitor BMP  - monitor I/O  - bladder scans may be inaccurate given ascites

## 2021-05-29 NOTE — PROGRESS NOTE ADULT - PROBLEM SELECTOR PLAN 6
DVT ppx: hold chemical DVT ppx given thrombocytopenia   Diet: fluid restricted 1L daily, low sodium Hypervolemic hyponatremia likely 2/2 cirrhosis. Baseline Na 130-135. Asx  - Continue to trend BMP  - diurese per hepatology  - fluid restricted diet

## 2021-05-29 NOTE — PROGRESS NOTE ADULT - PROBLEM SELECTOR PLAN 5
Hypervolemic hyponatremia likely 2/2 cirrhosis. Baseline Na 130-135. Asx  - Continue to trend BMP  - diurese per hepatology  - fluid restricted diet DDx includes dilutional (but Hgb around b/l with no s/s active bleeding, HD stable) vs active infection (afebrile, HD stable) on top of underlying cirrhosis. baseline plt around 200-250.   - No s/s active bleeding  - Trend H/H  - continue to monitor, unclear if receiving medication as part of clinical trail that is affecting plts  - transfuse plt <10 if afebrile, <15 if febrile, <50 if bleeding

## 2021-05-29 NOTE — PROGRESS NOTE ADULT - PROBLEM SELECTOR PLAN 2
h/o heavy EtOH use with prior admission for decompensated hepatic cirrhosis s/p para 4/22- 2.1 L removed. ammonia 44 (wnl). MELD 30   - c/w lactulose (titrate to 3-4 BM/day)  - c/w rifaximin  - grossly distended on exam  - Abd US 5/27 shows mild-mod ascites- s/p para 2.3 L removed 5/28  - ascitic fluid neg for SBP. SAAG 1.7, ascitic protin .7 c/w cirrhosis   - febrile 5/28 o/n- broadened from ceftriaxone (5/28) to zosyn (5/29- )  - c/w zosyn (5/29- ) for SBP ppx until infectious workup completed  - blood cx 5/27- ngtd   - hepatology following- appreciate recs LLE cellulitis with erythema up to knee. pinpoint lesions on dorsum of foot with serous drainage, no ulcer, abrasion, skin breakdown. TTP. Duplex neg for DVT. s/p vanc x1 in ED. CRP elevated (95).   - c/w IV vanc (5/27- )   - f/u MRSA swab- deescalate if neg   - blood cx 5/27- ngtd   - no fx or c/f osteo on foot xray  - podiatry consulted- no surgical intervention. likely 2/2 fluid overload- rec compressive therapy  - consider ID consult if unresolving with above tx

## 2021-05-29 NOTE — DISCHARGE NOTE PROVIDER - NSDCMRMEDTOKEN_GEN_ALL_CORE_FT
furosemide 40 mg oral tablet: 1 tab(s) orally once a day  Multiple Vitamins oral tablet: 1 tab(s) orally once a day  spironolactone 25 mg oral tablet: 4 tab(s) orally once a day

## 2021-05-29 NOTE — DISCHARGE NOTE PROVIDER - HOSPITAL COURSE
41y M PMHx EtOH abuse (last drink early April) c/b decompensated hepatic cirrhosis p/w progressively worsening b/l LE swelling a/w LLE foot pain. Pt was recently admitted 4/20-4/27 for alcoholic hepatitis and decompensated liver cirrhosis. Last para 4/22/21- 2.1 L removed neg for SBP. Discharged on lasix 40 mg qd and spironolactone 100 mg qd. He was also enrolled in a clinical trial with Dr. Michele (steroids vs placebo). Pt notes he follows with a GI "on [address]" but he does not have a chart in [employer]. Of note pt states he was recently admitted to [facility] last week Wednesday to Sunday for "blackness" of his R hand and a "problem" on his R side. Pt is a poor historian and was unable to describe what treatment he received but says he did not receive antibiotics or a paracentesis. He says he left Sunday- unclear if discharged or AMA. He notes that since his discharge he has noted his legs getting more swollen and it is difficult for him to walk. Denies trauma, fevers.    Pt started on IV vanc for the cellulitis (5/27- ). MRSA _______. Podiatry evaluated the pt and noted no acute intervention needed- xrays neg fro fx or s/s osteo. Cellulitis steadily improved.     For the decompensated hepatic cirrhosis pt started on lactulose, rifaximin, and albumin. Para 5/28- 2.3L removed ascitic fluid c/w transudative fluid 2/2 cirrhosis. Started on SBP ppx with ceftriaxone 5/28 but broadened to zosyn 5/29 after he had a fever 5/28 o/n. Infectious workup __________.

## 2021-05-29 NOTE — PROGRESS NOTE ADULT - SUBJECTIVE AND OBJECTIVE BOX
*incomplete note, full note to follow*      Chief Complaint:  Patient is a 41y old  Male who presents with a chief complaint of cellulitis (28 May 2021 07:43)      Interval Events:   - NAEON  - s/p 2.3 L para, negative for SBP   - INR uptrending to 2.19 <-- 1.85   - Awaiting Cr and CMP today      Hospital Medications:  acetaminophen   Tablet .. 650 milliGRAM(s) Oral every 6 hours PRN  albumin human 25% IVPB 100 milliLiter(s) IV Intermittent every 6 hours  cefTRIAXone   IVPB      folic acid 1 milliGRAM(s) Oral daily  lactulose Syrup 30 Gram(s) Oral every 6 hours  multivitamin 1 Tablet(s) Oral daily  rifAXIMin 550 milliGRAM(s) Oral two times a day  vancomycin  IVPB 1000 milliGRAM(s) IV Intermittent every 8 hours      PMHX/PSHX:  No pertinent past medical history    Alcohol abuse    Cirrhosis    No significant past surgical history            ROS:     General:  No weight loss, fevers, chills, night sweats, fatigue   Eyes:  No vision changes  ENT:  No sore throat, pain, runny nose  CV:  No chest pain, palpitations, dizziness   Resp:  No SOB, cough, wheezing  GI:  See HPI  :  No burning with urination, hematuria  Muscle:  No pain, weakness  Neuro:  No weakness/tingling, memory problems  Psych:  No fatigue, insomnia, mood problems, depression  Heme:  No easy bruisability  Skin:  No rash, edema      PHYSICAL EXAM:   GENERAL:  no distress  HEENT:  NC/AT,  conjunctivae clear and pink, scleral icterus  CHEST:  Full & symmetric excursion, no increased effort  HEART:  Regular rhythm, rate  ABDOMEN:  Soft, non-tender, distended  EXTREMITIES:  no cyanosis, clubbing or edema  SKIN:  jaundice  NEURO:  Alert, oriented, +asterixis    Vital Signs Last 24 Hrs  T(C): 37.9 (29 May 2021 07:14), Max: 38.5 (29 May 2021 05:21)  T(F): 100.2 (29 May 2021 07:14), Max: 101.3 (29 May 2021 05:21)  HR: 118 (29 May 2021 05:21) (80 - 118)  BP: 108/61 (29 May 2021 05:21) (98/56 - 108/67)  BP(mean): --  RR: 20 (29 May 2021 05:21) (16 - 20)  SpO2: 93% (29 May 2021 05:21) (93% - 99%)    LABS:                        8.5    25.97 )-----------( 73       ( 29 May 2021 07:16 )             25.3     05-28    127<L>  |  94<L>  |  20  ----------------------------<  80  3.6   |  18<L>  |  0.75    Ca    7.8<L>      28 May 2021 06:26  Phos  2.9     05-28  Mg     1.8     05-28    TPro  5.7<L>  /  Alb  2.1<L>  /  TBili  19.0<H>  /  DBili  x   /  AST  102<H>  /  ALT  85<H>  /  AlkPhos  276<H>  05-28    LIVER FUNCTIONS - ( 28 May 2021 06:26 )  Alb: 2.1 g/dL / Pro: 5.7 g/dL / ALK PHOS: 276 U/L / ALT: 85 U/L / AST: 102 U/L / GGT: x           PT/INR - ( 29 May 2021 07:16 )   PT: 25.3 sec;   INR: 2.19 ratio         PTT - ( 29 May 2021 07:16 )  PTT:42.6 sec                  Imaging:           *incomplete note, full note to follow*      Chief Complaint:  Patient is a 41y old  Male who presents with a chief complaint of cellulitis (28 May 2021 07:43)      Interval Events:   - Febrile to 101 this AM   - s/p 2.3 L para, negative for SBP   - INR uptrending to 2.19 <-- 1.85   - Awaiting Cr and CMP today      Hospital Medications:  acetaminophen   Tablet .. 650 milliGRAM(s) Oral every 6 hours PRN  albumin human 25% IVPB 100 milliLiter(s) IV Intermittent every 6 hours  cefTRIAXone   IVPB      folic acid 1 milliGRAM(s) Oral daily  lactulose Syrup 30 Gram(s) Oral every 6 hours  multivitamin 1 Tablet(s) Oral daily  rifAXIMin 550 milliGRAM(s) Oral two times a day  vancomycin  IVPB 1000 milliGRAM(s) IV Intermittent every 8 hours      PMHX/PSHX:  No pertinent past medical history    Alcohol abuse    Cirrhosis    No significant past surgical history            ROS:     General:  No weight loss, fevers, chills, night sweats, fatigue   Eyes:  No vision changes  ENT:  No sore throat, pain, runny nose  CV:  No chest pain, palpitations, dizziness   Resp:  No SOB, cough, wheezing  GI:  See HPI  :  No burning with urination, hematuria  Muscle:  No pain, weakness  Neuro:  No weakness/tingling, memory problems  Psych:  No fatigue, insomnia, mood problems, depression  Heme:  No easy bruisability  Skin:  No rash, edema      PHYSICAL EXAM:   GENERAL:  no distress  HEENT:  NC/AT,  conjunctivae clear and pink, scleral icterus  CHEST:  Full & symmetric excursion, no increased effort  HEART:  Regular rhythm, rate  ABDOMEN:  Soft, non-tender, distended  EXTREMITIES:  no cyanosis, clubbing or edema  SKIN:  jaundice  NEURO:  Alert, oriented, +asterixis    Vital Signs Last 24 Hrs  T(C): 37.9 (29 May 2021 07:14), Max: 38.5 (29 May 2021 05:21)  T(F): 100.2 (29 May 2021 07:14), Max: 101.3 (29 May 2021 05:21)  HR: 118 (29 May 2021 05:21) (80 - 118)  BP: 108/61 (29 May 2021 05:21) (98/56 - 108/67)  BP(mean): --  RR: 20 (29 May 2021 05:21) (16 - 20)  SpO2: 93% (29 May 2021 05:21) (93% - 99%)    LABS:                        8.5    25.97 )-----------( 73       ( 29 May 2021 07:16 )             25.3     05-28    127<L>  |  94<L>  |  20  ----------------------------<  80  3.6   |  18<L>  |  0.75    Ca    7.8<L>      28 May 2021 06:26  Phos  2.9     05-28  Mg     1.8     05-28    TPro  5.7<L>  /  Alb  2.1<L>  /  TBili  19.0<H>  /  DBili  x   /  AST  102<H>  /  ALT  85<H>  /  AlkPhos  276<H>  05-28    LIVER FUNCTIONS - ( 28 May 2021 06:26 )  Alb: 2.1 g/dL / Pro: 5.7 g/dL / ALK PHOS: 276 U/L / ALT: 85 U/L / AST: 102 U/L / GGT: x           PT/INR - ( 29 May 2021 07:16 )   PT: 25.3 sec;   INR: 2.19 ratio         PTT - ( 29 May 2021 07:16 )  PTT:42.6 sec                  Imaging:

## 2021-05-29 NOTE — DISCHARGE NOTE PROVIDER - NSDCCPCAREPLAN_GEN_ALL_CORE_FT
PRINCIPAL DISCHARGE DIAGNOSIS  Diagnosis: Cellulitis of left lower extremity  Assessment and Plan of Treatment: You presented with an infection of your left leg and were treated with antibiotics. Your leg iinfection improved. Please contine to take all medications as prescribed. Please follow up with your PCP for further workup. Please return to the hospital if you notice your leg is getting red, tender, swollen, have a fever, and have difficulty walking.  *********PLEASE TRANSLATE TO Latvian********        SECONDARY DISCHARGE DIAGNOSES  Diagnosis: Decompensated hepatic cirrhosis  Assessment and Plan of Treatment: You presented with lots of fluid in your abdomen. You underwent a paracentesis to remove fluid from your belly. This fluid did not show any signs of infection. You were given medications to help prevent further infection and help you urinate excess fluid. Please continue to take all your medications as prescribed and follow up with the hepatology team for further management.  *********PLEASE TRANSLATE TO Latvian********

## 2021-05-29 NOTE — PROGRESS NOTE ADULT - SUBJECTIVE AND OBJECTIVE BOX
Authored by Johny Jama MD ([phone]) [facility]    SUBJECTIVE / OVERNIGHT EVENTS: NAEON but pt febrile to 101.3- pt asx, notes he was wearing 3 blankets. Denies HA, cp, SOB, abd pain.     acetaminophen   Tablet ..   650 milliGRAM(s) Oral (05-29-21 @ 05:36)   650 milliGRAM(s) Oral (05-28-21 @ 13:10)    albumin human 25% IVPB   50 mL/Hr IV Intermittent (05-29-21 @ 07:18)   50 mL/Hr IV Intermittent (05-29-21 @ 00:30)   50 mL/Hr IV Intermittent (05-28-21 @ 17:07)   50 mL/Hr IV Intermittent (05-28-21 @ 15:30)    folic acid   1 milliGRAM(s) Oral (05-28-21 @ 10:57)    furosemide   Injectable   40 milliGRAM(s) IV Push (05-28-21 @ 14:44)    furosemide   Injectable   40 milliGRAM(s) IV Push (05-29-21 @ 05:35)    lactulose Syrup   30 Gram(s) Oral (05-29-21 @ 05:36)    multivitamin   1 Tablet(s) Oral (05-28-21 @ 10:57)    piperacillin/tazobactam IVPB.   200 mL/Hr IV Intermittent (05-29-21 @ 06:38)    rifAXIMin   550 milliGRAM(s) Oral (05-29-21 @ 05:36)   550 milliGRAM(s) Oral (05-28-21 @ 17:06)    vancomycin  IVPB   250 mL/Hr IV Intermittent (05-29-21 @ 05:35)   250 mL/Hr IV Intermittent (05-28-21 @ 22:33)   250 mL/Hr IV Intermittent (05-28-21 @ 13:11)    MEDICATIONS  (STANDING):  albumin human 25% IVPB 100 milliLiter(s) IV Intermittent every 6 hours  folic acid 1 milliGRAM(s) Oral daily  furosemide   Injectable 40 milliGRAM(s) IV Push daily  lactulose Syrup 30 Gram(s) Oral every 6 hours  multivitamin 1 Tablet(s) Oral daily  piperacillin/tazobactam IVPB.. 3.375 Gram(s) IV Intermittent every 8 hours  potassium phosphate IVPB 30 milliMole(s) IV Intermittent once  rifAXIMin 550 milliGRAM(s) Oral two times a day  vancomycin  IVPB 1000 milliGRAM(s) IV Intermittent every 8 hours    MEDICATIONS  (PRN):  acetaminophen   Tablet .. 650 milliGRAM(s) Oral every 6 hours PRN Temp greater or equal to 38C (100.4F), Mild Pain (1 - 3)    I&O's Summary    28 May 2021 07:01  -  29 May 2021 07:00  --------------------------------------------------------  IN: 1100 mL / OUT: 200 mL / NET: 900 mL    29 May 2021 07:01  -  29 May 2021 09:50  --------------------------------------------------------  IN: 100 mL / OUT: 0 mL / NET: 100 mL    PHYSICAL EXAM:  Vital Signs Last 24 Hrs  T(C): 37.3 (29 May 2021 09:03), Max: 38.5 (29 May 2021 05:21)  T(F): 99.2 (29 May 2021 09:03), Max: 101.3 (29 May 2021 05:21)  HR: 109 (29 May 2021 09:03) (80 - 118)  BP: 106/70 (29 May 2021 09:03) (98/56 - 108/67)  BP(mean): --  RR: 19 (29 May 2021 09:03) (16 - 20)  SpO2: 97% (29 May 2021 09:03) (93% - 99%)    GENERAL: No acute distress  HEAD:  Atraumatic, Normocephalic  EYES: EOMI, PERRLA, conjunctiva and sclera clear  NECK: Supple, no lymphadenopathy, no JVD  CHEST/LUNG: CTAB; No wheezes, rales, or rhonchi  HEART: Regular rate and rhythm; No murmurs, rubs, or gallops  ABDOMEN: Soft, non-tender. +Distended- decreased from previous. normal bowel sounds, no organomegaly  EXTREMITIES:  +b/l 2+ Pitting edema. improved erythema of LLE- mildly TTP. b/l feet wrapped  NEUROLOGY: A&O x 3, no focal deficits  SKIN: +Jaundiced    LABS:                        8.5    25.97 )-----------( 73       ( 29 May 2021 07:16 )             25.3     05-29    129<L>  |  95<L>  |  20  ----------------------------<  132<H>  3.2<L>   |  18<L>  |  1.02    Ca    8.1<L>      29 May 2021 07:16  Phos  2.3     05-29  Mg     1.7     05-29    TPro  5.6<L>  /  Alb  2.7<L>  /  TBili  18.0<H>  /  DBili  x   /  AST  123<H>  /  ALT  69<H>  /  AlkPhos  242<H>  05-29    PT/INR - ( 29 May 2021 07:16 )   PT: 25.3 sec;   INR: 2.19 ratio      PTT - ( 29 May 2021 07:16 )  PTT:42.6 sec    Culture - Blood (collected 27 May 2021 08:47)  Source: .Blood Blood-Venous  Preliminary Report (28 May 2021 09:01):    No growth to date.    Culture - Blood (collected 27 May 2021 08:47)  Source: .Blood Blood-Peripheral  Preliminary Report (28 May 2021 09:01):    No growth to date.

## 2021-05-29 NOTE — DISCHARGE NOTE PROVIDER - CARE PROVIDER_API CALL
Melquiades Michele)  Gastroenterology; Internal Medicine; Transplant Hepatology  [address] [place] [address]  Phone: [phone]  Fax: [phone]  Established Patient  Follow Up Time: 1 week

## 2021-05-29 NOTE — PROGRESS NOTE ADULT - ASSESSMENT
41y M PMHx EtOH abuse (last drink early April) c/b decompensated hepatic cirrhosis p/w progressively worsening b/l LE swelling a/w LLE foot pain likely 2/2 cellulitis. Pt s/p para 5/28- 2.3L removed neg for SBP.

## 2021-05-29 NOTE — PROGRESS NOTE ADULT - ASSESSMENT
39yo M with PMH of EtOH abuse who presents with jaundice and abdominal distention x 2 weeks, found to have cirrhosis and elevated liver tests.    # Elevated liver tests - ALT/AST ratio in the settings of ongoing (positive BAL in April) EtOH use with recent admission for alc hep; overall downtrending. Concern for possible infection given leukocytosis and may have been on steroids for alc hep in clinical trial.  # Cirrhotic morphology on imaging- suggestive of underlying cirrhosis, suspected 2/2 EtOH use. MELD-Na 30. ssible liver transplant candidate if able to remain abstinent from EtOH. Appears to have good social support.   - ascites - present  - varices - unknown  - HCC - no focal lesions on imaging CT 4/20 (not multiphase)  - HE - +asterixis  # Active EtOH use  # Leukocytosis  # Hyponatremia      Recommendations:  - improved Na - s/p lasix 40mg IV 5/28 --> will need to f/u Cr today   - c/w albumin 25% 100cc q6  - infectious w/u - blood cx, urine cx  - c/w antibiotics  - fluid restriction to 1L/day  - lactulose 30cc q6, titrate to 3-4BM/day  - rifaximin 550 BID  - rest of care per primary team      Thank you for involving us in the care of this patient, please reach out if any further questions.     Mckay Darden MD  Gastroenterology Fellow, PGY4    Available on [employer] Teams  [phone] ([facility])  [id number] ([facility])  Please contact on call fellow weekdays after 5pm-7am and weekends: [phone]   39yo M with PMH of EtOH abuse who presents with jaundice and abdominal distention x 2 weeks, found to have cirrhosis and elevated liver tests.    # Elevated liver tests - ALT/AST ratio in the settings of ongoing (positive BAL in April) EtOH use with recent admission for alc hep; overall downtrending. Concern for possible infection given leukocytosis and may have been on steroids for alc hep in clinical trial.  # Cirrhotic morphology on imaging- suggestive of underlying cirrhosis, suspected 2/2 EtOH use. MELD-Na 30. ssible liver transplant candidate if able to remain abstinent from EtOH. Appears to have good social support.   - ascites - present  - varices - unknown  - HCC - no focal lesions on imaging CT 4/20 (not multiphase)  - HE - +asterixis  # Active EtOH use  # Leukocytosis  # Hyponatremia      Recommendations:  - Please send PETH  - agree with repeating infectious w/u - blood cx, urine cx today, fungal cx  - improved Na - s/p lasix 40mg IV 5/28 --> will need to f/u Cr today   - c/w albumin 25% 100cc q6  - c/w antibiotics  - fluid restriction to 1L/day  - lactulose 30cc q6, titrate to 3-4BM/day  - rifaximin 550 BID  - rest of care per primary team      Thank you for involving us in the care of this patient, please reach out if any further questions.     Mckay Darden MD  Gastroenterology Fellow, PGY4    Available on [employer] Teams  [phone] ([facility])  [phone] ([facility])  Please contact on call fellow weekdays after 5pm-7am and weekends: [phone]

## 2021-05-30 NOTE — PROGRESS NOTE ADULT - PROBLEM SELECTOR PLAN 6
Hypervolemic hyponatremia likely 2/2 cirrhosis. Baseline Na 130-135. Asx  - Continue to trend BMP  - diurese per hepatology  - fluid restricted diet

## 2021-05-30 NOTE — PROGRESS NOTE ADULT - PROBLEM SELECTOR PLAN 2
LLE cellulitis with erythema up to knee. pinpoint lesions on dorsum of foot with serous drainage, no ulcer, abrasion, skin breakdown. TTP. Duplex neg for DVT. s/p vanc x1 in ED. CRP elevated (95).   - c/w IV vanc (5/27- )   - f/u MRSA swab- deescalate if neg   - blood cx 5/27- ngtd   - no fx or c/f osteo on foot xray  - podiatry consulted- no surgical intervention. likely 2/2 fluid overload- rec compressive therapy  - consider ID consult if unresolving with above tx LLE cellulitis with erythema up to knee. pinpoint lesions on dorsum of foot with serous drainage, no ulcer, abrasion, skin breakdown. TTP. Duplex neg for DVT. s/p vanc x1 in ED. CRP elevated (95).   - blood cx 5/27- ngtd   - no fx or c/f osteo on foot xray  - podiatry consulted- no surgical intervention. likely 2/2 fluid overload- rec compressive therapy  - ID recs

## 2021-05-30 NOTE — PROGRESS NOTE ADULT - ASSESSMENT
39yo M with PMH of EtOH abuse who presents with jaundice and abdominal distention x 2 weeks, found to have cirrhosis and elevated liver tests, now febrile with elevated leukocytosis concerning for possible infection of unknown etiology.     # Elevated liver tests - ALT/AST ratio in the settings of ongoing (positive BAL in April) EtOH use with recent admission for alc hep; overall downtrending. Concern for possible infection given leukocytosis and may have been on steroids for alc hep in clinical trial.  # Cirrhotic morphology on imaging- suggestive of underlying cirrhosis, suspected 2/2 EtOH use. MELD-Na 30. Possible liver transplant candidate if able to remain abstinent from EtOH. Appears to have good social support.   - ascites - present  - varices - unknown  - HCC - no focal lesions on imaging CT 4/20 (not multiphase)  - HE - +asterixis  # Active EtOH use  # Leukocytosis  # Hyponatremia      Recommendations:  - F/u infectious w/u - blood cx, urine cx today, fungal cx   - c/w albumin 25% 100cc q6  - c/w antibiotics (vanc/zosyn currently)  - fluid restriction to 1L/day  - lactulose 30cc q6, titrate to 3-4BM/day  - rifaximin 550 BID  - rest of care per primary team      Thank you for involving us in the care of this patient, please reach out if any further questions.     Mckay Darden MD  Gastroenterology Fellow, PGY4    Available on [employer] Teams  [phone] ([facility])  [id number] ([facility])  Please contact on call fellow weekdays after 5pm-7am and weekends: [phone]

## 2021-05-30 NOTE — PROGRESS NOTE ADULT - PROBLEM SELECTOR PLAN 1
febrile 5/28 o/n to 101.3 w/elev WBC, tachypnea, tachycardia. elev WBC may be i/s/o steroids from clinical trial. Other sources include LLE cellulitis (improving, no open wound/ulcer) vs PNA (O2 93%, tachypneic o/n) vs UTI. Ascitic fluid neg for SBP 5/28.  - blood cx 5/27- ngtd   - f/u blood cx 5/29  - f/u UCx  - f/u fungal cx   - c/w IV vanc (5/27- )  - ceftriaxone (5/28) broadened to zosyn (5/29- ) given fever o/n  - f/u MRSA swab- deescalate if neg  - no fx or c/f osteo on foot xray  - if continues to be febrile, consider ID consult febrile 5/28 o/n to 101.3 w/elev WBC, tachypnea, tachycardia. elev WBC may be i/s/o steroids from clinical trial. Other sources include LLE cellulitis (improving, no open wound/ulcer) vs PNA (O2 93%, tachypneic o/n) vs UTI. Ascitic fluid neg for SBP 5/28.  - blood cx 5/27- ngtd, blood cx 5/29  - f/u UCx and UA  - f/u fungal cx   - c/w IV vanc (5/27-5/29), MRSA neg  - ceftriaxone (5/28) broadened to zosyn (5/29- ) given fever  - no fx or c/f osteo on foot xray  - ID consulted: ordered CT ab/P with IV to rule out infection

## 2021-05-30 NOTE — PROGRESS NOTE ADULT - SUBJECTIVE AND OBJECTIVE BOX
Mary Sultana MD  Internal Medicine PGY-2  Pager [phone]  Pager [phone]      Patient is a 41y old  Male who presents with a chief complaint of cellulitis (29 May 2021 11:23)        SUBJECTIVE / OVERNIGHT EVENTS: Patient had no acute events overnight. Patient seen and examined at bedside this morning.     ROS: [ - ] Fever [ - ] Chills [ - ] Nausea/Vomiting [ - ] Chest Pain [ - ] Shortness of breath     MEDICATIONS  (STANDING):  albumin human 25% IVPB 100 milliLiter(s) IV Intermittent every 6 hours  folic acid 1 milliGRAM(s) Oral daily  furosemide   Injectable 40 milliGRAM(s) IV Push daily  lactulose Syrup 30 Gram(s) Oral every 6 hours  multivitamin 1 Tablet(s) Oral daily  piperacillin/tazobactam IVPB.. 3.375 Gram(s) IV Intermittent every 8 hours  rifAXIMin 550 milliGRAM(s) Oral two times a day  vancomycin  IVPB        MEDICATIONS  (PRN):  acetaminophen   Tablet .. 650 milliGRAM(s) Oral every 6 hours PRN Temp greater or equal to 38C (100.4F), Mild Pain (1 - 3)      Vital Signs Last 24 Hrs  T(C): 37.9 (30 May 2021 04:46), Max: 38.6 (30 May 2021 00:12)  T(F): 100.3 (30 May 2021 04:46), Max: 101.5 (30 May 2021 00:12)  HR: 93 (30 May 2021 04:46) (89 - 110)  BP: 105/55 (30 May 2021 04:46) (99/65 - 120/73)  BP(mean): --  RR: 18 (30 May 2021 04:46) (18 - 19)  SpO2: 94% (30 May 2021 04:46) (94% - 99%)  CAPILLARY BLOOD GLUCOSE        I&O's Summary    29 May 2021 07:01  -  30 May 2021 07:00  --------------------------------------------------------  IN: 1927 mL / OUT: 700 mL / NET: 1227 mL        PHYSICAL EXAM  GENERAL: No acute distress  HEAD:  Atraumatic, Normocephalic  EYES: EOMI, PERRLA, conjunctiva and sclera clear  NECK: Supple, no lymphadenopathy, no JVD  CHEST/LUNG: CTAB; No wheezes, rales, or rhonchi  HEART: Regular rate and rhythm; No murmurs, rubs, or gallops  ABDOMEN: Soft, non-tender. +Distended- decreased from previous. normal bowel sounds, no organomegaly  EXTREMITIES:  +b/l 2+ Pitting edema. improved erythema of LLE- mildly TTP. b/l feet wrapped. +asterxis  NEUROLOGY: A&O x 3, no focal deficits  SKIN: +Jaundice    LABS:                        7.9    26.53 )-----------( 77       ( 30 May 2021 07:15 )             23.8     05-29    129<L>  |  95<L>  |  20  ----------------------------<  132<H>  3.2<L>   |  18<L>  |  1.02    Ca    8.1<L>      29 May 2021 07:16  Phos  2.3     05-29  Mg     1.7     05-29    TPro  5.6<L>  /  Alb  2.7<L>  /  TBili  18.0<H>  /  DBili  x   /  AST  123<H>  /  ALT  69<H>  /  AlkPhos  242<H>  05-29    PT/INR - ( 29 May 2021 07:16 )   PT: 25.3 sec;   INR: 2.19 ratio         PTT - ( 29 May 2021 07:16 )  PTT:42.6 sec            RADIOLOGY & ADDITIONAL TESTS:    Imaging Personally Reviewed:  Consultant(s) Notes Reviewed:     Mary Sultana MD  Internal Medicine PGY-2  Pager [phone]  Pager [phone]      Patient is a 41y old  Male who presents with a chief complaint of cellulitis (29 May 2021 11:23)        SUBJECTIVE / OVERNIGHT EVENTS: Overnight, febrile 101.3F. Patient seen and examined at bedside this morning. LE pain has improved. Patient feels well with no acute complaints. Denies dysuria, abdominal pain, cough. 5 BM last 24 hours per nurse    ROS: [ - ] Fever [ - ] Chills [ - ] Nausea/Vomiting [ - ] Chest Pain [ - ] Shortness of breath     MEDICATIONS  (STANDING):  albumin human 25% IVPB 100 milliLiter(s) IV Intermittent every 6 hours  folic acid 1 milliGRAM(s) Oral daily  furosemide   Injectable 40 milliGRAM(s) IV Push daily  lactulose Syrup 30 Gram(s) Oral every 6 hours  multivitamin 1 Tablet(s) Oral daily  piperacillin/tazobactam IVPB.. 3.375 Gram(s) IV Intermittent every 8 hours  rifAXIMin 550 milliGRAM(s) Oral two times a day  vancomycin  IVPB        MEDICATIONS  (PRN):  acetaminophen   Tablet .. 650 milliGRAM(s) Oral every 6 hours PRN Temp greater or equal to 38C (100.4F), Mild Pain (1 - 3)      Vital Signs Last 24 Hrs  T(C): 37.9 (30 May 2021 04:46), Max: 38.6 (30 May 2021 00:12)  T(F): 100.3 (30 May 2021 04:46), Max: 101.5 (30 May 2021 00:12)  HR: 93 (30 May 2021 04:46) (89 - 110)  BP: 105/55 (30 May 2021 04:46) (99/65 - 120/73)  BP(mean): --  RR: 18 (30 May 2021 04:46) (18 - 19)  SpO2: 94% (30 May 2021 04:46) (94% - 99%)  CAPILLARY BLOOD GLUCOSE        I&O's Summary    29 May 2021 07:01  -  30 May 2021 07:00  --------------------------------------------------------  IN: 1927 mL / OUT: 700 mL / NET: 1227 mL        PHYSICAL EXAM  GENERAL: No acute distress  HEAD:  Atraumatic, Normocephalic  EYES: EOMI, PERRLA, conjunctiva and sclera clear  NECK: Supple, no lymphadenopathy, no JVD  CHEST/LUNG: CTAB; No wheezes, rales, or rhonchi  HEART: Regular rate and rhythm; No murmurs, rubs, or gallops  ABDOMEN: Soft, non-tender. +Distended- decreased from previous. normal bowel sounds, no organomegaly  EXTREMITIES:  +b/l 2+ Pitting edema. improved erythema of LLE- mildly TTP. b/l feet wrapped. +asterxis improved  NEUROLOGY: A&O x 3, no focal deficits  SKIN: +Jaundice    LABS:                        7.9    26.53 )-----------( 77       ( 30 May 2021 07:15 )             23.8     05-29    129<L>  |  95<L>  |  20  ----------------------------<  132<H>  3.2<L>   |  18<L>  |  1.02    Ca    8.1<L>      29 May 2021 07:16  Phos  2.3     05-29  Mg     1.7     05-29    TPro  5.6<L>  /  Alb  2.7<L>  /  TBili  18.0<H>  /  DBili  x   /  AST  123<H>  /  ALT  69<H>  /  AlkPhos  242<H>  05-29    PT/INR - ( 29 May 2021 07:16 )   PT: 25.3 sec;   INR: 2.19 ratio         PTT - ( 29 May 2021 07:16 )  PTT:42.6 sec            RADIOLOGY & ADDITIONAL TESTS:    Imaging Personally Reviewed:  Consultant(s) Notes Reviewed:

## 2021-05-30 NOTE — PROGRESS NOTE ADULT - ASSESSMENT
41y M PMHx EtOH abuse (last drink early April) c/b decompensated hepatic cirrhosis p/w progressively worsening b/l LE swelling a/w LLE foot pain likely 2/2 cellulitis, with decompensated cirrhosis s/p para 5/28- 2.3L removed neg for SBP with persistent fevers

## 2021-05-30 NOTE — CONSULT NOTE ADULT - ASSESSMENT
41 M originally from [facility] PMHx of ETOH abuse (quit early April) c/b decompensated alcoholic cirrhosis, recent admission (4/20-4/27) for decompensated cirrhosis, presented with b/l LE swelling and LLE foot pain.     Fever, Leukocytosis, SIRS vs Sepsis  -unclear source of fever at this time  -does not appear to have cellulitis, though this is after several days of antibiotics  -infectious ddx includes pneumonia, repeat CXR with b/l opacities after intial clear, slightly decreased SPO2 on RA, however pt appears to be comfortable breathing, not coughing  -UA negative, initial Bcx negative  -Significant leukocytosis though was elevated throughout previous admission as well. Pt reportedly on clinical trial involving possible steroids, though leukocytosis was present throughout prior admission as well  -Hyperbilirubinemia, elevated LFTS, though decreased from prior admission. Mild RUQ pain. ?Biliary disease  -MRA last admission with left kidney finding, infarct vs focal pyelo. Consider repeat abdominal imaging, CT A/P with contrast for MRI  -consider procalcitonin  -will discuss with attending, likely continue zosyn for now and d/c vancomycin (MRSA negative and does not appear to have purulent cellultis)    Anemia, thrombocytopenia  - from cirrhosis?  -new from last admission  -does not appear to be BM suppression as WBC elevated  -consider heme eval? 41 M originally from [facility] PMHx of ETOH abuse (quit early April) c/b decompensated alcoholic cirrhosis, recent admission (4/20-4/27) for decompensated cirrhosis, presented with b/l LE swelling and LLE foot pain.     Fever, Leukocytosis, SIRS vs Sepsis  -unclear source of fever at this time  -does not appear to have cellulitis, though this is after several days of antibiotics  -infectious ddx includes pneumonia, repeat CXR with b/l opacities after intial clear, slightly decreased SPO2 on RA, however pt appears to be comfortable breathing, not coughing  -UA negative, initial Bcx negative  -no SBP by fluid studies, cx  -Significant leukocytosis though was elevated throughout previous admission as well. Pt reportedly on clinical trial involving possible steroids, though leukocytosis was present throughout prior admission as well  -Hyperbilirubinemia, elevated LFTS, though decreased from prior admission. Mild RUQ pain. ?Biliary disease  -MRA last admission with left kidney finding, infarct vs focal pyelo. Consider repeat abdominal imaging, CT A/P with contrast for MRI  -consider procalcitonin  -will discuss with attending, likely continue zosyn for now and d/c vancomycin (MRSA negative and does not appear to have purulent cellultis)    Anemia, thrombocytopenia  - from cirrhosis?  -new from last admission  -does not appear to be BM suppression as WBC elevated  -consider heme eval? 41 M originally from [facility] PMHx of ETOH abuse (quit early April) c/b decompensated alcoholic cirrhosis, recent admission (4/20-4/27) for decompensated cirrhosis, presented with b/l LE swelling and LLE foot pain.     Fever, Leukocytosis, SIRS vs Sepsis  -unclear source of fever at this time  -does not appear to have cellulitis, though this is after several days of antibiotics  -infectious ddx includes pneumonia, repeat CXR with b/l opacities after intial clear, slightly decreased SPO2 on RA, however pt appears to be comfortable breathing, not coughing  -UA negative, initial Bcx negative  -no SBP by fluid studies, cx  -Significant leukocytosis though was elevated throughout previous admission as well. Pt reportedly on clinical trial involving possible steroids, though leukocytosis was present throughout prior admission as well  -Hyperbilirubinemia, elevated LFTS, though decreased from prior admission. Mild RUQ pain. ?Biliary disease  -MRA last admission with left kidney finding, infarct vs focal pyelo. Consider repeat abdominal imaging, CT A/P with contrast for MRI  -consider procalcitonin  - continue zosyn for now and d/c vancomycin (MRSA negative and does not appear to have purulent cellultis)  -send cryptococcus ag and histoplasma ag ( cirrhotic)    Anemia, thrombocytopenia  - from cirrhosis?  -new from last admission  -does not appear to be BM suppression as WBC elevated  -consider heme eval?

## 2021-05-30 NOTE — CONSULT NOTE ADULT - SUBJECTIVE AND OBJECTIVE BOX
Patient is a 41y old  Male who presents with a chief complaint of cellulitis (30 May 2021 08:21)    HPI:  41 M originally from [facility] PMHx of ETOH abuse (quit early April) c/b decompensated alcoholic cirrhosis, recent admission (-) for decompensated cirrhosis, presented with b/l LE swelling and LLE foot pain.     Last admission pt had paracentesis without evidence of SBP, was briefly treated with antibiotics then discontinued when no infectious source found. MRA abdomen at the time was remarkable for left kidney focal abnormality representing infarct vs focal pyelonephritis. Pt was discharged on clinical trial involving steroids for alcoholic hepatitis/cirrhosis.    In the interval since last admission, pt was briefly at [facility]. Poor historian, cannot say how long he was there, only that he was there for "leg swelling". He currently denies fever, chills, cough, SOB, pleuritic CP, night sweats, wt loss, dysuria, flank pain, back pain, nausea, vomiting. Admits to vague RUQ "burning" pain.    In the hospital pt was initially afebrile. Significant leukocytosis , though was noted to have leukocytosis in the 20s throughout April admission. Plts and Hb noted to be lower than during april admission. T bili and LFTs elevated but decreased from prior admission. UA, COVID PCR, Bcx  negative. Paracentesis performed with fluid studies and cultuer not suggestive of SBP.    Xray foot without evidence of OM, Duplex without DVT. Initial CXR unremarkable. After pt started spiking fevers to tmax 101.5 on , new CXR showed patchy opacities L > R. Pt was treated for possible cellulitis with vancomycin, CTX eventually escalated to Zosyn with new fevers.    Pt is from [facility], no known exposure to TB.    prior hospital charts reviewed [x  ]  primary team notes reviewed [x  ]  other consultant notes reviewed [x  ]  PAST MEDICAL & SURGICAL HISTORY:  Alcohol abuse    Cirrhosis    No significant past surgical history      Allergies  No Known Allergies    ANTIMICROBIALS (past 90 days)  MEDICATIONS  (STANDING):    cefTRIAXone   IVPB   100 mL/Hr IV Intermittent (21 @ 08:04)    piperacillin/tazobactam IVPB.   200 mL/Hr IV Intermittent (21 @ 06:38)    piperacillin/tazobactam IVPB..   25 mL/Hr IV Intermittent (21 @ 02:48)   25 mL/Hr IV Intermittent (21 @ 18:01)   25 mL/Hr IV Intermittent (21 @ 10:04)    rifAXIMin   550 milliGRAM(s) Oral (21 @ 05:56)   550 milliGRAM(s) Oral (21 @ 17:14)   550 milliGRAM(s) Oral (21 @ 05:36)   550 milliGRAM(s) Oral (21 @ 17:06)   550 milliGRAM(s) Oral (21 @ 05:58)   550 milliGRAM(s) Oral (21 @ 17:49)    vancomycin  IVPB   250 mL/Hr IV Intermittent (21 @ 22:29)   250 mL/Hr IV Intermittent (21 @ 05:35)   250 mL/Hr IV Intermittent (21 @ 22:33)   250 mL/Hr IV Intermittent (21 @ 13:11)    vancomycin  IVPB   166.67 mL/Hr IV Intermittent (21 @ 07:25)   166.67 mL/Hr IV Intermittent (21 @ 23:44)   166.67 mL/Hr IV Intermittent (21 @ 13:41)    vancomycin  IVPB   250 mL/Hr IV Intermittent (21 @ 08:50)    vancomycin  IVPB.   250 mL/Hr IV Intermittent (21 @ 05:08)      ANTIMICROBIALS:    piperacillin/tazobactam IVPB.. 3.375 every 8 hours  rifAXIMin 550 two times a day  vancomycin  IVPB    vancomycin  IVPB 750 every 8 hours    OTHER MEDS: MEDICATIONS  (STANDING):  acetaminophen   Tablet .. 650 every 6 hours PRN  furosemide   Injectable 40 daily  lactulose Syrup 30 every 6 hours    SOCIAL HISTORY:   significant alcohol use until april, no smoking or recreational drugs, from [facility]  FAMILY HISTORY:  No pertinent family history in first degree relatives      REVIEW OF SYSTEMS  [  ] ROS unobtainable because:    [ x ] All other systems negative except as noted below:	    Constitutional:  [ ] fever [ ] chills  [ ] weight loss  [ ] weakness  Skin:  [ ] rash [ ] phlebitis	  Eyes: [ ] icterus [ ] pain  [ ] discharge	  ENMT: [ ] sore throat  [ ] thrush [ ] ulcers [ ] exudates  Respiratory: [ ] dyspnea [ ] hemoptysis [ ] cough [ ] sputum	  Cardiovascular:  [ ] chest pain [ ] palpitations [ ] edema	  Gastrointestinal:  [ ] nausea [ ] vomiting [ ] diarrhea [ ] constipation [ ] pain	  Genitourinary:  [ ] dysuria [ ] frequency [ ] hematuria [ ] discharge [ ] flank pain  [ ] incontinence  Musculoskeletal:  [ ] myalgias [ ] arthralgias [ ] arthritis  [ ] back pain  Neurological:  [ ] headache [ ] seizures  [ ] confusion/altered mental status  Psychiatric:  [ ] anxiety [ ] depression	  Hematology/Lymphatics:  [ ] lymphadenopathy  Endocrine:  [ ] adrenal [ ] thyroid  Allergic/Immunologic:	 [ ] transplant [ ] seasonal    Vital Signs Last 24 Hrs  T(F): 98.5 (21 @ 08:27), Max: 101.5 (21 @ 00:12)  Vital Signs Last 24 Hrs  HR: 115 (21 @ 08:27) (89 - 115)  BP: 115/72 (21 @ 08:27) (99/65 - 120/73)  RR: 19 (21 @ 08:27)  SpO2: 98% (21 @ 08:27) (94% - 99%)  Wt(kg): --    PHYSICAL EXAM:  Constitutional: non-toxic, no distress, oriented and alert though somewhat poor historian  HEAD/EYES: icteric  ENT:  supple, no thrush, broken tooth right upper  Cardiovascular:   normal S1, S2, no murmur, no edema  Respiratory:  faint crackles left lung base  GI:  soft, minimal burning pain on palpation RUQ, neg [place] sign, normal bowel sounds  :  no burnham, no CVA tenderness  Musculoskeletal:  b/l LE pitting edema, superficial skin tear on dorsal LLE, no signs of cellulitis, abscess, or infection  Neurologic: awake and alert, normal strength, no focal findings  Skin:  no rash, no erythema, no phlebitis  Heme/Onc: no lymphadenopathy   Psychiatric:  awake, alert, appropriate mood                            7.9    26.53 )-----------( 77       ( 30 May 2021 07:15 )             23.8     05-    132<L>  |  99  |  16  ----------------------------<  87  3.3<L>   |  17<L>  |  0.99    Ca    8.0<L>      30 May 2021 07:15  Phos  2.1       Mg     1.7         TPro  5.5<L>  /  Alb  3.0<L>  /  TBili  16.9<H>  /  DBili  x   /  AST  96<H>  /  ALT  54<H>  /  AlkPhos  199<H>      Urinalysis Basic - ( 30 May 2021 08:21 )    Color: Yellow / Appearance: Clear / S.008 / pH: x  Gluc: x / Ketone: Negative  / Bili: Negative / Urobili: Negative   Blood: x / Protein: Negative / Nitrite: Negative   Leuk Esterase: Negative / RBC: x / WBC x   Sq Epi: x / Non Sq Epi: x / Bacteria: x    MICROBIOLOGY:Vancomycin Level, Trough: 22.7 ( @ 13:46)  Vancomycin Level, Trough: 18.4 ( @ 06:26)    Culture - Body Fluid with Gram Stain (collected 28 May 2021 22:13)  Source: .Body Fluid Peritoneal Fluid  Preliminary Report (29 May 2021 18:49):    No growth    Culture - Blood (collected 27 May 2021 08:47)  Source: .Blood Blood-Venous  Preliminary Report (28 May 2021 09:01):    No growth to date.    Culture - Blood (collected 27 May 2021 08:47)  Source: .Blood Blood-Peripheral  Preliminary Report (28 May 2021 09:01):    No growth to date.                  RADIOLOGY:  < from: Xray Chest 1 View- PORTABLE-Urgent (Xray Chest 1 View- PORTABLE-Urgent .) (21 @ 10:52) >  IMPRESSION:    Left greater than right bilateral patchy hazy opacities new from prior.        < end of copied text >  < from: US Abdomen Limited (21 @ 13:50) >    IMPRESSION:  Mild to moderate ascites.      < end of copied text >  < from: Xray Chest 1 View AP/PA (21 @ 10:13) >      IMPRESSION:  Persistently elevated right hemidiaphragm.    Right basilar linear atelectasis. Otherwise clear lungs.    < end of copied text >  < from: Xray Foot AP + Lateral + Oblique, Left (21 @ 09:44) >    IMPRESSION: There is no osseous abnormality identified. There is marked dorsal soft tissue swelling throughout the midfoot and forefoot. There is no fracture.      < end of copied text >  < from: VA Duplex Lower Ext Vein Scan, Left (21 @ 04:45) >  IMPRESSION:    No deep vein thrombosis in the left lower extremity.        < end of copied text >  < from: MR Abdomen w/wo IV Cont (21 @ 20:17) >    IMPRESSION:    Wedge shaped left upper pole hypoenhancing, nonmasslike region may represent infarction or focal pyelonephritis.    Hepatomegaly with early cirrhotic features. No evidence of HCC. Splenomegaly. Moderate ascites.    < end of copied text >

## 2021-05-30 NOTE — PROGRESS NOTE ADULT - PROBLEM SELECTOR PLAN 4
SCr .87 on admission. baseline SCr .5. Likely prerenal 2/2 hypoperfusion i/s/o decompensated cirrhosis. need to r/o sepsis (SBP)  - avoid nephrotoxic meds  - c/w albumin per hepatology   - s/p para 5/28- 2.3 L removed  - blood cx 5/27- ngtd   - Monitor BMP  - monitor I/O  - bladder scans may be inaccurate given ascites

## 2021-05-30 NOTE — PROGRESS NOTE ADULT - SUBJECTIVE AND OBJECTIVE BOX
*incomplete note, full note to follow*      Chief Complaint:  Patient is a 41y old  Male who presents with a chief complaint of cellulitis (28 May 2021 07:43)      Interval Events:   - Continues to be febrile again to 101 this AM  - Leukocytosis remains elevated 26 <-- 25.9    - s/p 2.3 L para, negative for SBP   - INR uptrending 2.54 <-- 2.19 <-- 1.85   - Tbili 16.9 <-- 18.0   - BCx and fungal Cx from 5/29 pending       Hospital Medications:  acetaminophen   Tablet .. 650 milliGRAM(s) Oral every 6 hours PRN  albumin human 25% IVPB 100 milliLiter(s) IV Intermittent every 6 hours  cefTRIAXone   IVPB      folic acid 1 milliGRAM(s) Oral daily  lactulose Syrup 30 Gram(s) Oral every 6 hours  multivitamin 1 Tablet(s) Oral daily  rifAXIMin 550 milliGRAM(s) Oral two times a day  vancomycin  IVPB 1000 milliGRAM(s) IV Intermittent every 8 hours      PMHX/PSHX:  No pertinent past medical history    Alcohol abuse    Cirrhosis    No significant past surgical history            ROS:     General:  No weight loss, fevers, chills, night sweats, fatigue   Eyes:  No vision changes  ENT:  No sore throat, pain, runny nose  CV:  No chest pain, palpitations, dizziness   Resp:  No SOB, cough, wheezing  GI:  See HPI  :  No burning with urination, hematuria  Muscle:  No pain, weakness  Neuro:  No weakness/tingling, memory problems  Psych:  No fatigue, insomnia, mood problems, depression  Heme:  No easy bruisability  Skin:  No rash, edema      PHYSICAL EXAM:   Vital Signs Last 24 Hrs  T(C): 37.9 (30 May 2021 04:46), Max: 38.6 (30 May 2021 00:12)  T(F): 100.3 (30 May 2021 04:46), Max: 101.5 (30 May 2021 00:12)  HR: 93 (30 May 2021 04:46) (89 - 110)  BP: 105/55 (30 May 2021 04:46) (99/65 - 120/73)  BP(mean): --  RR: 18 (30 May 2021 04:46) (18 - 19)  SpO2: 94% (30 May 2021 04:46) (94% - 99%)    GENERAL:  no distress  HEENT:  NC/AT,  conjunctivae clear and pink, scleral icterus  CHEST:  Full & symmetric excursion, no increased effort  HEART:  Regular rhythm, rate  ABDOMEN:  Soft, non-tender, distended  EXTREMITIES:  no cyanosis, clubbing or edema  SKIN:  jaundice  NEURO:  Alert, oriented, +asterixis        LABS:                        8.5    25.97 )-----------( 73       ( 29 May 2021 07:16 )             25.3     05-28    127<L>  |  94<L>  |  20  ----------------------------<  80  3.6   |  18<L>  |  0.75    Ca    7.8<L>      28 May 2021 06:26  Phos  2.9     05-28  Mg     1.8     05-28    TPro  5.7<L>  /  Alb  2.1<L>  /  TBili  19.0<H>  /  DBili  x   /  AST  102<H>  /  ALT  85<H>  /  AlkPhos  276<H>  05-28    LIVER FUNCTIONS - ( 28 May 2021 06:26 )  Alb: 2.1 g/dL / Pro: 5.7 g/dL / ALK PHOS: 276 U/L / ALT: 85 U/L / AST: 102 U/L / GGT: x           PT/INR - ( 29 May 2021 07:16 )   PT: 25.3 sec;   INR: 2.19 ratio         PTT - ( 29 May 2021 07:16 )  PTT:42.6 sec                  Imaging:

## 2021-05-30 NOTE — PROGRESS NOTE ADULT - PROBLEM SELECTOR PLAN 3
h/o heavy EtOH use with prior admission for decompensated hepatic cirrhosis s/p para 4/22- 2.1 L removed. ammonia 44 (wnl). MELD 30   - c/w lactulose (titrate to 3-4 BM/day)  - c/w rifaximin  - grossly distended on exam  - Abd US 5/27 shows mild-mod ascites- s/p para 2.3 L removed 5/28  - ascitic fluid neg for SBP. SAAG 1.7, ascitic protin .7 c/w cirrhosis   - febrile 5/28 o/n- broadened from ceftriaxone (5/28) to zosyn (5/29- )  - c/w zosyn (5/29- ) for SBP ppx until infectious workup completed  - blood cx 5/27- ngtd   - hepatology following- appreciate recs h/o heavy EtOH use with prior admission for decompensated hepatic cirrhosis s/p para 4/22- 2.1 L removed. ammonia 44 (wnl). MELD 30   - c/w lactulose (titrate to 3-4 BM/day) and rifaxmin  - Abd US 5/27 shows mild-mod ascites- s/p para 2.3 L removed 5/28  - ascitic fluid neg for SBP. SAAG 1.7, ascitic protin .7 c/w cirrhosis   - febrile 5/28 o/n- broadened from ceftriaxone (5/28) to zosyn (5/29- )  - c/w zosyn (5/29- ) for SBP ppx until infectious workup completed  - blood cx 5/27- ngtd   - hepatology following- appreciate recs  -CT ab/P pending  -hold of lasix 40mg IV during infectious workup  -start IV Vit K 10mg for 3 days (5/30-6/1)

## 2021-05-30 NOTE — PROGRESS NOTE ADULT - PROBLEM SELECTOR PLAN 7
DVT ppx: hold chemical DVT ppx given thrombocytopenia   Diet: fluid restricted 1L daily, low sodium DVT ppx: hold chemical DVT ppx given thrombocytopenia   Diet: fluid restricted 1L daily, low sodium  Dispo: pending

## 2021-05-31 NOTE — PROGRESS NOTE ADULT - PROBLEM SELECTOR PLAN 7
DVT ppx: hold chemical DVT ppx given thrombocytopenia   Diet: fluid restricted 1L daily, low sodium  Dispo: pending

## 2021-05-31 NOTE — PROGRESS NOTE ADULT - PROBLEM SELECTOR PLAN 2
LLE cellulitis with erythema up to knee. pinpoint lesions on dorsum of foot with serous drainage, no ulcer, abrasion, skin breakdown. TTP. Duplex neg for DVT. s/p vanc x1 in ED. CRP elevated (95).   - blood cx 5/27- ngtd   - no fx or c/f osteo on foot xray  - podiatry consulted- no surgical intervention. likely 2/2 fluid overload- rec compressive therapy  - ID recs

## 2021-05-31 NOTE — PROGRESS NOTE ADULT - PROBLEM SELECTOR PLAN 3
h/o heavy EtOH use with prior admission for decompensated hepatic cirrhosis s/p para 4/22- 2.1 L removed. ammonia 44 (wnl). MELD 30   - c/w lactulose (titrate to 3-4 BM/day) and rifaxmin  - Abd US 5/27 shows mild-mod ascites- s/p para 2.3 L removed 5/28  - ascitic fluid neg for SBP. SAAG 1.7, ascitic protin .7 c/w cirrhosis   - febrile 5/28 o/n- broadened from ceftriaxone (5/28) to zosyn (5/29- )  - c/w zosyn (5/29- ) for SBP ppx until infectious workup completed  - blood cx 5/27- ngtd   - hepatology following- appreciate recs  -CT ab/P pending  -hold of lasix 40mg IV during infectious workup  -start IV Vit K 10mg for 3 days (5/30-6/1) h/o heavy EtOH use with prior admission for decompensated hepatic cirrhosis s/p para 4/22- 2.1 L removed. ammonia 44 (wnl). MELD 30   - c/w lactulose (titrate to 3-4 BM/day) and rifaxmin  - Abd US 5/27 shows mild-mod ascites- s/p para 2.3 L removed 5/28  - ascitic fluid neg for SBP. SAAG 1.7, ascitic protin .7 c/w cirrhosis   - febrile 5/28 o/n- broadened from ceftriaxone (5/28) to zosyn (5/29- )  - c/w zosyn (5/29- ) for SBP ppx until infectious workup completed  - blood cx 5/27- ngtd   - hepatology following- appreciate recs  - CT A/P: maybe new multifocal PNA?  - hold of lasix 40mg IV during infectious workup  -start IV Vit K 10mg for 3 days (5/30-6/1)

## 2021-05-31 NOTE — PROGRESS NOTE ADULT - SUBJECTIVE AND OBJECTIVE BOX
CC: F/U for Fever    Saw/spoke to patient. Had fever this AM. No new complaints otherwise. No chills. No cough/sob, no abd pain, no diarrhea.    Allergies  No Known Allergies    ANTIMICROBIALS:  piperacillin/tazobactam IVPB.. 3.375 every 8 hours  rifAXIMin 550 two times a day    PE:    Vital Signs Last 24 Hrs  T(C): 37.1 (31 May 2021 09:27), Max: 38.4 (31 May 2021 04:48)  T(F): 98.7 (31 May 2021 09:27), Max: 101.2 (31 May 2021 04:48)  HR: 111 (31 May 2021 09:) (76 - 117)  BP: 106/63 (31 May 2021 09:27) (103/71 - 125/78)  RR: 18 (31 May 2021 09:27) (18 - 18)  SpO2: 98% (31 May 2021 09:27) (94% - 98%)    Gen: AOx3, NAD, non-toxic, jaundice  CV: S1+S2 normal, tachycardic  Resp: Clear bilat, no resp distress, no crackles/wheezes  Abd: Soft, nontender, +BS  Ext: No LE edema, no wounds    LABS:                        8.0    24.58 )-----------( 90       ( 31 May 2021 06:41 )             24.6     05-31    134<L>  |  102  |  17  ----------------------------<  102<H>  4.2   |  17<L>  |  1.12    Ca    8.3<L>      31 May 2021 06:41  Phos  1.8     -  Mg     1.6         TPro  5.9<L>  /  Alb  3.3  /  TBili  19.1<H>  /  DBili  x   /  AST  79<H>  /  ALT  43  /  AlkPhos  183<H>      Urinalysis Basic - ( 30 May 2021 08:21 )    Color: Yellow / Appearance: Clear / S.008 / pH: x  Gluc: x / Ketone: Negative  / Bili: Negative / Urobili: Negative   Blood: x / Protein: Negative / Nitrite: Negative   Leuk Esterase: Negative / RBC: x / WBC x   Sq Epi: x / Non Sq Epi: x / Bacteria: x    MICROBIOLOGY:    .Urine Clean Catch (Midstream)  21   No growth    .Blood Blood, isolator tube  21   Testing in progress     .Urine Clean Catch (Midstream)  21   No growth      (otherwise reviewed)    RADIOLOGY:     CT:    IMPRESSION:  Partially imaged patchy opacities in the right middle lobe, left upper lobe and bilateral lower lobes, likely representing multifocal infection.    There is 1.2 cm right lower lobe lung nodule which is likely inflammatory in etiology. Follow-up CT in 3-4 weeks is recommended to document resolution.    Cirrhosis with evidence of portal hypertension (splenomegaly, varices, small to moderate volume ascites).

## 2021-05-31 NOTE — PROGRESS NOTE ADULT - ASSESSMENT
39yo M with PMH of EtOH abuse who presents with jaundice and abdominal distention x 2 weeks, found to have cirrhosis and elevated liver tests, now febrile with elevated leukocytosis concerning for possible infection, CT a/p with new pulmonary opacities.     #Fevers: pt with daily fevers since 5/29, now with CT c/a/p showing new pulmonary opacities with BCx NTD. LDH elevated to 196. Awaiting fungal Cx, histo, cryptococcus.   # Elevated liver tests - ALT/AST ratio in the settings of ongoing (positive BAL in April) EtOH use with recent admission for alc hep; overall downtrending. Concern for possible infection given leukocytosis and may have been on steroids for alc hep in clinical trial.  # Cirrhotic morphology on imaging- suggestive of underlying cirrhosis, suspected 2/2 EtOH use. MELD-Na 30. Possible liver transplant candidate if able to remain abstinent from EtOH. Appears to have good social support.   - ascites - present  - varices - unknown  - HCC - no focal lesions on imaging CT 4/20 (not multiphase)  - HE - +asterixis  # Active EtOH use  # Leukocytosis  # Hyponatremia      Recommendations:  - F/u infectious w/u - fungal cx, crypto, histo, ID recs  - Send fungitell, galactomannan   - c/w albumin 25% 100cc q6  - c/w antibiotics (zosyn currently)  - fluid restriction to 1L/day  - lactulose 30cc q6, titrate to 3-4BM/day  - rifaximin 550 BID  - rest of care per primary team      Thank you for involving us in the care of this patient, please reach out if any further questions.     Mckay Darden MD  Gastroenterology Fellow, PGY4    Available on [employer] Teams  [phone] ([facility])  [phone] ([facility])  Please contact on call fellow weekdays after 5pm-7am and weekends: [phone]

## 2021-05-31 NOTE — PROGRESS NOTE ADULT - SUBJECTIVE AND OBJECTIVE BOX
NOTE INCOMPLETE/ IN PROGRESS    PROGRESS NOTE:   Authored by Dr. Monalisa Kimbrough MD  Pager [facility]: [phone]; [facility]: [phone]     Patient is a 41y old  Male who presents with a chief complaint of cellulitis (30 May 2021 09:36)      SUBJECTIVE / OVERNIGHT EVENTS:  O/N, pt spiked a fever to 101.3 F.     MEDICATIONS  (STANDING):  albumin human 25% IVPB 100 milliLiter(s) IV Intermittent every 6 hours  folic acid 1 milliGRAM(s) Oral daily  lactulose Syrup 30 Gram(s) Oral every 8 hours  multivitamin 1 Tablet(s) Oral daily  phytonadione  IVPB 10 milliGRAM(s) IV Intermittent daily  piperacillin/tazobactam IVPB.. 3.375 Gram(s) IV Intermittent every 8 hours  rifAXIMin 550 milliGRAM(s) Oral two times a day    MEDICATIONS  (PRN):  acetaminophen   Tablet .. 650 milliGRAM(s) Oral every 6 hours PRN Temp greater or equal to 38C (100.4F), Mild Pain (1 - 3)      CAPILLARY BLOOD GLUCOSE        I&O's Summary    29 May 2021 07:01  -  30 May 2021 07:00  --------------------------------------------------------  IN: 1927 mL / OUT: 700 mL / NET: 1227 mL    30 May 2021 07:01  -  31 May 2021 06:53  --------------------------------------------------------  IN: 1930 mL / OUT: 1500 mL / NET: 430 mL        PHYSICAL EXAM:  Vital Signs Last 24 Hrs  T(C): 38.4 (31 May 2021 04:48), Max: 38.4 (31 May 2021 04:48)  T(F): 101.2 (31 May 2021 04:48), Max: 101.2 (31 May 2021 04:48)  HR: 117 (31 May 2021 04:48) (76 - 117)  BP: 125/78 (31 May 2021 04:48) (103/71 - 125/78)  BP(mean): --  RR: 18 (31 May 2021 04:48) (18 - 19)  SpO2: 96% (31 May 2021 04:48) (94% - 98%)    GENERAL: No acute distress  HEAD:  Atraumatic, Normocephalic  EYES: EOMI, PERRLA, conjunctiva and sclera clear  NECK: Supple, no lymphadenopathy, no JVD  CHEST/LUNG: CTAB; No wheezes, rales, or rhonchi  HEART: Regular rate and rhythm; No murmurs, rubs, or gallops  ABDOMEN: Soft, non-tender. +Distended- decreased from previous. normal bowel sounds, no organomegaly  EXTREMITIES:  +b/l 2+ Pitting edema. improved erythema of LLE- mildly TTP. b/l feet wrapped. +asterxis improved  NEUROLOGY: A&O x 3, no focal deficits  SKIN: +Jaundice    LABS:                        8.0    24.58 )-----------( 90       ( 31 May 2021 06:41 )             24.6     05-30    132<L>  |  99  |  16  ----------------------------<  87  3.3<L>   |  17<L>  |  0.99    Ca    8.0<L>      30 May 2021 07:15  Phos  2.1     05-30  Mg     1.7     05-30    TPro  5.5<L>  /  Alb  3.0<L>  /  TBili  16.9<H>  /  DBili  x   /  AST  96<H>  /  ALT  54<H>  /  AlkPhos  199<H>  05-30    PT/INR - ( 30 May 2021 07:15 )   PT: 29.2 sec;   INR: 2.54 ratio         PTT - ( 30 May 2021 07:15 )  PTT:51.7 sec      Urinalysis Basic - ( 30 May 2021 08:21 )    Color: Yellow / Appearance: Clear / S.008 / pH: x  Gluc: x / Ketone: Negative  / Bili: Negative / Urobili: Negative   Blood: x / Protein: Negative / Nitrite: Negative   Leuk Esterase: Negative / RBC: x / WBC x   Sq Epi: x / Non Sq Epi: x / Bacteria: x        Culture - Urine (collected 30 May 2021 04:00)  Source: .Urine Clean Catch (Midstream)  Final Report (30 May 2021 22:52):    No growth    Culture - Blood (collected 29 May 2021 12:22)  Source: .Blood Blood-Peripheral  Preliminary Report (30 May 2021 13:01):    No growth to date.    Culture - Blood (collected 29 May 2021 12:21)  Source: .Blood Blood-Peripheral  Preliminary Report (30 May 2021 13:01):    No growth to date.    Culture - Body Fluid with Gram Stain (collected 28 May 2021 22:13)  Source: .Body Fluid Peritoneal Fluid  Preliminary Report (29 May 2021 18:49):    No growth        RADIOLOGY & ADDITIONAL TESTS:  Results Reviewed:   Imaging Personally Reviewed:  Electrocardiogram Personally Reviewed:    COORDINATION OF CARE:  Care Discussed with Consultants/Other Providers [Y/N]:  Prior or Outpatient Records Reviewed [Y/N]:   PROGRESS NOTE:   Authored by Dr. Monalisa Kimbrough MD  Pager [facility]: [phone]; [facility]: [phone]     Patient is a 41y old  Male who presents with a chief complaint of cellulitis (30 May 2021 09:36)      SUBJECTIVE / OVERNIGHT EVENTS:  O/N, pt spiked a fever to 101.3 F. Given tylenol. Pt seen and examined this AM. Pt stated that he did not feel his fever last night. Also denying chills, SOB, cough, dysuria. Pt spiking again this PM, given tylenol and recultured. RVP negative. SpCx ordered given that he may have multifocal PNA on imaging.       MEDICATIONS  (STANDING):  albumin human 25% IVPB 100 milliLiter(s) IV Intermittent every 6 hours  folic acid 1 milliGRAM(s) Oral daily  lactulose Syrup 30 Gram(s) Oral every 8 hours  multivitamin 1 Tablet(s) Oral daily  phytonadione  IVPB 10 milliGRAM(s) IV Intermittent daily  piperacillin/tazobactam IVPB.. 3.375 Gram(s) IV Intermittent every 8 hours  rifAXIMin 550 milliGRAM(s) Oral two times a day    MEDICATIONS  (PRN):  acetaminophen   Tablet .. 650 milliGRAM(s) Oral every 6 hours PRN Temp greater or equal to 38C (100.4F), Mild Pain (1 - 3)        I&O's Summary    29 May 2021 07:  -  30 May 2021 07:00  --------------------------------------------------------  IN: 1927 mL / OUT: 700 mL / NET: 1227 mL    30 May 2021 07:01  -  31 May 2021 06:53  --------------------------------------------------------  IN: 1930 mL / OUT: 1500 mL / NET: 430 mL        PHYSICAL EXAM:  Vital Signs Last 24 Hrs  T(C): 38.4 (31 May 2021 04:48), Max: 38.4 (31 May 2021 04:48)  T(F): 101.2 (31 May 2021 04:48), Max: 101.2 (31 May 2021 04:48)  HR: 117 (31 May 2021 04:48) (76 - 117)  BP: 125/78 (31 May 2021 04:48) (103/71 - 125/78)  BP(mean): --  RR: 18 (31 May 2021 04:48) (18 - 19)  SpO2: 96% (31 May 2021 04:48) (94% - 98%)    GENERAL: No acute distress  HEAD:  Atraumatic, Normocephalic  EYES: EOMI, PERRLA, +scleral icterus   NECK: Supple, no lymphadenopathy, no JVD  CHEST/LUNG: CTAB; No wheezes, rales, or rhonchi  HEART: Regular rate and rhythm; No murmurs, rubs, or gallops  ABDOMEN: Soft, non-tender. +Distended- decreased from previous. normal bowel sounds, no organomegaly  EXTREMITIES:  +b/l 2+ Pitting edema. improved erythema of LLE- mildly TTP. b/l feet wrapped. +asterixis improved  NEUROLOGY: A&O x 3, no focal deficits  SKIN: +Jaundice    LABS:                        8.0    24.58 )-----------( 90       ( 31 May 2021 06:41 )             24.6     05-30    132<L>  |  99  |  16  ----------------------------<  87  3.3<L>   |  17<L>  |  0.99    Ca    8.0<L>      30 May 2021 07:15  Phos  2.1     05-30  Mg     1.7     05-30    TPro  5.5<L>  /  Alb  3.0<L>  /  TBili  16.9<H>  /  DBili  x   /  AST  96<H>  /  ALT  54<H>  /  AlkPhos  199<H>  05-30    PT/INR - ( 30 May 2021 07:15 )   PT: 29.2 sec;   INR: 2.54 ratio         PTT - ( 30 May 2021 07:15 )  PTT:51.7 sec      Urinalysis Basic - ( 30 May 2021 08:21 )    Color: Yellow / Appearance: Clear / S.008 / pH: x  Gluc: x / Ketone: Negative  / Bili: Negative / Urobili: Negative   Blood: x / Protein: Negative / Nitrite: Negative   Leuk Esterase: Negative / RBC: x / WBC x   Sq Epi: x / Non Sq Epi: x / Bacteria: x    Culture - Urine (collected 30 May 2021 04:00)  Source: .Urine Clean Catch (Midstream)  Final Report (30 May 2021 22:52):    No growth    Culture - Blood (collected 29 May 2021 12:22)  Source: .Blood Blood-Peripheral  Preliminary Report (30 May 2021 13:01):    No growth to date.    Culture - Blood (collected 29 May 2021 12:21)  Source: .Blood Blood-Peripheral  Preliminary Report (30 May 2021 13:01):    No growth to date.    Culture - Body Fluid with Gram Stain (collected 28 May 2021 22:13)  Source: .Body Fluid Peritoneal Fluid  Preliminary Report (29 May 2021 18:49):    No growth

## 2021-05-31 NOTE — PROGRESS NOTE ADULT - PROBLEM SELECTOR PLAN 1
febrile 5/28 o/n to 101.3 w/elev WBC, tachypnea, tachycardia. elev WBC may be i/s/o steroids from clinical trial. Other sources include LLE cellulitis (improving, no open wound/ulcer) vs PNA (O2 93%, tachypneic o/n) vs UTI. Ascitic fluid neg for SBP 5/28.  - blood cx 5/27- ngtd, blood cx 5/29  - f/u UCx and UA  - f/u fungal cx   - c/w IV vanc (5/27-5/29), MRSA neg  - ceftriaxone (5/28) broadened to zosyn (5/29- ) given fever  - no fx or c/f osteo on foot xray  - ID consulted: ordered CT ab/P with IV to rule out infection febrile 5/28 o/n to 101.3 w/elev WBC, tachypnea, tachycardia. elev WBC may be i/s/o steroids from clinical trial. Other sources include LLE cellulitis (improving, no open wound/ulcer) vs PNA (O2 93%, tachypneic o/n) vs UTI. Ascitic fluid neg for SBP 5/28.  - blood cx 5/27- ngtd, blood cx 5/29; repeating 5/31  - f/u UCx and UA  - f/u fungal cx   - c/w IV vanc (5/27-5/29), MRSA neg  - ceftriaxone (5/28) broadened to zosyn (5/29- ) given fever  - no fx or c/f osteo on foot xray  - ID consulted: ordered CT ab/P with IV to rule out infection  - RVP negative  - SpCx ordered

## 2021-05-31 NOTE — PROGRESS NOTE ADULT - ASSESSMENT
41 M originally from [facility] PMHx of ETOH abuse (quit early April) c/b decompensated alcoholic cirrhosis, recent admission (4/20-4/27) for decompensated cirrhosis, presented with b/l LE swelling and LLE foot pain  Leukocytosis, fever  Was on treatment for LLE cellulitis--on my eval, site is markedly improved, no signs active infection  Elevated LFTs in setting cirrhosis  No pulmonary complaints  Ascites fluid not consistent with SBP  CXR with hazy opacities, I reviewed personally, no large consolidation  CT A/P notes lower lung opacities with concern for possible infection  Unclear cause for fevers/leukocytosis presently, although I suspect LFTs are due to cirrhosis, would evaluate for possibility cholangitis  Overall,  1) Fever, Leukocytosis  - CT A/P with lung opacities, although minimal symptoms pneumonia clinically, treat for now  - Zosyn 3.375g q 8  - Would not re-add vanco unless clinical signs worsening (note MRSA PCR is negative, and patient appears generally stable)  - F/U BCXs  - Trend WBC to normal, monitor for further fevers  2) Elevated LFTs  - Trend to normal  - F/U Hepatology  3) PNA  - Zosyn as above  - monitor resp status    Sourav Jimenez MD  Pager [phone]  After 5pm and on weekends call [phone]

## 2021-05-31 NOTE — PROGRESS NOTE ADULT - ASSESSMENT
41y M PMHx EtOH abuse (last drink early April) c/b decompensated hepatic cirrhosis p/w progressively worsening b/l LE swelling a/w LLE foot pain likely 2/2 cellulitis, with decompensated cirrhosis s/p para 5/28- 2.3L removed neg for SBP with persistent fevers 41y M PMHx EtOH abuse (last drink early April) c/b decompensated hepatic cirrhosis p/w progressively worsening b/l LE swelling a/w LLE foot pain likely 2/2 cellulitis, with decompensated cirrhosis s/p para 5/28- 2.3L removed neg for SBP with persistent fevers.

## 2021-05-31 NOTE — PROVIDER CONTACT NOTE (OTHER) - BACKGROUND
Admitted with alcoholic cirrhosis, cellulitis LLE, thrombocytopenia
Pt has hepatic cirrhosis
Pt has hepatic cirrhosis and cellulitis

## 2021-05-31 NOTE — PROGRESS NOTE ADULT - SUBJECTIVE AND OBJECTIVE BOX
*incomplete note, full note to follow*      Chief Complaint:  Patient is a 41y old  Male who presents with a chief complaint of cellulitis (28 May 2021 07:43)      Interval Events:   - Pt again febrile to 101.2  - CT a/p showing patchy opacities in the right middle lobe, left upper lobe and bilateral lower lobes, likely representing multifocal infection.  - Leukocytosis remains elevated, slightly downtrending 24.58 <-- 26 <-- 25.9    - INR starting to downtrend 2.37 <-- 2.54 <-- 2.19 <-- 1.85   - Tbili 16.9 <-- 18.0   - BCx  negative, fungal Cx pending      Hospital Medications:  acetaminophen   Tablet .. 650 milliGRAM(s) Oral every 6 hours PRN  albumin human 25% IVPB 100 milliLiter(s) IV Intermittent every 6 hours  cefTRIAXone   IVPB      folic acid 1 milliGRAM(s) Oral daily  lactulose Syrup 30 Gram(s) Oral every 6 hours  multivitamin 1 Tablet(s) Oral daily  rifAXIMin 550 milliGRAM(s) Oral two times a day  vancomycin  IVPB 1000 milliGRAM(s) IV Intermittent every 8 hours      PMHX/PSHX:  No pertinent past medical history    Alcohol abuse    Cirrhosis    No significant past surgical history            ROS:     General:  No weight loss, fevers, chills, night sweats, fatigue   Eyes:  No vision changes  ENT:  No sore throat, pain, runny nose  CV:  No chest pain, palpitations, dizziness   Resp:  No SOB, cough, wheezing  GI:  See HPI  :  No burning with urination, hematuria  Muscle:  No pain, weakness  Neuro:  No weakness/tingling, memory problems  Psych:  No fatigue, insomnia, mood problems, depression  Heme:  No easy bruisability  Skin:  No rash, edema      PHYSICAL EXAM:   Vital Signs Last 24 Hrs  T(C): 38.4 (31 May 2021 04:48), Max: 38.4 (31 May 2021 04:48)  T(F): 101.2 (31 May 2021 04:48), Max: 101.2 (31 May 2021 04:48)  HR: 117 (31 May 2021 04:48) (76 - 117)  BP: 125/78 (31 May 2021 04:48) (103/71 - 125/78)  BP(mean): --  RR: 18 (31 May 2021 04:48) (18 - 18)  SpO2: 96% (31 May 2021 04:48) (94% - 97%)      GENERAL:  no distress  HEENT:  NC/AT,  conjunctivae clear and pink, scleral icterus  CHEST:  Full & symmetric excursion, no increased effort  HEART:  Regular rhythm, rate  ABDOMEN:  Soft, non-tender, distended  EXTREMITIES:  no cyanosis, clubbing or edema  SKIN:  jaundice  NEURO:  Alert, oriented, +asterixis    LABS:                        8.0    24.58 )-----------( 90       ( 31 May 2021 06:41 )             24.6     05-    134<L>  |  102  |  17  ----------------------------<  102<H>  4.2   |  17<L>  |  1.12    Ca    8.3<L>      31 May 2021 06:41  Phos  1.8       Mg     1.6         TPro  5.9<L>  /  Alb  3.3  /  TBili  19.1<H>  /  DBili  x   /  AST  79<H>  /  ALT  43  /  AlkPhos  183<H>      LIVER FUNCTIONS - ( 31 May 2021 06:41 )  Alb: 3.3 g/dL / Pro: 5.9 g/dL / ALK PHOS: 183 U/L / ALT: 43 U/L / AST: 79 U/L / GGT: x           PT/INR - ( 31 May 2021 06:41 )   PT: 27.3 sec;   INR: 2.37 ratio         PTT - ( 31 May 2021 06:41 )  PTT:49.9 sec  Urinalysis Basic - ( 30 May 2021 08:21 )    Color: Yellow / Appearance: Clear / S.008 / pH: x  Gluc: x / Ketone: Negative  / Bili: Negative / Urobili: Negative   Blood: x / Protein: Negative / Nitrite: Negative   Leuk Esterase: Negative / RBC: x / WBC x   Sq Epi: x / Non Sq Epi: x / Bacteria: x                        Imaging:

## 2021-06-01 NOTE — PROGRESS NOTE ADULT - PROBLEM SELECTOR PLAN 1
febrile 5/28 o/n to 101.3 w/elev WBC, tachypnea, tachycardia. elev WBC may be i/s/o steroids from clinical trial. Other sources include LLE cellulitis (improving, no open wound/ulcer) vs PNA (O2 93%, tachypneic o/n) vs UTI. Ascitic fluid neg for SBP 5/28.  - blood cx 5/27- ngtd, blood cx 5/29; repeating 5/31  - f/u UCx and UA  - f/u fungal cx   - c/w IV vanc (5/27-5/29), MRSA neg  - ceftriaxone (5/28) broadened to zosyn (5/29- ) given fever  - no fx or c/f osteo on foot xray  - ID consulted: ordered CT ab/P with IV to rule out infection  - RVP negative  - SpCx ordered febrile 5/28 o/n to 101.3 w/elev WBC, tachypnea, tachycardia. elev WBC may be i/s/o steroids from clinical trial. Other sources include LLE cellulitis (improving, no open wound/ulcer) vs PNA (O2 93%, tachypneic o/n) vs UTI. Ascitic fluid neg for SBP 5/28.  - blood cx 5/27- ngtd, blood cx 5/29; repeating 5/31  - UCx and UA: negative  - f/u fungal cx   - IV vanc (5/27-5/29), MRSA neg  - ceftriaxone (5/28) broadened to zosyn (5/29- ) given fevers  - no fx or c/f osteo on foot xray  - ID consulted: ordered CT ab/P with IV to rule out infection--> showing multifocal PNA  - Pt also ordered for CTA Chest--> will f/u  - RVP negative  - SpCx ordered

## 2021-06-01 NOTE — PROGRESS NOTE ADULT - ASSESSMENT
41y M PMHx EtOH abuse (last drink early April) c/b decompensated hepatic cirrhosis p/w progressively worsening b/l LE swelling a/w LLE foot pain likely 2/2 cellulitis, with decompensated cirrhosis s/p para 5/28- 2.3L removed neg for SBP with persistent fevers.

## 2021-06-01 NOTE — PROGRESS NOTE ADULT - PROBLEM SELECTOR PLAN 3
h/o heavy EtOH use with prior admission for decompensated hepatic cirrhosis s/p para 4/22- 2.1 L removed. ammonia 44 (wnl). MELD 30   - c/w lactulose (titrate to 3-4 BM/day) and rifaxmin  - Abd US 5/27 shows mild-mod ascites- s/p para 2.3 L removed 5/28  - ascitic fluid neg for SBP. SAAG 1.7, ascitic protin .7 c/w cirrhosis   - febrile 5/28 o/n- broadened from ceftriaxone (5/28) to zosyn (5/29- )  - c/w zosyn (5/29- ) for SBP ppx until infectious workup completed  - blood cx 5/27- ngtd   - hepatology following- appreciate recs  - CT A/P: maybe new multifocal PNA?  - hold of lasix 40mg IV during infectious workup  -start IV Vit K 10mg for 3 days (5/30-6/1) h/o heavy EtOH use with prior admission for decompensated hepatic cirrhosis s/p para 4/22- 2.1 L removed. ammonia 44 (wnl). MELD 30   - c/w lactulose (titrate to 3-4 BM/day) and rifaxmin  - Abd US 5/27 shows mild-mod ascites- s/p para 2.3 L removed 5/28  - ascitic fluid neg for SBP. SAAG 1.7, ascitic protin .7 c/w cirrhosis   - febrile 5/28 o/n- broadened from ceftriaxone (5/28) to zosyn (5/29- )  - c/w zosyn (5/29- ) for SBP ppx until infectious workup completed  - blood cx 5/27- ngtd   - hepatology following- appreciate recs  - CT A/P: maybe new multifocal PNA?  - hold of lasix 40mg IV during infectious workup

## 2021-06-01 NOTE — PROGRESS NOTE ADULT - ASSESSMENT
41yo M with PMH of EtOH abuse who presents with jaundice and abdominal distention x 2 weeks, found to have cirrhosis and elevated liver tests, now febrile with elevated leukocytosis concerning for possible infection, CT a/p with new pulmonary opacities.     #Fevers: pt with daily fevers since 5/29, now with CT c/a/p showing new pulmonary opacities with BCx NTD. LDH elevated to 196. Awaiting fungal Cx, histo, cryptococcus.   # Elevated liver tests - ALT/AST ratio in the settings of ongoing (positive BAL in April) EtOH use with recent admission for alc hep; overall downtrending. Concern for possible infection given leukocytosis and may have been on steroids for alc hep in clinical trial.  # Cirrhotic morphology on imaging- suggestive of underlying cirrhosis, suspected 2/2 EtOH use. MELD-Na 30. Possible liver transplant candidate if able to remain abstinent from EtOH. Appears to have good social support.   - ascites - present  - varices - unknown  - HCC - no focal lesions on imaging CT 4/20 (not multiphase)  - HE - +asterixis  # Active EtOH use  # Leukocytosis  # Hyponatremia      Recommendations:  - F/u infectious w/u - fungal cx, crypto, histo, ID recs  - Send fungitell, galactomannan   - c/w albumin 25% 100cc q6  - c/w antibiotics (zosyn currently)  - fluid restriction to 1L/day  - lactulose 30cc q6, titrate to 3-4BM/day  - rifaximin 550 BID  - rest of care per primary team    Cheyanne Elmore  Gastroenterology Fellow  Pager [phone] or [phone]  Please page on-call Fellow on weekends/holidays or after 5 pm and before 8 am on weekdays   On-call GI fellow can be contacted via the [employer] service ([phone])

## 2021-06-01 NOTE — PROGRESS NOTE ADULT - ASSESSMENT
41 M originally from [facility] PMHx of ETOH abuse (quit early April) c/b decompensated alcoholic cirrhosis, recent admission (4/20-4/27) for decompensated cirrhosis, presented with b/l LE swelling and LLE foot pain  Leukocytosis, fever  Was on treatment for LLE cellulitis--on my eval, site is markedly improved, no signs active infection  Elevated LFTs in setting cirrhosis  No pulmonary complaints  Ascites fluid not consistent with SBP  CXR with hazy opacities  CT A/P notes lower lung opacities with concern for possible infection  Unclear cause for fevers/leukocytosis presently, treat for suspected bacterial pneumonia  Overall,  1) Fever, Leukocytosis  - CT A/P with lung opacities, although minimal symptoms pneumonia clinically, treat for now  - Zosyn 3.375g q 8  - F/U BCXs  - Trend WBC to normal, monitor for further fevers  2) Elevated LFTs  - Trend to normal  - F/U Hepatology  3) PNA  - Zosyn as above, 5-7 day course  - monitor resp status    My colleagues will be covering this patient on 6/2/21, I will return 6/3/21.  Please call [phone] or on call fellow with any questions or change in status.     Sourav Jimenez MD  Pager [phone]  After 5pm and on weekends call [phone]

## 2021-06-01 NOTE — PROGRESS NOTE ADULT - SUBJECTIVE AND OBJECTIVE BOX
CC: F/U Fever    Saw/spoke to patient. Intermittent fevers, no chills. No new complaints.    Allergies  No Known Allergies    ANTIMICROBIALS:  piperacillin/tazobactam IVPB.. 3.375 every 8 hours  rifAXIMin 550 two times a day    PE:    Vital Signs Last 24 Hrs  T(C): 37 (01 Jun 2021 07:41), Max: 38.7 (31 May 2021 19:38)  T(F): 98.6 (01 Jun 2021 07:41), Max: 101.7 (31 May 2021 19:38)  HR: 95 (01 Jun 2021 07:41) (90 - 122)  BP: 98/63 (01 Jun 2021 07:41) (94/58 - 136/82)  RR: 18 (01 Jun 2021 07:41) (18 - 18)  SpO2: 95% (01 Jun 2021 07:41) (93% - 97%)    Gen: AOx3, NAD, non-toxic  CV: S1+S2 normal, nontachycardic  Resp: Clear bilat, no resp distress, no crackles/wheezes  Abd: Soft, nontender, +BS  Ext: No LE edema, no wounds    LABS:                        7.6    24.17 )-----------( 96       ( 01 Jun 2021 07:09 )             24.1     06-01    135  |  103  |  19  ----------------------------<  120<H>  3.5   |  16<L>  |  1.13    Ca    7.8<L>      01 Jun 2021 07:06  Phos  2.5     06-01  Mg     1.6     06-01    TPro  5.3<L>  /  Alb  2.8<L>  /  TBili  19.3<H>  /  DBili  x   /  AST  67<H>  /  ALT  37  /  AlkPhos  156<H>  06-01    MICROBIOLOGY:    .Stool  06-01-21   Cryptosporidium parvum antigen negative  performed by rapid immunoassay (non-enzymatic).  (It is recommended that all specimens tested by  an immunochromatographic card test be  confirmed by another method.)  --  --    .Urine Clean Catch (Midstream)  05-30-21   No growth     .Blood Blood, isolator tube  05-30-21   Testing in progress     .Urine Clean Catch (Midstream)  05-30-21   No growth      Rapid RVP Result: NotDetec (05-31 @ 12:40)    (otherwise reviewed)    RADIOLOGY:    5/30 CT:    IMPRESSION:  Partially imaged patchy opacities in the right middle lobe, left upper lobe and bilateral lower lobes, likely representing multifocal infection.    There is 1.2 cm right lower lobe lung nodule which is likely inflammatory in etiology. Follow-up CT in 3-4 weeks is recommended to document resolution.    Cirrhosis with evidence of portal hypertension (splenomegaly, varices, small to moderate volume ascites).

## 2021-06-01 NOTE — PROGRESS NOTE ADULT - SUBJECTIVE AND OBJECTIVE BOX
NOTE INCOMPLETE/ IN PROGRESS    PROGRESS NOTE:   Authored by Dr. Monalisa Kimbrough MD  Pager [facility]: [phone]; [facility]: [id number]     Patient is a 41y old  Male who presents with a chief complaint of cellulitis (31 May 2021 08:31)      SUBJECTIVE / OVERNIGHT EVENTS:  O/N, pt again spiked a fever to 100.7 F (after spiking to 101.7 in the early evening). He was given ibuprofen and tylenol, w/ resolution.     MEDICATIONS  (STANDING):  folic acid 1 milliGRAM(s) Oral daily  lactulose Syrup 30 Gram(s) Oral every 6 hours  multivitamin 1 Tablet(s) Oral daily  phytonadione  IVPB 10 milliGRAM(s) IV Intermittent daily  piperacillin/tazobactam IVPB.. 3.375 Gram(s) IV Intermittent every 8 hours  rifAXIMin 550 milliGRAM(s) Oral two times a day    MEDICATIONS  (PRN):  acetaminophen   Tablet .. 650 milliGRAM(s) Oral every 6 hours PRN Temp greater or equal to 38C (100.4F), Mild Pain (1 - 3)        I&O's Summary    30 May 2021 07:  -  31 May 2021 07:00  --------------------------------------------------------  IN: 2230 mL / OUT: 1500 mL / NET: 730 mL    31 May 2021 07:  -  2021 06:25  --------------------------------------------------------  IN: 851 mL / OUT: 300 mL / NET: 551 mL        PHYSICAL EXAM:  Vital Signs Last 24 Hrs  T(C): 36.9 (2021 05:15), Max: 38.7 (31 May 2021 19:38)  T(F): 98.5 (2021 05:15), Max: 101.7 (31 May 2021 19:38)  HR: 90 (2021 05:15) (90 - 122)  BP: 100/65 (2021 05:15) (94/58 - 136/82)  BP(mean): --  RR: 18 (2021 05:15) (18 - 18)  SpO2: 94% (2021 05:15) (93% - 98%)    GENERAL: No acute distress  HEAD:  Atraumatic, Normocephalic  EYES: EOMI, PERRLA, +scleral icterus   NECK: Supple, no lymphadenopathy, no JVD  CHEST/LUNG: CTAB; No wheezes, rales, or rhonchi  HEART: Regular rate and rhythm; No murmurs, rubs, or gallops  ABDOMEN: Soft, non-tender. +Distended- decreased from previous. normal bowel sounds, no organomegaly  EXTREMITIES:  +b/l 2+ Pitting edema. improved erythema of LLE- mildly TTP. b/l feet wrapped. +asterixis improved  NEUROLOGY: A&O x 3, no focal deficits  SKIN: +Jaundice    LABS:                        8.0    24.58 )-----------( 90       ( 31 May 2021 06:41 )             24.6     05-31    134<L>  |  102  |  17  ----------------------------<  102<H>  4.2   |  17<L>  |  1.12    Ca    8.3<L>      31 May 2021 06:41  Phos  1.8     05-31  Mg     1.6     05-31    TPro  5.9<L>  /  Alb  3.3  /  TBili  19.1<H>  /  DBili  x   /  AST  79<H>  /  ALT  43  /  AlkPhos  183<H>  05-31    PT/INR - ( 31 May 2021 06:41 )   PT: 27.3 sec;   INR: 2.37 ratio         PTT - ( 31 May 2021 06:41 )  PTT:49.9 sec      Urinalysis Basic - ( 30 May 2021 08:21 )    Color: Yellow / Appearance: Clear / S.008 / pH: x  Gluc: x / Ketone: Negative  / Bili: Negative / Urobili: Negative   Blood: x / Protein: Negative / Nitrite: Negative   Leuk Esterase: Negative / RBC: x / WBC x   Sq Epi: x / Non Sq Epi: x / Bacteria: x        Culture - Urine (collected 30 May 2021 12:41)  Source: .Urine Clean Catch (Midstream)  Final Report (31 May 2021 07:51):    No growth    Culture - Fungal, Blood (collected 30 May 2021 10:08)  Source: .Blood Blood, isolator tube  Preliminary Report (31 May 2021 10:37):    Testing in progress    Culture - Urine (collected 30 May 2021 04:00)  Source: .Urine Clean Catch (Midstream)  Final Report (30 May 2021 22:52):    No growth    Culture - Blood (collected 29 May 2021 12:22)  Source: .Blood Blood-Peripheral  Preliminary Report (30 May 2021 13:01):    No growth to date.    Culture - Blood (collected 29 May 2021 12:21)  Source: .Blood Blood-Peripheral  Preliminary Report (30 May 2021 13:01):    No growth to date.        RADIOLOGY & ADDITIONAL TESTS:  Results Reviewed:   Imaging Personally Reviewed:  Electrocardiogram Personally Reviewed:    COORDINATION OF CARE:  Care Discussed with Consultants/Other Providers [Y/N]:  Prior or Outpatient Records Reviewed [Y/N]:   PROGRESS NOTE:   Authored by Dr. Monalisa Kimbrough MD  Pager [facility]: [phone]; [facility]: [id number]     Patient is a 41y old  Male who presents with a chief complaint of cellulitis (31 May 2021 08:31)      SUBJECTIVE / OVERNIGHT EVENTS:  O/N, pt again spiked a fever to 100.7 F (after spiking to 101.7 F in the early evening). He was given ibuprofen and tylenol, w/ resolution. This AM, pt denies any complaints, stating that he feels well and is hoping to go home soon.       MEDICATIONS  (STANDING):  folic acid 1 milliGRAM(s) Oral daily  lactulose Syrup 30 Gram(s) Oral every 6 hours  multivitamin 1 Tablet(s) Oral daily  phytonadione  IVPB 10 milliGRAM(s) IV Intermittent daily  piperacillin/tazobactam IVPB.. 3.375 Gram(s) IV Intermittent every 8 hours  rifAXIMin 550 milliGRAM(s) Oral two times a day    MEDICATIONS  (PRN):  acetaminophen   Tablet .. 650 milliGRAM(s) Oral every 6 hours PRN Temp greater or equal to 38C (100.4F), Mild Pain (1 - 3)        I&O's Summary    30 May 2021 07:  -  31 May 2021 07:00  --------------------------------------------------------  IN: 2230 mL / OUT: 1500 mL / NET: 730 mL    31 May 2021 07:01  -  2021 06:25  --------------------------------------------------------  IN: 851 mL / OUT: 300 mL / NET: 551 mL        PHYSICAL EXAM:  Vital Signs Last 24 Hrs  T(C): 36.9 (2021 05:15), Max: 38.7 (31 May 2021 19:38)  T(F): 98.5 (2021 05:15), Max: 101.7 (31 May 2021 19:38)  HR: 90 (2021 05:15) (90 - 122)  BP: 100/65 (2021 05:15) (94/58 - 136/82)  BP(mean): --  RR: 18 (2021 05:15) (18 - 18)  SpO2: 94% (2021 05:15) (93% - 98%)    GENERAL: No acute distress  HEAD:  Atraumatic, Normocephalic  EYES: EOMI, PERRLA, +scleral icterus   NECK: Supple, no lymphadenopathy, no JVD  CHEST/LUNG: CTAB; No wheezes, rales, or rhonchi  HEART: Regular rate and rhythm; No murmurs, rubs, or gallops  ABDOMEN: Soft, non-tender. +Distended- decreased from previous. normal bowel sounds, no organomegaly  EXTREMITIES:  +b/l 2+ Pitting edema. improved erythema of LLE- mildly TTP. b/l feet wrapped. +asterixis improved  NEUROLOGY: A&O x 3, no focal deficits  SKIN: +Jaundice    LABS:                        8.0    24.58 )-----------( 90       ( 31 May 2021 06:41 )             24.6     05-31    134<L>  |  102  |  17  ----------------------------<  102<H>  4.2   |  17<L>  |  1.12    Ca    8.3<L>      31 May 2021 06:41  Phos  1.8       Mg     1.6     31    TPro  5.9<L>  /  Alb  3.3  /  TBili  19.1<H>  /  DBili  x   /  AST  79<H>  /  ALT  43  /  AlkPhos  183<H>  05-31    PT/INR - ( 31 May 2021 06:41 )   PT: 27.3 sec;   INR: 2.37 ratio         PTT - ( 31 May 2021 06:41 )  PTT:49.9 sec      Urinalysis Basic - ( 30 May 2021 08:21 )    Color: Yellow / Appearance: Clear / S.008 / pH: x  Gluc: x / Ketone: Negative  / Bili: Negative / Urobili: Negative   Blood: x / Protein: Negative / Nitrite: Negative   Leuk Esterase: Negative / RBC: x / WBC x   Sq Epi: x / Non Sq Epi: x / Bacteria: x        Culture - Urine (collected 30 May 2021 12:41)  Source: .Urine Clean Catch (Midstream)  Final Report (31 May 2021 07:51):    No growth    Culture - Fungal, Blood (collected 30 May 2021 10:08)  Source: .Blood Blood, isolator tube  Preliminary Report (31 May 2021 10:37):    Testing in progress    Culture - Urine (collected 30 May 2021 04:00)  Source: .Urine Clean Catch (Midstream)  Final Report (30 May 2021 22:52):    No growth    Culture - Blood (collected 29 May 2021 12:22)  Source: .Blood Blood-Peripheral  Preliminary Report (30 May 2021 13:01):    No growth to date.    Culture - Blood (collected 29 May 2021 12:21)  Source: .Blood Blood-Peripheral  Preliminary Report (30 May 2021 13:01):    No growth to date.

## 2021-06-01 NOTE — PROGRESS NOTE ADULT - SUBJECTIVE AND OBJECTIVE BOX
Chief Complaint:  Patient is a 41y old  Male who presents with a chief complaint of cellulitis (2021 06:24)      Interval Events:   Patient with fungal culture pending.  Still febrile early this morning.    Allergies:  No Known Allergies      Hospital Medications:  acetaminophen   Tablet .. 650 milliGRAM(s) Oral every 6 hours PRN  folic acid 1 milliGRAM(s) Oral daily  lactulose Syrup 30 Gram(s) Oral every 6 hours  multivitamin 1 Tablet(s) Oral daily  phytonadione  IVPB 10 milliGRAM(s) IV Intermittent daily  piperacillin/tazobactam IVPB.. 3.375 Gram(s) IV Intermittent every 8 hours  rifAXIMin 550 milliGRAM(s) Oral two times a day      PMHX/PSHX:  No pertinent past medical history    Alcohol abuse    Cirrhosis    No significant past surgical history        Family history:  No pertinent family history in first degree relatives        ROS:  General: no night sweats, wt loss  CV: no pain, palpitation  Resp: no cough wheezing  Muscles: no weakness or pain  Endocrine: no polyuria, polydipsia, cold/heat intolerance  Heme: No petechia, ecchymosis    PHYSICAL EXAM:   GENERAL:  NAD  HEENT:  NC/AT,  conjunctivae clear, sclera -anicteric  CHEST:  Full & symmetric excursion, no increased  HEART:  Regular rhythm  ABDOMEN:  Soft, non-tender, non-distended, normoactive bowel sounds,  no masses ,no hepato-splenomegaly,   EXTREMITIES:  no cyanosis,clubbing or edema  SKIN:  No rash/erythema/ecchymoses/petechiae/wounds/abscess/warm/dry  NEURO:  Alert, oriented    Vital Signs:  Vital Signs Last 24 Hrs  T(C): 37 (2021 07:41), Max: 38.7 (31 May 2021 19:38)  T(F): 98.6 (2021 07:41), Max: 101.7 (31 May 2021 19:38)  HR: 95 (2021 07:41) (90 - 122)  BP: 98/63 (2021 07:41) (94/58 - 136/82)  BP(mean): --  RR: 18 (2021 07:41) (18 - 18)  SpO2: 95% (2021 07:41) (93% - 98%)  Daily     Daily Weight in k.9 (31 May 2021 19:32)    LABS:                        7.6    24.17 )-----------( 96       ( 2021 07:09 )             24.1     06-    135  |  103  |  19  ----------------------------<  120<H>  3.5   |  16<L>  |  1.13    Ca    7.8<L>      2021 07:06  Phos  2.5     06  Mg     1.6     06    TPro  5.3<L>  /  Alb  2.8<L>  /  TBili  19.3<H>  /  DBili  x   /  AST  67<H>  /  ALT  37  /  AlkPhos  156<H>  06    LIVER FUNCTIONS - ( 2021 07:06 )  Alb: 2.8 g/dL / Pro: 5.3 g/dL / ALK PHOS: 156 U/L / ALT: 37 U/L / AST: 67 U/L / GGT: x           PT/INR - ( 2021 07:06 )   PT: 25.7 sec;   INR: 2.22 ratio         PTT - ( 31 May 2021 06:41 )  PTT:49.9 sec        Imaging:

## 2021-06-02 NOTE — PROGRESS NOTE ADULT - PROBLEM SELECTOR PLAN 3
h/o heavy EtOH use with prior admission for decompensated hepatic cirrhosis s/p para 4/22- 2.1 L removed. ammonia 44 (wnl). MELD 30   - c/w lactulose (titrate to 3-4 BM/day) and rifaxmin  - Abd US 5/27 shows mild-mod ascites- s/p para 2.3 L removed 5/28  - ascitic fluid neg for SBP. SAAG 1.7, ascitic protin .7 c/w cirrhosis   - febrile 5/28 o/n- broadened from ceftriaxone (5/28) to zosyn (5/29- )  - c/w zosyn (5/29- ) for SBP ppx until infectious workup completed  - blood cx 5/27- ngtd   - hepatology following- appreciate recs  - CT A/P: maybe new multifocal PNA?  - hold of lasix 40mg IV during infectious workup

## 2021-06-02 NOTE — DIETITIAN INITIAL EVALUATION ADULT. - CHIEF COMPLAINT
"40 y/o Male with PMHx EtOH abuse (last drink early April) c/b decompensated hepatic cirrhosis p/w progressively worsening b/l LE swelling a/w LLE foot pain likely 2/2 cellulitis, with decompensated cirrhosis s/p para 5/28- 2.3L removed neg for SBP with persistent fevers." Pt remains on antibiotics.

## 2021-06-02 NOTE — PROGRESS NOTE ADULT - ASSESSMENT
39yo M with PMH of EtOH abuse who presents with jaundice and abdominal distention x 2 weeks, found to have cirrhosis and elevated liver tests, now febrile with elevated leukocytosis concerning for possible infection, CT a/p with new pulmonary opacities.     #Fevers: pt with daily fevers since 5/29, now with CT c/a/p showing new pulmonary opacities with BCx NTD. LDH elevated to 196. Awaiting fungal Cx, histo, cryptococcus.   # Elevated liver tests - ALT/AST ratio in the settings of ongoing (positive BAL in April) EtOH use with recent admission for alc hep; overall downtrending. Concern for possible infection given leukocytosis and may have been on steroids for alc hep in clinical trial.  # Cirrhotic morphology on imaging- suggestive of underlying cirrhosis, suspected 2/2 EtOH use. MELD-Na 30 on 6/2/21. Possible liver transplant candidate if able to remain abstinent from EtOH. Appears to have good social support.   - ascites - present  - varices - unknown  - HCC - no focal lesions on imaging CT 4/20 (not multiphase)  - HE - no asterixis  # Active EtOH use  # Leukocytosis  # Hyponatremia    Recommendations:  - F/u infectious w/u - fungal cx, crypto, histo, ID recs  - Would consider empiric fungal coverage given persistent fevers if ok with ID  - F/U fungitell, galactomannan   - c/w albumin 25% 100cc q6  - c/w antibiotics (zosyn currently)  - fluid restriction to 1L/day  - lactulose 30cc q6, titrate to 3-4BM/day  - rifaximin 550 BID  - rest of care per primary team    Roderick Lantigua MD  Gastroenterology Fellow  Please contact via [employer] Teams  Pager number:   [phone] (Long range pager)  [phone] ([employer] pager)  Pageable via [place] at [facility] and [facility]. Select Tools --> On Call [facility]-ED --> Search for name    Please call Hepatology ([phone]) if there are any additional questions or concerns.    Please call answering service for on-call GI fellow ([phone]) after 5pm and before 8am, and on weekends.     39yo M with PMH of EtOH abuse who presents with jaundice and abdominal distention x 2 weeks, found to have cirrhosis and elevated liver tests, now febrile with elevated leukocytosis concerning for possible infection, CT a/p with new pulmonary opacities.     #Fevers: pt with daily fevers since 5/29, now with CT c/a/p showing new pulmonary opacities with BCx NTD. LDH elevated to 196. Awaiting fungal Cx, histo, cryptococcus.   # Elevated liver tests - ALT/AST ratio in the settings of ongoing (positive BAL in April) EtOH use with recent admission for alc hep; overall downtrending. Concern for possible infection given leukocytosis and may have been on steroids for alc hep in clinical trial.  # Cirrhotic morphology on imaging- suggestive of underlying cirrhosis, suspected 2/2 EtOH use. MELD-Na 30 on 6/2/21. Possible liver transplant candidate if able to remain abstinent from EtOH. Appears to have good social support.   - ascites - present  - varices - unknown  - HCC - no focal lesions on imaging CT 4/20 (not multiphase)  - HE - no asterixis  # Active EtOH use  # Leukocytosis  # Hyponatremia    Recommendations:  - F/u infectious w/u - fungal cx, crypto, histo, ID recs  - Would consider empiric fungal coverage given persistent fevers if ok with ID  - F/U fungitell, galactomannan   - c/w albumin 25% 100cc q6  - c/w antibiotics (zosyn currently)  - fluid restriction to 1L/day  - lactulose 30cc q6, titrate to 3-4BM/day  - stop rifaximin 550 BID  - rest of care per primary team    Roderikc Lantigua MD  Gastroenterology Fellow  Please contact via [employer] Teams  Pager number:   [phone] (Long range pager)  [phone] ([employer] pager)  Pageable via [place] at [facility] and [facility]. Select Tools --> On Call [facility]-ED --> Search for name    Please call Hepatology ([phone]) if there are any additional questions or concerns.    Please call answering service for on-call GI fellow ([phone]) after 5pm and before 8am, and on weekends.

## 2021-06-02 NOTE — PROGRESS NOTE ADULT - SUBJECTIVE AND OBJECTIVE BOX
Chief Complaint: Fever  Reason for consult: Cirrhosis management    Interval Events:   - Fungal cultures and fungitell pending  - Patient will fever of 101.9 overnight    Allergies:  No Known Allergies    MEDICATIONS  (STANDING):  albumin human 25% IVPB 100 milliLiter(s) IV Intermittent every 8 hours  azithromycin  IVPB      azithromycin  IVPB 500 milliGRAM(s) IV Intermittent once  folic acid 1 milliGRAM(s) Oral daily  lactulose Syrup 30 Gram(s) Oral every 6 hours  multivitamin 1 Tablet(s) Oral daily  piperacillin/tazobactam IVPB.. 3.375 Gram(s) IV Intermittent every 8 hours    MEDICATIONS  (PRN):  acetaminophen   Tablet .. 650 milliGRAM(s) Oral every 6 hours PRN Temp greater or equal to 38C (100.4F), Mild Pain (1 - 3)      PMHX/PSHX:     Alcohol abuse    Cirrhosis    ROS:  General: no night sweats, wt loss  CV: no pain, palpitation  Resp: no cough wheezing  GI: No nausea/vomiting, no abdominal pain, + diarrhea, no constipation  Muscles: no weakness or pain  Endocrine: no polyuria, polydipsia, cold/heat intolerance  Heme: No petechia, ecchymosis    PHYSICAL EXAM:     ICU Vital Signs Last 24 Hrs  T(C): 36.6 (02 Jun 2021 13:01), Max: 38.8 (01 Jun 2021 23:51)  T(F): 97.8 (02 Jun 2021 13:01), Max: 101.9 (01 Jun 2021 23:51)  HR: 107 (02 Jun 2021 13:01) (93 - 121)  BP: 103/66 (02 Jun 2021 13:01) (101/61 - 116/74)  RR: 18 (02 Jun 2021 13:01) (16 - 18)  SpO2: 98% (02 Jun 2021 13:01) (92% - 98%)    GENERAL:  NAD  HEENT:  NC/AT,  conjunctivae clear, + scleral icterus  CHEST:  No accessory muscle use  ABDOMEN:  Soft, non-tender, non-distended  EXTREMITIES:  no cyanosis,clubbing or edema  SKIN:  No rash/erythema, + jaundice  NEURO:  Alert, oriented    LABS:                        8.3    27.47 )-----------( 121      ( 02 Jun 2021 11:17 )             25.7     06-02    133<L>  |  102  |  20  ----------------------------<  90  3.7   |  16<L>  |  1.08    Ca    8.4      02 Jun 2021 11:17  Phos  2.5     06-01  Mg     1.6     06-01    TPro  6.2  /  Alb  3.1<L>  /  TBili  22.5<H>  /  DBili  x   /  AST  96<H>  /  ALT  45  /  AlkPhos  183<H>  06-02    LIVER FUNCTIONS - ( 02 Jun 2021 11:17 )  Alb: 3.1 g/dL / Pro: 6.2 g/dL / ALK PHOS: 183 U/L / ALT: 45 U/L / AST: 96 U/L / GGT: x           PT/INR - ( 02 Jun 2021 11:17 )   PT: 27.6 sec;   INR: 2.40 ratio      Imaging:    Reviewed

## 2021-06-02 NOTE — PROGRESS NOTE ADULT - SUBJECTIVE AND OBJECTIVE BOX
NOTE INCOMPLETE/ IN PROGRESS    PROGRESS NOTE:   Authored by Dr. Monalisa Kimbrough MD  Pager [facility]: [phone]; [facility]: [id number]     Patient is a 41y old  Male who presents with a chief complaint of cellulitis (01 Jun 2021 09:14)      SUBJECTIVE / OVERNIGHT EVENTS:    ADDITIONAL REVIEW OF SYSTEMS:    MEDICATIONS  (STANDING):  folic acid 1 milliGRAM(s) Oral daily  lactulose Syrup 30 Gram(s) Oral every 6 hours  multivitamin 1 Tablet(s) Oral daily  phytonadione  IVPB 10 milliGRAM(s) IV Intermittent daily  piperacillin/tazobactam IVPB.. 3.375 Gram(s) IV Intermittent every 8 hours  rifAXIMin 550 milliGRAM(s) Oral two times a day    MEDICATIONS  (PRN):  acetaminophen   Tablet .. 650 milliGRAM(s) Oral every 6 hours PRN Temp greater or equal to 38C (100.4F), Mild Pain (1 - 3)      CAPILLARY BLOOD GLUCOSE        I&O's Summary    01 Jun 2021 07:01  -  02 Jun 2021 07:00  --------------------------------------------------------  IN: 1661 mL / OUT: 0 mL / NET: 1661 mL        PHYSICAL EXAM:  Vital Signs Last 24 Hrs  T(C): 37.6 (02 Jun 2021 04:25), Max: 38.8 (01 Jun 2021 23:51)  T(F): 99.6 (02 Jun 2021 04:25), Max: 101.9 (01 Jun 2021 23:51)  HR: 109 (02 Jun 2021 04:25) (95 - 121)  BP: 104/70 (02 Jun 2021 04:25) (98/63 - 116/74)  BP(mean): --  RR: 16 (02 Jun 2021 04:25) (16 - 18)  SpO2: 93% (02 Jun 2021 04:25) (93% - 95%)    GENERAL: No acute distress  HEAD:  Atraumatic, Normocephalic  EYES: EOMI, PERRLA, +scleral icterus   NECK: Supple, no lymphadenopathy, no JVD  CHEST/LUNG: CTAB; No wheezes, rales, or rhonchi  HEART: Regular rate and rhythm; No murmurs, rubs, or gallops  ABDOMEN: Soft, non-tender. +Distended- decreased from previous. normal bowel sounds, no organomegaly  EXTREMITIES:  +b/l 2+ Pitting edema. improved erythema of LLE- mildly TTP. b/l feet wrapped. +asterixis improved  NEUROLOGY: A&O x 3, no focal deficits  SKIN: +Jaundice    LABS:                        7.6    24.17 )-----------( 96       ( 01 Jun 2021 07:09 )             24.1     06-01    135  |  103  |  19  ----------------------------<  120<H>  3.5   |  16<L>  |  1.13    Ca    7.8<L>      01 Jun 2021 07:06  Phos  2.5     06-01  Mg     1.6     06-01    TPro  5.3<L>  /  Alb  2.8<L>  /  TBili  19.3<H>  /  DBili  x   /  AST  67<H>  /  ALT  37  /  AlkPhos  156<H>  06-01    PT/INR - ( 01 Jun 2021 07:06 )   PT: 25.7 sec;   INR: 2.22 ratio                   Culture - Sputum (collected 01 Jun 2021 16:25)  Source: .Sputum Sputum  Gram Stain (01 Jun 2021 19:46):    Moderate polymorphonuclear leukocytes per low power field    Moderate Squamous epithelial cells per low power field    Few Gram positive cocci in pairs seen per oil power field    Few Gram Variable Rods seen per oil power field    Culture - Blood (collected 01 Jun 2021 02:17)  Source: .Blood Blood-Venous  Preliminary Report (02 Jun 2021 03:01):    No growth to date.    Culture - Blood (collected 01 Jun 2021 02:17)  Source: .Blood Blood-Peripheral  Preliminary Report (02 Jun 2021 03:01):    No growth to date.    Culture - Urine (collected 30 May 2021 12:41)  Source: .Urine Clean Catch (Midstream)  Final Report (31 May 2021 07:51):    No growth    Culture - Fungal, Blood (collected 30 May 2021 10:08)  Source: .Blood Blood, isolator tube  Preliminary Report (31 May 2021 10:37):    Testing in progress        RADIOLOGY & ADDITIONAL TESTS:  Results Reviewed:   Imaging Personally Reviewed:  Electrocardiogram Personally Reviewed:    COORDINATION OF CARE:  Care Discussed with Consultants/Other Providers [Y/N]:  Prior or Outpatient Records Reviewed [Y/N]:   PROGRESS NOTE:   Authored by Dr. Monalisa Kimbrough MD  Pager [facility]: [phone]; [facility]: [id number]     Patient is a 41y old  Male who presents with a chief complaint of cellulitis (01 Jun 2021 09:14)      SUBJECTIVE / OVERNIGHT EVENTS:  O/N, pt again spiked a fever to 101.9 F. Fever resolved w/ ibuprofen. This AM, pt stating that he feels frustrated to still be admitted. Explained to pt that given his persistent fevers and rising bilirubin, he is not medically stable for dispo at this time. Also explained to pt his entire hospital course and recent imaging studies, including findings of multifocal PNA and incidental finding of pulmonary nodule that would require outpt f/u in 3 months. Further discussed w/ pt his cirrhosis and alcohol use, and answered questions about requirement for liver transplant, including need for alcohol cessation. Pt expressed gratitude for explanations, and stated that all of his questions were answered to his satisfaction. Pt denies any complaints at this time, stating that he is not experiencing any chills, N/V, abdominal pain, LE pain or swelling.     MEDICATIONS  (STANDING):  folic acid 1 milliGRAM(s) Oral daily  lactulose Syrup 30 Gram(s) Oral every 6 hours  multivitamin 1 Tablet(s) Oral daily  phytonadione  IVPB 10 milliGRAM(s) IV Intermittent daily  piperacillin/tazobactam IVPB.. 3.375 Gram(s) IV Intermittent every 8 hours  rifAXIMin 550 milliGRAM(s) Oral two times a day    MEDICATIONS  (PRN):  acetaminophen   Tablet .. 650 milliGRAM(s) Oral every 6 hours PRN Temp greater or equal to 38C (100.4F), Mild Pain (1 - 3)      I&O's Summary    01 Jun 2021 07:01  -  02 Jun 2021 07:00  --------------------------------------------------------  IN: 1661 mL / OUT: 0 mL / NET: 1661 mL        PHYSICAL EXAM:  Vital Signs Last 24 Hrs  T(C): 37.6 (02 Jun 2021 04:25), Max: 38.8 (01 Jun 2021 23:51)  T(F): 99.6 (02 Jun 2021 04:25), Max: 101.9 (01 Jun 2021 23:51)  HR: 109 (02 Jun 2021 04:25) (95 - 121)  BP: 104/70 (02 Jun 2021 04:25) (98/63 - 116/74)  BP(mean): --  RR: 16 (02 Jun 2021 04:25) (16 - 18)  SpO2: 93% (02 Jun 2021 04:25) (93% - 95%)    GENERAL: No acute distress  HEAD:  Atraumatic, Normocephalic  EYES: EOMI, PERRLA, +scleral icterus   NECK: Supple, no lymphadenopathy, no JVD  CHEST/LUNG: CTAB; No wheezes, rales, or rhonchi  HEART: Regular rate and rhythm; No murmurs, rubs, or gallops  ABDOMEN: Soft, non-tender. +Distended- decreased from previous. normal bowel sounds, no organomegaly  EXTREMITIES:  +b/l 2+ Pitting edema. improved erythema of LLE- mildly TTP. b/l feet wrapped. +asterixis improved  NEUROLOGY: A&O x 3, no focal deficits  SKIN: +Jaundice    LABS:                        7.6    24.17 )-----------( 96       ( 01 Jun 2021 07:09 )             24.1     06-01    135  |  103  |  19  ----------------------------<  120<H>  3.5   |  16<L>  |  1.13    Ca    7.8<L>      01 Jun 2021 07:06  Phos  2.5     06-01  Mg     1.6     06-01    TPro  5.3<L>  /  Alb  2.8<L>  /  TBili  19.3<H>  /  DBili  x   /  AST  67<H>  /  ALT  37  /  AlkPhos  156<H>  06-01    PT/INR - ( 01 Jun 2021 07:06 )   PT: 25.7 sec;   INR: 2.22 ratio          Culture - Sputum (collected 01 Jun 2021 16:25)  Source: .Sputum Sputum  Gram Stain (01 Jun 2021 19:46):    Moderate polymorphonuclear leukocytes per low power field    Moderate Squamous epithelial cells per low power field    Few Gram positive cocci in pairs seen per oil power field    Few Gram Variable Rods seen per oil power field    Culture - Blood (collected 01 Jun 2021 02:17)  Source: .Blood Blood-Venous  Preliminary Report (02 Jun 2021 03:01):    No growth to date.    Culture - Blood (collected 01 Jun 2021 02:17)  Source: .Blood Blood-Peripheral  Preliminary Report (02 Jun 2021 03:01):    No growth to date.    Culture - Urine (collected 30 May 2021 12:41)  Source: .Urine Clean Catch (Midstream)  Final Report (31 May 2021 07:51):    No growth    Culture - Fungal, Blood (collected 30 May 2021 10:08)  Source: .Blood Blood, isolator tube  Preliminary Report (31 May 2021 10:37):    Testing in progress      RADIOLOGY & ADDITIONAL TESTS:  CTA CHEST  IMPRESSION:  Limited exam for evaluation of pulmonary embolus secondary to motion, streak artifact and poor opacification of pulmonary arteries.  Patchy opacities within bilateral upper lobes and bilateral lower lobes likely representing multifocal pneumonia.  1.2 cm right lower lobe nodule. Recommend follow-up chest CT in 3 months to determine stability.  SORAYA VILLEGAS MD; Attending Radiologist  This document has been electronically signed. Jun 1 2021  1:44PM

## 2021-06-02 NOTE — PROVIDER CONTACT NOTE (OTHER) - SITUATION
Temp 101.9, , /74
temp 102.4
provider ordered lasix 40mg IVP
As above
Pt is currently febrile of 101.7 and tachycardic. Pt did not respond to PO Tylenol given by day RN.
Pt is febrile and tachycardic

## 2021-06-02 NOTE — DIETITIAN INITIAL EVALUATION ADULT. - PHYSCIAL ASSESSMENT
Pt appears thinner than BMI suggests (noted pt with ascites); pt also reports he feels thinner on extremitites 135% IBW  Skin: no pressure injuries per flow sheets

## 2021-06-02 NOTE — DIETITIAN INITIAL EVALUATION ADULT. - REASON INDICATOR FOR ASSESSMENT
Initial Nutrition Assessment for Length Of Stay on 8MON  Source: patient (Monegasque speaking, [place]  Services offered & accepted: Karissa, ID#: [id number]

## 2021-06-02 NOTE — DIETITIAN INITIAL EVALUATION ADULT. - PROBLEM SELECTOR PLAN 5
Hypervolemic hyponatremia likely 2/2 cirrhosis.l Baseline Na 130-135. Asx  - Continue to trend BMP  - diurese per hepatology  - fluid restricted diet

## 2021-06-02 NOTE — PROGRESS NOTE ADULT - PROBLEM SELECTOR PLAN 1
febrile 5/28 o/n to 101.3 w/elev WBC, tachypnea, tachycardia. elev WBC may be i/s/o steroids from clinical trial. Other sources include LLE cellulitis (improving, no open wound/ulcer) vs PNA (O2 93%, tachypneic o/n) vs UTI. Ascitic fluid neg for SBP 5/28.  - blood cx 5/27- ngtd, blood cx 5/29; repeating 5/31  - UCx and UA: negative  - f/u fungal cx   - IV vanc (5/27-5/29), MRSA neg  - ceftriaxone (5/28) broadened to zosyn (5/29- ) given fevers  - no fx or c/f osteo on foot xray  - ID consulted: ordered CT ab/P with IV to rule out infection--> showing multifocal PNA  - Pt also ordered for CTA Chest--> will f/u  - RVP negative  - SpCx ordered febrile 5/28 o/n to 101.3 w/elev WBC, tachypnea, tachycardia. elev WBC may be i/s/o steroids from clinical trial. Other sources include LLE cellulitis (improving, no open wound/ulcer) vs PNA (O2 93%, tachypneic o/n) vs UTI. Ascitic fluid neg for SBP 5/28.  - blood cx 5/27- ngtd, blood cx 5/29; repeating 5/31  - UCx and UA: negative  - f/u fungal cx   - IV vanc (5/27-5/29), MRSA neg  - ceftriaxone (5/28) broadened to zosyn (5/29- ) given fevers  - no fx or c/f osteo on foot xray  - ID consulted: ordered CT ab/P with IV to rule out infection--> showing multifocal PNA  - Pt also ordered for CTA Chest--> PE cannot be excluded; redemonstration of multifocal PNA; incidental pulmonary nodule that will require outpatient f/u/ repeat CT in 3 months  - RVP negative  - SpCx ordered

## 2021-06-02 NOTE — DIETITIAN INITIAL EVALUATION ADULT. - ADD RECOMMEND
2. Monitor diet, PO intake, GI status, weight, labs, skin integrity 3. Continue current diet as tolerated; fluid restriction per team 4. Malnutrition notification placed in chart

## 2021-06-02 NOTE — DIETITIAN INITIAL EVALUATION ADULT. - PERTINENT MEDS FT
MEDICATIONS  (STANDING):  folic acid 1 milliGRAM(s) Oral daily  lactulose Syrup 30 Gram(s) Oral every 6 hours  multivitamin 1 Tablet(s) Oral daily  piperacillin/tazobactam IVPB.. 3.375 Gram(s) IV Intermittent every 8 hours  rifAXIMin 550 milliGRAM(s) Oral two times a day    MEDICATIONS  (PRN):  acetaminophen   Tablet .. 650 milliGRAM(s) Oral every 6 hours PRN Temp greater or equal to 38C (100.4F), Mild Pain (1 - 3)

## 2021-06-02 NOTE — DIETITIAN INITIAL EVALUATION ADULT. - OTHER INFO
Upon RD visit, pt reports intact appetite, consuming >75% at meals. Pt reports consuming pasta with shrimp, a big salad and green beans at lunch today and reports consuming 100%. Pt denies nausea, vomiting, constipation at this time. Reports previous diarrhea (noted pt had been ordered for lactulose) but reports went to the bathroom before and it was normal.     Pt reports most recent weight of ~157 pounds. Noted dosing weight of 160 pounds (5/28/21). RD obtained bed scale weight of 162.8 pounds mostly consistent with reported recent weight and dosing weight. Noted pt admitted in April 2020 and per 4/24/21 RD note, pt reported UBW of 174 pounds and previous dosing weight of 191 pounds which was likely elevated secondary to fluid retention. Pt attributes weight loss to stricter diet since April. Pt with ~8% weight loss x 2 months (be also be some fluid).    RD discussed continued importance of adequate intake with focus on protein foods first at meals; consuming main entree first and then sides for adequate calorie and protein provision. Pt reports eating more vegetables than meat. RD encouraged continued consumption of plant proteins for lean body mass maintenance. Pt agreeable to ensure enlive daily. Pt declines need for additional low sodium diet education; previous dietitian on last admit provided pt with verbal & written low sodium diet education. Made pt aware RD remains available as needed.

## 2021-06-02 NOTE — DIETITIAN INITIAL EVALUATION ADULT. - ORAL INTAKE PTA/DIET HISTORY
Pt reports appetite was fair PTA. Reports consuming small breakfast, and then lunch and dinner meals. Reports following a low salt diet at home, cut out a lot of bread per pt. Confirms no known food allergies. Denies Hx of chewing or swallowing issues. Pt takes multivitamin PTA per H&P.

## 2021-06-02 NOTE — DIETITIAN NUTRITION RISK NOTIFICATION - TREATMENT: THE FOLLOWING DIET HAS BEEN RECOMMENDED
Diet, Regular:   1000mL Fluid Restriction ([id number])  Low Sodium (05-27-21 @ 21:40) [Active]

## 2021-06-03 NOTE — PROGRESS NOTE ADULT - SUBJECTIVE AND OBJECTIVE BOX
Chief Complaint: Fever  Reason for consult: Cirrhosis management    Interval Events:   - Patient again febrile overnight     Allergies:  No Known Allergies    MEDICATIONS  (STANDING):  albumin human 25% IVPB 100 milliLiter(s) IV Intermittent every 8 hours  azithromycin  IVPB      azithromycin  IVPB 500 milliGRAM(s) IV Intermittent every 24 hours  folic acid 1 milliGRAM(s) Oral daily  lactulose Syrup 30 Gram(s) Oral every 6 hours  multivitamin 1 Tablet(s) Oral daily  piperacillin/tazobactam IVPB.. 3.375 Gram(s) IV Intermittent every 8 hours    MEDICATIONS  (PRN):  acetaminophen   Tablet .. 650 milliGRAM(s) Oral every 6 hours PRN Temp greater or equal to 38C (100.4F), Mild Pain (1 - 3)    PMHX/PSHX:     Alcohol abuse    Cirrhosis    ROS:  General: no night sweats, wt loss, + fevers  CV: no pain, palpitation  Resp: no cough wheezing  GI: No nausea/vomiting, no abdominal pain, no diarrhea, no constipation  Muscles: no weakness or pain  Endocrine: no polyuria, polydipsia, cold/heat intolerance  Heme: No petechia, ecchymosis    PHYSICAL EXAM:     ICU Vital Signs Last 24 Hrs  T(C): 36.6 (02 Jun 2021 13:01), Max: 38.8 (01 Jun 2021 23:51)  T(F): 97.8 (02 Jun 2021 13:01), Max: 101.9 (01 Jun 2021 23:51)  HR: 107 (02 Jun 2021 13:01) (93 - 121)  BP: 103/66 (02 Jun 2021 13:01) (101/61 - 116/74)  RR: 18 (02 Jun 2021 13:01) (16 - 18)  SpO2: 98% (02 Jun 2021 13:01) (92% - 98%)    GENERAL:  NAD  HEENT:  NC/AT,  conjunctivae clear, + scleral icterus  CHEST:  No accessory muscle use  ABDOMEN:  Soft, non-tender, non-distended  EXTREMITIES:  no cyanosis,clubbing or edema  SKIN:  No rash/erythema, + jaundice  NEURO:  Alert, oriented    LABS:                        7.8    25.20 )-----------( 93       ( 03 Jun 2021 06:18 )             24.7     06-03    133<L>  |  103  |  24<H>  ----------------------------<  75  3.8   |  14<L>  |  1.03    Ca    8.4      03 Jun 2021 06:18  Phos  2.6     06-03  Mg     1.7     06-03    TPro  6.0  /  Alb  3.2<L>  /  TBili  21.0<H>  /  DBili  x   /  AST  79<H>  /  ALT  39  /  AlkPhos  172<H>  06-03    LIVER FUNCTIONS - ( 03 Jun 2021 06:18 )  Alb: 3.2 g/dL / Pro: 6.0 g/dL / ALK PHOS: 172 U/L / ALT: 39 U/L / AST: 79 U/L / GGT: x           PT/INR - ( 03 Jun 2021 06:18 )   PT: 28.2 sec;   INR: 2.45 ratio      Imaging:    Reviewed

## 2021-06-03 NOTE — CONSULT NOTE ADULT - ASSESSMENT
41M PMH of ETOH abuse (quit early April) c/b decompensated alcoholic cirrhosis, recent admission (4/20-4/27) for decompensated cirrhosis, presented with b/l LE swelling and LLE foot pain who has developed hypoxemia, GINNY, fevers, and hyponatremia since admission with CT chest demonstrating patchy opacities in the bilateral upper lobes and bilateral lower lobes.    # Hypoxemia  - Etiology: likely infection at this time. Does not look like organizing PNA or IIP's. Low bicarbonate contributing to work of breathing  - Daily ABG to assess acid-base status  - Please broaden to Vanc/Livia/Azithro. Send SCx, blood cultures, Ulegionella, and Ustrept.  - Fungitell returned at 500. Despite that CT does not look like a fungal infection, B-D glucan containing organism could be from elsewhere --> please add fungal coverage. Discussed with ID, fluconazole is reasonable at this time.     # GINNY  - due to decreased bicarbonate, hyponatremia, soft BP, and now infection, concerned for possibility of hepatorenal I/II  - Patient recently recieved contrast, CHRISTIE possible  - Please check Ulytes, specifically Biju, Uric acid, and Ucreatinine    Guarded prognosis  Will follow        Juan Solorzano MD  PGY-6  Pulmonary and Critical Care Fellow  Pager: [phone]

## 2021-06-03 NOTE — PROGRESS NOTE ADULT - ASSESSMENT
41 [facility] PMHx of ETOH abuse (quit early April) c/b decompensated alcoholic cirrhosis, recent admission (4/20-4/27) for decompensated cirrhosis, presented with b/l LE swelling and LLE foot pain  Leukocytosis, fever  Was on treatment for LLE cellulitis--on my eval, site is markedly improved, no signs active infection  Elevated LFTs in setting cirrhosis  No pulmonary complaints  Ascites fluid not consistent with SBP  CXR with hazy opacities  CT A/P notes lower lung opacities with concern for possible infection  Unclear cause for fevers/leukocytosis presently, treat for suspected bacterial pneumonia  Overall,  1) Fever, Leukocytosis  - CT A/P with lung opacities, although minimal symptoms pneumonia clinically, treat for now  - Zosyn 3.375g q 8  - F/U BCXs  - Trend WBC to normal, monitor for further fevers  2) Elevated LFTs  - Trend to normal  - F/U Hepatology  3) PNA  - Zosyn as above, 5-7 day course  - monitor resp status     Sourav Jimenez MD  Pager [phone]  After 5pm and on weekends call [phone]

## 2021-06-03 NOTE — PROGRESS NOTE ADULT - SUBJECTIVE AND OBJECTIVE BOX
NOTE INCOMPLETE/ IN PROGRESS    PROGRESS NOTE:   Authored by Dr. Monalisa Kimbrough MD  Pager [facility]: [phone]; [place] [facility]: [id number]     Patient is a 41y old  Male who presents with a chief complaint of Cellulitis (02 Jun 2021 15:49)      SUBJECTIVE / OVERNIGHT EVENTS:    ADDITIONAL REVIEW OF SYSTEMS:    MEDICATIONS  (STANDING):  albumin human 25% IVPB 100 milliLiter(s) IV Intermittent every 8 hours  azithromycin  IVPB      azithromycin  IVPB 500 milliGRAM(s) IV Intermittent every 24 hours  folic acid 1 milliGRAM(s) Oral daily  lactulose Syrup 30 Gram(s) Oral every 6 hours  multivitamin 1 Tablet(s) Oral daily  piperacillin/tazobactam IVPB.. 3.375 Gram(s) IV Intermittent every 8 hours    MEDICATIONS  (PRN):  acetaminophen   Tablet .. 650 milliGRAM(s) Oral every 6 hours PRN Temp greater or equal to 38C (100.4F), Mild Pain (1 - 3)      CAPILLARY BLOOD GLUCOSE        I&O's Summary    02 Jun 2021 07:01  -  03 Jun 2021 07:00  --------------------------------------------------------  IN: 400 mL / OUT: 0 mL / NET: 400 mL        PHYSICAL EXAM:  Vital Signs Last 24 Hrs  T(C): 37 (03 Jun 2021 04:45), Max: 39.1 (02 Jun 2021 23:46)  T(F): 98.6 (03 Jun 2021 04:45), Max: 102.4 (02 Jun 2021 23:46)  HR: 101 (03 Jun 2021 04:45) (93 - 116)  BP: 96/56 (03 Jun 2021 04:45) (96/56 - 111/72)  BP(mean): --  RR: 16 (03 Jun 2021 04:45) (16 - 18)  SpO2: 91% (03 Jun 2021 04:45) (91% - 98%)    GENERAL: No acute distress  HEAD:  Atraumatic, Normocephalic  EYES: EOMI, PERRLA, +scleral icterus   NECK: Supple, no lymphadenopathy, no JVD  CHEST/LUNG: CTAB; No wheezes, rales, or rhonchi  HEART: Regular rate and rhythm; No murmurs, rubs, or gallops  ABDOMEN: Soft, non-tender. +Distended- decreased from previous. normal bowel sounds, no organomegaly  EXTREMITIES:  +b/l 2+ Pitting edema. improved erythema of LLE- mildly TTP. b/l feet wrapped. +asterixis improved  NEUROLOGY: A&O x 3, no focal deficits  SKIN: +Jaundice    LABS:                        7.8    25.20 )-----------( 93       ( 03 Jun 2021 06:18 )             24.7     06-03    133<L>  |  103  |  24<H>  ----------------------------<  75  3.8   |  14<L>  |  1.03    Ca    8.4      03 Jun 2021 06:18  Phos  2.6     06-03  Mg     1.7     06-03    TPro  6.0  /  Alb  3.2<L>  /  TBili  21.0<H>  /  DBili  x   /  AST  79<H>  /  ALT  39  /  AlkPhos  172<H>  06-03    PT/INR - ( 03 Jun 2021 06:18 )   PT: 28.2 sec;   INR: 2.45 ratio                   Culture - Sputum (collected 01 Jun 2021 16:25)  Source: .Sputum Sputum  Gram Stain (01 Jun 2021 19:46):    Moderate polymorphonuclear leukocytes per low power field    Moderate Squamous epithelial cells per low power field    Few Gram positive cocci in pairs seen per oil power field    Few Gram Variable Rods seen per oil power field  Preliminary Report (02 Jun 2021 16:40):    Normal Respiratory Minda present    Culture - Blood (collected 01 Jun 2021 02:17)  Source: .Blood Blood-Venous  Preliminary Report (02 Jun 2021 03:01):    No growth to date.    Culture - Blood (collected 01 Jun 2021 02:17)  Source: .Blood Blood-Peripheral  Preliminary Report (02 Jun 2021 03:01):    No growth to date.        RADIOLOGY & ADDITIONAL TESTS:  Results Reviewed:   Imaging Personally Reviewed:  Electrocardiogram Personally Reviewed:    COORDINATION OF CARE:  Care Discussed with Consultants/Other Providers [Y/N]:  Prior or Outpatient Records Reviewed [Y/N]:   PROGRESS NOTE:   Authored by Dr. Monalisa Kimbrough MD  Pager [facility]: [phone]; [facility]: [id number]     Patient is a 41y old  Male who presents with a chief complaint of Cellulitis (02 Jun 2021 15:49)      SUBJECTIVE / OVERNIGHT EVENTS:    ADDITIONAL REVIEW OF SYSTEMS:    MEDICATIONS  (STANDING):  albumin human 25% IVPB 100 milliLiter(s) IV Intermittent every 8 hours  azithromycin  IVPB      azithromycin  IVPB 500 milliGRAM(s) IV Intermittent every 24 hours  folic acid 1 milliGRAM(s) Oral daily  lactulose Syrup 30 Gram(s) Oral every 6 hours  multivitamin 1 Tablet(s) Oral daily  piperacillin/tazobactam IVPB.. 3.375 Gram(s) IV Intermittent every 8 hours    MEDICATIONS  (PRN):  acetaminophen   Tablet .. 650 milliGRAM(s) Oral every 6 hours PRN Temp greater or equal to 38C (100.4F), Mild Pain (1 - 3)      CAPILLARY BLOOD GLUCOSE        I&O's Summary    02 Jun 2021 07:01  -  03 Jun 2021 07:00  --------------------------------------------------------  IN: 400 mL / OUT: 0 mL / NET: 400 mL        PHYSICAL EXAM:  Vital Signs Last 24 Hrs  T(C): 37 (03 Jun 2021 04:45), Max: 39.1 (02 Jun 2021 23:46)  T(F): 98.6 (03 Jun 2021 04:45), Max: 102.4 (02 Jun 2021 23:46)  HR: 101 (03 Jun 2021 04:45) (93 - 116)  BP: 96/56 (03 Jun 2021 04:45) (96/56 - 111/72)  BP(mean): --  RR: 16 (03 Jun 2021 04:45) (16 - 18)  SpO2: 91% (03 Jun 2021 04:45) (91% - 98%)    GENERAL: No acute distress  HEAD:  Atraumatic, Normocephalic  EYES: EOMI, PERRLA, +scleral icterus   NECK: Supple, no lymphadenopathy, no JVD  CHEST/LUNG: CTAB; No wheezes, rales, or rhonchi  HEART: Regular rate and rhythm; No murmurs, rubs, or gallops  ABDOMEN: Soft, non-tender. +Distended- decreased from previous. normal bowel sounds, no organomegaly  EXTREMITIES:  +b/l 2+ Pitting edema. improved erythema of LLE- mildly TTP. b/l feet wrapped. +asterixis improved  NEUROLOGY: A&O x 3, no focal deficits  SKIN: +Jaundice    LABS:                        7.8    25.20 )-----------( 93       ( 03 Jun 2021 06:18 )             24.7     06-03    133<L>  |  103  |  24<H>  ----------------------------<  75  3.8   |  14<L>  |  1.03    Ca    8.4      03 Jun 2021 06:18  Phos  2.6     06-03  Mg     1.7     06-03    TPro  6.0  /  Alb  3.2<L>  /  TBili  21.0<H>  /  DBili  x   /  AST  79<H>  /  ALT  39  /  AlkPhos  172<H>  06-03    PT/INR - ( 03 Jun 2021 06:18 )   PT: 28.2 sec;   INR: 2.45 ratio                   Culture - Sputum (collected 01 Jun 2021 16:25)  Source: .Sputum Sputum  Gram Stain (01 Jun 2021 19:46):    Moderate polymorphonuclear leukocytes per low power field    Moderate Squamous epithelial cells per low power field    Few Gram positive cocci in pairs seen per oil power field    Few Gram Variable Rods seen per oil power field  Preliminary Report (02 Jun 2021 16:40):    Normal Respiratory Minda present    Culture - Blood (collected 01 Jun 2021 02:17)  Source: .Blood Blood-Venous  Preliminary Report (02 Jun 2021 03:01):    No growth to date.    Culture - Blood (collected 01 Jun 2021 02:17)  Source: .Blood Blood-Peripheral  Preliminary Report (02 Jun 2021 03:01):    No growth to date.        RADIOLOGY & ADDITIONAL TESTS:  Results Reviewed:   Imaging Personally Reviewed:  Electrocardiogram Personally Reviewed:    COORDINATION OF CARE:  Care Discussed with Consultants/Other Providers [Y/N]:  Prior or Outpatient Records Reviewed [Y/N]:   PROGRESS NOTE:   Authored by Dr. Monalisa Kimbroguh MD  Pager [facility]: [phone]; [facility]: [id number]     Patient is a 41y old  Male who presents with a chief complaint of Cellulitis (02 Jun 2021 15:49)      SUBJECTIVE / OVERNIGHT EVENTS:  O/N, pt again spiking, this time to 102.4 F. Given ibuprofen and tylenol, w/ appropriate response. Pt's Fungitell returned positive, w/ pt started on caspofungin. Pt also noted to desat today, now requiring nasal cannula. Pulm c/s, will appreciate recs. Pt stating that he feels "sick and tired" today, generally weaker, but trying to stay "optimistic."    MEDICATIONS  (STANDING):  albumin human 25% IVPB 100 milliLiter(s) IV Intermittent every 8 hours  azithromycin  IVPB      azithromycin  IVPB 500 milliGRAM(s) IV Intermittent every 24 hours  folic acid 1 milliGRAM(s) Oral daily  lactulose Syrup 30 Gram(s) Oral every 6 hours  multivitamin 1 Tablet(s) Oral daily  piperacillin/tazobactam IVPB.. 3.375 Gram(s) IV Intermittent every 8 hours    MEDICATIONS  (PRN):  acetaminophen   Tablet .. 650 milliGRAM(s) Oral every 6 hours PRN Temp greater or equal to 38C (100.4F), Mild Pain (1 - 3)        I&O's Summary    02 Jun 2021 07:01  -  03 Jun 2021 07:00  --------------------------------------------------------  IN: 400 mL / OUT: 0 mL / NET: 400 mL        PHYSICAL EXAM:  Vital Signs Last 24 Hrs  T(C): 37 (03 Jun 2021 04:45), Max: 39.1 (02 Jun 2021 23:46)  T(F): 98.6 (03 Jun 2021 04:45), Max: 102.4 (02 Jun 2021 23:46)  HR: 101 (03 Jun 2021 04:45) (93 - 116)  BP: 96/56 (03 Jun 2021 04:45) (96/56 - 111/72)  BP(mean): --  RR: 16 (03 Jun 2021 04:45) (16 - 18)  SpO2: 91% (03 Jun 2021 04:45) (91% - 98%)    GENERAL: No acute distress  HEAD:  Atraumatic, Normocephalic  EYES: EOMI, PERRLA, +scleral icterus   NECK: Supple, no lymphadenopathy, no JVD  CHEST/LUNG: CTAB; No wheezes, rales, or rhonchi  HEART: Regular rate and rhythm; No murmurs, rubs, or gallops  ABDOMEN: Soft, non-tender. +Distended- decreased from previous. normal bowel sounds, no organomegaly  EXTREMITIES:  +b/l 2+ Pitting edema. improved erythema of LLE- mildly TTP. b/l feet wrapped. +asterixis improved  NEUROLOGY: A&O x 3, no focal deficits  SKIN: +Jaundice    LABS:                        7.8    25.20 )-----------( 93       ( 03 Jun 2021 06:18 )             24.7     06-03    133<L>  |  103  |  24<H>  ----------------------------<  75  3.8   |  14<L>  |  1.03    Ca    8.4      03 Jun 2021 06:18  Phos  2.6     06-03  Mg     1.7     06-03    TPro  6.0  /  Alb  3.2<L>  /  TBili  21.0<H>  /  DBili  x   /  AST  79<H>  /  ALT  39  /  AlkPhos  172<H>  06-03    PT/INR - ( 03 Jun 2021 06:18 )   PT: 28.2 sec;   INR: 2.45 ratio           Culture - Sputum (collected 01 Jun 2021 16:25)  Source: .Sputum Sputum  Gram Stain (01 Jun 2021 19:46):    Moderate polymorphonuclear leukocytes per low power field    Moderate Squamous epithelial cells per low power field    Few Gram positive cocci in pairs seen per oil power field    Few Gram Variable Rods seen per oil power field  Preliminary Report (02 Jun 2021 16:40):    Normal Respiratory Minda present    Culture - Blood (collected 01 Jun 2021 02:17)  Source: .Blood Blood-Venous  Preliminary Report (02 Jun 2021 03:01):    No growth to date.    Culture - Blood (collected 01 Jun 2021 02:17)  Source: .Blood Blood-Peripheral  Preliminary Report (02 Jun 2021 03:01):    No growth to date.

## 2021-06-03 NOTE — PROGRESS NOTE ADULT - PROBLEM SELECTOR PLAN 1
febrile 5/28 o/n to 101.3 w/elev WBC, tachypnea, tachycardia. elev WBC may be i/s/o steroids from clinical trial. Other sources include LLE cellulitis (improving, no open wound/ulcer) vs PNA (O2 93%, tachypneic o/n) vs UTI. Ascitic fluid neg for SBP 5/28.  - blood cx 5/27- ngtd, blood cx 5/29; repeating 5/31  - UCx and UA: negative  - f/u fungal cx   - IV vanc (5/27-5/29), MRSA neg  - ceftriaxone (5/28) broadened to zosyn (5/29- ) given fevers  - no fx or c/f osteo on foot xray  - ID consulted: ordered CT ab/P with IV to rule out infection--> showing multifocal PNA  - Pt also ordered for CTA Chest--> PE cannot be excluded; redemonstration of multifocal PNA; incidental pulmonary nodule that will require outpatient f/u/ repeat CT in 3 months  - RVP negative  - SpCx ordered febrile 5/28 o/n to 101.3 w/elev WBC, tachypnea, tachycardia. elev WBC may be i/s/o steroids from clinical trial. Other sources include LLE cellulitis (improving, no open wound/ulcer) vs PNA (O2 93%, tachypneic o/n) vs UTI. Ascitic fluid neg for SBP 5/28.  - blood cx 5/27- ngtd, blood cx 5/29; repeating 5/31  - UCx and UA: negative  - f/u fungal cx   - IV vanc (5/27-5/29), MRSA neg  - ceftriaxone (5/28) broadened to zosyn (5/29- ) given fevers  - no fx or c/f osteo on foot xray  - ID consulted: ordered CT ab/P with IV to rule out infection--> showing multifocal PNA  - Pt also ordered for CTA Chest--> PE cannot be excluded; redemonstration of multifocal PNA; incidental pulmonary nodule that will require outpatient f/u/ repeat CT in 3 months  - RVP negative  - SpCx ordered  - Fungitell now positive, started on caspofungin  - Pulm c/s as pt now w/ AHRF, desatting to 80s, now requiring NC

## 2021-06-03 NOTE — PROGRESS NOTE ADULT - PROBLEM SELECTOR PLAN 3
h/o heavy EtOH use with prior admission for decompensated hepatic cirrhosis s/p para 4/22- 2.1 L removed. ammonia 44 (wnl). MELD 30   - c/w lactulose (titrate to 3-4 BM/day) and rifaxmin  - Abd US 5/27 shows mild-mod ascites- s/p para 2.3 L removed 5/28  - ascitic fluid neg for SBP. SAAG 1.7, ascitic protin .7 c/w cirrhosis   - febrile 5/28 o/n- broadened from ceftriaxone (5/28) to zosyn (5/29- )  - c/w zosyn (5/29- ) for SBP ppx until infectious workup completed  - blood cx 5/27- ngtd   - hepatology following- appreciate recs  - CT A/P: maybe new multifocal PNA?  - hold of lasix 40mg IV during infectious workup h/o heavy EtOH use with prior admission for decompensated hepatic cirrhosis s/p para 4/22- 2.1 L removed. ammonia 44 (wnl). MELD 30   - c/w lactulose BID (titrate to 3-4 BM/day)  - Abd US 5/27 shows mild-mod ascites- s/p para 2.3 L removed 5/28  - ascitic fluid neg for SBP. SAAG 1.7, ascitic protin .7 c/w cirrhosis   - febrile 5/28 o/n- broadened from ceftriaxone (5/28) to zosyn (5/29- )  - c/w zosyn (5/29- ) for SBP ppx until infectious workup completed  - blood cx 5/27- ngtd   - hepatology following- appreciate recs  - CT A/P: maybe new multifocal PNA?  - restarted spironolactone on 6/3;

## 2021-06-03 NOTE — PROGRESS NOTE ADULT - ASSESSMENT
41yo M with PMH of EtOH abuse who presents with jaundice and abdominal distention x 2 weeks, found to have cirrhosis and elevated liver tests, now febrile with elevated leukocytosis concerning for possible infection, CT a/p with new pulmonary opacities.     #Fevers: pt with daily fevers since 5/29, now with CT c/a/p showing new pulmonary opacities with BCx NTD. LDH elevated to 196. Awaiting fungal Cx, histo, cryptococcus.   # Elevated liver tests - ALT/AST ratio in the settings of ongoing (positive BAL in April) EtOH use with recent admission for alc hep; overall downtrending. Concern for possible infection given leukocytosis and may have been on steroids for alc hep in clinical trial.  # Cirrhotic morphology on imaging- suggestive of underlying cirrhosis, suspected 2/2 EtOH use. MELD-Na 30 on 6/2/21. Possible liver transplant candidate if able to remain abstinent from EtOH. Appears to have good social support.   - ascites - present  - varices - unknown  - HCC - no focal lesions on imaging CT 4/20 (not multiphase)  - HE - no asterixis  # Active EtOH use  # Leukocytosis  # Hyponatremia    Recommendations:  - F/u infectious w/u - fungal cx, crypto, histo, ID recs  - Would consider empiric fungal coverage given persistent fevers  - F/U fungitell, galactomannan   - c/w albumin 25% 100cc q6  - c/w antibiotics (zosyn currently)  - fluid restriction to 1L/day  - lactulose 30cc q6, titrate to 3-4BM/day  - rest of care per primary team    Roderick Lantigua MD  Gastroenterology Fellow  Please contact via Microsoft Teams  Pager number:   [phone] (Long range pager)  [phone] ([employer] pager)  Pageable via [place] at [facility] and [facility]. Select Tools --> On Call [facility]-ED --> Search for name    Please call Hepatology ([phone]) if there are any additional questions or concerns.    Please call answering service for on-call GI fellow ([phone]) after 5pm and before 8am, and on weekends.     41yo M with PMH of EtOH abuse who presents with jaundice and abdominal distention x 2 weeks, found to have cirrhosis and elevated liver tests, now febrile with elevated leukocytosis concerning for possible infection, CT a/p with new pulmonary opacities.     #Fevers: pt with daily fevers since 5/29, now with CT c/a/p showing new pulmonary opacities with BCx NTD. LDH elevated to 196. Awaiting fungal Cx, histo, cryptococcus. + Fungitell.  # Elevated liver tests - ALT/AST ratio in the settings of ongoing (positive BAL in April) EtOH use with recent admission for alc hep; overall downtrending. Concern for possible infection given leukocytosis and may have been on steroids for alc hep in clinical trial.  # Cirrhotic morphology on imaging- suggestive of underlying cirrhosis, suspected 2/2 EtOH use. MELD-Na 30 on 6/2/21. Possible liver transplant candidate if able to remain abstinent from EtOH. Appears to have good social support.   - ascites - present  - varices - unknown  - HCC - no focal lesions on imaging CT 4/20 (not multiphase)  - HE - no asterixis  # Active EtOH use  # Leukocytosis  # Hyponatremia    Recommendations:  - F/u infectious w/u - fungal cx, crypto, histo, ID recs  - Would start micafungin in setting of positive fungitell and persistent fevers  - F/U galactomannan   - Stop albumin  - Start spironolactone 100 mg PO daily  - Continue with antibiotics - Zosyn and Azithromycin  - fluid restriction to 1L/day  - Decrease lactulose to 30 ml BID  - Rest of care per primary team    Roderick Lantigua MD  Gastroenterology Fellow  Please contact via [employer] Teams  Pager number:   [phone] (Long range pager)  [phone] ([employer] pager)  Pageable via [place] at [facility] and [facility]. [employer] --> On Call [facility]-ED --> Search for name    Please call Hepatology ([phone]) if there are any additional questions or concerns.    Please call answering service for on-call GI fellow ([phone]) after 5pm and before 8am, and on weekends.

## 2021-06-03 NOTE — PROGRESS NOTE ADULT - SUBJECTIVE AND OBJECTIVE BOX
CC: F/U for Fever    Saw/spoke to patient. No fevers, no chills. No new complaints. Unchanged.    Allergies  No Known Allergies    ANTIMICROBIALS:  azithromycin  IVPB    azithromycin  IVPB 500 every 24 hours  piperacillin/tazobactam IVPB.. 3.375 every 8 hours    PE:    Vital Signs Last 24 Hrs  T(C): 37.1 (03 Jun 2021 10:49), Max: 39.1 (02 Jun 2021 23:46)  T(F): 98.8 (03 Jun 2021 10:49), Max: 102.4 (02 Jun 2021 23:46)  HR: 103 (03 Jun 2021 10:49) (101 - 116)  BP: 111/70 (03 Jun 2021 10:49) (96/56 - 111/72)  RR: 18 (03 Jun 2021 12:09) (16 - 18)  SpO2: 93% (03 Jun 2021 12:09) (89% - 98%)    Gen: AOx3, NAD, non-toxic  CV: S1+S2 normal, tachycardic  Resp: Clear bilat, no resp distress, no crackles/wheezes  Abd: Soft, nontender, +BS  Ext: No LE edema, no wounds    LABS:                        7.8    25.20 )-----------( 93       ( 03 Jun 2021 06:18 )             24.7     06-03    133<L>  |  103  |  24<H>  ----------------------------<  75  3.8   |  14<L>  |  1.03    Ca    8.4      03 Jun 2021 06:18  Phos  2.6     06-03  Mg     1.7     06-03    TPro  6.0  /  Alb  3.2<L>  /  TBili  21.0<H>  /  DBili  x   /  AST  79<H>  /  ALT  39  /  AlkPhos  172<H>  06-03    MICROBIOLOGY:    .Sputum Sputum  06-01-21   Normal Respiratory Minda present  --    Moderate polymorphonuclear leukocytes per low power field  Moderate Squamous epithelial cells per low power field  Few Gram positive cocci in pairs seen per oil power field  Few Gram Variable Rods seen per oil power field    .Blood Blood-Peripheral  06-01-21   No growth to date.      .Stool  06-01-21   Cryptosporidium parvum antigen negative  performed by rapid immunoassay (non-enzymatic).  (It is recommended that all specimens tested by  an immunochromatographic card test be  confirmed by another method.)  --  --    .Urine Clean Catch (Midstream)  05-30-21   No growth    .Blood Blood, isolator tube  05-30-21   Testing in progress     Rapid RVP Result: NotDetec (05-31 @ 12:40)    (otherwise reviewed)    RADIOLOGY:    6/1 CT:    IMPRESSION:    Limited exam for evaluation of pulmonary embolus secondary to motion, streak artifact and poor opacification of pulmonary arteries.    Patchy opacities within bilateral upper lobes and bilateral lower lobes likely representing multifocal pneumonia.    1.2 cm right lower lobe nodule. Recommend follow-up chest CT in 3 months to determine stability. CC: F/U for Fever    Saw/spoke to patient. Fevers, no chills. No new complaints. Unchanged.    Allergies  No Known Allergies    ANTIMICROBIALS:  azithromycin  IVPB    azithromycin  IVPB 500 every 24 hours  piperacillin/tazobactam IVPB.. 3.375 every 8 hours    PE:    Vital Signs Last 24 Hrs  T(C): 37.1 (03 Jun 2021 10:49), Max: 39.1 (02 Jun 2021 23:46)  T(F): 98.8 (03 Jun 2021 10:49), Max: 102.4 (02 Jun 2021 23:46)  HR: 103 (03 Jun 2021 10:49) (101 - 116)  BP: 111/70 (03 Jun 2021 10:49) (96/56 - 111/72)  RR: 18 (03 Jun 2021 12:09) (16 - 18)  SpO2: 93% (03 Jun 2021 12:09) (89% - 98%)    Gen: AOx3, NAD, non-toxic  CV: S1+S2 normal, tachycardic  Resp: Clear bilat, no resp distress, no crackles/wheezes  Abd: Soft, nontender, +BS  Ext: No LE edema, no wounds    LABS:                        7.8    25.20 )-----------( 93       ( 03 Jun 2021 06:18 )             24.7     06-03    133<L>  |  103  |  24<H>  ----------------------------<  75  3.8   |  14<L>  |  1.03    Ca    8.4      03 Jun 2021 06:18  Phos  2.6     06-03  Mg     1.7     06-03    TPro  6.0  /  Alb  3.2<L>  /  TBili  21.0<H>  /  DBili  x   /  AST  79<H>  /  ALT  39  /  AlkPhos  172<H>  06-03    MICROBIOLOGY:    .Sputum Sputum  06-01-21   Normal Respiratory Minda present  --    Moderate polymorphonuclear leukocytes per low power field  Moderate Squamous epithelial cells per low power field  Few Gram positive cocci in pairs seen per oil power field  Few Gram Variable Rods seen per oil power field    .Blood Blood-Peripheral  06-01-21   No growth to date.      .Stool  06-01-21   Cryptosporidium parvum antigen negative  performed by rapid immunoassay (non-enzymatic).  (It is recommended that all specimens tested by  an immunochromatographic card test be  confirmed by another method.)  --  --    .Urine Clean Catch (Midstream)  05-30-21   No growth    .Blood Blood, isolator tube  05-30-21   Testing in progress     Rapid RVP Result: NotDetec (05-31 @ 12:40)    (otherwise reviewed)    RADIOLOGY:    6/1 CT:    IMPRESSION:    Limited exam for evaluation of pulmonary embolus secondary to motion, streak artifact and poor opacification of pulmonary arteries.    Patchy opacities within bilateral upper lobes and bilateral lower lobes likely representing multifocal pneumonia.    1.2 cm right lower lobe nodule. Recommend follow-up chest CT in 3 months to determine stability.

## 2021-06-03 NOTE — CONSULT NOTE ADULT - SUBJECTIVE AND OBJECTIVE BOX
HPI:  41M originally from [facility] PMH of ETOH abuse (quit early April) c/b decompensated alcoholic cirrhosis, recent admission (4/20-4/27) for decompensated cirrhosis, presented with b/l LE swelling and LLE foot pain who has developed hypoxemia and new onset renal failure since admission, currently with GINNY, fevers, and CT chest demonstrating patchy opacities in the bilateral upper lobes and bilateral lower lobes likely representing multifocal pneumonia, currently worsening on Zosyn and Azithromycin.          PAST MEDICAL & SURGICAL HISTORY:  Alcohol abuse    Cirrhosis    No significant past surgical history        FAMILY HISTORY:  No pertinent family history in first degree relatives        SOCIAL HISTORY:  Smoking: denies  EtOH Use: significant use. Stopped in April of this year  Marital Status:   Exposures: none  Recent Travel: none    Allergies    No Known Allergies    Intolerances        HOME MEDICATIONS:    REVIEW OF SYSTEMS:  CONSTITUTIONAL: +weakness, fevers or chills  EYES/ENT: No visual changes;  No vertigo or throat pain   NECK: No pain or stiffness  RESPIRATORY: No cough, wheezing, hemoptysis; +shortness of breath  CARDIOVASCULAR: No chest pain or palpitations  GASTROINTESTINAL: +abdominal/epigastric pain. No nausea, vomiting, or hematemesis; No diarrhea or constipation. No melena or hematochezia.  GENITOURINARY: No dysuria, frequency or hematuria  NEUROLOGICAL: No numbness or weakness  SKIN: No itching, burning, rashes, or lesions   All other review of systems is negative unless indicated above.    OBJECTIVE:  ICU Vital Signs Last 24 Hrs  T(C): 38.8 (03 Jun 2021 18:50), Max: 39.1 (02 Jun 2021 23:46)  T(F): 101.9 (03 Jun 2021 18:50), Max: 102.4 (02 Jun 2021 23:46)  HR: 112 (03 Jun 2021 14:30) (101 - 116)  BP: 111/67 (03 Jun 2021 14:30) (96/56 - 111/72)  BP(mean): --  ABP: --  ABP(mean): --  RR: 18 (03 Jun 2021 14:30) (16 - 18)  SpO2: 92% (03 Jun 2021 14:30) (89% - 93%)        06-02 @ 07:01  -  06-03 @ 07:00  --------------------------------------------------------  IN: 400 mL / OUT: 0 mL / NET: 400 mL    06-03 @ 07:01  -  06-03 @ 19:56  --------------------------------------------------------  IN: 1900 mL / OUT: 0 mL / NET: 1900 mL      CAPILLARY BLOOD GLUCOSE          PHYSICAL EXAM:  General: In mild respiratory distress on NC, tachypnic.   Neurology: A&Ox3, nonfocal, OLIVER x 4  Eyes: PERRLA/ EOMI, Gross vision intact  ENT/Neck: Neck supple, trachea midline, No JVD, Gross hearing intact  Respiratory: rales bilaterally. No wheezing.   CV: RRR, +S1/S2, -S3/S4, no murmurs, rubs or gallops  Abdominal: Soft, NT, ND +BS, No HSM  MSK: 5/5 strength UE/LE bilaterally  Extremities: No edema, 2+ peripheral pulses  Skin: +Rashes on abdomen and lower extremities, No Hematoma, Ecchymosis      HOSPITAL MEDICATIONS:  MEDICATIONS  (STANDING):  azithromycin  IVPB      azithromycin  IVPB 500 milliGRAM(s) IV Intermittent every 24 hours  caspofungin IVPB      folic acid 1 milliGRAM(s) Oral daily  lactulose Syrup 30 Gram(s) Oral every 12 hours  multivitamin 1 Tablet(s) Oral daily  piperacillin/tazobactam IVPB.. 3.375 Gram(s) IV Intermittent every 8 hours  spironolactone 100 milliGRAM(s) Oral daily    MEDICATIONS  (PRN):  acetaminophen   Tablet .. 650 milliGRAM(s) Oral every 6 hours PRN Temp greater or equal to 38C (100.4F), Mild Pain (1 - 3)      LABS:                        7.8    25.20 )-----------( 93       ( 03 Jun 2021 06:18 )             24.7     06-03    133<L>  |  103  |  24<H>  ----------------------------<  75  3.8   |  14<L>  |  1.03    Ca    8.4      03 Jun 2021 06:18  Phos  2.6     06-03  Mg     1.7     06-03    TPro  6.0  /  Alb  3.2<L>  /  TBili  21.0<H>  /  DBili  x   /  AST  79<H>  /  ALT  39  /  AlkPhos  172<H>  06-03    PT/INR - ( 03 Jun 2021 06:18 )   PT: 28.2 sec;   INR: 2.45 ratio                   MICROBIOLOGY:     RADIOLOGY:  [x] Reviewed by me    Chest ct:   IMPRESSION:    Limited exam for evaluation of pulmonary embolus secondary to motion, streak artifact and poor opacification of pulmonary arteries.    Patchy opacities within bilateral upper lobes and bilateral lower lobes likely representing multifocal pneumonia.    1.2 cm right lower lobe nodule. Recommend follow-up chest CT in 3 months to determine stability.        Point of Care Ultrasound Findings:  Lungs: Bilateral B-lines. No significant pleural effusions or consolidations  Heart: Normal LV function. No PEFF  Kidneys: Normal in size. No hydronephrosis.  Bladder: non-distended

## 2021-06-04 NOTE — PROGRESS NOTE ADULT - PROBLEM SELECTOR PLAN 4
SCr .87 on admission. baseline SCr .5. Likely prerenal 2/2 hypoperfusion i/s/o decompensated cirrhosis. need to r/o sepsis (SBP)  - avoid nephrotoxic meds  - c/w albumin per hepatology   - s/p para 5/28- 2.3 L removed  - blood cx 5/27- ngtd   - Monitor BMP  - monitor I/O  - bladder scans may be inaccurate given ascites SCr .87 on admission. baseline SCr .5. Likely prerenal 2/2 hypoperfusion i/s/o decompensated cirrhosis. need to r/o sepsis (SBP)  - avoid nephrotoxic meds  - c/w albumin per hepatology   - s/p para 5/28- 2.3 L removed  - blood cx 5/27- ngtd   - Monitor BMP  - monitor I/O  - bladder scans may be inaccurate given ascites  - Renal consulted

## 2021-06-04 NOTE — CONSULT NOTE ADULT - CONSULT REQUESTED DATE/TIME
04-Jun-2021 15:48
27-May-2021 13:11
30-May-2021 09:37
27-May-2021 09:24
04-Jun-2021 19:57
27-May-2021 13:41
03-Jun-2021 14:00

## 2021-06-04 NOTE — PROGRESS NOTE ADULT - SUBJECTIVE AND OBJECTIVE BOX
NOTE INCOMPLETE/ IN PROGRESS    PROGRESS NOTE:   Authored by Dr. Monalisa Kimbrough MD  Pager [facility]: [phone]; [facility]: [id number]     Patient is a 41y old  Male who presents with a chief complaint of cellulitis (03 Jun 2021 18:45)      SUBJECTIVE / OVERNIGHT EVENTS:    ADDITIONAL REVIEW OF SYSTEMS:    MEDICATIONS  (STANDING):  azithromycin  IVPB      azithromycin  IVPB 500 milliGRAM(s) IV Intermittent every 24 hours  caspofungin IVPB      caspofungin IVPB 50 milliGRAM(s) IV Intermittent every 24 hours  folic acid 1 milliGRAM(s) Oral daily  lactated ringers. 1000 milliLiter(s) (100 mL/Hr) IV Continuous <Continuous>  lactulose Syrup 30 Gram(s) Oral every 12 hours  multivitamin 1 Tablet(s) Oral daily  piperacillin/tazobactam IVPB.. 3.375 Gram(s) IV Intermittent every 8 hours  spironolactone 100 milliGRAM(s) Oral daily    MEDICATIONS  (PRN):  acetaminophen   Tablet .. 650 milliGRAM(s) Oral every 6 hours PRN Temp greater or equal to 38C (100.4F), Mild Pain (1 - 3)      I&O's Summary    02 Jun 2021 07:01  -  03 Jun 2021 07:00  --------------------------------------------------------  IN: 400 mL / OUT: 0 mL / NET: 400 mL    03 Jun 2021 07:01  -  04 Jun 2021 06:57  --------------------------------------------------------  IN: 2000 mL / OUT: 0 mL / NET: 2000 mL      PHYSICAL EXAM:  Vital Signs Last 24 Hrs  T(C): 37.5 (04 Jun 2021 04:14), Max: 38.8 (03 Jun 2021 18:50)  T(F): 99.5 (04 Jun 2021 04:14), Max: 101.9 (03 Jun 2021 18:50)  HR: 115 (04 Jun 2021 04:14) (103 - 115)  BP: 99/63 (04 Jun 2021 04:14) (99/63 - 121/66)  BP(mean): --  RR: 18 (04 Jun 2021 04:14) (18 - 18)  SpO2: 92% (04 Jun 2021 04:14) (89% - 93%)    GENERAL: No acute distress  HEAD:  Atraumatic, Normocephalic  EYES: EOMI, PERRLA, +scleral icterus   NECK: Supple, no lymphadenopathy, no JVD  CHEST/LUNG: CTAB; No wheezes, rales, or rhonchi  HEART: Regular rate and rhythm; No murmurs, rubs, or gallops  ABDOMEN: Soft, non-tender. +Distended- decreased from previous. normal bowel sounds, no organomegaly  EXTREMITIES:  +b/l 2+ Pitting edema. improved erythema of LLE- mildly TTP. b/l feet wrapped. +asterixis improved  NEUROLOGY: A&O x 3, no focal deficits  SKIN: +Jaundice    LABS:                        7.9    26.09 )-----------( 112      ( 04 Jun 2021 06:28 )             24.7     06-03    133<L>  |  105  |  22  ----------------------------<  147<H>  3.4<L>   |  13<L>  |  1.04    Ca    8.1<L>      03 Jun 2021 20:20  Phos  2.6     06-03  Mg     1.7     06-03    TPro  5.7<L>  /  Alb  3.0<L>  /  TBili  19.9<H>  /  DBili  x   /  AST  71<H>  /  ALT  33  /  AlkPhos  149<H>  06-03    PT/INR - ( 03 Jun 2021 06:18 )   PT: 28.2 sec;   INR: 2.45 ratio        Culture - Sputum (collected 01 Jun 2021 16:25)  Source: .Sputum Sputum  Gram Stain (01 Jun 2021 19:46):    Moderate polymorphonuclear leukocytes per low power field    Moderate Squamous epithelial cells per low power field    Few Gram positive cocci in pairs seen per oil power field    Few Gram Variable Rods seen per oil power field  Final Report (03 Jun 2021 16:57):    Normal Respiratory Minda present        RADIOLOGY & ADDITIONAL TESTS:  Results Reviewed:   Imaging Personally Reviewed:  Electrocardiogram Personally Reviewed:    COORDINATION OF CARE:  Care Discussed with Consultants/Other Providers [Y/N]:  Prior or Outpatient Records Reviewed [Y/N]:   PROGRESS NOTE:   Authored by Dr. Monalisa Kimbrough MD  Pager [facility]: [phone]; [facility]: [phone]     Patient is a 41y old  Male who presents with a chief complaint of cellulitis (03 Jun 2021 18:45)      SUBJECTIVE / OVERNIGHT EVENTS:    ADDITIONAL REVIEW OF SYSTEMS:    MEDICATIONS  (STANDING):  azithromycin  IVPB      azithromycin  IVPB 500 milliGRAM(s) IV Intermittent every 24 hours  caspofungin IVPB      caspofungin IVPB 50 milliGRAM(s) IV Intermittent every 24 hours  folic acid 1 milliGRAM(s) Oral daily  lactated ringers. 1000 milliLiter(s) (100 mL/Hr) IV Continuous <Continuous>  lactulose Syrup 30 Gram(s) Oral every 12 hours  multivitamin 1 Tablet(s) Oral daily  piperacillin/tazobactam IVPB.. 3.375 Gram(s) IV Intermittent every 8 hours  spironolactone 100 milliGRAM(s) Oral daily    MEDICATIONS  (PRN):  acetaminophen   Tablet .. 650 milliGRAM(s) Oral every 6 hours PRN Temp greater or equal to 38C (100.4F), Mild Pain (1 - 3)      I&O's Summary    02 Jun 2021 07:01  -  03 Jun 2021 07:00  --------------------------------------------------------  IN: 400 mL / OUT: 0 mL / NET: 400 mL    03 Jun 2021 07:01  -  04 Jun 2021 06:57  --------------------------------------------------------  IN: 2000 mL / OUT: 0 mL / NET: 2000 mL      PHYSICAL EXAM:  Vital Signs Last 24 Hrs  T(C): 37.5 (04 Jun 2021 04:14), Max: 38.8 (03 Jun 2021 18:50)  T(F): 99.5 (04 Jun 2021 04:14), Max: 101.9 (03 Jun 2021 18:50)  HR: 115 (04 Jun 2021 04:14) (103 - 115)  BP: 99/63 (04 Jun 2021 04:14) (99/63 - 121/66)  BP(mean): --  RR: 18 (04 Jun 2021 04:14) (18 - 18)  SpO2: 92% (04 Jun 2021 04:14) (89% - 93%)    GENERAL: No acute distress  HEAD:  Atraumatic, Normocephalic  EYES: EOMI, PERRLA, +scleral icterus   NECK: Supple, no lymphadenopathy, no JVD  CHEST/LUNG: CTAB; No wheezes, rales, or rhonchi  HEART: Regular rate and rhythm; No murmurs, rubs, or gallops  ABDOMEN: Soft, non-tender. +Distended- decreased from previous. normal bowel sounds, no organomegaly  EXTREMITIES:  +b/l 2+ Pitting edema. improved erythema of LLE- mildly TTP. b/l feet wrapped. +asterixis improved  NEUROLOGY: A&O x 3, no focal deficits  SKIN: +Jaundice    LABS:                        7.9    26.09 )-----------( 112      ( 04 Jun 2021 06:28 )             24.7     06-03    133<L>  |  105  |  22  ----------------------------<  147<H>  3.4<L>   |  13<L>  |  1.04    Ca    8.1<L>      03 Jun 2021 20:20  Phos  2.6     06-03  Mg     1.7     06-03    TPro  5.7<L>  /  Alb  3.0<L>  /  TBili  19.9<H>  /  DBili  x   /  AST  71<H>  /  ALT  33  /  AlkPhos  149<H>  06-03    PT/INR - ( 03 Jun 2021 06:18 )   PT: 28.2 sec;   INR: 2.45 ratio        Culture - Sputum (collected 01 Jun 2021 16:25)  Source: .Sputum Sputum  Gram Stain (01 Jun 2021 19:46):    Moderate polymorphonuclear leukocytes per low power field    Moderate Squamous epithelial cells per low power field    Few Gram positive cocci in pairs seen per oil power field    Few Gram Variable Rods seen per oil power field  Final Report (03 Jun 2021 16:57):    Normal Respiratory Minda present        RADIOLOGY & ADDITIONAL TESTS:  Results Reviewed:   Imaging Personally Reviewed:  Electrocardiogram Personally Reviewed:    COORDINATION OF CARE:  Care Discussed with Consultants/Other Providers [Y/N]:  Prior or Outpatient Records Reviewed [Y/N]:   PROGRESS NOTE:   Authored by Dr. Monalisa Kimbrough MD  Pager [facility]: [phone]; [facility]: [phone]     Patient is a 41y old  Male who presents with a chief complaint of cellulitis (03 Jun 2021 18:45)      SUBJECTIVE / OVERNIGHT EVENTS:  Pt spiked O/N and then again this AM. Started on IVF last night given evidence of rising lactate and worsening GINNY. Also required K repletion. This AM, pt complaining of considerate abdominal distention and fullness, w/ IR consulted for paracentesis. However, per IR, pt too unstable to have procedure done at this time, requiring medical optimization first. 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 Per pt, these rashes are new. Pt noted to have increased WOB, switched to HiFlo. Renal consulted for worsening GINNY w/ metabolic acidosis and derm consulted for rash. Abx broadened to vanc, inderjit, caspo, azithro and bactrim. Prednisone also added. Spironolactone d/c and albumin added again. Obtaining complete abdominal U/S, KUB, CXR Upright.       MEDICATIONS  (STANDING):  azithromycin  IVPB      azithromycin  IVPB 500 milliGRAM(s) IV Intermittent every 24 hours  caspofungin IVPB      caspofungin IVPB 50 milliGRAM(s) IV Intermittent every 24 hours  folic acid 1 milliGRAM(s) Oral daily  lactated ringers. 1000 milliLiter(s) (100 mL/Hr) IV Continuous <Continuous>  lactulose Syrup 30 Gram(s) Oral every 12 hours  multivitamin 1 Tablet(s) Oral daily  piperacillin/tazobactam IVPB.. 3.375 Gram(s) IV Intermittent every 8 hours  spironolactone 100 milliGRAM(s) Oral daily    MEDICATIONS  (PRN):  acetaminophen   Tablet .. 650 milliGRAM(s) Oral every 6 hours PRN Temp greater or equal to 38C (100.4F), Mild Pain (1 - 3)      I&O's Summary    02 Jun 2021 07:01  -  03 Jun 2021 07:00  --------------------------------------------------------  IN: 400 mL / OUT: 0 mL / NET: 400 mL    03 Jun 2021 07:01  -  04 Jun 2021 06:57  --------------------------------------------------------  IN: 2000 mL / OUT: 0 mL / NET: 2000 mL      PHYSICAL EXAM:  Vital Signs Last 24 Hrs  T(C): 37.5 (04 Jun 2021 04:14), Max: 38.8 (03 Jun 2021 18:50)  T(F): 99.5 (04 Jun 2021 04:14), Max: 101.9 (03 Jun 2021 18:50)  HR: 115 (04 Jun 2021 04:14) (103 - 115)  BP: 99/63 (04 Jun 2021 04:14) (99/63 - 121/66)  BP(mean): --  RR: 18 (04 Jun 2021 04:14) (18 - 18)  SpO2: 92% (04 Jun 2021 04:14) (89% - 93%)    GENERAL: No acute distress  HEAD:  Atraumatic, Normocephalic  EYES: EOMI, PERRLA, +scleral icterus   NECK: Supple, no lymphadenopathy, no JVD  CHEST/LUNG: CTAB; No wheezes, rales, or rhonchi  HEART: Regular rate and rhythm; No murmurs, rubs, or gallops  ABDOMEN: Soft, non-tender. +Distended- decreased from previous. normal bowel sounds, no organomegaly  EXTREMITIES:  +b/l 1+ Pitting edema. improved erythema of LLE- mildly TTP. b/l feet wrapped. +asterixis improved  NEUROLOGY: A&O x 3, no focal deficits  SKIN: +Jaundice; petechial/ purpuric rash of LEs; maculopapular, nonblanching rash of the abdomen and upper extremities    LABS:                        7.9    26.09 )-----------( 112      ( 04 Jun 2021 06:28 )             24.7     06-03    133<L>  |  105  |  22  ----------------------------<  147<H>  3.4<L>   |  13<L>  |  1.04    Ca    8.1<L>      03 Jun 2021 20:20  Phos  2.6     06-03  Mg     1.7     06-03    TPro  5.7<L>  /  Alb  3.0<L>  /  TBili  19.9<H>  /  DBili  x   /  AST  71<H>  /  ALT  33  /  AlkPhos  149<H>  06-03    PT/INR - ( 03 Jun 2021 06:18 )   PT: 28.2 sec;   INR: 2.45 ratio        Culture - Sputum (collected 01 Jun 2021 16:25)  Source: .Sputum Sputum  Gram Stain (01 Jun 2021 19:46):    Moderate polymorphonuclear leukocytes per low power field    Moderate Squamous epithelial cells per low power field    Few Gram positive cocci in pairs seen per oil power field    Few Gram Variable Rods seen per oil power field  Final Report (03 Jun 2021 16:57):    Normal Respiratory Minda present

## 2021-06-04 NOTE — RAPID RESPONSE TEAM SUMMARY - NSSITUATIONBACKGROUNDRRT_GEN_ALL_CORE
42 yo M with PMHx of EtOH abuse c/b decompensated cirrhosis s/p EtOH hepatitis trial with potential steroid treatment originally admitted 5/27 with LLE cellulitis with course c/b decompensated cirrhosis s/p paracentesis (5/28), worsening acute hypoxic respiratory failure and persistent SIRS 2/2 possible pulmonary process with CTA chest multifocal PNA on broad spectrum Abx and recent anti-fungal coverage i/s/o elevated fungitelle; also with worsening GINNY. RRT called this evening for hypoxia and increased WOB.

## 2021-06-04 NOTE — CHART NOTE - NSCHARTNOTEFT_GEN_A_CORE
DUR-928 Study Update Note    Patient was enrolled to DUR-928 with dosing on 4/26/21 and subsequently received 28 days of steroids vs placebo pills for 28 days. Last dose of the pills was on 5/24/21.  He was a [place] responder at day 7. He was seen in the office on 5/24/21 at which time he was complaining of swelling that was improving.  Subsequently, he was admitted to the hospital on 5/27/21 for lower extremity swelling and then began developing consistent fevers afterwards and now with pulmonary infiltrates bilaterally,   worsening oxygenation over the past few days, renal failure, and liver function. He is now on broad spectrum antibiotics and antifungal coverage with consideration for coverage of PCP as well.  I have discussed the case with the medical monitor (Dr. Partida) for the DUR-928 study who has advised to treat the patient as clinically appropriate with standard of care.   Since the patient is now off all study medications since 5/24/21, there is nothing further to be done respective of the study. DUR-928 Study Update Note    Patient was enrolled to DUR-928 with dosing on 4/26/21 and subsequently received 28 days of steroids vs placebo pills for 28 days. Last dose of the pills was on 5/24/21.  He was a [place] responder at day 7. He was seen in the office on 5/24/21 at which time he was complaining of swelling that was improving.  Subsequently, he was admitted to the hospital on 5/27/21 for lower extremity swelling and then began developing consistent fevers afterwards and now with pulmonary infiltrates bilaterally,   worsening oxygenation over the past few days, renal failure, and liver function. He is now on broad spectrum antibiotics and antifungal coverage with consideration for coverage of PCP as well.  I have discussed the case with the medical monitor (Dr. Partida) for the DUR-928 study who has advised to treat the patient as clinically appropriate with standard of care.   Since the patient is now off all study medications since 5/24/21, there is nothing further to be done respective of the study.  Patient was unblinded for medical decision purposes. He was randomized to the placebo infusion + methylprednisolone arm.

## 2021-06-04 NOTE — PROGRESS NOTE ADULT - SUBJECTIVE AND OBJECTIVE BOX
CC: F/U for SOB    Saw/spoke to patient. Fevers. SOB. Ill appearing. Worsening rash.    Allergies  No Known Allergies    ANTIMICROBIALS:  azithromycin  IVPB    azithromycin  IVPB 500 every 24 hours  caspofungin IVPB 50 every 24 hours  meropenem  IVPB 1000 every 8 hours    PE:    Vital Signs Last 24 Hrs  T(C): 37.2 (04 Jun 2021 12:15), Max: 38.8 (03 Jun 2021 18:50)  T(F): 98.9 (04 Jun 2021 12:15), Max: 101.9 (03 Jun 2021 18:50)  HR: 101 (04 Jun 2021 12:15) (101 - 115)  BP: 104/68 (04 Jun 2021 12:15) (97/65 - 121/66)  RR: 22 (04 Jun 2021 12:15) (18 - 36)  SpO2: 95% (04 Jun 2021 12:15) (91% - 96%)    Gen: AOx3, ill appearing, fatigued, jaundice  CV: S1+S2 normal, tachycardic  Resp: SOB on high flow NC  Abd: Soft, nontender, +BS  Skin: Purpuric rash on LEs    LABS:                        7.9    26.09 )-----------( 112      ( 04 Jun 2021 06:28 )             24.7     06-04    134<L>  |  103  |  23  ----------------------------<  135<H>  3.7   |  14<L>  |  1.04    Ca    7.9<L>      04 Jun 2021 06:28  Phos  2.9     06-04  Mg     1.6     06-04    TPro  5.7<L>  /  Alb  2.8<L>  /  TBili  19.7<H>  /  DBili  x   /  AST  81<H>  /  ALT  33  /  AlkPhos  146<H>  06-04    MICROBIOLOGY:    .Sputum Sputum  06-01-21   Normal Respiratory Minda present  --    Moderate polymorphonuclear leukocytes per low power field  Moderate Squamous epithelial cells per low power field  Few Gram positive cocci in pairs seen per oil power field  Few Gram Variable Rods seen per oil power field    .Blood Blood-Peripheral  06-01-21   No growth to date.     .Stool  06-01-21   Cryptosporidium parvum antigen negative  performed by rapid immunoassay (non-enzymatic).  (It is recommended that all specimens tested by  an immunochromatographic card test be  confirmed by another method.)  --  --    .Urine Clean Catch (Midstream)  05-30-21   No growth     .Blood Blood, isolator tube  05-30-21   Testing in progress    .Urine Clean Catch (Midstream)  05-30-21   No growth     .Blood Blood-Peripheral  05-29-21   No Growth Final    .Blood Blood-Peripheral  05-29-21   No Growth Final     Rapid RVP Result: NotDetec (05-31 @ 12:40)    (otherwise reviewed)    RADIOLOGY:    6/1 CT:    IMPRESSION:    Limited exam for evaluation of pulmonary embolus secondary to motion, streak artifact and poor opacification of pulmonary arteries.    Patchy opacities within bilateral upper lobes and bilateral lower lobes likely representing multifocal pneumonia.    1.2 cm right lower lobe nodule. Recommend follow-up chest CT in 3 months to determine stability.

## 2021-06-04 NOTE — PROGRESS NOTE ADULT - SUBJECTIVE AND OBJECTIVE BOX
CHIEF COMPLAINT:    Interval Events:    REVIEW OF SYSTEMS:  CONSTITUTIONAL: No weakness, fevers or chills  EYES/ENT: No visual changes;  No vertigo or throat pain   NECK: No pain or stiffness  RESPIRATORY: No cough, wheezing, hemoptysis; No shortness of breath  CARDIOVASCULAR: No chest pain or palpitations  GASTROINTESTINAL: No abdominal or epigastric pain. No nausea, vomiting, or hematemesis; No diarrhea or constipation. No melena or hematochezia.  GENITOURINARY: No dysuria, frequency or hematuria  NEUROLOGICAL: No numbness or weakness  SKIN: No itching, burning, rashes, or lesions   All other review of systems is negative unless indicated above.    OBJECTIVE:  ICU Vital Signs Last 24 Hrs  T(C): 38.3 (04 Jun 2021 08:09), Max: 38.8 (03 Jun 2021 18:50)  T(F): 101 (04 Jun 2021 08:09), Max: 101.9 (03 Jun 2021 18:50)  HR: 108 (04 Jun 2021 10:27) (107 - 115)  BP: 97/65 (04 Jun 2021 08:09) (97/65 - 121/66)  BP(mean): --  ABP: --  ABP(mean): --  RR: 28 (04 Jun 2021 10:27) (18 - 36)  SpO2: 96% (04 Jun 2021 10:27) (91% - 96%)        06-03 @ 07:01  -  06-04 @ 07:00  --------------------------------------------------------  IN: 2000 mL / OUT: 0 mL / NET: 2000 mL    06-04 @ 07:01  -  06-04 @ 12:10  --------------------------------------------------------  IN: 650 mL / OUT: 0 mL / NET: 650 mL      CAPILLARY BLOOD GLUCOSE          PHYSICAL EXAM:  General: WN/WD NAD  Neurology: A&Ox3, nonfocal, OLIVER x 4  Eyes: PERRLA/ EOMI, Gross vision intact  ENT/Neck: Neck supple, trachea midline, No JVD, Gross hearing intact  Respiratory: CTA B/L, No wheezing, rales, rhonchi  CV: RRR, +S1/S2, -S3/S4, no murmurs, rubs or gallops  Abdominal: Soft, NT, ND +BS, No HSM  MSK: 5/5 strength UE/LE bilaterally  Extremities: No edema, 2+ peripheral pulses  Skin: No Rashes, Hematoma, Ecchymosis  Incisions:   Tubes:    HOSPITAL MEDICATIONS:  MEDICATIONS  (STANDING):  albumin human  5% IVPB 250 milliLiter(s) IV Intermittent every 4 hours  azithromycin  IVPB      azithromycin  IVPB 500 milliGRAM(s) IV Intermittent every 24 hours  caspofungin IVPB 50 milliGRAM(s) IV Intermittent every 24 hours  folic acid 1 milliGRAM(s) Oral daily  lactulose Syrup 30 Gram(s) Oral every 12 hours  meropenem  IVPB 1000 milliGRAM(s) IV Intermittent every 8 hours  multivitamin 1 Tablet(s) Oral daily  sodium bicarbonate 1300 milliGRAM(s) Oral three times a day  vancomycin  IVPB      vancomycin  IVPB 1000 milliGRAM(s) IV Intermittent every 12 hours    MEDICATIONS  (PRN):  acetaminophen   Tablet .. 650 milliGRAM(s) Oral every 6 hours PRN Temp greater or equal to 38C (100.4F), Mild Pain (1 - 3)      LABS:                        7.9    26.09 )-----------( 112      ( 04 Jun 2021 06:28 )             24.7     Hgb Trend: 7.9<--, 7.8<--, 8.3<--, 7.6<--, 8.0<--  06-04    134<L>  |  103  |  23  ----------------------------<  135<H>  3.7   |  14<L>  |  1.04    Ca    7.9<L>      04 Jun 2021 06:28  Phos  2.9     06-04  Mg     1.6     06-04    TPro  5.7<L>  /  Alb  2.8<L>  /  TBili  19.7<H>  /  DBili  x   /  AST  81<H>  /  ALT  33  /  AlkPhos  146<H>  06-04    Creatinine Trend: 1.04<--, 1.04<--, 1.03<--, 1.08<--, 1.13<--, 1.12<--  PT/INR - ( 04 Jun 2021 06:28 )   PT: 32.8 sec;   INR: 2.87 ratio             Arterial Blood Gas:  06-04 @ 10:52  7.39/25/96/15/98/-8.9  ABG lactate: --    Venous Blood Gas:  06-03 @ 20:19  7.38/27/46/16/76  VBG Lactate: 2.8      MICROBIOLOGY:     Culture - Sputum (collected 01 Jun 2021 16:25)  Source: .Sputum Sputum  Gram Stain (01 Jun 2021 19:46):    Moderate polymorphonuclear leukocytes per low power field    Moderate Squamous epithelial cells per low power field    Few Gram positive cocci in pairs seen per oil power field    Few Gram Variable Rods seen per oil power field  Final Report (03 Jun 2021 16:57):    Normal Respiratory Minda present        RADIOLOGY:  [ ] Reviewed by me    PULMONARY FUNCTION TESTS:    EKG: CHIEF COMPLAINT:    Interval Events: Increased burden of hypoxemia and WOB, elevated ventilatory support from NC to HFNC at 60/60 with ABG demonstrating shanel score of: 5.71, predicting low risk of respiratory failure at this time. Persistent fevers. Looks worse today, ill appearing.     REVIEW OF SYSTEMS:  CONSTITUTIONAL: +weakness, fevers. No chills  EYES/ENT: No visual changes;  No vertigo or throat pain   NECK: No pain or stiffness  RESPIRATORY: No cough, wheezing, hemoptysis; +shortness of breath  CARDIOVASCULAR: No chest pain or palpitations  GASTROINTESTINAL: +abdominal/epigastric pain. No nausea, vomiting, or hematemesis; No diarrhea or constipation. No melena or hematochezia.  GENITOURINARY: No dysuria, frequency or hematuria  NEUROLOGICAL: No numbness or weakness  SKIN: No itching, burning, rashes, or lesions   All other review of systems is negative unless indicated above.    OBJECTIVE:  ICU Vital Signs Last 24 Hrs  T(C): 38.3 (04 Jun 2021 08:09), Max: 38.8 (03 Jun 2021 18:50)  T(F): 101 (04 Jun 2021 08:09), Max: 101.9 (03 Jun 2021 18:50)  HR: 108 (04 Jun 2021 10:27) (107 - 115)  BP: 97/65 (04 Jun 2021 08:09) (97/65 - 121/66)  BP(mean): --  ABP: --  ABP(mean): --  RR: 28 (04 Jun 2021 10:27) (18 - 36)  SpO2: 96% (04 Jun 2021 10:27) (91% - 96%)        06-03 @ 07:01 - 06-04 @ 07:00  --------------------------------------------------------  IN: 2000 mL / OUT: 0 mL / NET: 2000 mL    06-04 @ 07:01 - 06-04 @ 12:10  --------------------------------------------------------  IN: 650 mL / OUT: 0 mL / NET: 650 mL      CAPILLARY BLOOD GLUCOSE          PHYSICAL EXAM:  General: toxic appearing. +icteric sclera  Neurology: A&Ox3, nonfocal, OLIVER x 4  Eyes: PERRLA/ EOMI, Gross vision intact  ENT/Neck: Neck supple, trachea midline, No JVD, Gross hearing intact  Respiratory: Diffuse rales. No wheezing  CV: +tachycardia, +S1/S2, -S3/S4, no murmurs, rubs or gallops  Abdominal: Soft, +T, +D +BS, No HSM  MSK: 5/5 strength UE/LE bilaterally  Extremities: No edema, 2+ peripheral pulses  Skin: No Rashes, Hematoma, Ecchymosis      HOSPITAL MEDICATIONS:  MEDICATIONS  (STANDING):  albumin human  5% IVPB 250 milliLiter(s) IV Intermittent every 4 hours  azithromycin  IVPB      azithromycin  IVPB 500 milliGRAM(s) IV Intermittent every 24 hours  caspofungin IVPB 50 milliGRAM(s) IV Intermittent every 24 hours  folic acid 1 milliGRAM(s) Oral daily  lactulose Syrup 30 Gram(s) Oral every 12 hours  meropenem  IVPB 1000 milliGRAM(s) IV Intermittent every 8 hours  multivitamin 1 Tablet(s) Oral daily  sodium bicarbonate 1300 milliGRAM(s) Oral three times a day  vancomycin  IVPB      vancomycin  IVPB 1000 milliGRAM(s) IV Intermittent every 12 hours    MEDICATIONS  (PRN):  acetaminophen   Tablet .. 650 milliGRAM(s) Oral every 6 hours PRN Temp greater or equal to 38C (100.4F), Mild Pain (1 - 3)      LABS:                        7.9    26.09 )-----------( 112      ( 04 Jun 2021 06:28 )             24.7     Hgb Trend: 7.9<--, 7.8<--, 8.3<--, 7.6<--, 8.0<--  06-04    134<L>  |  103  |  23  ----------------------------<  135<H>  3.7   |  14<L>  |  1.04    Ca    7.9<L>      04 Jun 2021 06:28  Phos  2.9     06-04  Mg     1.6     06-04    TPro  5.7<L>  /  Alb  2.8<L>  /  TBili  19.7<H>  /  DBili  x   /  AST  81<H>  /  ALT  33  /  AlkPhos  146<H>  06-04    Creatinine Trend: 1.04<--, 1.04<--, 1.03<--, 1.08<--, 1.13<--, 1.12<--  PT/INR - ( 04 Jun 2021 06:28 )   PT: 32.8 sec;   INR: 2.87 ratio             Arterial Blood Gas:  06-04 @ 10:52  7.39/25/96/15/98/-8.9  ABG lactate: --    Venous Blood Gas:  06-03 @ 20:19  7.38/27/46/16/76  VBG Lactate: 2.8      MICROBIOLOGY:     Culture - Sputum (collected 01 Jun 2021 16:25)  Source: .Sputum Sputum  Gram Stain (01 Jun 2021 19:46):    Moderate polymorphonuclear leukocytes per low power field    Moderate Squamous epithelial cells per low power field    Few Gram positive cocci in pairs seen per oil power field    Few Gram Variable Rods seen per oil power field  Final Report (03 Jun 2021 16:57):    Normal Respiratory Minda present

## 2021-06-04 NOTE — RAPID RESPONSE TEAM SUMMARY - NSADDTLFINDINGSRRT_GEN_ALL_CORE
On arrival to [facility]RT pt HR 130s sinus tach, normotensive, SpO2 86% on HFNC 40LPM/60%, tachypneic to 40s, Febrile to 101.9F orally, . Pt awake, alert, following commands appropriately however in respiratory distress, diaphragmatic breathing with lung sounds diminished b/l, distended abdomen although soft. As pre primary team, pt with worsening hypoxia on 6/4 requiring HFNC with increased WOB I/s/o worsening metabolic acidosis and GINNY. He was initiated on bactrim/steroids as per pulm/ID given potential past exposure for steroids. He was also awaiting abdominal US for assessment of ascites requiring paracentesis by IR however unable to leave the floor.

## 2021-06-04 NOTE — CHART NOTE - NSCHARTNOTEFT_GEN_A_CORE
Malnutrition Follow Up     Interim events noted, chart reviewed. Pt c cirrhosis, noted Hepatology following. Pt now being seen by Pulmonary, pt c AHRF per team, now on high flow nasal cannula.     Diet: Diet, Regular:   1000mL Fluid Restriction ([id number])  Low Sodium (05-27-21 @ 21:40)    Meds/labs reviewed. Wt reviewed, no new wt at this time. 6/2: 157->6/4: 169 pounds, likely elevated in setting of ascites and overall fluid status    Skin: no pressure injuries   Edema: none at this time     Source: pt, preferred to speak c RD in English at this time     Pt reports appetite remains good to fair at this time, observed pt eating lunch, had eaten all of salad but not chicken, reports he prefers not to have too much chicken or beef at this time, discussed alternate sources of protein, pt agreed to having Greek yogurt, ordered for pt. Pt denies any other specific GI distress at this time. Agreeable to trying Ensure Enlive.     Nutrition Diagnosis: Moderate malnutrition continues, care plan in progress, to be addressed c improving intake and supplements     Plan:  1. Continue c current diet.   2. Fluid restriction per team.   3. Recommend Ensure Enlive x 1 daily (350kcal, 20g protein per 8 ounces).   4. Discussed protein rich foods available on menu. Discussed consuming protein first at meal times and then eating the other foods.     RD remains available.  Minnie Evans MS RD CDN [facility],  #[phone]

## 2021-06-04 NOTE — PROGRESS NOTE ADULT - ASSESSMENT
39yo M with PMH of EtOH abuse who presents with jaundice and abdominal distention x 2 weeks, found to have cirrhosis and elevated liver tests.    #Fevers: Now in septic shock and on HFNC. pt with daily fevers since 5/29, now with CT c/a/p showing new pulmonary opacities with BCx NTD. LDH elevated to 196. Awaiting fungal Cx, histo, cryptococcus. + Fungitell. Patient only started on capsofungin on 6/3/21 despite multiple days of fevers. ID documenting patient with no fevers, despite patient noted to be febrile on multiple occasions.  # Abnormal liver enzymes: Likely acute on chronic liver failure +/- alcoholic hepatitis  # Cirrhosis, decompensated, likely due to alcohol use  - MELD-Na 31 on 6/4/21. Possible liver transplant candidate if able to remain abstinent from EtOH. Appears to have good social support.   - ascites - present  - varices - unknown  - HCC - no focal lesions on imaging CT 4/20 (not multiphase)  - HE - no asterixis  # Hyponatremia    Recommendations:  - MICU consult  - Continue broad spectrum antibiotics and anti-fungals  - Ok to re-start albumin, however, patient appears to be in septic shock and may require intubation for respiratory failure  - Stop diuretics  - Rest of care per primary team    Roderick Lantigua MD  Gastroenterology Fellow  Please contact via [employer] Teams  Pager number:   [phone] (Long range pager)  [phone] (J pager)  Pageable via [place] at [facility] and [facility]. Select Tools --> On Call [facility]-ED --> Search for name    Please call Hepatology ([phone]) if there are any additional questions or concerns.    Please call answering service for on-call GI fellow ([phone]) after 5pm and before 8am, and on weekends.     41yo M with PMH of EtOH abuse who presents with jaundice and abdominal distention x 2 weeks, found to have cirrhosis and elevated liver tests.    # Fevers: Patient with daily fevers since 5/29, now with CT A/P showing new pulmonary opacities with BCx NTD. LDH elevated to 196. Awaiting fungal cultures. + Fungitell. Patient only started on capsofungin on 6/3/21 despite multiple days of fevers. ID documenting patient with no fevers, despite patient noted to be febrile on multiple occasions. Antibiotics broadened on 6/4/21.  # Abnormal liver enzymes: Likely acute on chronic liver failure +/- alcoholic hepatitis  # Cirrhosis, decompensated, likely due to alcohol use  MELD-Na 31 on 6/4/21.    Ascites - moderate on exam  Varices - unknown  - HCC - no focal lesions on imaging CT 4/20 (not multiphase)  - HE - no asterixis  # Rash  # Hyponatremia    Recommendations:  - F/U pulmonology recommendations  - F/U dermatology recommendations for evaluation of rash  - Continue broad spectrum antibiotics and anti-fungals; currently on vancomycin, meropenem, azithromycin and Caspofungin  - Ok to stop diuretics, but would hold off on albumin for now given patient with new respiratory failure  - Rest of care per primary team    Roderick Lantigua MD  Gastroenterology Fellow  Please contact via [employer] Teams  Pager number:   [phone] (Long range pager)  [phone] (J pager)  Pageable via [place] at [facility] and [facility]. Select Tools --> On Call [facility]-ED --> Search for name    Please call Hepatology ([phone]) if there are any additional questions or concerns.    Please call answering service for on-call GI fellow ([phone]) after 5pm and before 8am, and on weekends.

## 2021-06-04 NOTE — CONSULT NOTE ADULT - SUBJECTIVE AND OBJECTIVE BOX
[facility] DIVISION OF KIDNEY DISEASES AND HYPERTENSION -- [phone]  -- INITIAL CONSULT NOTE  --------------------------------------------------------------------------------  HPI: Patient is a 42 y/o M w PMH of EtoH abuse complicated by decompensated cirrhosis presented to [facility] for worsening LE edema. Admitted to [facility] for acute hypoxic resiraptoy failure in setting of PNA. Nephrology consulted for GINNY. On review of labs on [facility] HIE/[facility], patient noted to have baseline Scr of 0.49 on 4/25/21, Scr on admission increased to 0.87 on admission on 5/27/21, Scr progressively increased and peaked to 1.13 on 5/1/21, Scr then elevated/stable at 1.04 today.             PAST HISTORY  --------------------------------------------------------------------------------  PAST MEDICAL & SURGICAL HISTORY:  Alcohol abuse    Cirrhosis    No significant past surgical history      FAMILY HISTORY:  No pertinent family history in first degree relatives      PAST SOCIAL HISTORY:    ALLERGIES & MEDICATIONS  --------------------------------------------------------------------------------  Allergies    No Known Allergies    Intolerances      Standing Inpatient Medications  albumin human  5% IVPB 250 milliLiter(s) IV Intermittent every 4 hours  azithromycin  IVPB      azithromycin  IVPB 500 milliGRAM(s) IV Intermittent every 24 hours  caspofungin IVPB 50 milliGRAM(s) IV Intermittent every 24 hours  folic acid 1 milliGRAM(s) Oral daily  lactulose Syrup 30 Gram(s) Oral every 12 hours  meropenem  IVPB 1000 milliGRAM(s) IV Intermittent every 8 hours  multivitamin 1 Tablet(s) Oral daily  predniSONE   Tablet 40 milliGRAM(s) Oral every 12 hours  sodium bicarbonate 1300 milliGRAM(s) Oral three times a day  trimethoprim / sulfamethoxazole IVPB 320 milliGRAM(s) IV Intermittent every 8 hours    PRN Inpatient Medications  acetaminophen   Tablet .. 650 milliGRAM(s) Oral every 6 hours PRN      REVIEW OF SYSTEMS  --------------------------------------------------------------------------------  Gen: No fevers/chills  Skin: No rashes  Head/Eyes/Ears: Normal hearing,   Respiratory: No dyspnea, cough  CV: No chest pain  GI: No abdominal pain, diarrhea  : No dysuria, hematuria  MSK: No  edema  Heme: No easy bruising or bleeding  Psych: No significant depression    All other systems were reviewed and are negative, except as noted.    VITALS/PHYSICAL EXAM  --------------------------------------------------------------------------------  T(C): 37.2 (06-04-21 @ 12:15), Max: 38.8 (06-03-21 @ 18:50)  HR: 105 (06-04-21 @ 14:33) (101 - 115)  BP: 104/68 (06-04-21 @ 12:15) (97/65 - 121/66)  RR: 22 (06-04-21 @ 12:15) (18 - 36)  SpO2: 92% (06-04-21 @ 14:33) (91% - 96%)  Wt(kg): --        06-03-21 @ 07:01  -  06-04-21 @ 07:00  --------------------------------------------------------  IN: 2000 mL / OUT: 0 mL / NET: 2000 mL    06-04-21 @ 07:01  -  06-04-21 @ 15:48  --------------------------------------------------------  IN: 1190 mL / OUT: 0 mL / NET: 1190 mL      Physical Exam:  	Gen: NAD  	HEENT: MMM  	Pulm: CTA B/L  	CV: S1S2  	Abd: Soft, +BS   	Ext: No LE edema B/L  	Neuro: Awake  	Skin: Warm and dry  	Vascular access:    LABS/STUDIES  --------------------------------------------------------------------------------              7.9    26.09 >-----------<  112      [06-04-21 @ 06:28]              24.7     134  |  103  |  23  ----------------------------<  135      [06-04-21 @ 06:28]  3.7   |  14  |  1.04        Ca     7.9     [06-04-21 @ 06:28]      Mg     1.6     [06-04-21 @ 06:28]      Phos  2.9     [06-04-21 @ 06:28]    TPro  5.7  /  Alb  2.8  /  TBili  19.7  /  DBili  x   /  AST  81  /  ALT  33  /  AlkPhos  146  [06-04-21 @ 06:28]    PT/INR: PT 32.8 , INR 2.87       [06-04-21 @ 06:28]          [06-04-21 @ 06:28]    Creatinine Trend:  SCr 1.04 [06-04 @ 06:28]  SCr 1.04 [06-03 @ 20:20]  SCr 1.03 [06-03 @ 06:18]  SCr 1.08 [06-02 @ 11:17]  SCr 1.13 [06-01 @ 07:06]    Urinalysis - [05-30-21 @ 08:21]      Color Yellow / Appearance Clear / SG 1.008 / pH 7.5      Gluc Negative / Ketone Negative  / Bili Negative / Urobili Negative       Blood Negative / Protein Negative / Leuk Est Negative / Nitrite Negative      RBC  / WBC  / Hyaline  / Gran  / Sq Epi  / Non Sq Epi  / Bacteria     Urine Creatinine 111      [06-04-21 @ 10:46]  Urine Sodium 8      [06-04-21 @ 10:46]    Iron 66, TIBC 132, %sat 50      [04-21-21 @ 09:07]  Ferritin 537      [04-23-21 @ 09:10]    HBsAb Nonreact      [04-26-21 @ 08:46]  HBsAg Nonreact      [04-20-21 @ 18:40]  HBcAb Nonreact      [04-26-21 @ 08:46]  HCV 0.17, Nonreact      [04-20-21 @ 18:40]  HIV Nonreact      [04-20-21 @ 13:15]    KATIE: titer 1:80, pattern Speckled      [04-21-21 @ 03:37]  Free Light Chains: kappa 3.70, lambda 3.57, ratio = 1.04      [04-21 @ 08:39]   [facility] DIVISION OF KIDNEY DISEASES AND HYPERTENSION -- [phone]  -- INITIAL CONSULT NOTE  --------------------------------------------------------------------------------  HPI: Patient is a 42 y/o M w PMH of EtoH abuse complicated by decompensated cirrhosis presented to [facility] for worsening LE edema. Admitted to [facility] for acute hypoxic resiraptoy failure in setting of PNA. Nephrology consulted for GINNY. On review of labs on [facility] HIE/[facility], patient noted to have baseline Scr of 0.49 on 4/25/21, Scr on admission increased to 0.87 on admission on 5/27/21, Scr progressively increased and peaked to 1.13 on 5/1/21, Scr then elevated/stable at 1.04 today.     Patient was seen and examined at bedside. Endorse worsening SOB and fever. Denies CP, SOB, nausea, vomiting, diarrhea or dysuria    PAST HISTORY  --------------------------------------------------------------------------------  PAST MEDICAL & SURGICAL HISTORY:  Alcohol abuse    Cirrhosis    No significant past surgical history      FAMILY HISTORY:  No pertinent family history in first degree relatives    PAST SOCIAL HISTORY:    ALLERGIES & MEDICATIONS  --------------------------------------------------------------------------------  Allergies    No Known Allergies    Intolerances    Standing Inpatient Medications  albumin human  5% IVPB 250 milliLiter(s) IV Intermittent every 4 hours  azithromycin  IVPB      azithromycin  IVPB 500 milliGRAM(s) IV Intermittent every 24 hours  caspofungin IVPB 50 milliGRAM(s) IV Intermittent every 24 hours  folic acid 1 milliGRAM(s) Oral daily  lactulose Syrup 30 Gram(s) Oral every 12 hours  meropenem  IVPB 1000 milliGRAM(s) IV Intermittent every 8 hours  multivitamin 1 Tablet(s) Oral daily  predniSONE   Tablet 40 milliGRAM(s) Oral every 12 hours  sodium bicarbonate 1300 milliGRAM(s) Oral three times a day  trimethoprim / sulfamethoxazole IVPB 320 milliGRAM(s) IV Intermittent every 8 hours    PRN Inpatient Medications  acetaminophen   Tablet .. 650 milliGRAM(s) Oral every 6 hours PRN    REVIEW OF SYSTEMS  --------------------------------------------------------------------------------  Gen: + fevers/chills  Respiratory: + dyspnea  CV: No chest pain  GI: No abdominal pain, diarrhea  : No dysuria, hematuria  MSK: No  edema    All other systems were reviewed and are negative, except as noted.    VITALS/PHYSICAL EXAM  --------------------------------------------------------------------------------  T(C): 37.2 (06-04-21 @ 12:15), Max: 38.8 (06-03-21 @ 18:50)  HR: 105 (06-04-21 @ 14:33) (101 - 115)  BP: 104/68 (06-04-21 @ 12:15) (97/65 - 121/66)  RR: 22 (06-04-21 @ 12:15) (18 - 36)  SpO2: 92% (06-04-21 @ 14:33) (91% - 96%)  Wt(kg): --    06-03-21 @ 07:01  -  06-04-21 @ 07:00  --------------------------------------------------------  IN: 2000 mL / OUT: 0 mL / NET: 2000 mL    06-04-21 @ 07:01  -  06-04-21 @ 15:48  --------------------------------------------------------  IN: 1190 mL / OUT: 0 mL / NET: 1190 mL      Physical Exam:  	Gen: tachypnic, jaundiced   	HEENT: MMM, icteric   	Pulm: b/l crackles of all lung bases   	CV: S1S2  	Abd: Soft, moderate distention   	Ext: No LE edema B/L  	Neuro: Awake  	Skin: Warm and dry    LABS/STUDIES  --------------------------------------------------------------------------------              7.9    26.09 >-----------<  112      [06-04-21 @ 06:28]              24.7     134  |  103  |  23  ----------------------------<  135      [06-04-21 @ 06:28]  3.7   |  14  |  1.04        Ca     7.9     [06-04-21 @ 06:28]      Mg     1.6     [06-04-21 @ 06:28]      Phos  2.9     [06-04-21 @ 06:28]    TPro  5.7  /  Alb  2.8  /  TBili  19.7  /  DBili  x   /  AST  81  /  ALT  33  /  AlkPhos  146  [06-04-21 @ 06:28]    PT/INR: PT 32.8 , INR 2.87       [06-04-21 @ 06:28]          [06-04-21 @ 06:28]    Creatinine Trend:  SCr 1.04 [06-04 @ 06:28]  SCr 1.04 [06-03 @ 20:20]  SCr 1.03 [06-03 @ 06:18]  SCr 1.08 [06-02 @ 11:17]  SCr 1.13 [06-01 @ 07:06]    Urinalysis - [05-30-21 @ 08:21]      Color Yellow / Appearance Clear / SG 1.008 / pH 7.5      Gluc Negative / Ketone Negative  / Bili Negative / Urobili Negative       Blood Negative / Protein Negative / Leuk Est Negative / Nitrite Negative      RBC  / WBC  / Hyaline  / Gran  / Sq Epi  / Non Sq Epi  / Bacteria     Urine Creatinine 111      [06-04-21 @ 10:46]  Urine Sodium 8      [06-04-21 @ 10:46]    Iron 66, TIBC 132, %sat 50      [04-21-21 @ 09:07]  Ferritin 537      [04-23-21 @ 09:10]    HBsAb Nonreact      [04-26-21 @ 08:46]  HBsAg Nonreact      [04-20-21 @ 18:40]  HBcAb Nonreact      [04-26-21 @ 08:46]  HCV 0.17, Nonreact      [04-20-21 @ 18:40]  HIV Nonreact      [04-20-21 @ 13:15]    KATIE: titer 1:80, pattern Speckled      [04-21-21 @ 03:37]  Free Light Chains: kappa 3.70, lambda 3.57, ratio = 1.04      [04-21 @ 08:39]

## 2021-06-04 NOTE — CONSULT NOTE ADULT - ASSESSMENT
The above is a preliminary note, recommendations are not final until this note is signed.   1. Dermatitis, favor overall resolving morbiliform drug eruption given morphology of macules and papules on trunk/extremities w/involvement of b/l axilla. Rash appears to be resolving given hyperpigmented patches admixed with erythematous macules and papules. The natural history of morbilliform drug eruption is that it is presents 7-14 days after drug exposure, but it can occur within 2-3 days if previously sensitized. It typically worsens for 3-5 days even after the drug is stopped, plateaus for 3-5 days, and then resolves over 5-10 days. As it resolves, the rash will turn from pink to red to brown and then fade.     Given timeline of reported onset of rash per pt, favor causative agent is most likely medication he received during prior hospitalization at [facility], though if he was previously sensitized, rash onset would be quicker (thus making vanco or ctx possible culprits). It is not possible to definitively identify causative drug. Interestingly, the rash will often resolve even if the culprit medication is continued.     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 Low c/f vasculitis given exam today, and will defer skin biopsy at this time given lesions on b/l dorsal feet appear to be old and resolving. However, as skin rash evolves, can consider skin biopsy in the future    Plan:   - Low concern for DRESS at this time, given lack of facial involvement, however please obtain daily cbc w/diff and cmp to monitor for systemic involvement. Pt also now on systemic steroids due to c/f PCP pneumonia, thus will be hard to interpret to labs.  - Start triamcinolone 0.1% ointment BID to affected areas for up to 2 weeks on followed by 1 week off. Please do not apply to face, axilla, groin.   - Burning on L thigh/buttock may indicative of early zoster, however no zosteriform lesion seen clinically. Can continue to monitor clinically for now.       The patient's chart was reviewed in addition to being seen and examined at bedside with the dermatology attending Dr. Zari Mckeon. Recommendations were communicated with the primary team.  Please page [phone] w/10 digit call back number for further related questions.  Dermatology will continue to follow pt.     Elba Terry MD  Resident Physician, PGY2  [facility] Dermatology  Pager: [phone]  Office: [phone]   1. Dermatitis, favor overall resolving morbiliform drug eruption given morphology of macules and papules on trunk/extremities w/involvement of b/l axilla. Rash appears to be resolving given hyperpigmented patches admixed with erythematous macules and papules. The natural history of morbilliform drug eruption is that it is presents 7-14 days after drug exposure, but it can occur within 2-3 days if previously sensitized. It typically worsens for 3-5 days even after the drug is stopped, plateaus for 3-5 days, and then resolves over 5-10 days. As it resolves, the rash will turn from pink to red to brown and then fade.     Given timeline of reported onset of rash per pt, favor causative agent is most likely medication he received during prior hospitalization at [facility], though if he was previously sensitized, rash onset would be quicker (thus making vanco or ctx possible culprits). It is not possible to definitively identify causative drug. Interestingly, the rash will often resolve even if the culprit medication is continued.     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 Low c/f vasculitis given exam today, and will defer skin biopsy at this time given lesions on b/l dorsal feet appear to be old and resolving. However, as skin rash evolves, can consider skin biopsy in the future.    Plan:   - Low concern for DRESS at this time, given lack of facial involvement and appearance of rash, however please obtain daily cbc w/diff and cmp to monitor for systemic involvement. Pt also now on systemic steroids due to c/f PCP pneumonia, thus will be hard to interpret to labs.  - Start triamcinolone 0.1% ointment BID to affected areas for up to 2 weeks on followed by 1 week off. Please do not apply to face, axilla, groin.   - Burning on L thigh/buttock may indicative of early zoster, however no zosteriform lesion seen clinically. Can continue to monitor clinically for now.       The patient's chart was reviewed in addition to being seen and examined at bedside with the dermatology attending Dr. Zari Mckeon. Recommendations were communicated with the primary team.  Please page [phone] w/10 digit call back number for further related questions.  Dermatology will continue to follow pt.     Elba Terry MD  Resident Physician, PGY2  [facility] Dermatology  Pager: [phone]  Office: [phone]   1. Dermatitis, favor overall resolving morbiliform drug eruption given morphology of macules and papules on trunk/extremities w/involvement of b/l axilla. Rash appears to be resolving given hyperpigmented patches admixed with erythematous macules and papules. The natural history of morbilliform drug eruption is that it is presents 7-14 days after drug exposure, but it can occur within 2-3 days if previously sensitized. It typically worsens for 3-5 days even after the drug is stopped, plateaus for 3-5 days, and then resolves over 5-10 days. As it resolves, the rash will turn from pink to red to brown and then fade.     Given timeline of reported onset of rash per pt, favor causative agent is most likely medication he received during prior hospitalization at [facility], though if he was previously sensitized, rash onset would be quicker (thus making vanco or ctx possible culprits). It is not possible to definitively identify causative drug. Interestingly, the rash will often resolve even if the culprit medication is continued.     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 Low c/f vasculitis given exam today, and will defer skin biopsy at this time given lesions on b/l dorsal feet appear to be old and resolving. However, as skin rash evolves, can consider skin biopsy in the future.    Plan:   - Low concern for DRESS at this time, given lack of facial involvement and appearance of rash, however please obtain daily cbc w/diff and cmp to monitor for systemic involvement. Pt also now on systemic steroids due to c/f PCP pneumonia, thus will be hard to interpret to labs.  - Start triamcinolone 0.1% ointment BID to affected areas for up to 2 weeks on followed by 1 week off. Please do not apply to face, axilla, groin.   - Burning on L thigh/buttock may indicative of early zoster, however no zosteriform lesion seen on exam. Can continue to monitor clinically for now.       The patient's chart was reviewed in addition to being seen and examined at bedside with the dermatology attending Dr. Zari Mckeon. Recommendations were communicated with the primary team.  Please page [phone] w/10 digit call back number for further related questions.  Dermatology will continue to follow pt.     Elba Terry MD  Resident Physician, PGY2  [facility]  Pager: [phone]  Office: [phone]   1. Dermatitis, favor overall resolving morbiliform drug eruption given morphology of macules and papules on trunk/extremities w/involvement of b/l axilla. Rash appears to be resolving given hyperpigmented patches admixed with erythematous macules and papules. The natural history of morbilliform drug eruption is that it is presents 7-14 days after drug exposure, but it can occur within 2-3 days if previously sensitized. It typically worsens for 3-5 days even after the drug is stopped, plateaus for 3-5 days, and then resolves over 5-10 days. As it resolves, the rash will turn from pink to red to brown and then fade.     Given timeline of reported onset of rash per pt, favor causative agent is most likely medication he received during prior hospitalization at [facility], though if he was previously sensitized, rash onset would be quicker (thus making vanco or ctx possible culprits). It is not possible to definitively identify causative drug. Interestingly, the rash will often resolve even if the culprit medication is continued.     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 Low c/f vasculitis given exam today, and will defer skin biopsy at this time given lesions on b/l dorsal feet appear to be old and resolving. However, as skin rash evolves, can consider skin biopsy in the future. Recommend obtaining serologies at this time for initial work-up.     Plan:   - Low concern for DRESS at this time, given lack of facial involvement and appearance of rash, however please obtain daily cbc w/diff and cmp to monitor for systemic involvement. Of note pt with absolute peripheral eosinophilia to 0.93 5/28, and no diff obtained since them. Pt also now on systemic steroids due to c/f PCP pneumonia, thus will be hard to interpret to labs.  - Start triamcinolone 0.1% ointment BID to affected areas for up to 2 weeks on followed by 1 week off. Please do not apply to face, axilla, groin.   - Burning on L thigh/buttock may indicative of early zoster, however no zosteriform lesion seen on exam. Can continue to monitor clinically for now.   - Please obtain KATIE, ANCAs, cryoglobulins, C3, C4, and rheumatoid factor     The patient's chart was reviewed in addition to being seen and examined at bedside with the dermatology attending Dr. Zari Mckeon. Recommendations were communicated with the primary team.  Please page [phone] w/10 digit call back number for further related questions.  Dermatology will continue to follow pt.     Elba Terry MD  Resident Physician, PGY2  [facility] Dermatology  Pager: [phone]  Office: [phone]   1. Dermatitis, favor overall resolving morbiliform drug eruption given morphology of macules and papules on trunk/extremities w/involvement of b/l axilla. Rash appears to be resolving given hyperpigmented patches admixed with erythematous macules and papules. The natural history of morbilliform drug eruption is that it is presents 7-14 days after drug exposure, but it can occur within 2-3 days if previously sensitized. It typically worsens for 3-5 days even after the drug is stopped, plateaus for 3-5 days, and then resolves over 5-10 days. As it resolves, the rash will turn from pink to red to brown and then fade.     Given timeline of reported onset of rash per pt, favor causative agent is most likely medication he received during prior hospitalization at [facility], though if he was previously sensitized, rash onset would be quicker (thus making vanco or ctx possible culprits). It is not possible to definitively identify causative drug. Interestingly, the rash will often resolve even if the culprit medication is continued.     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 Low c/f vasculitis given exam today, and will defer skin biopsy at this time given lesions on b/l dorsal feet appear to be old and resolving. However, as skin rash evolves, can consider skin biopsy in the future. Recommend obtaining serologies at this time for initial work-up given acute decompensation.     Plan:   - Low concern for DRESS at this time, given lack of facial involvement and appearance of rash, however please obtain daily cbc w/diff and cmp to monitor for systemic involvement. Of note pt with absolute peripheral eosinophilia to 0.93 5/28, and no diff obtained since them. Pt also now on systemic steroids due to c/f PCP pneumonia, thus will be hard to interpret to labs.  - Start triamcinolone 0.1% ointment BID to affected areas for up to 2 weeks on followed by 1 week off. Please do not apply to face, axilla, groin.   - Burning on L thigh/buttock may indicative of early zoster, however no zosteriform lesion seen on exam. Can continue to monitor clinically for now.   - Please obtain KATIE, ANCAs, cryoglobulins, C3, C4, and rheumatoid factor     The patient's chart was reviewed in addition to being seen and examined at bedside with the dermatology attending Dr. Zari Mckeon. Recommendations were communicated with the primary team.  Please page [phone] w/10 digit call back number for further related questions.  Dermatology will continue to follow pt.     Elba Terry MD  Resident Physician, PGY2  [facility] Dermatology  Pager: [phone]  Office: [phone]

## 2021-06-04 NOTE — RAPID RESPONSE TEAM SUMMARY - NSOTHERINTERVENTIONSRRT_GEN_ALL_CORE
Acute hypoxemic respiratory failure and WOB 2/2 worsening pneumonia (?fungal) and metabolic acidosis. HFNC increased to 60LPM/100% with improvement in SpO2 to 95%, pt placed on Bipap 12/5 however did not tolerate, tachypneic to 50s. Discussed with pt and wife regarding impending intubation, all questions at the time answered. Anesthesia successfully intubated pt with etomidate/succinylcholine and pt with stable hemodynamics prior to transfer (SpO2 95%, HR 130s, /100) without vasopressors. Pt safely transferred to MICU for further management.  Acute hypoxemic respiratory failure and increased WOB 2/2 worsening pneumonia (?fungal) and metabolic acidosis. HFNC increased to 60LPM/100% with improvement in SpO2 to 95%, pt placed on Bipap 12/5 however did not tolerate, tachypneic to 50s. Discussed with pt and wife regarding impending intubation, all questions at the time answered. Anesthesia successfully intubated pt with etomidate/succinylcholine and pt with stable hemodynamics prior to transfer (SpO2 95%, HR 130s, /100) without vasopressors. Pt safely transferred to MICU for further management.

## 2021-06-04 NOTE — PROGRESS NOTE ADULT - PROBLEM SELECTOR PLAN 3
h/o heavy EtOH use with prior admission for decompensated hepatic cirrhosis s/p para 4/22- 2.1 L removed. ammonia 44 (wnl). MELD 30   - c/w lactulose BID (titrate to 3-4 BM/day)  - Abd US 5/27 shows mild-mod ascites- s/p para 2.3 L removed 5/28  - ascitic fluid neg for SBP. SAAG 1.7, ascitic protin .7 c/w cirrhosis   - febrile 5/28 o/n- broadened from ceftriaxone (5/28) to zosyn (5/29- )  - c/w zosyn (5/29- ) for SBP ppx until infectious workup completed  - blood cx 5/27- ngtd   - hepatology following- appreciate recs  - CT A/P: maybe new multifocal PNA?  - restarted spironolactone on 6/3; h/o heavy EtOH use with prior admission for decompensated hepatic cirrhosis s/p para 4/22- 2.1 L removed. ammonia 44 (wnl). MELD 31   - c/w lactulose BID (titrate to 3-4 BM/day)  - Abd US 5/27 shows mild-mod ascites- s/p para 2.3 L removed 5/28  - ascitic fluid neg for SBP. SAAG 1.7, ascitic protin .7 c/w cirrhosis   - abx as above  - blood cx 5/27- ngtd   - hepatology following- appreciate recs  - CT A/P: maybe new multifocal PNA?  - restarted spironolactone on 6/3; stopped after 1 dose  - Pt again w/ abdominal distention on 6/4--> IR consulted, but too unstable at this time. F/u imaging studies and optimize medically.

## 2021-06-04 NOTE — CONSULT NOTE ADULT - ATTENDING COMMENTS
40 M with ETOH abuse here with jaundice, abdominal distention now found to have acute hypoxemic respiratory failure of unclear etiology.    CT chest showing bilateral opacities - could be infectious in nature.  Does not appear to be pulmonary edema.  Tachypnea/Hypoxemia today may also be related to worsening bicarb in setting of GINNY (respiratory compensation of metabolic acidosis)    Reccs:  acute hypoxemic respiratory failure and pneumonia: broaden abx as per ID, can consider antifungals given high fungitell  GINNY/metabolic acidosis: would workup GINNY, consider IVF, recheck CMP now, along with blood gas with lactate
41 M originally from [facility] PMHx of ETOH abuse (quit early April) c/b decompensated alcoholic cirrhosis, recent admission (4/20-4/27) for decompensated cirrhosis, presented with b/l LE swelling and LLE foot pain  Leukocytosis, fever  Was on treatment for LLE cellulitis--on my eval, site is markedly improved, no signs active infection  Elevated LFTs in setting cirrhosis  No pulmonary complaints  Ascites fluid not consistent with SBP  CXR with hazy opacities, I reviewed personally, no large consolidation  Unclear cause for fevers/leukocytosis presently, although I suspect LFTs are due to cirrhosis, would evaluate for possibility cholangitis  Overall,  1) Fever, Leukocytosis  - Unclear etiology presently  - Zosyn 3.375g q 8  - F/U BCXs  - Monitor for signs/symptoms infection  - Trend WBC to normal, monitor for further fevers  2) Elevated LFTs  - Would check CT A/P to further evaluate for cholangitis, as well as other intra-abd causes of leukocytosis  - Trend to normal  3) Abnormal finding on imaging  - CXR with hazy opacities  - No clinical signs pneumonia, low suspicion, although Zosyn would cover for pneumonia regardless    Sourav Jimenez MD  Pager [phone]  After 5pm and on weekends call [phone]    I was physically present for the key portions of the evaluation and management service provided. I saw and examined the patient. I agree with the above history, physical, and plan except for any discrepancies which I have documented in “Attending Statements.” Please refer to “Attending Statements” for final plan.
GINNY/ATN in setting of liver disease and probably underlying hepatorenal physiology. Sepsis.    Edema, inspiratory rales, on HFNC.  Looks rather terrible, dyspneic, ill.    - Recheck urinalysis  - Discontinue oral bicarbonate (despite low bicarb, pH essentially normal)  - Hold fluids and diuretics given tenuous resp. status  - Hyponatremia mild, no tx at this time  - Remainder per fellow, will follow
Hepatology Staff: Mervat Anne MD    I saw and examined the patient along with  Dr. Funes on 05-27-21 @ 16:59  Patient Medical Record, hosptial course was reviewed and summarized as below:    Vitals: Vital Signs Last 24 Hrs  T(C): 36.9 (27 May 2021 12:24), Max: 37.2 (27 May 2021 03:30)  T(F): 98.5 (27 May 2021 12:24), Max: 99 (27 May 2021 03:30)  HR: 91 (27 May 2021 12:24) (89 - 112)  BP: 109/74 (27 May 2021 12:24) (97/62 - 122/69)  BP(mean): 85 (27 May 2021 05:10) (85 - 89)  RR: 18 (27 May 2021 12:24) (16 - 18)  SpO2: 96% (27 May 2021 12:24) (96% - 100%)  Medications:  IV Fluids:   Antibiotics:rifAXIMin 550 milliGRAM(s) Oral two times a day  vancomycin  IVPB 1250 milliGRAM(s) IV Intermittent every 8 hours    Diuretics:  Labs:Creatinine, Serum: 0.71 mg/dL (05-27-21 @ 10:31)  Bilirubin Total, Serum: 17.4 mg/dL (05-27-21 @ 10:31)  Creatinine, Serum: 0.87 mg/dL (05-27-21 @ 03:34)  Bilirubin Total, Serum: 18.5 mg/dL (05-27-21 @ 03:34)  INR: 1.86 ratio (05-27-21 @ 03:34)    I/O: I&O's Summary    Nutritional Status: Weight (kg): 72.6 (05-27-21 @ 02:55)  BMI (kg/m2): 30.3 (05-27-21 @ 02:55)  Albumin, Serum: 1.7 g/dL (05-27-21 @ 10:31)  Albumin, Serum: 2.0 g/dL (05-27-21 @ 03:34)    Recommendations: This is a 40-year-old young male with known history of EtOH abuse presents with jaundice and abdominal distention along with lower extremity edema for the last 2 weeks.  On examination patient has moderate abdominal distention and 3+ bilateral lower extremity edema.  Known history of cirrhosis and recent hospital admission with alcoholic hepatitis, currently enrolled in a clinical trial.  Admission labs are significant for severe hyponatremia serum sodium level now with 124 mEq/L.  Serum albumin level is 1.6 g/dL.  Additionally patient has significant leukocytosis although denies any fever, chills or rigor.    Recommend infectious work-up.  I also notified  Dr. Melquiades Michele, for the study in which patient is abnormal.    Recommend IV albumin 25% 25 g every 6 hours fluid restriction to 1 L a day to correct hyponatremia (hypervolemic).  I will plan to initiate diuresis after following serum sodium level tomorrow a.m.  Agree with the rest of the recommendation as outlined in fellow note.      Plan discussed with Primary team.

## 2021-06-04 NOTE — PROGRESS NOTE ADULT - ASSESSMENT
41 M originally from [facility] PMHx of ETOH abuse (quit early April) c/b decompensated alcoholic cirrhosis, recent admission (4/20-4/27) for decompensated cirrhosis, presented with b/l LE swelling and LLE foot pain  Leukocytosis, fever  Elevated LFTs in setting cirrhosis  CXR with hazy opacities  CT A/P notes lower lung opacities with concern for possible infection  Concern for worsening resp status  Discussion with Pulm, Hepatology, Medicine--possibility of PCP?  Note worsening rash on LE  Ongoing fevers, worsening SOB  Overall,  1) Fever, Leukocytosis  - Now elevated Fungitell in setting of progressively worsening resp status  - Empiric Livia, Azithro, Caspo for now  - Would DC Vanco  - F/U BCXs  - Trend WBC to normal, monitor for further fevers  - As patient is well covered in terms of bacterial and fungal agents, recommend exploration of noninfectious diagnoses, as he is not responding to current treatments  2) Elevated Fungitell  - Unclear significance; findings equivocal for PCP--prior had lower LDH, time course atypical,   - F/U pending BCXs  - Caspo daily  - Add Mepron for now  - Note that Mepron is second line for PCP. As per discussions, would unblind patient in study to determine if he received steroids. If is found to have received steroids, would escalate to Bactrim + steroids (if pulm agrees to treat for suspected PCP).  3) Rash  - Purpuric rash on LE? Consider cryoglobulinemia, vasculitis, other etiologies?  - Derm eval  4) Elevated LFTs  - F/U hepatology    Sourav Jimenez MD  Pager [phone]  After 5pm and on weekends call [phone]

## 2021-06-04 NOTE — CHART NOTE - NSCHARTNOTEFT_GEN_A_CORE
CHIEF COMPLAINT:  Hypoxia     HPI:  41M with PMH of ETOH abuse (quit early April) c/b decompensated alcoholic cirrhosis, recent admission (4/20-4/27) for decompensated cirrhosis, presented with b/l LE swelling and LLE foot pain who has developed hypoxemia and new onset renal failure since admission, currently with GINNY, fevers, and CT chest demonstrating patchy opacities in the bilateral upper lobes and bilateral lower lobes likely representing multifocal pneumonia, currently worsening on Zosyn and Azithromycin. The patient was admitted to medicine floors for further management Hepatology and IR was consulted for paracentesis on 5/28 a total of 2300ml was drained (-) x 1 culture 2nd body fluid culture is pending. Per hepatology unknown history of varices or no known lesion of HCC on CT 4/20Podiatry was also consulted for concerns of lower extremity cellulitis at this time Antibiotic therapy per ID. The patients hospital course was further complicated by hypoxic respiratory failure with associated GINNY. CTA chest demonstrated multifocal PNA, CT of the ABD and Pelvis was negative for  infectious process. COVID / RVP (-) all cx are also negative to date. Fungitel >500 and Caspofungin was added to PCP. Dermatology was also consulted to evaluate rash however per dermatology low suspicion for any vasculitis.     Today RRT was called for worsening hypoxia, failed high flow nasal o2 and could not tolerate Bilevel support. Now intubated and transferred to the medical ICU for further management.       PAST MEDICAL & SURGICAL HISTORY:  Alcohol abuse    Cirrhosis    No significant past surgical history        FAMILY HISTORY:  No pertinent family history in first degree relatives      Advance Directives:    Allergies    No Known Allergies    Intolerances      REVIEW OF SYSTEMS:  Constitutional: [ ] fevers [ ] chills [ ] weight loss [ ] weight gain  HEENT: [ ] dry eyes [ ] eye irritation [ ] postnasal drip [ ] nasal congestion  CV: [ ] chest pain [ ] orthopnea [ ] palpitations [ ] murmur  Resp: [ ] cough [ ] shortness of breath [ ] dyspnea [ ] wheezing [ ] sputum [ ] hemoptysis  GI: [ ] nausea [ ] vomiting [ ] diarrhea [ ] constipation [ ] abd pain [ ] dysphagia   : [ ] dysuria [ ] nocturia [ ] hematuria [ ] increased urinary frequency  Musculoskeletal: [ ] back pain [ ] myalgias [ ] arthralgias [ ] fracture  Skin: [ ] rash [ ] itch  Neurological: [ ] headache [ ] dizziness [ ] syncope [ ] weakness [ ] numbness  Psychiatric: [ ] anxiety [ ] depression  Endocrine: [ ] diabetes [ ] thyroid problem  Hematologic/Lymphatic: [ ] anemia [ ] bleeding problem  Allergic/Immunologic: [ ] itchy eyes [ ] nasal discharge [ ] hives [ ] angioedema  [ ] All other systems negative  [x ] Unable to assess ROS because the patient is intubated     OBJECTIVE:  ICU Vital Signs Last 24 Hrs  T(C): 38.8 (04 Jun 2021 19:35), Max: 38.8 (04 Jun 2021 19:35)  T(F): 101.9 (04 Jun 2021 19:35), Max: 101.9 (04 Jun 2021 19:35)  HR: 129 (04 Jun 2021 19:35) (101 - 129)  BP: 114/60 (04 Jun 2021 19:35) (97/65 - 122/71)  BP(mean): --  ABP: --  ABP(mean): --  RR: 50 (04 Jun 2021 19:35) (18 - 50)  SpO2: 86% (04 Jun 2021 19:35) (86% - 96%)        06-03 @ 07:01 - 06-04 @ 07:00  --------------------------------------------------------  IN: 2000 mL / OUT: 0 mL / NET: 2000 mL    06-04 @ 07:01 - 06-04 @ 20:15  --------------------------------------------------------  IN: 1440 mL / OUT: 0 mL / NET: 1440 mL      CAPILLARY BLOOD GLUCOSE      POCT Blood Glucose.: 165 mg/dL (04 Jun 2021 19:40)      PHYSICAL EXAM:  General:   HEENT:   Lymph Nodes:  Neck:   Respiratory:   Cardiovascular:   Abdomen:   Extremities:   Skin:   Neurological:  Psychiatry:    LINES:   [facility]     HOSPITAL MEDICATIONS:  MEDICATIONS  (STANDING):  acetaminophen  IVPB .. 1000 milliGRAM(s) IV Intermittent once  albumin human  5% IVPB 250 milliLiter(s) IV Intermittent every 4 hours  azithromycin  IVPB      azithromycin  IVPB 500 milliGRAM(s) IV Intermittent every 24 hours  caspofungin IVPB 50 milliGRAM(s) IV Intermittent every 24 hours  folic acid 1 milliGRAM(s) Oral daily  lactulose Syrup 30 Gram(s) Oral every 12 hours  meropenem  IVPB 1000 milliGRAM(s) IV Intermittent every 8 hours  multivitamin 1 Tablet(s) Oral daily  predniSONE   Tablet 40 milliGRAM(s) Oral every 12 hours  sodium bicarbonate 1300 milliGRAM(s) Oral three times a day  trimethoprim / sulfamethoxazole IVPB 320 milliGRAM(s) IV Intermittent every 8 hours    MEDICATIONS  (PRN):  acetaminophen   Tablet .. 650 milliGRAM(s) Oral every 6 hours PRN Temp greater or equal to 38C (100.4F), Mild Pain (1 - 3)      LABS:                        7.9    26.09 )-----------( 112      ( 04 Jun 2021 06:28 )             24.7     Hgb Trend: 7.9<--, 7.8<--, 8.3<--, 7.6<--, 8.0<--  06-04    134<L>  |  103  |  23  ----------------------------<  135<H>  3.7   |  14<L>  |  1.04    Ca    7.9<L>      04 Jun 2021 06:28  Phos  2.9     06-04  Mg     1.6     06-04    TPro  5.7<L>  /  Alb  2.8<L>  /  TBili  19.7<H>  /  DBili  x   /  AST  81<H>  /  ALT  33  /  AlkPhos  146<H>  06-04    Creatinine Trend: 1.04<--, 1.04<--, 1.03<--, 1.08<--, 1.13<--, 1.12<--  PT/INR - ( 04 Jun 2021 06:28 )   PT: 32.8 sec;   INR: 2.87 ratio             Arterial Blood Gas:  06-04 @ 10:52  7.39/25/96/15/98/-8.9  ABG lactate: --    Venous Blood Gas:  06-03 @ 20:19  7.38/27/46/16/76  VBG Lactate: 2.8      MICROBIOLOGY:   (P)  6/4    cureCulture - Sputum . (06.01.21 @ 16:25)   Gram Stain:   Moderate polymorphonuclear leukocytes per low power field   Moderate Squamous epithelial cells per low power field   Few Gram positive cocci in pairs seen per oil power field   Few Gram Variable Rods seen per oil power field   Specimen Source: .Sputum Sputum   Culture Results:   Normal Respiratory Minda present Culture - Blood (06.01.21 @ 02:17)   Specimen Source: .Blood Blood-Venous   Culture Results:   No growth to date.       Historical Values  Culture - Blood (06.01.21 @ 02:17)   Specimen Source: .Blood Blood-Venous   Culture Results:   No growth to date.   Culture - Blood (06.01.21 @ 02:17)   Specimen Source: .Blood Blood-Peripheral   Culture Results: ulture - Blood (06.01.21 @ 02:17)   Specimen Source: .Blood Blood-Peripheral   Culture Results:   No growth to date.       Historical Values  Culture - Blood (06.01.21 @ 02:17)   Specimen Source: .Blood Blood-Venous   Culture Results:   No growth to date.   Culture - Blood (06.01.21 @ 02:17)   Specimen Source: .Blood Blood-Peripheral   Culture Results:   No growth to date.   Culture - Blood (05.29.21 @ 12:22)   Specimen Source: .Blood Blood-Peripheral   Culture Results: Cryptosporidium Antigen (06.01.21 @ 02:11)   Culture Results:   Cryptosporidium parvum antigen negative   performed by rapid immunoassay (non-enzymatic).   (It is recommended that all specimens tested by   an immunochromatographic card test be   confirmed by another method.) Culture - Urine (05.30.21 @ 12:41)   Specimen Source: .Urine Clean Catch (Midstream)   Culture Results:   No growth       Historical Values  Culture - Urine (05.30.21 @ 12:41)   Specimen Source: .Urine Clean Catch (Midstream)   Culture Results:   No growth   Culture - Urine (05.30.21 @ 04:00)   Specimen Source: .Urine Clean Catch (Midstream)   Culture Results:   RADIOLOGY:  ad< from: Xray Chest 1 View- PORTABLE-Urgent (Xray Chest 1 View- PORTABLE-Urgent .) (06.04.21 @ 20:11) >    INTERPRETATION:  Right upper lobe opacity. Near complete opacification of the lef themithorax with bronchograms visualized. FIndings discussed with MD Malika at 8:10 PM on 6/4/2021. F/u official report.        < end of copied text >    < from: Xray Abdomen 1 View Portable, IMMEDIATE (Xray Abdomen 1 View Portable, IMMEDIATE .) (06.04.21 @ 20:10) >        INTERPRETATION:  Limited evaluation. Dilated loops of bowel. Findings discussed with MD Malika at 8:10 PM on 6/4/2021. F/u official report.      < end of copied text >    < from: CT Angio Chest PE Protocol w/ IV Cont (06.01.21 @ 13:21) >    IMPRESSION:    Limited exam for evaluation of pulmonary embolus secondary to motion, streak artifact and poor opacification of pulmonary arteries.    Patchy opacities within bilateral upper lobes and bilateral lower lobes likely representing multifocal pneumonia.    1.2 cm right lower lobe nodule. Recommend follow-up chest CT in 3 months to determine stability.    < end of copied text >    < from: CT Abdomen and Pelvis w/ IV Cont (05.30.21 @ 16:17) >    IMPRESSION:  Partially imaged patchy opacities in the right middle lobe, left upper lobe and bilateral lower lobes, likely representing multifocal infection.    There is 1.2 cm right lower lobe lung nodule which is likely inflammatory in etiology. Follow-up CT in 3-4 weeks is recommended to document resolution.      < end of copied text >        EKG:  e< from: 12 Lead ECG (04.27.21 @ 06:44) >    Ventricular Rate 67 BPM    Atrial Rate 67 BPM    P-R Interval 158 ms    QRS Duration 90 ms    Q-T Interval 416 ms    QTC Calculation(Bazett) 439 ms    P Axis -5 degrees    R Axis -10 degrees    T Axis -7 degrees    Diagnosis Line *** POOR DATA QUALITY, INTERPRETATION MAY BE ADVERSELY AFFECTED  NORMAL SINUS RHYTHM  MINIMAL VOLTAGE CRITERIA FOR LVH, MAY BE NORMAL VARIANT  BORDERLINE ECG  WHEN COMPARED WITH ECG OF 26-APR-2021 13:01, (UNCONFIRMED)  NO SIGNIFICANT CHANGE WAS FOUND  Confirmed by CINDA Ortiz Zlata ([id number]) on 4/27/2021 4:01:26 PM    < end of copied text >    ASSESSMENT PLAN:  41M with PMH of ETOH abuse (quit early April) c/b decompensated alcoholic cirrhosis, recent admission (4/20-4/27) for decompensated cirrhosis, presented with b/l LE swelling and LLE foot pain. Now with hypoxic respiratory failure.     Neuro   Sedation   - Maintain Rass goal of - 2 to -3 while intubated   - Sedation Vacation if/ when medically appropriate     Hyperammonemia  - check ammonia levels daily   - will continue with lactulose and Rifaximin via OGT       Critical care myopathy   - PT/OT once medically appropriate     CV   Hemodynamically stable at this time   - VS q 1 hr will maintain goal map of 65-70 for possible HRS   - vasopressor support if needed   - trend lactate   - bed side POCUS on admission   - will obtain base line CE and EKG     Pulm   Hypoxic respiratory failure secondary to PNA   - will maintain Mechanical vent support   - blood gas on admission   - chest x ray   - will resent sputum   - Bronchoscopy per Attending for CMV / bronchial specimens   - will order Hypertonic saline 3% with Duoneb and chest pt as tolerated   - wean to extubate once medically appropriate     GI   Nutrition   - OGT placement   - will initiate feed in the am if hemodynamics remain stable   - PPI / Bowel regimen lactulose     (+) Large volume ascites  - will need paracentesis tonight or in the AM per attending   - Hepatology follow up appreciated   - will continue with 25% of albumin     ETOH   - folic acid 1 mg IV daily   - Thiamine 500mg tid x 3 days if not completed if completed will order 100 mg daily   - lactulose and rifaximin as above for Hyperammonemia    - out of withdrawal window        GINNY   Baseline Creat .4-.5 now 1.   -Person cath placement   - strict I/o   - Will assess fluid status daily   - if volume overloaded will reorder diuretic   - may need to change Bactrim to mepron if worsening renal failure     ID   Multifocal PNA now with hypoxic respiratory failure   - will continue with caspofungin Meropenem Azithromycin and bactrim   - ID follow up appreciated   - follow up on new cx UA sputum and Blood   - possible bronchoscopy   - hold vanco for now due to MRSA (-) will re check   - will send procalcitonin CMV PCR   - follow up with toxoplasma IGM and Qunat gold     Endo   NPO status for now   - will order fs q 6 hrs   - hypoglycemia protocol   - if FS >200 will order ISS     VTE   - will obtain Va Doppler bilateral extremities   - Hold AC (+) chronic Thrombocytopenia secondary to liver failure                       [id number] CHIEF COMPLAINT:  Hypoxia     HPI:  41M with PMH of ETOH abuse (quit early April) c/b decompensated alcoholic cirrhosis, recent admission (4/20-4/27) for decompensated cirrhosis, presented with b/l LE swelling and LLE foot pain who has developed hypoxemia and new onset renal failure since admission, currently with GINNY, fevers, and CT chest demonstrating patchy opacities in the bilateral upper lobes and bilateral lower lobes likely representing multifocal pneumonia, currently worsening on Zosyn and Azithromycin. The patient was admitted to medicine floors for further management Hepatology and IR was consulted for paracentesis on 5/28 a total of 2300ml was drained (-) x 1 culture 2nd body fluid culture is pending. Per hepatology unknown history of varices or no known lesion of HCC on CT 4/20Podiatry was also consulted for concerns of lower extremity cellulitis at this time Antibiotic therapy per ID. The patients hospital course was further complicated by hypoxic respiratory failure with associated GINNY. CTA chest demonstrated multifocal PNA, CT of the ABD and Pelvis was negative for  infectious process. COVID / RVP (-) all cx are also negative to date. Fungitel >500 and Caspofungin was added to PCP. Dermatology was also consulted to evaluate rash however per dermatology low suspicion for any vasculitis.     Today RRT was called for worsening hypoxia, failed high flow nasal o2 and could not tolerate Bilevel support. Now intubated and transferred to the medical ICU for further management.       PAST MEDICAL & SURGICAL HISTORY:  Alcohol abuse    Cirrhosis    No significant past surgical history        FAMILY HISTORY:  No pertinent family history in first degree relatives      Advance Directives:    Allergies    No Known Allergies    Intolerances      REVIEW OF SYSTEMS:  Constitutional: [ ] fevers [ ] chills [ ] weight loss [ ] weight gain  HEENT: [ ] dry eyes [ ] eye irritation [ ] postnasal drip [ ] nasal congestion  CV: [ ] chest pain [ ] orthopnea [ ] palpitations [ ] murmur  Resp: [ ] cough [ ] shortness of breath [ ] dyspnea [ ] wheezing [ ] sputum [ ] hemoptysis  GI: [ ] nausea [ ] vomiting [ ] diarrhea [ ] constipation [ ] abd pain [ ] dysphagia   : [ ] dysuria [ ] nocturia [ ] hematuria [ ] increased urinary frequency  Musculoskeletal: [ ] back pain [ ] myalgias [ ] arthralgias [ ] fracture  Skin: [ ] rash [ ] itch  Neurological: [ ] headache [ ] dizziness [ ] syncope [ ] weakness [ ] numbness  Psychiatric: [ ] anxiety [ ] depression  Endocrine: [ ] diabetes [ ] thyroid problem  Hematologic/Lymphatic: [ ] anemia [ ] bleeding problem  Allergic/Immunologic: [ ] itchy eyes [ ] nasal discharge [ ] hives [ ] angioedema  [ ] All other systems negative  [x ] Unable to assess ROS because the patient is intubated     OBJECTIVE:  ICU Vital Signs Last 24 Hrs  T(C): 38.8 (04 Jun 2021 19:35), Max: 38.8 (04 Jun 2021 19:35)  T(F): 101.9 (04 Jun 2021 19:35), Max: 101.9 (04 Jun 2021 19:35)  HR: 129 (04 Jun 2021 19:35) (101 - 129)  BP: 114/60 (04 Jun 2021 19:35) (97/65 - 122/71)  BP(mean): --  ABP: --  ABP(mean): --  RR: 50 (04 Jun 2021 19:35) (18 - 50)  SpO2: 86% (04 Jun 2021 19:35) (86% - 96%)        06-03 @ 07:01  -  06-04 @ 07:00  --------------------------------------------------------  IN: 2000 mL / OUT: 0 mL / NET: 2000 mL    06-04 @ 07:01 - 06-04 @ 20:15  --------------------------------------------------------  IN: 1440 mL / OUT: 0 mL / NET: 1440 mL      CAPILLARY BLOOD GLUCOSE      POCT Blood Glucose.: 165 mg/dL (04 Jun 2021 19:40)      PHYSICAL EXAM:  General: Intubated and sedated   HEENT: PERRLA   Lymph Nodes: no palpable lymph node adenopathy    Neck: supple   Respiratory: intubated with diminished breath sounds to left   Cardiovascular: s1 s2 no m/c/g/r  Abdomen: soft (+) non tender   Extremities: (+) edema   Skin: Jaundice   Neurological: sedated   Psychiatry: unable to assess due to sedation     LINES:   hospitals     HOSPITAL MEDICATIONS:  MEDICATIONS  (STANDING):  acetaminophen  IVPB .. 1000 milliGRAM(s) IV Intermittent once  albumin human  5% IVPB 250 milliLiter(s) IV Intermittent every 4 hours  azithromycin  IVPB      azithromycin  IVPB 500 milliGRAM(s) IV Intermittent every 24 hours  caspofungin IVPB 50 milliGRAM(s) IV Intermittent every 24 hours  folic acid 1 milliGRAM(s) Oral daily  lactulose Syrup 30 Gram(s) Oral every 12 hours  meropenem  IVPB 1000 milliGRAM(s) IV Intermittent every 8 hours  multivitamin 1 Tablet(s) Oral daily  predniSONE   Tablet 40 milliGRAM(s) Oral every 12 hours  sodium bicarbonate 1300 milliGRAM(s) Oral three times a day  trimethoprim / sulfamethoxazole IVPB 320 milliGRAM(s) IV Intermittent every 8 hours    MEDICATIONS  (PRN):  acetaminophen   Tablet .. 650 milliGRAM(s) Oral every 6 hours PRN Temp greater or equal to 38C (100.4F), Mild Pain (1 - 3)      LABS:                        7.9    26.09 )-----------( 112      ( 04 Jun 2021 06:28 )             24.7     Hgb Trend: 7.9<--, 7.8<--, 8.3<--, 7.6<--, 8.0<--  06-04    134<L>  |  103  |  23  ----------------------------<  135<H>  3.7   |  14<L>  |  1.04    Ca    7.9<L>      04 Jun 2021 06:28  Phos  2.9     06-04  Mg     1.6     06-04    TPro  5.7<L>  /  Alb  2.8<L>  /  TBili  19.7<H>  /  DBili  x   /  AST  81<H>  /  ALT  33  /  AlkPhos  146<H>  06-04    Creatinine Trend: 1.04<--, 1.04<--, 1.03<--, 1.08<--, 1.13<--, 1.12<--  PT/INR - ( 04 Jun 2021 06:28 )   PT: 32.8 sec;   INR: 2.87 ratio             Arterial Blood Gas:  06-04 @ 10:52  7.39/25/96/15/98/-8.9  ABG lactate: --    Venous Blood Gas:  06-03 @ 20:19  7.38/27/46/16/76  VBG Lactate: 2.8      MICROBIOLOGY:   (P)  6/4    cureCulture - Sputum . (06.01.21 @ 16:25)   Gram Stain:   Moderate polymorphonuclear leukocytes per low power field   Moderate Squamous epithelial cells per low power field   Few Gram positive cocci in pairs seen per oil power field   Few Gram Variable Rods seen per oil power field   Specimen Source: .Sputum Sputum   Culture Results:   Normal Respiratory Minda present Culture - Blood (06.01.21 @ 02:17)   Specimen Source: .Blood Blood-Venous   Culture Results:   No growth to date.       Historical Values  Culture - Blood (06.01.21 @ 02:17)   Specimen Source: .Blood Blood-Venous   Culture Results:   No growth to date.   Culture - Blood (06.01.21 @ 02:17)   Specimen Source: .Blood Blood-Peripheral   Culture Results: ulture - Blood (06.01.21 @ 02:17)   Specimen Source: .Blood Blood-Peripheral   Culture Results:   No growth to date.       Historical Values  Culture - Blood (06.01.21 @ 02:17)   Specimen Source: .Blood Blood-Venous   Culture Results:   No growth to date.   Culture - Blood (06.01.21 @ 02:17)   Specimen Source: .Blood Blood-Peripheral   Culture Results:   No growth to date.   Culture - Blood (05.29.21 @ 12:22)   Specimen Source: .Blood Blood-Peripheral   Culture Results: Cryptosporidium Antigen (06.01.21 @ 02:11)   Culture Results:   Cryptosporidium parvum antigen negative   performed by rapid immunoassay (non-enzymatic).   (It is recommended that all specimens tested by   an immunochromatographic card test be   confirmed by another method.) Culture - Urine (05.30.21 @ 12:41)   Specimen Source: .Urine Clean Catch (Midstream)   Culture Results:   No growth       Historical Values  Culture - Urine (05.30.21 @ 12:41)   Specimen Source: .Urine Clean Catch (Midstream)   Culture Results:   No growth   Culture - Urine (05.30.21 @ 04:00)   Specimen Source: .Urine Clean Catch (Midstream)   Culture Results:   RADIOLOGY:  ad< from: Xray Chest 1 View- PORTABLE-Urgent (Xray Chest 1 View- PORTABLE-Urgent .) (06.04.21 @ 20:11) >    INTERPRETATION:  Right upper lobe opacity. Near complete opacification of the lef themithorax with bronchograms visualized. FIndings discussed with MD Malika at 8:10 PM on 6/4/2021. F/u official report.        < end of copied text >    < from: Xray Abdomen 1 View Portable, IMMEDIATE (Xray Abdomen 1 View Portable, IMMEDIATE .) (06.04.21 @ 20:10) >        INTERPRETATION:  Limited evaluation. Dilated loops of bowel. Findings discussed with MD Malika at 8:10 PM on 6/4/2021. F/u official report.      < end of copied text >    < from: CT Angio Chest PE Protocol w/ IV Cont (06.01.21 @ 13:21) >    IMPRESSION:    Limited exam for evaluation of pulmonary embolus secondary to motion, streak artifact and poor opacification of pulmonary arteries.    Patchy opacities within bilateral upper lobes and bilateral lower lobes likely representing multifocal pneumonia.    1.2 cm right lower lobe nodule. Recommend follow-up chest CT in 3 months to determine stability.    < end of copied text >    < from: CT Abdomen and Pelvis w/ IV Cont (05.30.21 @ 16:17) >    IMPRESSION:  Partially imaged patchy opacities in the right middle lobe, left upper lobe and bilateral lower lobes, likely representing multifocal infection.    There is 1.2 cm right lower lobe lung nodule which is likely inflammatory in etiology. Follow-up CT in 3-4 weeks is recommended to document resolution.      < end of copied text >        EKG:  e< from: 12 Lead ECG (04.27.21 @ 06:44) >    Ventricular Rate 67 BPM    Atrial Rate 67 BPM    P-R Interval 158 ms    QRS Duration 90 ms    Q-T Interval 416 ms    QTC Calculation(Bazett) 439 ms    P Axis -5 degrees    R Axis -10 degrees    T Axis -7 degrees    Diagnosis Line *** POOR DATA QUALITY, INTERPRETATION MAY BE ADVERSELY AFFECTED  NORMAL SINUS RHYTHM  MINIMAL VOLTAGE CRITERIA FOR LVH, MAY BE NORMAL VARIANT  BORDERLINE ECG  WHEN COMPARED WITH ECG OF 26-APR-2021 13:01, (UNCONFIRMED)  NO SIGNIFICANT CHANGE WAS FOUND  Confirmed by CINDA Ortiz Zlata ([id number]) on 4/27/2021 4:01:26 PM    < end of copied text >    ASSESSMENT PLAN:  41M with PMH of ETOH abuse (quit early April) c/b decompensated alcoholic cirrhosis, recent admission (4/20-4/27) for decompensated cirrhosis, presented with b/l LE swelling and LLE foot pain. Now with hypoxic respiratory failure.     Neuro   Sedation   - Maintain Rass goal of - 2 to -3 while intubated   - Sedation Vacation if/ when medically appropriate     Hyperammonemia  - check ammonia levels daily   - will continue with lactulose and Rifaximin via OGT       Critical care myopathy   - PT/OT once medically appropriate     CV   Hemodynamically stable at this time   - VS q 1 hr will maintain goal map of 65-70 for possible HRS   - vasopressor support if needed   - trend lactate   - bed side POCUS on admission   - will obtain base line CE and EKG     Pulm   Hypoxic respiratory failure secondary to PNA   - will maintain Mechanical vent support   - blood gas on admission   - chest x ray   - will resent sputum   - Bronchoscopy per Attending for CMV / bronchial specimens   - will order Hypertonic saline 3% with Duoneb and chest pt as tolerated   - wean to extubate once medically appropriate   - Change Po steroids to IV solumedrol     GI   Nutrition   - OGT placement   - will initiate feed in the am if hemodynamics remain stable   - PPI / Bowel regimen lactulose     (+) Large volume ascites  - will need paracentesis tonight or in the AM per attending   - Hepatology follow up appreciated   - will continue with 25% of albumin     ETOH   - folic acid 1 mg IV daily   - Thiamine 500mg tid x 3 days if not completed if completed will order 100 mg daily   - lactulose and rifaximin as above for Hyperammonemia    - out of withdrawal window        GINNY   Baseline Creat .4-.5 now 1.   -Person cath placement   - strict I/o   - Will assess fluid status daily   - if volume overloaded will reorder diuretic   - may need to change Bactrim to mepron if worsening renal failure     ID   Multifocal PNA now with hypoxic respiratory failure   - will continue with caspofungin Meropenem Azithromycin and bactrim   - ID follow up appreciated   - follow up on new cx UA sputum and Blood   - possible bronchoscopy   - hold vanco for now due to MRSA (-) will re check   - will send procalcitonin CMV PCR   - follow up with toxoplasma IGM and Qunat gold     Endo   NPO status for now   - will order fs q 6 hrs   - hypoglycemia protocol   - if FS >200 will order ISS     VTE   - will obtain Va Doppler bilateral extremities   - Hold AC (+) chronic Thrombocytopenia secondary to liver failure     [id number]    ATTENDING ATTESTATION CHIEF COMPLAINT:  Hypoxia     HPI:  41M with PMH of ETOH abuse (quit early April) c/b decompensated alcoholic cirrhosis, recent admission (4/20-4/27) for decompensated cirrhosis, presented with b/l LE swelling and LLE foot pain who has developed hypoxemia and new onset renal failure since admission, currently with GINNY, fevers, and CT chest demonstrating patchy opacities in the bilateral upper lobes and bilateral lower lobes likely representing multifocal pneumonia, currently worsening on Zosyn and Azithromycin. The patient was admitted to medicine floors for further management Hepatology and IR was consulted for paracentesis on 5/28 a total of 2300ml was drained (-) x 1 culture 2nd body fluid culture is pending. Per hepatology unknown history of varices or no known lesion of HCC on CT 4/20Podiatry was also consulted for concerns of lower extremity cellulitis at this time Antibiotic therapy per ID. The patients hospital course was further complicated by hypoxic respiratory failure with associated GINNY. CTA chest demonstrated multifocal PNA, CT of the ABD and Pelvis was negative for  infectious process. COVID / RVP (-) all cx are also negative to date. Fungitel >500 and Caspofungin was added to PCP. Dermatology was also consulted to evaluate rash however per dermatology low suspicion for any vasculitis.     Today RRT was called for worsening hypoxia, failed high flow nasal o2 and could not tolerate Bilevel support. Now intubated and transferred to the medical ICU for further management.       PAST MEDICAL & SURGICAL HISTORY:  Alcohol abuse    Cirrhosis    No significant past surgical history        FAMILY HISTORY:  No pertinent family history in first degree relatives      Advance Directives:    Allergies    No Known Allergies    Intolerances      REVIEW OF SYSTEMS:  [x ] Unable to assess ROS because the patient is intubated     OBJECTIVE:  ICU Vital Signs Last 24 Hrs  T(C): 38.8 (04 Jun 2021 19:35), Max: 38.8 (04 Jun 2021 19:35)  T(F): 101.9 (04 Jun 2021 19:35), Max: 101.9 (04 Jun 2021 19:35)  HR: 129 (04 Jun 2021 19:35) (101 - 129)  BP: 114/60 (04 Jun 2021 19:35) (97/65 - 122/71)  RR: 50 (04 Jun 2021 19:35) (18 - 50)  SpO2: 86% (04 Jun 2021 19:35) (86% - 96%)        06-03 @ 07:01 - 06-04 @ 07:00  --------------------------------------------------------  IN: 2000 mL / OUT: 0 mL / NET: 2000 mL    06-04 @ 07:01 - 06-04 @ 20:15  --------------------------------------------------------  IN: 1440 mL / OUT: 0 mL / NET: 1440 mL      CAPILLARY BLOOD GLUCOSE  POCT Blood Glucose.: 165 mg/dL (04 Jun 2021 19:40)      PHYSICAL EXAM:  General: Intubated and sedated   HEENT: PERRLA   Lymph Nodes: no palpable lymph node adenopathy    Neck: supple   Respiratory: intubated with diminished breath sounds to left   Cardiovascular: s1 s2 no m/c/g/r  Abdomen: soft (+) non tender   Extremities: (+) edema   Skin: Jaundice   Neurological: sedated   Psychiatry: unable to assess due to sedation     LINES:   \A [facility] [place] [facility]\""     HOSPITAL MEDICATIONS:  MEDICATIONS  (STANDING):  acetaminophen  IVPB .. 1000 milliGRAM(s) IV Intermittent once  albumin human  5% IVPB 250 milliLiter(s) IV Intermittent every 4 hours  azithromycin  IVPB      azithromycin  IVPB 500 milliGRAM(s) IV Intermittent every 24 hours  caspofungin IVPB 50 milliGRAM(s) IV Intermittent every 24 hours  folic acid 1 milliGRAM(s) Oral daily  lactulose Syrup 30 Gram(s) Oral every 12 hours  meropenem  IVPB 1000 milliGRAM(s) IV Intermittent every 8 hours  multivitamin 1 Tablet(s) Oral daily  predniSONE   Tablet 40 milliGRAM(s) Oral every 12 hours  sodium bicarbonate 1300 milliGRAM(s) Oral three times a day  trimethoprim / sulfamethoxazole IVPB 320 milliGRAM(s) IV Intermittent every 8 hours    MEDICATIONS  (PRN):  acetaminophen   Tablet .. 650 milliGRAM(s) Oral every 6 hours PRN Temp greater or equal to 38C (100.4F), Mild Pain (1 - 3)      LABS:                        7.9    26.09 )-----------( 112      ( 04 Jun 2021 06:28 )             24.7     Hgb Trend: 7.9<--, 7.8<--, 8.3<--, 7.6<--, 8.0<--  06-04    134<L>  |  103  |  23  ----------------------------<  135<H>  3.7   |  14<L>  |  1.04    Ca    7.9<L>      04 Jun 2021 06:28  Phos  2.9     06-04  Mg     1.6     06-04    TPro  5.7<L>  /  Alb  2.8<L>  /  TBili  19.7<H>  /  DBili  x   /  AST  81<H>  /  ALT  33  /  AlkPhos  146<H>  06-04    Creatinine Trend: 1.04<--, 1.04<--, 1.03<--, 1.08<--, 1.13<--, 1.12<--  PT/INR - ( 04 Jun 2021 06:28 )   PT: 32.8 sec;   INR: 2.87 ratio        Arterial Blood Gas:  06-04 @ 10:52  7.39/25/96/15/98/-8.9  ABG lactate: --    Venous Blood Gas:  06-03 @ 20:19  7.38/27/46/16/76  VBG Lactate: 2.8      MICROBIOLOGY:   (P)  6/4    cureCulture - Sputum . (06.01.21 @ 16:25)   Gram Stain:   Moderate polymorphonuclear leukocytes per low power field   Moderate Squamous epithelial cells per low power field   Few Gram positive cocci in pairs seen per oil power field   Few Gram Variable Rods seen per oil power field   Specimen Source: .Sputum Sputum   Culture Results:   Normal Respiratory Minda present Culture - Blood (06.01.21 @ 02:17)   Specimen Source: .Blood Blood-Venous   Culture Results:   No growth to date.       Historical Values  Culture - Blood (06.01.21 @ 02:17)   Specimen Source: .Blood Blood-Venous   Culture Results:   No growth to date.   Culture - Blood (06.01.21 @ 02:17)   Specimen Source: .Blood Blood-Peripheral   Culture Results: ulture - Blood (06.01.21 @ 02:17)   Specimen Source: .Blood Blood-Peripheral   Culture Results:   No growth to date.       Historical Values  Culture - Blood (06.01.21 @ 02:17)   Specimen Source: .Blood Blood-Venous   Culture Results:   No growth to date.   Culture - Blood (06.01.21 @ 02:17)   Specimen Source: .Blood Blood-Peripheral   Culture Results:   No growth to date.   Culture - Blood (05.29.21 @ 12:22)   Specimen Source: .Blood Blood-Peripheral   Culture Results: Cryptosporidium Antigen (06.01.21 @ 02:11)   Culture Results:   Cryptosporidium parvum antigen negative   performed by rapid immunoassay (non-enzymatic).   (It is recommended that all specimens tested by   an immunochromatographic card test be   confirmed by another method.) Culture - Urine (05.30.21 @ 12:41)   Specimen Source: .Urine Clean Catch (Midstream)   Culture Results:   No growth       Historical Values  Culture - Urine (05.30.21 @ 12:41)   Specimen Source: .Urine Clean Catch (Midstream)   Culture Results:   No growth   Culture - Urine (05.30.21 @ 04:00)   Specimen Source: .Urine Clean Catch (Midstream)   Culture Results:   RADIOLOGY:  ad< from: Xray Chest 1 View- PORTABLE-Urgent (Xray Chest 1 View- PORTABLE-Urgent .) (06.04.21 @ 20:11) >    INTERPRETATION:  Right upper lobe opacity. Near complete opacification of the lef themithorax with bronchograms visualized. FIndings discussed with MD Malika at 8:10 PM on 6/4/2021. F/u official report.        < end of copied text >    < from: Xray Abdomen 1 View Portable, IMMEDIATE (Xray Abdomen 1 View Portable, IMMEDIATE .) (06.04.21 @ 20:10) >        INTERPRETATION:  Limited evaluation. Dilated loops of bowel. Findings discussed with MD Malika at 8:10 PM on 6/4/2021. F/u official report.      < end of copied text >    < from: CT Angio Chest PE Protocol w/ IV Cont (06.01.21 @ 13:21) >    IMPRESSION:    Limited exam for evaluation of pulmonary embolus secondary to motion, streak artifact and poor opacification of pulmonary arteries.    Patchy opacities within bilateral upper lobes and bilateral lower lobes likely representing multifocal pneumonia.    1.2 cm right lower lobe nodule. Recommend follow-up chest CT in 3 months to determine stability.    < end of copied text >    < from: CT Abdomen and Pelvis w/ IV Cont (05.30.21 @ 16:17) >    IMPRESSION:  Partially imaged patchy opacities in the right middle lobe, left upper lobe and bilateral lower lobes, likely representing multifocal infection.    There is 1.2 cm right lower lobe lung nodule which is likely inflammatory in etiology. Follow-up CT in 3-4 weeks is recommended to document resolution.      < end of copied text >        EKG:  e< from: 12 Lead ECG (04.27.21 @ 06:44) >    Ventricular Rate 67 BPM    Atrial Rate 67 BPM    P-R Interval 158 ms    QRS Duration 90 ms    Q-T Interval 416 ms    QTC Calculation(Bazett) 439 ms    P Axis -5 degrees    R Axis -10 degrees    T Axis -7 degrees    Diagnosis Line *** POOR DATA QUALITY, INTERPRETATION MAY BE ADVERSELY AFFECTED  NORMAL SINUS RHYTHM  MINIMAL VOLTAGE CRITERIA FOR LVH, MAY BE NORMAL VARIANT  BORDERLINE ECG  WHEN COMPARED WITH ECG OF 26-APR-2021 13:01, (UNCONFIRMED)  NO SIGNIFICANT CHANGE WAS FOUND  Confirmed by CINDA Ortiz Zlata ([id number]) on 4/27/2021 4:01:26 PM    < end of copied text >        ASSESSMENT PLAN:  41M with PMH of ETOH abuse (quit early April) c/b decompensated alcoholic cirrhosis, recent admission (4/20-4/27) for decompensated cirrhosis, presented with b/l LE swelling and LLE foot pain. Now with hypoxic respiratory failure due to progressive bilateral infiltrates of uncertain etiology.    Neuro   Sedation   - Maintain Rass goal of - 2 to -3 while intubated   - Sedation Vacation if/ when medically appropriate     Hyperammonemia  - check ammonia levels daily   - will continue with lactulose and Rifaximin via OGT       Critical care myopathy   - PT/OT once medically appropriate     CV   Hemodynamically stable at this time   - VS q 1 hr will maintain goal map of 65-70 for possible HRS   - vasopressor support if needed   - trend lactate   - bed side POCUS on admission shows a hyperdynamic heart and flat IVC - will albumin resuscitate  - will obtain base line CE and EKG     Pulm   Hypoxic respiratory failure secondary to PNA   - will maintain Mechanical vent support   - blood gas on admission   - chest x ray   - will resent sputum   - Bronchoscopy in AM per Attending for CMV / bronchial specimens   - will order Hypertonic saline 3% with Duoneb and chest pt as tolerated   - wean to extubate once medically appropriate   - Change Po steroids to IV solumedrol     GI   Nutrition   - OGT placement   - will initiate feed in the am if hemodynamics remain stable   - PPI / Bowel regimen lactulose     (+) Large volume ascites  - will need paracentesis tonight or in the AM per attending; no current evidence of abd compartment syndrome  - Hepatology follow up appreciated   - will continue with 25% of albumin     ETOH   - folic acid 1 mg IV daily   - Thiamine 500mg tid x 3 days if not completed if completed will order 100 mg daily   - lactulose and rifaximin as above for Hyperammonemia    - out of withdrawal window        GINNY   Baseline Creat .4-.5 now 1.   -Person cath placement   - strict I/o   - Will assess fluid status daily   - if volume overloaded will reorder diuretic   - may need to change Bactrim to mepron if worsening renal failure     ID   Multifocal PNA now with hypoxic respiratory failure   - will continue with caspofungin Meropenem Azithromycin and bactrim   - ID follow up appreciated   - follow up on new cx UA sputum and Blood   - possible bronchoscopy   - hold vanco for now due to MRSA (-) will re check   - will send procalcitonin CMV PCR   - follow up with toxoplasma IGM and Quant gold     Endo   NPO status for now   - will order fs q 6 hrs   - hypoglycemia protocol   - if FS >200 will order ISS     VTE   - will obtain Va Doppler bilateral extremities   - Hold AC (+) chronic Thrombocytopenia secondary to liver failure     Jennifer Cristobal ACNP- BC ex [id number]    ATTENDING ATTESTATION

## 2021-06-04 NOTE — CHART NOTE - NSCHARTNOTEFT_GEN_A_CORE
Per Hepatology, patient received steroids as part of study    Start Bactrim for PCP    Would still keep differential broad, as it is very unclear if this is PCP    Sourav Jimenez MD  Pager [phone]  After 5pm and on weekends call [phone]

## 2021-06-04 NOTE — CHART NOTE - NSCHARTNOTEFT_GEN_A_CORE
: Berkley Morales PA-C    INDICATION: Acute hypoxemic respiratory failure, decompensated cirrhosis, GINNY    PROCEDURE:  [X] LIMITED ECHO  [X] LIMITED CHEST  [ ] LIMITED RETROPERITONEAL  [X] LIMITED ABDOMINAL  [ ] LIMITED DVT  [ ] NEEDLE GUIDANCE VASCULAR  [ ] NEEDLE GUIDANCE THORACENTESIS  [ ] NEEDLE GUIDANCE PARACENTESIS  [ ] NEEDLE GUIDANCE PERICARDIOCENTESIS  [ ] OTHER    FINDINGS:  B-lines in b/l lung fields with irregular pleura. No pleural effusions b/l. Cardiac motion appearing hyperdynamic, normal LV systolic function with underfilled ventricles, LV<RV, no pericardial effusion. IVC appears collapsed on subxiphoid view. Large volume ascites of lower abdomen predominant in dependent areas.     INTERPRETATION: : Berkley Morales PA-C    INDICATION: Acute hypoxemic respiratory failure, decompensated cirrhosis, GINNY    PROCEDURE:  [X] LIMITED ECHO  [X] LIMITED CHEST  [ ] LIMITED RETROPERITONEAL  [X] LIMITED ABDOMINAL  [ ] LIMITED DVT  [ ] NEEDLE GUIDANCE VASCULAR  [ ] NEEDLE GUIDANCE THORACENTESIS  [ ] NEEDLE GUIDANCE PARACENTESIS  [ ] NEEDLE GUIDANCE PERICARDIOCENTESIS  [ ] OTHER    FINDINGS:  Chest:   Bilateral anterior lung fields B-line predominant with irregular pleura. No pleural effusions b/l.   Echo:  Cardiac motion appearing hyperdynamic, normal LV systolic function with underfilled ventricles, RV less than LV, no pericardial effusion. IVC appears collapsed on subxiphoid view.   Abdominal:  Large volume ascites of lower abdomen predominant in dependent areas.     INTERPRETATION:  B/l lung consolidations : Berkley Morales PA-C    INDICATION: Acute hypoxemic respiratory failure, decompensated cirrhosis, GINNY    PROCEDURE:  [X] LIMITED ECHO  [X] LIMITED CHEST  [ ] LIMITED RETROPERITONEAL  [X] LIMITED ABDOMINAL  [ ] LIMITED DVT  [ ] NEEDLE GUIDANCE VASCULAR  [ ] NEEDLE GUIDANCE THORACENTESIS  [ ] NEEDLE GUIDANCE PARACENTESIS  [ ] NEEDLE GUIDANCE PERICARDIOCENTESIS  [ ] OTHER    FINDINGS:    Chest:   Bilateral anterior lung fields B-line predominant with irregular pleura. No pleural effusions b/l.     Echo:  Cardiac motion appearing hyperdynamic, normal LV systolic function with underfilled ventricles, RV less than LV, no pericardial effusion. IVC appears collapsed on subxiphoid view.     Abdominal:  Large volume ascites of lower abdomen predominant in dependent areas, anechoic and uncomplicated in nature.    INTERPRETATION:  Bilateral B-lines likely d/t lung consolidations. Hypovolemic with normal LV systolic function. Large volume abdominal.

## 2021-06-04 NOTE — CONSULT NOTE ADULT - SUBJECTIVE AND OBJECTIVE BOX
HPI: 41y M PMHx EtOH abuse (last drink early April) c/b decompensated hepatic cirrhosis p/w progressively worsening b/l LE swelling a/w LLE foot pain. Pt was recently admitted 4/20-4/27 for alcoholic hepatitis and decompensated liver cirrhosis. Last para 4/22/21- 2.1 L removed neg for SBP. Discharged on lasix 40 mg qd and spironolactone 100 mg qd. He was also enrolled in a clinical trial with Dr. Michele (steroids vs placebo). Pt notes he follows with a GI "on [address]" but he does not have a chart in [employer]. Of note pt states he was recently admitted to [facility] last week Wednesday to Sunday for "blackness" of his R hand and a "problem" on his R side. Pt is a poor historian and was unable to describe what treatment he received but says he did not receive antibiotics or a paracentesis. He says he left Sunday- unclear if discharged or AMA. He notes that since his discharge he has noted his legs getting more swollen and it is difficult for him to walk. Denies trauma, fevers.    ED course:  Afebrile, HR 80-110s, -120/60-70, RR 16, 97% on RA  Received 1g IV vanc in ED (27 May 2021 11:23)    Hospital course thus far complicated by fevers since 5/29, worsening oxygenation (now on HFNC) found to have pulmonary infiltrates bilaterally in the setting of positive fungitell, worsening liver function, GINNY thought to be 2/2 to septic shock w/component of HRS, kidney function stable. Due to worsening clinical status, pt was unblinded from the DUR-928 clinical trial for medical decision purposes (per Dr. Michele, hepatology) which pt was enrolled in 4/26/2021 and has been off of study medications 5/24/21; pt was randomized to placebo infusion + methylprednisolone arm. 6/4 now with concern for PCP pneumonia vs other infectious process given elevated fungitell, broadened to bacrim +pred, caspofungin, meropenem, vancomycin, continued on azithromycin.     DERM CONSULT/HPI: Dermatology consulted for rash on b/l LE w/purpuric appearance, primary team c/f vasculitis (LCV vs cryoglobulinemic vasculitis). Per pt, notes rash on b/l LE/dorsal feet have been present since last admission 4/20 - 4/27 for alcoholic hepatitis at which time he also had significant LE swelling, and states rash is resolving. Notes new generalized rash on trunk, upper arms, and b/l lower thighs of 5-7 day duration,                  PAST MEDICAL & SURGICAL HISTORY:  Alcohol abuse    Cirrhosis    No significant past surgical history        REVIEW OF SYSTEMS:  General: no fevers/chills; no lethargy  Skin/Breast: see HPI  Ophthalmologic: no eye pain or changes in vision  ENMT: no dysphagia or changes in haering  Respiratory: no SOB or cough  Cardiovascular: no palpitations or chest pain  Gastroinestinal: no abdominal pain or blood in stool  Genitourinary: no dysuria or frequency  Musculoskeletal: no joint pains or weakness  Neurological: no weakness, numbness, or tingling    MEDICATIONS  (STANDING):  acetaminophen  IVPB .. 1000 milliGRAM(s) IV Intermittent once  albumin human  5% IVPB 250 milliLiter(s) IV Intermittent every 4 hours  azithromycin  IVPB      azithromycin  IVPB 500 milliGRAM(s) IV Intermittent every 24 hours  caspofungin IVPB 50 milliGRAM(s) IV Intermittent every 24 hours  folic acid 1 milliGRAM(s) Oral daily  lactulose Syrup 30 Gram(s) Oral every 12 hours  meropenem  IVPB 1000 milliGRAM(s) IV Intermittent every 8 hours  multivitamin 1 Tablet(s) Oral daily  predniSONE   Tablet 40 milliGRAM(s) Oral every 12 hours  sodium bicarbonate 1300 milliGRAM(s) Oral three times a day  trimethoprim / sulfamethoxazole IVPB 320 milliGRAM(s) IV Intermittent every 8 hours    MEDICATIONS  (PRN):  acetaminophen   Tablet .. 650 milliGRAM(s) Oral every 6 hours PRN Temp greater or equal to 38C (100.4F), Mild Pain (1 - 3)      Allergies    No Known Allergies    Intolerances        SOCIAL HISTORY:    FAMILY HISTORY:  No pertinent family history in first degree relatives        Vital Signs Last 24 Hrs  T(C): 38.8 (04 Jun 2021 19:35), Max: 38.8 (04 Jun 2021 19:35)  T(F): 101.9 (04 Jun 2021 19:35), Max: 101.9 (04 Jun 2021 19:35)  HR: 129 (04 Jun 2021 19:35) (101 - 129)  BP: 114/60 (04 Jun 2021 19:35) (97/65 - 122/71)  BP(mean): --  RR: 50 (04 Jun 2021 19:35) (18 - 50)  SpO2: 86% (04 Jun 2021 19:35) (86% - 96%)    PHYSICAL EXAM:  The patient was AOx3, well nourished, and in NAD.  OP showed no ulcerations.  There was no visible LAD.  Conjunctiva were non-injected.  There was no clubbing or edema of extremities.   The scalp, hair, face, eyebrows, lips, OP, neck, chest, back, extremities x4, and nails were examined.  There was no hyperhidrosis or bromhidrosis.     Of note on skin exam:   pink erythematous macules and papules on R thigh/knee, chest/abdomen, trunk admixed with brown hyperpigmented macules and patches at sites of prior rash on L thigh, b/l axilla  fading non-blanching violaceous macules on b/l dorsal feet w/hemorrhagic crusts on L dorsal foot   pt declined groin exam     LABS:                        7.9    26.09 )-----------( 112      ( 04 Jun 2021 06:28 )             24.7     06-04    134<L>  |  103  |  23  ----------------------------<  135<H>  3.7   |  14<L>  |  1.04    Ca    7.9<L>      04 Jun 2021 06:28  Phos  2.9     06-04  Mg     1.6     06-04    TPro  5.7<L>  /  Alb  2.8<L>  /  TBili  19.7<H>  /  DBili  x   /  AST  81<H>  /  ALT  33  /  AlkPhos  146<H>  06-04    PT/INR - ( 04 Jun 2021 06:28 )   PT: 32.8 sec;   INR: 2.87 ratio               RADIOLOGY & ADDITIONAL STUDIES: HPI: 41y M PMHx EtOH abuse (last drink early April) c/b decompensated hepatic cirrhosis p/w progressively worsening b/l LE swelling a/w LLE foot pain. Pt was recently admitted 4/20-4/27 for alcoholic hepatitis and decompensated liver cirrhosis. Last para 4/22/21- 2.1 L removed neg for SBP. Discharged on lasix 40 mg qd and spironolactone 100 mg qd. He was also enrolled in a clinical trial with Dr. Michele (steroids vs placebo). Pt notes he follows with a GI "on [address]" but he does not have a chart in [employer]. Of note pt states he was recently admitted to [facility] last week Wednesday to Sunday for "blackness" of his R hand and a "problem" on his R side. Pt is a poor historian and was unable to describe what treatment he received but says he did not receive antibiotics or a paracentesis. He says he left Sunday- unclear if discharged or AMA. He notes that since his discharge he has noted his legs getting more swollen and it is difficult for him to walk. Denies trauma, fevers.    ED course:  Afebrile, HR 80-110s, -120/60-70, RR 16, 97% on RA  Received 1g IV vanc in ED (27 May 2021 11:23)    Hospital course thus far complicated by fevers since 5/29, worsening oxygenation (now on HFNC) found to have pulmonary infiltrates bilaterally in the setting of positive fungitell, worsening liver function, GINNY thought to be 2/2 to septic shock w/component of HRS, kidney function stable. Due to worsening clinical status, pt was unblinded from the DUR-928 clinical trial for medical decision purposes (per Dr. Michele, hepatology) which pt was enrolled in 4/26/2021 and has been off of study medications 5/24/21; pt was randomized to placebo infusion + methylprednisolone arm. 6/4 now with concern for PCP pneumonia vs other infectious process given elevated fungitell, broadened to bacrim +pred, caspofungin, meropenem, vancomycin, continued on azithromycin. Pt has since had continued clinical decline, RRT called, and pt intubated, transferred to MICU w/plan for bronchoscopy due to unclear cause of respiratory failure.      DERM CONSULT/HPI: Dermatology consulted for rash on b/l LE w/purpuric appearance, primary team c/f vasculitis (LCV vs cryoglobulinemic vasculitis). (Pt seen and interviewed prior to intubation 6/4 evening). Per discussion with pt, notes rash on b/l LE/dorsal feet have been present since last admission 4/20 - 4/27 for alcoholic hepatitis at which time he also had significant LE swelling, and states rash is resolving. Notes new generalized rash on trunk, upper arms, and b/l lower thighs of 5-7 day duration, with burning on L thigh extending to L buttock. Denies itch.     Notable inpatient meds:   Bactrim 6/4 -  Meropenem 6/4 -    Vanco 5/27 - 5/30, resumed 6/4   Azithromycin 6/2 - present  Caspofungin 6/3 - present  Zosyn 5/29 - 6/3  Rifaximin 5/27 - 6/2   CTX 5/28     PAST MEDICAL & SURGICAL HISTORY:  Alcohol abuse    Cirrhosis    No significant past surgical history    REVIEW OF SYSTEMS:  General: no fevers/chills; no lethargy  Skin/Breast: see HPI  Ophthalmologic: no eye pain or changes in vision  ENMT: no dysphagia or changes in haering  Respiratory: no SOB or cough  Cardiovascular: no palpitations or chest pain  Gastroinestinal: no abdominal pain or blood in stool  Genitourinary: no dysuria or frequency  Musculoskeletal: no joint pains or weakness  Neurological: no weakness, numbness, or tingling    MEDICATIONS  (STANDING):  acetaminophen  IVPB .. 1000 milliGRAM(s) IV Intermittent once  albumin human  5% IVPB 250 milliLiter(s) IV Intermittent every 4 hours  azithromycin  IVPB      azithromycin  IVPB 500 milliGRAM(s) IV Intermittent every 24 hours  caspofungin IVPB 50 milliGRAM(s) IV Intermittent every 24 hours  folic acid 1 milliGRAM(s) Oral daily  lactulose Syrup 30 Gram(s) Oral every 12 hours  meropenem  IVPB 1000 milliGRAM(s) IV Intermittent every 8 hours  multivitamin 1 Tablet(s) Oral daily  predniSONE   Tablet 40 milliGRAM(s) Oral every 12 hours  sodium bicarbonate 1300 milliGRAM(s) Oral three times a day  trimethoprim / sulfamethoxazole IVPB 320 milliGRAM(s) IV Intermittent every 8 hours    MEDICATIONS  (PRN):  acetaminophen   Tablet .. 650 milliGRAM(s) Oral every 6 hours PRN Temp greater or equal to 38C (100.4F), Mild Pain (1 - 3)      Allergies    No Known Allergies    Intolerances        SOCIAL HISTORY:    FAMILY HISTORY:  No pertinent family history in first degree relatives        Vital Signs Last 24 Hrs  T(C): 38.8 (04 Jun 2021 19:35), Max: 38.8 (04 Jun 2021 19:35)  T(F): 101.9 (04 Jun 2021 19:35), Max: 101.9 (04 Jun 2021 19:35)  HR: 129 (04 Jun 2021 19:35) (101 - 129)  BP: 114/60 (04 Jun 2021 19:35) (97/65 - 122/71)  BP(mean): --  RR: 50 (04 Jun 2021 19:35) (18 - 50)  SpO2: 86% (04 Jun 2021 19:35) (86% - 96%)    PHYSICAL EXAM:  The patient was AOx3, well nourished, and in NAD.  OP showed no ulcerations.  There was no visible LAD.  Conjunctiva were non-injected.  There was no clubbing or edema of extremities.   The scalp, hair, face, eyebrows, lips, OP, neck, chest, back, extremities x4, and nails were examined.  There was no hyperhidrosis or bromhidrosis.     Of note on skin exam:   pink erythematous macules and papules on R thigh/knee, chest/abdomen, trunk admixed with brown hyperpigmented macules and patches at sites of prior rash on L thigh, b/l axilla  fading non-blanching violaceous macules on b/l dorsal feet w/hemorrhagic crusts on L dorsal foot   pt declined groin exam   no facial involvement noted     LABS:                        7.9    26.09 )-----------( 112      ( 04 Jun 2021 06:28 )             24.7     06-04    134<L>  |  103  |  23  ----------------------------<  135<H>  3.7   |  14<L>  |  1.04    Ca    7.9<L>      04 Jun 2021 06:28  Phos  2.9     06-04  Mg     1.6     06-04    TPro  5.7<L>  /  Alb  2.8<L>  /  TBili  19.7<H>  /  DBili  x   /  AST  81<H>  /  ALT  33  /  AlkPhos  146<H>  06-04    PT/INR - ( 04 Jun 2021 06:28 )   PT: 32.8 sec;   INR: 2.87 ratio               RADIOLOGY & ADDITIONAL STUDIES: HPI: 41y M PMHx EtOH abuse (last drink early April) c/b decompensated hepatic cirrhosis p/w progressively worsening b/l LE swelling a/w LLE foot pain. Pt was recently admitted 4/20-4/27 for alcoholic hepatitis and decompensated liver cirrhosis. Last para 4/22/21- 2.1 L removed neg for SBP. Discharged on lasix 40 mg qd and spironolactone 100 mg qd. He was also enrolled in a clinical trial with Dr. Michele (steroids vs placebo). Pt notes he follows with a GI "on [address]" but he does not have a chart in [employer]. Of note pt states he was recently admitted to [facility] last week Wednesday to Sunday for "blackness" of his R hand and a "problem" on his R side. Pt is a poor historian and was unable to describe what treatment he received but says he did not receive antibiotics or a paracentesis. He says he left Sunday- unclear if discharged or AMA. He notes that since his discharge he has noted his legs getting more swollen and it is difficult for him to walk. Denies trauma, fevers.    ED course:  Afebrile, HR 80-110s, -120/60-70, RR 16, 97% on RA  Received 1g IV vanc in ED (27 May 2021 11:23)    Hospital course thus far complicated by fevers since 5/29, worsening oxygenation (now on HFNC) found to have pulmonary infiltrates bilaterally in the setting of positive fungitell, worsening liver function, GINNY thought to be 2/2 to septic shock w/component of HRS, kidney function stable. Due to worsening clinical status, pt was unblinded from the DUR-928 clinical trial for medical decision purposes (per Dr. Michele, hepatology) which pt was enrolled in 4/26/2021 and has been off of study medications 5/24/21; pt was randomized to placebo infusion + methylprednisolone arm. 6/4 now with concern for PCP pneumonia vs other infectious process given elevated fungitell, broadened to bacrim +pred, caspofungin, meropenem, vancomycin, continued on azithromycin. Pt has since had continued clinical decline, RRT called 6/4 evening, and pt intubated, transferred to MICU w/plan for bronchoscopy due to unclear cause of respiratory failure.      DERM CONSULT/HPI: Dermatology consulted for rash on b/l LE w/purpuric appearance, primary team c/f vasculitis (LCV vs cryoglobulinemic vasculitis). (Pt seen and interviewed prior to intubation 6/4 evening). Per discussion with pt, notes rash on b/l LE/dorsal feet have been present since last admission 4/20 - 4/27 for alcoholic hepatitis at which time he also had significant LE swelling, and states rash is resolving. Notes new generalized rash on trunk, upper arms, and b/l lower thighs of 5-7 day duration, with burning on L thigh extending to L buttock. Denies itch.     Notable inpatient meds:   Bactrim 6/4 -  Meropenem 6/4 -    Vanco 5/27 - 5/30, resumed 6/4   Azithromycin 6/2 - present  Caspofungin 6/3 - present  Zosyn 5/29 - 6/3  Rifaximin 5/27 - 6/2   CTX 5/28     PAST MEDICAL & SURGICAL HISTORY:  Alcohol abuse    Cirrhosis    No significant past surgical history    REVIEW OF SYSTEMS:  General: no fevers/chills; no lethargy  Skin/Breast: see HPI  Ophthalmologic: no eye pain or changes in vision  ENMT: no dysphagia or changes in haering  Respiratory: no SOB or cough  Cardiovascular: no palpitations or chest pain  Gastroinestinal: no abdominal pain or blood in stool  Genitourinary: no dysuria or frequency  Musculoskeletal: no joint pains or weakness  Neurological: no weakness, numbness, or tingling    MEDICATIONS  (STANDING):  acetaminophen  IVPB .. 1000 milliGRAM(s) IV Intermittent once  albumin human  5% IVPB 250 milliLiter(s) IV Intermittent every 4 hours  azithromycin  IVPB      azithromycin  IVPB 500 milliGRAM(s) IV Intermittent every 24 hours  caspofungin IVPB 50 milliGRAM(s) IV Intermittent every 24 hours  folic acid 1 milliGRAM(s) Oral daily  lactulose Syrup 30 Gram(s) Oral every 12 hours  meropenem  IVPB 1000 milliGRAM(s) IV Intermittent every 8 hours  multivitamin 1 Tablet(s) Oral daily  predniSONE   Tablet 40 milliGRAM(s) Oral every 12 hours  sodium bicarbonate 1300 milliGRAM(s) Oral three times a day  trimethoprim / sulfamethoxazole IVPB 320 milliGRAM(s) IV Intermittent every 8 hours    MEDICATIONS  (PRN):  acetaminophen   Tablet .. 650 milliGRAM(s) Oral every 6 hours PRN Temp greater or equal to 38C (100.4F), Mild Pain (1 - 3)      Allergies    No Known Allergies    Intolerances        SOCIAL HISTORY:    FAMILY HISTORY:  No pertinent family history in first degree relatives        Vital Signs Last 24 Hrs  T(C): 38.8 (04 Jun 2021 19:35), Max: 38.8 (04 Jun 2021 19:35)  T(F): 101.9 (04 Jun 2021 19:35), Max: 101.9 (04 Jun 2021 19:35)  HR: 129 (04 Jun 2021 19:35) (101 - 129)  BP: 114/60 (04 Jun 2021 19:35) (97/65 - 122/71)  BP(mean): --  RR: 50 (04 Jun 2021 19:35) (18 - 50)  SpO2: 86% (04 Jun 2021 19:35) (86% - 96%)    PHYSICAL EXAM:  The patient was AOx3, well nourished, and in NAD.  OP showed no ulcerations.  There was no visible LAD.  Conjunctiva were non-injected.  There was no clubbing or edema of extremities.   The scalp, hair, face, eyebrows, lips, OP, neck, chest, back, extremities x4, and nails were examined.  There was no hyperhidrosis or bromhidrosis.     Of note on skin exam:   pink erythematous macules and papules on R thigh/knee, chest/abdomen, trunk admixed with brown hyperpigmented macules and patches at sites of prior rash on L thigh, b/l axilla  fading non-blanching violaceous macules on b/l dorsal feet w/hemorrhagic crusts on L dorsal foot   pt declined groin exam   no facial involvement noted     LABS:                        7.9    26.09 )-----------( 112      ( 04 Jun 2021 06:28 )             24.7     06-04    134<L>  |  103  |  23  ----------------------------<  135<H>  3.7   |  14<L>  |  1.04    Ca    7.9<L>      04 Jun 2021 06:28  Phos  2.9     06-04  Mg     1.6     06-04    TPro  5.7<L>  /  Alb  2.8<L>  /  TBili  19.7<H>  /  DBili  x   /  AST  81<H>  /  ALT  33  /  AlkPhos  146<H>  06-04    PT/INR - ( 04 Jun 2021 06:28 )   PT: 32.8 sec;   INR: 2.87 ratio               RADIOLOGY & ADDITIONAL STUDIES: HPI: 41y M PMHx EtOH abuse (last drink early April) c/b decompensated hepatic cirrhosis p/w progressively worsening b/l LE swelling a/w LLE foot pain. Pt was recently admitted 4/20-4/27 for alcoholic hepatitis and decompensated liver cirrhosis. Last para 4/22/21- 2.1 L removed neg for SBP. Discharged on lasix 40 mg qd and spironolactone 100 mg qd. He was also enrolled in a clinical trial with Dr. Michele (steroids vs placebo). Pt notes he follows with a GI "on [address]" but he does not have a chart in [employer]. Of note pt states he was recently admitted to [facility] last week Wednesday to Sunday for "blackness" of his R hand and a "problem" on his R side. Pt is a poor historian and was unable to describe what treatment he received but says he did not receive antibiotics or a paracentesis. He says he left Sunday- unclear if discharged or AMA. He notes that since his discharge he has noted his legs getting more swollen and it is difficult for him to walk. Denies trauma, fevers.    ED course:  Afebrile, HR 80-110s, -120/60-70, RR 16, 97% on RA  Received 1g IV vanc in ED (27 May 2021 11:23)    Hospital course thus far complicated by fevers since 5/29, worsening oxygenation (now on HFNC) found to have pulmonary infiltrates bilaterally in the setting of positive fungitell, worsening liver function, GINNY thought to be 2/2 to septic shock w/component of HRS, kidney function stable. Due to worsening clinical status, pt was unblinded from the DUR-928 clinical trial for medical decision purposes (per Dr. Michele, hepatology) which pt was enrolled in 4/26/2021 and has been off of study medications 5/24/21; pt was randomized to placebo infusion + methylprednisolone arm. 6/4 now with concern for PCP pneumonia vs other infectious process given elevated fungitell, broadened to bacrim +pred, caspofungin, meropenem, vancomycin, continued on azithromycin. Pt has since had continued clinical decline, RRT called 6/4 evening, and pt intubated, transferred to MICU w/plan for bronchoscopy due to unclear cause of respiratory failure.      DERM CONSULT/HPI: Dermatology consulted for rash on b/l LE w/purpuric appearance, primary team c/f vasculitis (LCV vs cryoglobulinemic vasculitis). (Pt seen and interviewed prior to intubation 6/4 evening). Per discussion with pt, notes rash on b/l LE/dorsal feet have been present since last admission 4/20 - 4/27 for alcoholic hepatitis at which time he also had significant LE swelling, and states rash is resolving.  (Per chart review, no documentation of rash at that time on dorsal feet). Notes new generalized rash on trunk, upper arms, and b/l lower thighs of 5-7 day duration, with burning on L thigh extending to L buttock. Denies itch.     Notable inpatient meds:   Bactrim 6/4 -  Meropenem 6/4 -    Vanco 5/27 - 5/30, resumed 6/4   Azithromycin 6/2 - present  Caspofungin 6/3 - present  Zosyn 5/29 - 6/3  Rifaximin 5/27 - 6/2   CTX 5/28     PAST MEDICAL & SURGICAL HISTORY:  Alcohol abuse    Cirrhosis    No significant past surgical history    REVIEW OF SYSTEMS:  General: no fevers/chills; no lethargy  Skin/Breast: see HPI  Ophthalmologic: no eye pain or changes in vision  ENMT: no dysphagia or changes in haering  Respiratory: no SOB or cough  Cardiovascular: no palpitations or chest pain  Gastroinestinal: no abdominal pain or blood in stool  Genitourinary: no dysuria or frequency  Musculoskeletal: no joint pains or weakness  Neurological: no weakness, numbness, or tingling    MEDICATIONS  (STANDING):  acetaminophen  IVPB .. 1000 milliGRAM(s) IV Intermittent once  albumin human  5% IVPB 250 milliLiter(s) IV Intermittent every 4 hours  azithromycin  IVPB      azithromycin  IVPB 500 milliGRAM(s) IV Intermittent every 24 hours  caspofungin IVPB 50 milliGRAM(s) IV Intermittent every 24 hours  folic acid 1 milliGRAM(s) Oral daily  lactulose Syrup 30 Gram(s) Oral every 12 hours  meropenem  IVPB 1000 milliGRAM(s) IV Intermittent every 8 hours  multivitamin 1 Tablet(s) Oral daily  predniSONE   Tablet 40 milliGRAM(s) Oral every 12 hours  sodium bicarbonate 1300 milliGRAM(s) Oral three times a day  trimethoprim / sulfamethoxazole IVPB 320 milliGRAM(s) IV Intermittent every 8 hours    MEDICATIONS  (PRN):  acetaminophen   Tablet .. 650 milliGRAM(s) Oral every 6 hours PRN Temp greater or equal to 38C (100.4F), Mild Pain (1 - 3)      Allergies    No Known Allergies    Intolerances        SOCIAL HISTORY:    FAMILY HISTORY:  No pertinent family history in first degree relatives        Vital Signs Last 24 Hrs  T(C): 38.8 (04 Jun 2021 19:35), Max: 38.8 (04 Jun 2021 19:35)  T(F): 101.9 (04 Jun 2021 19:35), Max: 101.9 (04 Jun 2021 19:35)  HR: 129 (04 Jun 2021 19:35) (101 - 129)  BP: 114/60 (04 Jun 2021 19:35) (97/65 - 122/71)  BP(mean): --  RR: 50 (04 Jun 2021 19:35) (18 - 50)  SpO2: 86% (04 Jun 2021 19:35) (86% - 96%)    PHYSICAL EXAM:  The patient was AOx3, well nourished, and in NAD.  OP showed no ulcerations.  There was no visible LAD.  Conjunctiva were non-injected.  There was no clubbing or edema of extremities.   The scalp, hair, face, eyebrows, lips, OP, neck, chest, back, extremities x4, and nails were examined.  There was no hyperhidrosis or bromhidrosis.     Of note on skin exam:   pink erythematous macules and papules on R thigh/knee, chest/abdomen, trunk admixed with brown hyperpigmented macules and patches at sites of prior rash on L thigh, b/l axilla  fading non-blanching violaceous macules on b/l dorsal feet w/hemorrhagic crusts on L dorsal foot   pt declined groin exam   no facial involvement noted     LABS:                        7.9    26.09 )-----------( 112      ( 04 Jun 2021 06:28 )             24.7     06-04    134<L>  |  103  |  23  ----------------------------<  135<H>  3.7   |  14<L>  |  1.04    Ca    7.9<L>      04 Jun 2021 06:28  Phos  2.9     06-04  Mg     1.6     06-04    TPro  5.7<L>  /  Alb  2.8<L>  /  TBili  19.7<H>  /  DBili  x   /  AST  81<H>  /  ALT  33  /  AlkPhos  146<H>  06-04    PT/INR - ( 04 Jun 2021 06:28 )   PT: 32.8 sec;   INR: 2.87 ratio               RADIOLOGY & ADDITIONAL STUDIES:

## 2021-06-04 NOTE — PROGRESS NOTE ADULT - PROBLEM SELECTOR PLAN 1
febrile 5/28 o/n to 101.3 w/elev WBC, tachypnea, tachycardia. elev WBC may be i/s/o steroids from clinical trial. Other sources include LLE cellulitis (improving, no open wound/ulcer) vs PNA (O2 93%, tachypneic o/n) vs UTI. Ascitic fluid neg for SBP 5/28.  - blood cx 5/27- ngtd, blood cx 5/29; repeating 5/31  - UCx and UA: negative  - f/u fungal cx   - IV vanc (5/27-5/29), MRSA neg  - ceftriaxone (5/28) broadened to zosyn (5/29- ) given fevers  - no fx or c/f osteo on foot xray  - ID consulted: ordered CT ab/P with IV to rule out infection--> showing multifocal PNA  - Pt also ordered for CTA Chest--> PE cannot be excluded; redemonstration of multifocal PNA; incidental pulmonary nodule that will require outpatient f/u/ repeat CT in 3 months  - RVP negative  - SpCx ordered  - Fungitell now positive, started on caspofungin  - Pulm c/s as pt now w/ AHRF, desatting to 80s, now requiring NC febrile 5/28 o/n to 101.3 w/elev WBC, tachypnea, tachycardia. elev WBC may be i/s/o steroids from clinical trial. Other sources include LLE cellulitis (improving, no open wound/ulcer) vs PNA (O2 93%, tachypneic o/n) vs UTI. Ascitic fluid neg for SBP 5/28.  - blood cx 5/27- ngtd, blood cx 5/29; repeating 5/31  - UCx and UA: negative  - f/u fungal cx   - IV vanc (5/27-5/29), MRSA neg  - ceftriaxone (5/28) broadened to zosyn (5/29- ) given fevers  - no fx or c/f osteo on foot xray  - ID consulted: ordered CT ab/P with IV to rule out infection--> showing multifocal PNA  - Pt also ordered for CTA Chest--> PE cannot be excluded; redemonstration of multifocal PNA; incidental pulmonary nodule that will require outpatient f/u/ repeat CT in 3 months  - RVP negative  - SpCx ordered  - Fungitell now positive, started on caspofungin  - Pulm c/s as pt now w/ AHRF, desatting to 80s, now requiring HiFlo 2/2 increased WOB  -Abx as of 6/4: vanc, inderjit azithro, bactrim, caspo

## 2021-06-04 NOTE — PROGRESS NOTE ADULT - ASSESSMENT
41M PMH of ETOH abuse (quit early April) c/b decompensated alcoholic cirrhosis, recent admission (4/20-4/27) for decompensated cirrhosis, presented with b/l LE swelling and LLE foot pain who has developed hypoxemia, GINNY, fevers, and hyponatremia since admission with CT chest demonstrating patchy opacities in the bilateral upper lobes and bilateral lower lobes.    # Hypoxemia  - Etiology: likely infection at this time. Does not look like organizing PNA or IIP's. Low bicarbonate contributing to work of breathing, currently increased from NC to HFNC  - Daily ABG to assess acid-base status --> currently demonstrating overcompensated HAGMA (lactate)+NAGMA (renal) with Resp alkalosis (likely driven by cirrhosis)  - c/w Livia/Azithro. Send SCx, blood cultures, Ulegionella, and Ustrept.  - Fungitell returned at 500. Despite that CT does not look like a fungal infection, B-D glucan containing organism could be from elsewhere --> please add fungal coverage. PCP possible as patient was in clinical trial involving 1month of high dose steroids. Unclear what arm patient was in-->recommend to un-blind and if patient was in treatment arm, recommend starting empiric PCP treatment with steroids. Agree with ID re: [email or web address] for now.    # GINNY  - due to decreased bicarbonate, hyponatremia, soft BP, and now infection, concerned for possibility of hepatorenal I/II  - Patient recently recieved, CHRISTIE possible  - Please check Ulytes, specifically Biju, Uric acid, and Ucreatinine    # rash  - Unclear etiology  - recommend biopsy. Please check complements, KATIE, PR3/MPO. If vasculitis --> ?pulmonary involvement, may be steroid responsive disease  - check ferritin    Guarded prognosis  Discussed case with Medicine/ID/GI?hepatology  Will follow        Juan Solorzano MD  PGY-[id number]  Pulmonary and Critical Care Fellow  Pager: [phone]

## 2021-06-04 NOTE — CHART NOTE - NSCHARTNOTEFT_GEN_A_CORE
Consulted for paracentesis, however currently hemodynamically tenuous receiving fluid replacement. Defer para at this time and recommend empiric treatment for SBP if clinically warranted. Can re-eval Monday if para still needed. Re-consult PRN

## 2021-06-04 NOTE — CHART NOTE - NSCHARTNOTEFT_GEN_A_CORE
41y M PMHx EtOH abuse (last drink early April) c/b decompensated hepatic cirrhosis s/p recent trial trial for alcoholic hepatitis with potential steroid exposure initially admitted with LLE foot pain likely 2/2 cellulitis, course c/b decompensated cirrhosis s/p paracentesis (5/28- 2.3L) as well as acute hypoxic respiratory failure and persistent SIRS concerning for pulmonary process as well as GINNY. Despire Vanco (5/27- ), Zosyn (5/29- ), azithro still spiking fevers. CTA chest noting multifocal pna. CTAP neg for other intra-abdominal source of infection. In interim, other workup- RVP/COVID neg 5/31, legionella neg 6/3, galactomanan neg, fungitell > 500. Blood ctx neg last on 6.1 Given elevated fungitell and possible prolonged exposure to steroids, caspofungin was added on 6/3.       On 6/4, patient had worsening hypoxia requiring HiFlo as well as prerenal GINNY with metabolic acidosis for which - spirnolactone dc'd, PO sodium bicarb/albumin initiated. Per d/w ID, hepatology, and pulm and questionable exposure to steroid he was initiated on bactrim/steroids on 6/4. Patient was pending abdominal US to assess for ascites however unstable to leave the floor for IR evaluation. There was also concern for possible inflammatory process given non blanching rash on both feet, abdomen and upper extremities however per derm low suspicion for vasculitis process.     RRT called for worsening hypoxia and tachypnea despite 100% HiFlo as well as fever to 101. Otherwise was HDS during RRT. Decision made given WOB to intubate and attempt bronch given unclear diagnosis. Patient intubated and transferred to MICU.     CC time 31 mins

## 2021-06-04 NOTE — PROGRESS NOTE ADULT - SUBJECTIVE AND OBJECTIVE BOX
Chief Complaint: Fever  Reason for consult: Cirrhosis management    Interval Events:   - Patient febrile this morning  - Now tachycardic, hypotensive, with tachypnea and hypoxia and currently on HFNC  - Started on capsofungin yesterday    Allergies:  No Known Allergies    MEDICATIONS  (STANDING):  albumin human  5% IVPB 250 milliLiter(s) IV Intermittent every 4 hours  azithromycin  IVPB      azithromycin  IVPB 500 milliGRAM(s) IV Intermittent every 24 hours  caspofungin IVPB 50 milliGRAM(s) IV Intermittent every 24 hours  folic acid 1 milliGRAM(s) Oral daily  lactulose Syrup 30 Gram(s) Oral every 12 hours  meropenem  IVPB 1000 milliGRAM(s) IV Intermittent every 8 hours  multivitamin 1 Tablet(s) Oral daily  sodium bicarbonate 1300 milliGRAM(s) Oral three times a day  vancomycin  IVPB      vancomycin  IVPB 1000 milliGRAM(s) IV Intermittent every 12 hours    MEDICATIONS  (PRN):  acetaminophen   Tablet .. 650 milliGRAM(s) Oral every 6 hours PRN Temp greater or equal to 38C (100.4F), Mild Pain (1 - 3)    PMHX/PSHX:     Alcohol abuse    Cirrhosis    ROS:  General: no night sweats, wt loss, + fevers  CV: no pain, palpitation  Resp: + shortness of breath  GI: No nausea/vomiting, no abdominal pain, no diarrhea, no constipation  Muscles: no weakness or pain  Endocrine: no polyuria, polydipsia, cold/heat intolerance  Heme: No petechia, ecchymosis    PHYSICAL EXAM:     Vital Signs:  Vital Signs Last 24 Hrs  T(C): 38.3 (2021 08:09), Max: 38.8 (2021 18:50)  T(F): 101 (2021 08:09), Max: 101.9 (2021 18:50)  HR: 108 (2021 10:27) (103 - 115)  BP: 97/65 (2021 08:09) (97/65 - 121/66)  BP(mean): --  RR: 28 (2021 10:27) (18 - 36)  SpO2: 96% (2021 10:27) (89% - 96%)  Daily Weight in k.7 (2021 08:09)    GENERAL:  NAD  HEENT:  NC/AT,  conjunctivae clear, + scleral icterus  CHEST:  + Increased effort, + accessory muscle use  ABDOMEN:  Soft, non-tender, + distended  EXTREMITIES: + Edema  SKIN:  No rash/erythema, + jaundice  NEURO:  Alert, oriented    LABS:                        7.8    25.20 )-----------( 93       ( 2021 06:18 )             24.7     06-03    133<L>  |  103  |  24<H>  ----------------------------<  75  3.8   |  14<L>  |  1.03    Ca    8.4      2021 06:18  Phos  2.6     06-03  Mg     1.7     06-03    TPro  6.0  /  Alb  3.2<L>  /  TBili  21.0<H>  /  DBili  x   /  AST  79<H>  /  ALT  39  /  AlkPhos  172<H>  06-03    LIVER FUNCTIONS - ( 2021 06:18 )  Alb: 3.2 g/dL / Pro: 6.0 g/dL / ALK PHOS: 172 U/L / ALT: 39 U/L / AST: 79 U/L / GGT: x           PT/INR - ( 2021 06:18 )   PT: 28.2 sec;   INR: 2.45 ratio      Imaging:    Reviewed         Chief Complaint: Fever  Reason for consult: Cirrhosis management    Interval Events:   - Patient febrile this morning  - Now tachycardic, hypotensive, with tachypnea and hypoxia and currently on HFNC  - Started on Caspofungin yesterday  - Also now with worsening rash    Allergies:  No Known Allergies    MEDICATIONS  (STANDING):  albumin human  5% IVPB 250 milliLiter(s) IV Intermittent every 4 hours  azithromycin  IVPB      azithromycin  IVPB 500 milliGRAM(s) IV Intermittent every 24 hours  caspofungin IVPB 50 milliGRAM(s) IV Intermittent every 24 hours  folic acid 1 milliGRAM(s) Oral daily  lactulose Syrup 30 Gram(s) Oral every 12 hours  meropenem  IVPB 1000 milliGRAM(s) IV Intermittent every 8 hours  multivitamin 1 Tablet(s) Oral daily  sodium bicarbonate 1300 milliGRAM(s) Oral three times a day  vancomycin  IVPB      vancomycin  IVPB 1000 milliGRAM(s) IV Intermittent every 12 hours    MEDICATIONS  (PRN):  acetaminophen   Tablet .. 650 milliGRAM(s) Oral every 6 hours PRN Temp greater or equal to 38C (100.4F), Mild Pain (1 - 3)    PMHX/PSHX:     Alcohol abuse    Cirrhosis    ROS:  General: no night sweats, wt loss, + fevers  CV: no pain, palpitation  Resp: + shortness of breath  GI: No nausea/vomiting, no abdominal pain, no diarrhea, no constipation  Muscles: no weakness or pain  Endocrine: no polyuria, polydipsia, cold/heat intolerance  Heme: No petechia, ecchymosis    PHYSICAL EXAM:     Vital Signs:  Vital Signs Last 24 Hrs  T(C): 38.3 (2021 08:09), Max: 38.8 (2021 18:50)  T(F): 101 (2021 08:09), Max: 101.9 (2021 18:50)  HR: 108 (2021 10:27) (103 - 115)  BP: 97/65 (2021 08:09) (97/65 - 121/66)  BP(mean): --  RR: 28 (2021 10:27) (18 - 36)  SpO2: 96% (2021 10:27) (89% - 96%)  Daily Weight in k.7 (2021 08:09)    GENERAL:  NAD  HEENT:  NC/AT,  conjunctivae clear, + scleral icterus  CHEST:  + Increased effort, + accessory muscle use  ABDOMEN:  Soft, non-tender, + distended  EXTREMITIES: + Edema  SKIN:  No rash/erythema, + jaundice  NEURO:  Alert, oriented    LABS:                        7.8    25.20 )-----------( 93       ( 2021 06:18 )             24.7     06-03    133<L>  |  103  |  24<H>  ----------------------------<  75  3.8   |  14<L>  |  1.03    Ca    8.4      2021 06:18  Phos  2.6     06-03  Mg     1.7     06-03    TPro  6.0  /  Alb  3.2<L>  /  TBili  21.0<H>  /  DBili  x   /  AST  79<H>  /  ALT  39  /  AlkPhos  172<H>  06-03    LIVER FUNCTIONS - ( 2021 06:18 )  Alb: 3.2 g/dL / Pro: 6.0 g/dL / ALK PHOS: 172 U/L / ALT: 39 U/L / AST: 79 U/L / GGT: x           PT/INR - ( 2021 06:18 )   PT: 28.2 sec;   INR: 2.45 ratio      Imaging:    Reviewed

## 2021-06-04 NOTE — PROGRESS NOTE ADULT - PROBLEM SELECTOR PLAN 2
LLE cellulitis with erythema up to knee. pinpoint lesions on dorsum of foot with serous drainage, no ulcer, abrasion, skin breakdown. TTP. Duplex neg for DVT. s/p vanc x1 in ED. CRP elevated (95).   - blood cx 5/27- ngtd   - no fx or c/f osteo on foot xray  - podiatry consulted- no surgical intervention. likely 2/2 fluid overload- rec compressive therapy  - ID recs LLE cellulitis with erythema up to knee. pinpoint lesions on dorsum of foot with serous drainage, no ulcer, abrasion, skin breakdown. TTP. Duplex neg for DVT. s/p vanc x1 in ED. CRP elevated (95).   - blood cx 5/27- ngtd   - no fx or c/f osteo on foot xray  - podiatry consulted- no surgical intervention. likely 2/2 fluid overload- rec compressive therapy  - ID recs    # New Rash Noted  LE petechiae/ purpuric rash and abdominal/ UE non-blanching, maculopapular rash  C/f cryoglobulinemia vs leukocytoclastic vasculitis?   Derm c/s--> f/u recs

## 2021-06-04 NOTE — CONSULT NOTE ADULT - ASSESSMENT
Patient is a 40 y/o M w PMH of EtoH abuse complicated by decompensated cirrhosis presented to [facility] for worsening LE edema. Admitted to [facility] for acute hypoxic resiraptoy failure in setting of PNA. Nephrology consulted for GINNY.     #GINNY  Pt with hemodynamically mediated GINNY in the setting of septic shock, with component of HRS. Now GINNY stable. Patient noted to have baseline Scr of 0.49 on 4/25/21, Scr on admission increased to 0.87 on admission on 5/27/21, Scr progressively increased and peaked to 1.13 on 5/1/21, Scr then elevated/stable at 1.04 today. UA was bland. Urine Na low today. Send repeat UA. Stop PO bicarb supplements as serum pH arterial is 7.39. Also stop IV albumin. Monitor labs and urine output. Avoid NSAIDs, ACEI/ARBS, RCA and nephrotoxins. Dose medications as per eGFR.    If any questions, please feel free to contact me     Carlton Jackson  Nephrology Fellow  [facility] Pager: [phone]  [facility] Pager: [phone]

## 2021-06-05 NOTE — PROGRESS NOTE ADULT - ASSESSMENT
Patient is a 40 y/o M w PMH of EtoH abuse complicated by decompensated cirrhosis presented to [facility] for worsening LE edema. Admitted to [facility] for acute hypoxic resiraptoy failure in setting of PNA. Nephrology consulted for GINNY.     #GINNY  Pt with GINNY in the setting of liver disease. [facility]  [facility] Scr of 0.49 on 4/25/21, Scr on admission increased to 0.87 on admission on 5/27/21, Scr progressively increased and peaked to 1.13 on 5/1/21, Scr on admission at 1.04 (6/4/21). Intubated on 6/4 due to hypoxic resp failure. on IV pressor support. Today Cr worsened to 1.4mg/dl. GINNY most likely secondary to ATN (in the setting of septic shock) vs Bilirubin cast nephropathy (serum Bb total at 20, UA with large Bb). Urine Na at 8. Agree with Person catheter placement. Agree with IV bicarb gtt, (Ph:7.2, bicarb: 15-PcO2: 33) -- Will need to consider HD if renal failure continues to worsen. Monitor labs and urine output. Avoid NSAIDs, ACEI/ARBS, RCA and nephrotoxins. Dose medications as per eGFR.    #hyponatremia:  mild hyponatremia - Na 131  at this time no intervention, Monitor serum sodium Q8 hours. Do not correct serum sodium >6-8 meq/day.     Patient is a 42 y/o M w PMH of EtoH abuse complicated by decompensated cirrhosis presented to [facility] for worsening LE edema. Admitted to [facility] for acute hypoxic resiraptoy failure in setting of PNA. Nephrology consulted for GINNY.     #GINNY  Pt with GINNY in the setting of liver disease. [facility]  baseline Scr of 0.49 on 4/25/21, Scr on admission increased to 0.87 on admission on 5/27/21, Scr progressively increased and peaked to 1.13 on 5/1/21, Scr on admission at 1.04 (6/4/21). Intubated on 6/4 due to hypoxic resp failure. on IV pressor support. Today Cr worsened to 1.4mg/dl. GINNY most likely secondary to ATN (in the setting of septic shock) vs Bilirubin cast nephropathy (serum Bb total at 20, UA with large Bb). Urine Na at 8. Agree with Person catheter placement. Brittany use IV bicarb gtt given mixed metabolic and resp acidosis -- Will need to consider HD if renal failure continues to worsen, if indicated by goals of care and prognosis. Monitor labs and urine output. Avoid NSAIDs, ACEI/ARBS, RCA and nephrotoxins. Dose medications as per eGFR.    #hyponatremia:  mild hyponatremia - Na 131  at this time no intervention, Monitor serum sodium. Do not correct serum sodium >6-8 meq/day.

## 2021-06-05 NOTE — PROGRESS NOTE ADULT - SUBJECTIVE AND OBJECTIVE BOX
Follow Up:  Hypoxic Resp Failure    Interval History: Intubated  and moved to MICU. febrile overnight.     REVIEW OF SYSTEMS  [ x ] ROS unobtainable because:  intubated and sedated  [  ] All other systems negative except as noted below    Constitutional:  [ ] fever [ ] chills  [ ] weight loss  [ ] weakness  Skin:  [ ] rash [ ] phlebitis	  Eyes: [ ] icterus [ ] pain  [ ] discharge	  ENMT: [ ] sore throat  [ ] thrush [ ] ulcers [ ] exudates  Respiratory: [ ] dyspnea [ ] hemoptysis [ ] cough [ ] sputum	  Cardiovascular:  [ ] chest pain [ ] palpitations [ ] edema	  Gastrointestinal:  [ ] nausea [ ] vomiting [ ] diarrhea [ ] constipation [ ] pain	  Genitourinary:  [ ] dysuria [ ] frequency [ ] hematuria [ ] discharge [ ] flank pain  [ ] incontinence  Musculoskeletal:  [ ] myalgias [ ] arthralgias [ ] arthritis  [ ] back pain  Neurological:  [ ] headache [ ] seizures  [ ] confusion/altered mental status    Allergies  No Known Allergies        ANTIMICROBIALS:  caspofungin IVPB 50 every 24 hours  meropenem  IVPB 1000 every 8 hours  trimethoprim / sulfamethoxazole IVPB 320 every 8 hours      OTHER MEDS:  MEDICATIONS  (STANDING):  albuterol/ipratropium for Nebulization 3 every 6 hours  heparin   Injectable 5000 every 8 hours  insulin lispro (ADMELOG) corrective regimen sliding scale  every 4 hours  ketamine Infusion. 0.25 <Continuous>  lactulose Syrup 30 every 12 hours  methylPREDNISolone sodium succinate Injectable 40 every 12 hours  midazolam Infusion 0.02 <Continuous>  norepinephrine Infusion 0.05 <Continuous>  propofol Infusion 50 <Continuous>  sodium chloride 3%  Inhalation 3 every 8 hours      Vital Signs Last 24 Hrs  T(C): 36.7 (2021 12:00), Max: 38.8 (2021 19:35)  T(F): 98.1 (2021 12:00), Max: 101.9 (2021 19:35)  HR: 97 (2021 14:45) (84 - 147)  BP: 110/59 (2021 14:45) (79/43 - 187/120)  BP(mean): 79 (2021 14:45) (57 - 149)  RR: 28 (2021 14:45) (21 - 52)  SpO2: 99% (2021 14:45) (86% - 100%)    PHYSICAL EXAMINATION:  General: Intubated and Sedated  HEENT: +ETT  Neck: Supple  Cardiac: RRR, No M/R/G  Resp: CTAB, No Wh/Rh/Ra  Abdomen: NBS, NT/ND, No HSM, No rigidity or guarding  MSK: No LE edema. No Calf tenderness  : Person  Skin: No rashes or lesions. Skin is warm and dry to the touch.   Neuro: Intubated and Sedated  Psych: Unable to assess - intubated and sedated                          7.1    27.05 )-----------( 123      ( 2021 13:28 )             21.3       06-    133<L>  |  97  |  29<H>  ----------------------------<  227<H>  3.6   |  19<L>  |  1.57<H>    Ca    7.4<L>      2021 11:26  Phos  3.8     06-  Mg     1.7         TPro  5.5<L>  /  Alb  2.8<L>  /  TBili  19.2<H>  /  DBili  x   /  AST  <5<L>  /  ALT  <5<L>  /  AlkPhos  125<H>        Urinalysis Basic - ( 2021 21:30 )    Color: Dark Yellow / Appearance: Slightly Turbid / S.022 / pH: x  Gluc: x / Ketone: Negative  / Bili: Large >10 / Urobili: Negative   Blood: x / Protein: 30 mg/dL / Nitrite: Negative   Leuk Esterase: Negative / RBC: 2 /hpf / WBC 0 /HPF   Sq Epi: x / Non Sq Epi: 0 /hpf / Bacteria: Negative        MICROBIOLOGY:  v  .Sputum Sputum  21 --  --    Numerous polymorphonuclear leukocytes per low power field  Rare Squamous epithelial cells per low power field  No organisms seen per oil power field      .Blood Blood-Venous  21   No growth to date.  --  --      .Sputum Sputum  21   Normal Respiratory Minda present  --    Moderate polymorphonuclear leukocytes per low power field  Moderate Squamous epithelial cells per low power field  Few Gram positive cocci in pairs seen per oil power field  Few Gram Variable Rods seen per oil power field      .Blood Blood-Peripheral  21   No growth to date.  --  --      .Stool  21   Cryptosporidium parvum antigen negative  performed by rapid immunoassay (non-enzymatic).  (It is recommended that all specimens tested by  an immunochromatographic card test be  confirmed by another method.)  --  --      .Urine Clean Catch (Midstream)  21   No growth  --  --      .Blood Blood, isolator tube  21   Testing in progress  --  --      .Urine Clean Catch (Midstream)  21   No growth  --  --      .Blood Blood-Peripheral  21   No Growth Final  --  --      .Blood Blood-Peripheral  21   No Growth Final  --  --      .Body Fluid Peritoneal Fluid  21   No growth  --  --      .Body Fluid Peritoneal Fluid  21   No growth  --  --      .Blood Blood-Peripheral  21   No Growth Final  --  --        Toxoplasma IgG Screen: 54.9 IU/mL (21 @ 10:07)    Rapid RVP Result: NotDetec ( @ 12:40)        RADIOLOGY:    <The imaging below has been reviewed and visualized by me independently. Findings as detailed in report below>    EXAM:  XR CHEST PORTABLE URGENT 1V                        PROCEDURE DATE:  2021    The heart is normal in size. Pulmonary edema. Multifocal pneumonia cannot be ruled out as well. Endotracheal tube and NG tube are in good position. The overall appearance of the chest remain unchanged when compared to previous study done the same date at 7:55 PM.

## 2021-06-05 NOTE — PROGRESS NOTE ADULT - SUBJECTIVE AND OBJECTIVE BOX
GI Progress Note      Interval Events:   - overnight patient w/ worsening hypoxia, RRT called, patient intubated, admitted to MICU  - difficult to sedate overnight, on prop and versed  - worsening renal function     Allergies:  No Known Allergies      Hospital Medications:  albumin human 25% IVPB 50 milliLiter(s) IV Intermittent every 6 hours  albuterol/ipratropium for Nebulization 3 milliLiter(s) Nebulizer every 6 hours  caspofungin IVPB 50 milliGRAM(s) IV Intermittent every 24 hours  chlorhexidine 0.12% Liquid 15 milliLiter(s) Oral Mucosa every 12 hours  chlorhexidine 4% Liquid 1 Application(s) Topical <User Schedule>  citric acid/sodium citrate Solution 30 milliLiter(s) Oral every 6 hours  folic acid 1 milliGRAM(s) Oral daily  heparin   Injectable 5000 Unit(s) SubCutaneous every 8 hours  insulin lispro (ADMELOG) corrective regimen sliding scale   SubCutaneous every 4 hours  ketamine Infusion. 0.25 mG/kG/Hr IV Continuous <Continuous>  lactulose Syrup 30 Gram(s) Oral every 12 hours  meropenem  IVPB 1000 milliGRAM(s) IV Intermittent every 8 hours  methylPREDNISolone sodium succinate Injectable 40 milliGRAM(s) IV Push every 12 hours  midazolam Infusion 0.02 mG/kG/Hr IV Continuous <Continuous>  multivitamin 1 Tablet(s) Oral daily  norepinephrine Infusion 0.05 MICROgram(s)/kG/Min IV Continuous <Continuous>  propofol Infusion 50 MICROgram(s)/kG/Min IV Continuous <Continuous>  sodium bicarbonate  Infusion 0.155 mEq/kG/Hr IV Continuous <Continuous>  sodium chloride 3%  Inhalation 3 milliLiter(s) Inhalation every 8 hours  thiamine Injectable 100 milliGRAM(s) IV Push daily  trimethoprim / sulfamethoxazole IVPB 320 milliGRAM(s) IV Intermittent every 8 hours        PHYSICAL EXAM:   Vital Signs:  Vital Signs Last 24 Hrs  T(C): 36.9 (2021 16:00), Max: 38.8 (2021 19:35)  T(F): 98.4 (2021 16:00), Max: 101.9 (2021 19:35)  HR: 92 (2021 16:00) (84 - 147)  BP: 107/59 (2021 16:00) (79/43 - 187/120)  BP(mean): 76 (2021 16:00) (57 - 149)  RR: 28 (2021 16:00) (21 - 52)  SpO2: 99% (2021 16:00) (86% - 100%)  Daily     Daily Weight in k.2 (2021 20:34)    GENERAL:  intubated, sedated  HEENT:  intubated  CHEST: mechanical breath sounds  HEART:  RRR  ABDOMEN:  distended, jaundice   EXTREMITIES:  No cyanosis, clubbing, or edema  SKIN:  No rash/erythema/ecchymoses/petechiae/wounds/abscess/warm/dry  NEURO:  sedated    LABS:                        7.1    27.05 )-----------( 123      ( 2021 13:28 )             21.3     Mean Cell Volume: 98.2 fl (21 @ 13:28)    06-    133<L>  |  97  |  29<H>  ----------------------------<  227<H>  3.6   |  19<L>  |  1.57<H>    Ca    7.4<L>      2021 11:26  Phos  3.8     06-05  Mg     1.7     06-05    TPro  5.5<L>  /  Alb  2.8<L>  /  TBili  19.2<H>  /  DBili  x   /  AST  <5<L>  /  ALT  <5<L>  /  AlkPhos  125<H>  06-05    LIVER FUNCTIONS - ( 2021 11:26 )  Alb: 2.8 g/dL / Pro: 5.5 g/dL / ALK PHOS: 125 U/L / ALT: <5 U/L / AST: <5 U/L / GGT: x           PT/INR - ( 2021 06:50 )   PT: 27.6 sec;   INR: 2.40 ratio         PTT - ( 2021 06:50 )  PTT:70.2 sec  Urinalysis Basic - ( 2021 21:30 )    Color: Dark Yellow / Appearance: Slightly Turbid / S.022 / pH: x  Gluc: x / Ketone: Negative  / Bili: Large >10 / Urobili: Negative   Blood: x / Protein: 30 mg/dL / Nitrite: Negative   Leuk Esterase: Negative / RBC: 2 /hpf / WBC 0 /HPF   Sq Epi: x / Non Sq Epi: 0 /hpf / Bacteria: Negative      Amylase Serum--      Lipase serum--       [id number]        Imaging:

## 2021-06-05 NOTE — PROGRESS NOTE ADULT - ASSESSMENT
41 M originally from [facility] PMHx of ETOH abuse (quit early April) c/b decompensated alcoholic cirrhosis, recent admission (4/20-4/27) for decompensated cirrhosis, presented with b/l LE swelling and LLE foot pain  Leukocytosis, fever  Elevated LFTs in setting cirrhosis  CXR with hazy opacities  CT A/P notes lower lung opacities with concern for possible infection  Concern for worsening resp status  Discussion with Pulm, Hepatology, Medicine--possibility of PCP?  Note worsening rash on LE  Ongoing fevers, worsening SOB  Intubated 6/4  Overall,  1) Fever (persistent), Leukocytosis (increasing), Hypoxic Respiratory Failure (worsening)  - Now elevated Fungitell in setting of progressively worsening resp status  - Empiric Livia (adjust to CrCl), Caspo for now  - Continue IV Bactrim (adjust to CrCl)  - Would send combicath Pneumocystis PCR, COVID19 PCR, Bacterial, AFB, Fungal Cultures  - Bronchoscopy when able  - F/U BCXs  - Trend WBC to normal, monitor for further fevers  - As patient is well covered in terms of bacterial and fungal agents, recommend exploration of noninfectious diagnoses, as he is not responding to current treatments  2) Elevated Fungitell  - Unclear significance; findings equivocal for PCP--prior had lower LDH, time course atypical,   - F/U pending BCXs  - Caspo daily  - Continue IV Bactrim (adjust to CrCl)  3) Rash  - Purpuric rash on LE? Consider cryoglobulinemia, vasculitis, other etiologies?  - Derm eval  4) Elevated LFTs  - F/U hepatology    Alcon Noble M.D.  [facility] Division of Infectious Disease  8AM-5PM: Pager Number [phone]  After Hours (or if no response): Please contact the Infectious Diseases Office at [phone]     The above assessment and plan were discussed with MICU Team

## 2021-06-05 NOTE — PROGRESS NOTE ADULT - SUBJECTIVE AND OBJECTIVE BOX
[facility] DIVISION OF KIDNEY DISEASES AND HYPERTENSION -- FOLLOW UP NOTE  --------------------------------------------------------------------------------  If any questions, please feel free to contact me  NS pager: [phone], [facility]: [phone]  Kolby Harrington M.D.  Nephrology Fellow    (After 5 pm or on weekends please page the on-call fellow)  --------------------------------------------------------------------------------    Chief Complaint:  Patient is a 41y old  Male who presents with a chief complaint of cellulitis (04 Jun 2021 19:57)    HPI: Patient is a 42 y/o M w PMH of EtoH abuse complicated by decompensated cirrhosis presented to [facility] for worsening LE edema. Admitted to [facility] for acute hypoxic respiratory failure in setting of PNA. Nephrology consulted for GINNY. baseline Scr of 0.49 on 4/25/21, Scr on admission increased to 0.87 on admission on 5/27/21, Scr increased and peaked to 1.13 on 5/1/21, on admission Scr at 1.04 today.     Patient seen and examined at bedside, intubated overnight due to hypoxic resp failure. Cr worsened today at 1.47mg/dl. Patient oliguric. Requiring IV pressor support and on Bicarb gtt. Vitals/labs/imaging reviewed     PAST HISTORY  --------------------------------------------------------------------------------  No significant changes to PMH, PSH, FHx, SHx, unless otherwise noted    ALLERGIES & MEDICATIONS  --------------------------------------------------------------------------------  Allergies    No Known Allergies    Intolerances      Standing Inpatient Medications  albumin human 25% IVPB 50 milliLiter(s) IV Intermittent every 6 hours  albuterol/ipratropium for Nebulization 3 milliLiter(s) Nebulizer every 6 hours  caspofungin IVPB 50 milliGRAM(s) IV Intermittent every 24 hours  chlorhexidine 0.12% Liquid 15 milliLiter(s) Oral Mucosa every 12 hours  chlorhexidine 4% Liquid 1 Application(s) Topical <User Schedule>  citric acid/sodium citrate Solution 30 milliLiter(s) Oral every 6 hours  folic acid 1 milliGRAM(s) Oral daily  heparin   Injectable 5000 Unit(s) SubCutaneous every 8 hours  ketamine Infusion. 0.25 mG/kG/Hr IV Continuous <Continuous>  lactulose Syrup 30 Gram(s) Oral every 12 hours  meropenem  IVPB 1000 milliGRAM(s) IV Intermittent every 8 hours  methylPREDNISolone sodium succinate Injectable 40 milliGRAM(s) IV Push every 12 hours  midazolam Infusion 0.02 mG/kG/Hr IV Continuous <Continuous>  multivitamin 1 Tablet(s) Oral daily  norepinephrine Infusion 0.05 MICROgram(s)/kG/Min IV Continuous <Continuous>  phytonadione  IVPB 10 milliGRAM(s) IV Intermittent once  propofol Infusion 50 MICROgram(s)/kG/Min IV Continuous <Continuous>  sodium bicarbonate  Infusion 0.155 mEq/kG/Hr IV Continuous <Continuous>  sodium chloride 3%  Inhalation 3 milliLiter(s) Inhalation every 8 hours  thiamine Injectable 100 milliGRAM(s) IV Push daily  trimethoprim / sulfamethoxazole IVPB 320 milliGRAM(s) IV Intermittent every 8 hours    PRN Inpatient Medications      REVIEW OF SYSTEMS  --------------------------------------------------------------------------------  unable to obtain due to current medical conditions     VITALS/PHYSICAL EXAM  --------------------------------------------------------------------------------  T(C): 36.6 (06-05-21 @ 08:00), Max: 38.8 (06-04-21 @ 19:35)  HR: 104 (06-05-21 @ 09:02) (84 - 147)  BP: 110/59 (06-05-21 @ 08:00) (79/43 - 187/120)  RR: 29 (06-05-21 @ 08:00) (21 - 52)  SpO2: 94% (06-05-21 @ 09:02) (86% - 100%)  Wt(kg): --        06-04-21 @ 07:01  -  06-05-21 @ 07:00  --------------------------------------------------------  IN: 4553 mL / OUT: 150 mL / NET: 4403 mL    06-05-21 @ 07:01  -  06-05-21 @ 09:15  --------------------------------------------------------  IN: 182.8 mL / OUT: 20 mL / NET: 162.8 mL        Physical Exam:  	Gen: intubated/sedated  	HEENT: +Et tube   	Pulm: Coarse breath sounds   	CV: S1S2, RRR  	Abd: Soft, +BS, distended    	Ext: no LE edema B/L  	Neuro: sedated  	Skin: +jaundice   	Vascular access: CVC      LABS/STUDIES  --------------------------------------------------------------------------------              8.8    42.16 >-----------<  166      [06-04-21 @ 21:39]              27.4     131  |  99  |  29  ----------------------------<  238      [06-05-21 @ 06:32]  3.9   |  15  |  1.47        Ca     7.9     [06-05-21 @ 06:32]      Mg     1.7     [06-05-21 @ 06:32]      Phos  3.8     [06-05-21 @ 06:32]    TPro  5.8  /  Alb  3.0  /  TBili  20.8  /  DBili  x   /  AST  66  /  ALT  27  /  AlkPhos  126  [06-05-21 @ 06:32]    PT/INR: PT 27.6 , INR 2.40       [06-05-21 @ 06:50]  PTT: 70.2       [06-05-21 @ 06:50]          [06-04-21 @ 06:28]    Creatinine Trend:  SCr 1.47 [06-05 @ 06:32]  SCr 1.07 [06-04 @ 21:39]  SCr 1.06 [06-04 @ 20:23]  SCr 1.04 [06-04 @ 06:28]  SCr 1.04 [06-03 @ 20:20]    Urinalysis - [06-04-21 @ 21:30]      Color Dark Yellow / Appearance Slightly Turbid / SG 1.022 / pH 6.0      Gluc Negative / Ketone Negative  / Bili Large >10 / Urobili Negative       Blood Negative / Protein 30 mg/dL / Leuk Est Negative / Nitrite Negative      RBC 2 / WBC 0 / Hyaline 4 / Gran  / Sq Epi  / Non Sq Epi 0 / Bacteria Negative    Urine Creatinine 111      [06-04-21 @ 10:46]  Urine Sodium 8      [06-04-21 @ 10:46]    Iron 66, TIBC 132, %sat 50      [04-21-21 @ 09:07]  Ferritin 537      [04-23-21 @ 09:10]

## 2021-06-05 NOTE — PROGRESS NOTE ADULT - ASSESSMENT
39yo M with PMH of EtOH abuse w/ recent admission for alc hep enrolled in [id number] RCT (unblited, received placebo infusion plus corticosteroids) readmitted 5/28/21 for sepsis, course complicated by septic shock and hypoxic resp failure now intubated in MICU, with worsening GINNY    # hypoxic resp failure - likely from infectious etiology, longstanding steroid use c/f possible PJP vs other, being followed by ID. On inderjit, caspo, IV bactrim, solumedrol (for PCP, not alc hep)  # GINNY - worsening renal function in setting of septic shock  # Abnormal liver enzymes: Likely acute on chronic liver failure +/- alcoholic hepatitis  # Cirrhosis, decompensated, likely due to alcohol use  MELD-Na 34  Ascites - moderate on exam  Varices - unknown  - HCC - no focal lesions on imaging CT 4/20 (not multiphase)  - HE - no asterixis  # Rash  # Hyponatremia    Recommendations:  - broad spectrum antimicrobials per ID  - agree w/ Combicath vs broncoscopy  - c/w lactulose  - start rifaximin 550mg bid  - monitor CMP and GINNY, may require CRRT if worsening renal function  - SW for assistance with insurance options, as patient w/ good family support and may be transplant candidate, however currently only has emergency Medicaid  - rest of care per ICU    GI will continue to follow.     Yobany Maloney, PGY4  Gastroenterology Fellow  Available on Microsoft Teams  [phone] ([email or web address] Short Range Pager)  [phone] (Long Range Pager)    After 5pm, please contact the on-call GI fellow. [phone]

## 2021-06-05 NOTE — PROCEDURE NOTE - ADDITIONAL PROCEDURE DETAILS
41 yr old male with PMHx alcoholic cirrhosis, decompensated cirrhosis  c/b hypoxic resp failure found to have large ascites. On vasopressor therapy. A line placed for closer monitoring
41 yr old male with PMHx alcoholic cirrhosis, decompensated cirrhosis  c/b hypoxic resp failure found to have large ascites. Paracentesis performed for diagnostic and therapeutic reasons

## 2021-06-05 NOTE — CHART NOTE - NSCHARTNOTEFT_GEN_A_CORE
CCM ATTENDING NOTE    41M w/ EtOH misuse w/ recent alcoholic hepatitis/decompensated cirrhosis (April 2021). ADmitted w/ cellulitis initially w/ hospital course complicated by acute hypoxemic respiratory failure intubated and transferred to ICU. PT also has developed GINNY and likely decompensated cirrhosis/liver failure. Pt seen and examined on rounds w/ residents. He is sedated, has obvious jaundice, distended abdomen w/ fluid wave, rash on b/l LE. Labs and imaging reviewed.     - pt initially difficult to sedate and currently on versed and propofol; prefer not to use versed in liver failure pt so will switch to ketamine and continue w/ propofol  - shock state on low dose pressors, likely vasoplegic shock; titrate to MAP >/65  - pt has had progressive acute hypoxemic respiratory failure now intubated on high FiO2 settings; given that he has been on high dose steroids for a prolonged period of time favor PCP or fungal infection as possible etiology; continue to titrate FiO2 as tolerated  - abx broadened to meropenam and caspofungin added yesterday for possibility of HAP or fungal pna, also on bactrim + steroids for PCP; pt needs bronchoscopy w/ BAL but with high FiO2 requirements will hold off now; send combicath PCP PCR, bacterial and fungal cx; fungatell was noted to be very elevated (pt had been on zosyn so unclear source of elevated fungatell); d/c azithromycin   - GINNY likely prerenal (urine Na quite low), doubt HRS; fluid resuscitation w/ albumin for today, continue w/ bicarb gtt for metabolic acidosis; monitor electrolytes and UOP closely  - pt with decompensated cirrhosis/liver failure likely in setting of new infection; plan for diagnostic and therapeutic paracentesis today, on abx to cover for possible SBP; start TF   - elevated INR likely in setting of liver disease/poor PO intake, will give vitamin K x1; plts stable in the 120s  - monitor FS, ISS coverage  - HSQ for DVT ppx  - prognosis very guarded; will need to get full medicaid to be considered for liver transplant here at [facility]    Unable to reach family for procedures, blood consent etc; at this time will sign 2 physician consent as needed     Critical care services provided.     I have personally and independently provided 45 minutes of critical care services.  This excludes any time spent on separate procedures or teaching.

## 2021-06-06 NOTE — PROGRESS NOTE ADULT - ASSESSMENT
Patient is a 42 y/o M w PMH of EtoH abuse complicated by decompensated cirrhosis presented to [facility] for worsening LE edema. Admitted to [facility] for acute hypoxic resiraptoy failure in setting of PNA. Nephrology consulted for GINNY.     #GINNY  Pt with GINNY in the setting of liver disease. [facility]  baseline Scr of 0.49 on 4/25/21, Scr on admission increased to 0.87 on 5/27/21, Scr progressively increased and peaked to 1.13 on 5/1/21. Intubated on 6/4 due to hypoxic resp failure. on IV pressor support. Today Cr worsened to 1.4 >> 1.9mg/dl. GINNY most likely secondary to ATN (in the setting of septic shock) vs Bilirubin cast nephropathy (serum Bb total at 20, UA with large Bb). Urine Na at 8.     - s/p IV lasix this am and UOP up to 75cc/hr.   - off bicarb gtt and acidosis improved.   - Will need to consider HD if renal failure continues to worsen, if indicated by goals of care and prognosis.   - Monitor labs and urine output.   -Avoid NSAIDs, ACEI/ARBS, RCA and nephrotoxins.   -Dose medications as per eGFR.    #hyponatremia:  mild hyponatremia -improved  Na 131 >> 135  at this time no intervention, Monitor serum sodium.     #hypokalemia   this am with K: 3.2  please replete to goal ~4

## 2021-06-06 NOTE — PROGRESS NOTE ADULT - SUBJECTIVE AND OBJECTIVE BOX
INTERVAL HPI/OVERNIGHT EVENTS:    Patient is a 41y old  Male who presents with a chief complaint of cellulitis (2021 15:43)      ROS:  CONSTITUTIONAL:  Negative fever or chills, feels well, good appetite  EYES:  Negative  blurry vision or double vision  CARDIOVASCULAR:  Negative for chest pain or palpitations  RESPIRATORY:  Negative for cough, wheezing, or SOB   GASTROINTESTINAL:  Negative for nausea, vomiting, diarrhea, constipation, or abdominal pain  GENITOURINARY:  Negative frequency, urgency or dysuria  NEUROLOGIC:  No headache, confusion, dizziness, lightheadedness    Allergies    No Known Allergies    Intolerances        ANTIBIOTICS/RELEVANT:  antimicrobials  caspofungin IVPB 50 milliGRAM(s) IV Intermittent every 24 hours  meropenem  IVPB 500 milliGRAM(s) IV Intermittent every 12 hours  trimethoprim / sulfamethoxazole IVPB 320 milliGRAM(s) IV Intermittent every 8 hours    immunologic:    OTHER:  albumin human 25% IVPB 50 milliLiter(s) IV Intermittent every 6 hours  albuterol/ipratropium for Nebulization 3 milliLiter(s) Nebulizer every 6 hours  chlorhexidine 0.12% Liquid 15 milliLiter(s) Oral Mucosa every 12 hours  chlorhexidine 4% Liquid 1 Application(s) Topical <User Schedule>  citric acid/sodium citrate Solution 30 milliLiter(s) Oral every 6 hours  folic acid 1 milliGRAM(s) Oral daily  heparin   Injectable 5000 Unit(s) SubCutaneous every 8 hours  insulin lispro (ADMELOG) corrective regimen sliding scale   SubCutaneous every 6 hours  ketamine Infusion. 0.25 mG/kG/Hr IV Continuous <Continuous>  lactulose Syrup 30 Gram(s) Oral every 12 hours  methylPREDNISolone sodium succinate Injectable 40 milliGRAM(s) IV Push every 12 hours  midazolam Infusion 0.02 mG/kG/Hr IV Continuous <Continuous>  multivitamin 1 Tablet(s) Oral daily  norepinephrine Infusion 0.05 MICROgram(s)/kG/Min IV Continuous <Continuous>  propofol Infusion 50 MICROgram(s)/kG/Min IV Continuous <Continuous>  sodium bicarbonate  Infusion 0.155 mEq/kG/Hr IV Continuous <Continuous>  sodium chloride 3%  Inhalation 3 milliLiter(s) Inhalation every 6 hours  thiamine Injectable 100 milliGRAM(s) IV Push daily      Objective:  Vital Signs Last 24 Hrs  T(C): 36.8 (2021 00:00), Max: 37.7 (2021 20:00)  T(F): 98.2 (2021 00:00), Max: 99.9 (2021 20:00)  HR: 95 (2021 03:50) (88 - 965)  BP: 113/63 (2021 02:00) (96/50 - 119/68)  BP(mean): 81 (2021 02:00) (70 - 88)  RR: 28 (2021 02:00) (28 - 35)  SpO2: 93% (2021 03:50) (90% - 100%)    PHYSICAL EXAM:  General -   HEENT -   CV -   Resp -   Abdomen -   Extremities -   Skin -       LABS:                        7.7    33.08 )-----------( 142      ( 2021 00:54 )             23.0     06-06    137  |  95<L>  |  35<H>  ----------------------------<  149<H>  3.2<L>   |  22  |  1.92<H>    Ca    7.3<L>      2021 00:54  Phos  3.5     06-06  Mg     1.8     06-06    TPro  5.9<L>  /  Alb  3.2<L>  /  TBili  17.1<H>  /  DBili  x   /  AST  56<H>  /  ALT  24  /  AlkPhos  125<H>  06-06    PT/INR - ( 2021 00:54 )   PT: 26.1 sec;   INR: 2.26 ratio         PTT - ( 2021 00:54 )  PTT:56.3 sec  Urinalysis Basic - ( 2021 21:30 )    Color: Dark Yellow / Appearance: Slightly Turbid / S.022 / pH: x  Gluc: x / Ketone: Negative  / Bili: Large >10 / Urobili: Negative   Blood: x / Protein: 30 mg/dL / Nitrite: Negative   Leuk Esterase: Negative / RBC: 2 /hpf / WBC 0 /HPF   Sq Epi: x / Non Sq Epi: 0 /hpf / Bacteria: Negative          MICROBIOLOGY:        RADIOLOGY & ADDITIONAL STUDIES: INTERVAL HPI/OVERNIGHT EVENTS:    Patient is a 41y old  Male who presents with a chief complaint of cellulitis (2021 15:43)    41M with PMH of ETOH abuse (quit early April) c/b decompensated alcoholic cirrhosis, recent admission (-) for decompensated cirrhosis, presented with b/l LE swelling and LLE foot pain who has developed hypoxemia and new onset renal failure since admission, currently with GINNY, fevers, and CT chest demonstrating patchy opacities in the bilateral upper lobes and bilateral lower lobes likely representing multifocal pneumonia, currently worsening on Zosyn and Azithromycin. The patient was admitted to medicine floors for further management Hepatology and IR was consulted for paracentesis on  a total of 2300ml was drained (-) x 1 culture 2nd body fluid culture is pending. Per hepatology unknown history of varices or no known lesion of HCC on CT Podiatry was also consulted for concerns of lower extremity cellulitis at this time Antibiotic therapy per ID. The patients hospital course was further complicated by hypoxic respiratory failure with associated GINNY. CTA chest demonstrated multifocal PNA, CT of the ABD and Pelvis was negative for  infectious process. COVID / RVP (-) all cx are also negative to date. Fungitel >500 and Caspofungin was added to PCP. Dermatology was also consulted to evaluate rash however per dermatology low suspicion for any vasculitis.     Today RRT was called for worsening hypoxia, failed high flow nasal o2 and could not tolerate Bilevel support. Now intubated and transferred to the medical ICU for further management    ROS: unable to ROS.       Allergies    No Known Allergies    Intolerances      PAST MEDICAL & SURGICAL HISTORY:  Alcohol abuse    Cirrhosis    No significant past surgical history        FAMILY HISTORY:  No pertinent family history in first degree relatives      ANTIBIOTICS/RELEVANT:  antimicrobials  caspofungin IVPB 50 milliGRAM(s) IV Intermittent every 24 hours  meropenem  IVPB 500 milliGRAM(s) IV Intermittent every 12 hours  trimethoprim / sulfamethoxazole IVPB 320 milliGRAM(s) IV Intermittent every 8 hours    immunologic:    OTHER:  albumin human 25% IVPB 50 milliLiter(s) IV Intermittent every 6 hours  albuterol/ipratropium for Nebulization 3 milliLiter(s) Nebulizer every 6 hours  chlorhexidine 0.12% Liquid 15 milliLiter(s) Oral Mucosa every 12 hours  chlorhexidine 4% Liquid 1 Application(s) Topical <User Schedule>  citric acid/sodium citrate Solution 30 milliLiter(s) Oral every 6 hours  folic acid 1 milliGRAM(s) Oral daily  heparin   Injectable 5000 Unit(s) SubCutaneous every 8 hours  insulin lispro (ADMELOG) corrective regimen sliding scale   SubCutaneous every 6 hours  ketamine Infusion. 0.25 mG/kG/Hr IV Continuous <Continuous>  lactulose Syrup 30 Gram(s) Oral every 12 hours  methylPREDNISolone sodium succinate Injectable 40 milliGRAM(s) IV Push every 12 hours  midazolam Infusion 0.02 mG/kG/Hr IV Continuous <Continuous>  multivitamin 1 Tablet(s) Oral daily  norepinephrine Infusion 0.05 MICROgram(s)/kG/Min IV Continuous <Continuous>  propofol Infusion 50 MICROgram(s)/kG/Min IV Continuous <Continuous>  sodium bicarbonate  Infusion 0.155 mEq/kG/Hr IV Continuous <Continuous>  sodium chloride 3%  Inhalation 3 milliLiter(s) Inhalation every 6 hours  thiamine Injectable 100 milliGRAM(s) IV Push daily      Objective:  Vital Signs Last 24 Hrs  T(C): 36.8 (2021 00:00), Max: 37.7 (2021 20:00)  T(F): 98.2 (2021 00:00), Max: 99.9 (2021 20:00)  HR: 95 (2021 03:50) (88 - 965)  BP: 113/63 (2021 02:00) (96/50 - 119/68)  BP(mean): 81 (2021 02:00) (70 - 88)  RR: 28 (2021 02:00) (28 - 35)  SpO2: 93% (2021 03:50) (90% - 100%)    PHYSICAL EXAM:  General: Intubated and sedated   HEENT: PERRLA   Lymph Nodes: no palpable lymph node adenopathy    Neck: supple   Respiratory: intubated with diminished breath sounds to left   Cardiovascular: s1 s2 no m/c/g/r  Abdomen: soft (+) non tender   Extremities: (+) edema   Skin: Jaundice   Neurological: sedated   Psychiatry: unable to assess due to sedation -       LABS:                        7.7    33.08 )-----------( 142      ( 2021 00:54 )             23.0     06-06    137  |  95<L>  |  35<H>  ----------------------------<  149<H>  3.2<L>   |  22  |  1.92<H>    Ca    7.3<L>      2021 00:54  Phos  3.5       Mg     1.8         TPro  5.9<L>  /  Alb  3.2<L>  /  TBili  17.1<H>  /  DBili  x   /  AST  56<H>  /  ALT  24  /  AlkPhos  125<H>      PT/INR - ( 2021 00:54 )   PT: 26.1 sec;   INR: 2.26 ratio         PTT - ( 2021 00:54 )  PTT:56.3 sec  Urinalysis Basic - ( 2021 21:30 )    Color: Dark Yellow / Appearance: Slightly Turbid / S.022 / pH: x  Gluc: x / Ketone: Negative  / Bili: Large >10 / Urobili: Negative   Blood: x / Protein: 30 mg/dL / Nitrite: Negative   Leuk Esterase: Negative / RBC: 2 /hpf / WBC 0 /HPF   Sq Epi: x / Non Sq Epi: 0 /hpf / Bacteria: Negative    Blood Gas Profile - Arterial (21 @ 05:06)    pH, Arterial: 7.46    pCO2, Arterial: 38 mmHg    pO2, Arterial: 70 mmHg    HCO3, Arterial: 27 mmoL/L    Base Excess, Arterial: 3.1 mmol/L    Oxygen Saturation, Arterial: 94 %    Total CO2, Arterial: 28 mmoL/L    FIO2, Arterial: 70    Blood Gas Source Arterial: Arterial        MICROBIOLOGY:    Culture - Body Fluid with Gram Stain (21 @ 21:30)    Gram Stain:   polymorphonuclear leukocytes seen  No organisms seen  by cytocentrifuge    Specimen Source: .Body Fluid Peritoneal Fluid      Culture - Bronchial (21 @ 15:16)    Gram Stain:   No polymorphonuclear cells seen  No squamous epithelial cells  No organisms seen    Specimen Source: Bronch Wash Combicath CUP    Culture - Sputum . (21 @ 23:02)    Gram Stain:   Numerous polymorphonuclear leukocytes per low power field  Rare Squamous epithelial cells per low power field  No organisms seen per oil power field    Specimen Source: .Sputum Sputum    Culture Results:   No growth to date.      Culture - Blood (21 @ 22:07)    Specimen Source: .Blood Blood-Peripheral    Culture Results:   No growth to date.    Culture - Blood (21 @ 22:07)    Specimen Source: .Blood Blood-Venous    Culture Results:   No growth to date.      RADIOLOGY & ADDITIONAL STUDIES:    < from: Xray Chest 1 View- PORTABLE-Urgent (Xray Chest 1 View- PORTABLE-Urgent .) (21 @ 20:57) >    EXAM:  XR CHEST PORTABLE URGENT 1V                            PROCEDURE DATE:  2021            INTERPRETATION:  A single chest x-ray was obtained on 2021.    INDICATION: Shortness of breath.    IMPRESSION:    The heart is normal in size. Pulmonary edema. Multifocal pneumonia cannot be ruled out as well. Endotracheal tube and NG tube are in good position. The overall appearance of the chest remain unchanged when compared to previous study done the same date at 7:55 PM.    < end of copied text >    < from: CT Angio Chest PE Protocol w/ IV Cont (21 @ 13:21) >    EXAM:  CT ANGIO CHEST PULM [facility]                            PROCEDURE DATE:  2021            INTERPRETATION:  INDICATION, CLINICAL INFORMATION: Shortness of breath, cirrhosis, lower extremity swelling, pain, leukocytosis, fever      TECHNIQUE: CT pulmonary angiogram of the chest was performed. Coronal and sagittal images were reconstructed. Maximum intensity projection images were generated. Images were obtained after the uneventful administration of 90 cc nonionic intravenous Omnipaque 350. 10 cc of Omnipaque 350 was discarded.      COMPARISON: None.    FINDINGS:    PULMONARY VESSELS: Limited exam for evaluation of pulmonary embolus secondary to motion, streak artifact and poor opacification of pulmonary arteries. Main pulmonary artery normal in diameter.    HEART AND VASCULATURE: Heart size is normal. No pericardial effusion. No aortic aneurysm or dissection.    LUNGS, AIRWAYS, PLEURA: Patent central airways. Patchy opacities within bilateral upper lobes and bilateral lower lobes likely representing multifocal pneumonia. 1.2 cm right lower lobe nodule. No pleural effusions or pneumothorax. The right hemidiaphragm is elevated.    MEDIASTINUM: No mass or lymphadenopathy.    UPPER ABDOMEN: Ascites.    BONES AND SOFT TISSUES: No aggressive osseous lesion.    LOWER NECK: Within normal limits.    IMPRESSION:    Limited exam for evaluation of pulmonary embolus secondary to motion, streak artifact and poor opacification of pulmonary arteries.    Patchy opacities within bilateral upper lobes and bilateral lower lobes likely representing multifocal pneumonia.    1.2 cm right lower lobe nodule. Recommend follow-up chest CT in 3 months to determine stability.    < end of copied text >   INTERVAL HPI/OVERNIGHT EVENTS:    Patient is a 41y old  Male who presents with a chief complaint of cellulitis (2021 15:43)    41M with PMH of ETOH abuse (quit early April) c/b decompensated alcoholic cirrhosis, recent admission (-) for decompensated cirrhosis, presented with b/l LE swelling and LLE foot pain who has developed hypoxemia and new onset renal failure since admission, currently with GINNY, fevers, and CT chest demonstrating patchy opacities in the bilateral upper lobes and bilateral lower lobes likely representing multifocal pneumonia, currently worsening on Zosyn and Azithromycin. The patient was admitted to medicine floors for further management Hepatology and IR was consulted for paracentesis on  a total of 2300ml was drained (-) x 1 culture 2nd body fluid culture is pending. Per hepatology unknown history of varices or no known lesion of HCC on CT Podiatry was also consulted for concerns of lower extremity cellulitis at this time Antibiotic therapy per ID. The patients hospital course was further complicated by hypoxic respiratory failure with associated GINNY. CTA chest demonstrated multifocal PNA, CT of the ABD and Pelvis was negative for  infectious process. COVID / RVP (-) all cx are also negative to date. Fungitel >500 and Caspofungin was added to PCP. Dermatology was also consulted to evaluate rash however per dermatology low suspicion for any vasculitis.     Today RRT was called for worsening hypoxia, failed high flow nasal o2 and could not tolerate Bilevel support. Now intubated and transferred to the medical ICU for further management    ROS: unable to ROS.       Allergies    No Known Allergies    Intolerances      PAST MEDICAL & SURGICAL HISTORY:  Alcohol abuse    Cirrhosis    No significant past surgical history        FAMILY HISTORY:  No pertinent family history in first degree relatives      ANTIBIOTICS/RELEVANT:  antimicrobials  caspofungin IVPB 50 milliGRAM(s) IV Intermittent every 24 hours  meropenem  IVPB 500 milliGRAM(s) IV Intermittent every 12 hours  trimethoprim / sulfamethoxazole IVPB 320 milliGRAM(s) IV Intermittent every 8 hours    immunologic:    OTHER:  albumin human 25% IVPB 50 milliLiter(s) IV Intermittent every 6 hours  albuterol/ipratropium for Nebulization 3 milliLiter(s) Nebulizer every 6 hours  chlorhexidine 0.12% Liquid 15 milliLiter(s) Oral Mucosa every 12 hours  chlorhexidine 4% Liquid 1 Application(s) Topical <User Schedule>  citric acid/sodium citrate Solution 30 milliLiter(s) Oral every 6 hours  folic acid 1 milliGRAM(s) Oral daily  heparin   Injectable 5000 Unit(s) SubCutaneous every 8 hours  insulin lispro (ADMELOG) corrective regimen sliding scale   SubCutaneous every 6 hours  ketamine Infusion. 0.25 mG/kG/Hr IV Continuous <Continuous>  lactulose Syrup 30 Gram(s) Oral every 12 hours  methylPREDNISolone sodium succinate Injectable 40 milliGRAM(s) IV Push every 12 hours  midazolam Infusion 0.02 mG/kG/Hr IV Continuous <Continuous>  multivitamin 1 Tablet(s) Oral daily  norepinephrine Infusion 0.05 MICROgram(s)/kG/Min IV Continuous <Continuous>  propofol Infusion 50 MICROgram(s)/kG/Min IV Continuous <Continuous>  sodium bicarbonate  Infusion 0.155 mEq/kG/Hr IV Continuous <Continuous>  sodium chloride 3%  Inhalation 3 milliLiter(s) Inhalation every 6 hours  thiamine Injectable 100 milliGRAM(s) IV Push daily      Objective:  Vital Signs Last 24 Hrs  T(C): 36.8 (2021 00:00), Max: 37.7 (2021 20:00)  T(F): 98.2 (2021 00:00), Max: 99.9 (2021 20:00)  HR: 95 (2021 03:50) (88 - 965)  BP: 113/63 (2021 02:00) (96/50 - 119/68)  BP(mean): 81 (2021 02:00) (70 - 88)  RR: 28 (2021 02:00) (28 - 35)  SpO2: 93% (2021 03:50) (90% - 100%)    PHYSICAL EXAM:  General: Intubated and sedated   HEENT: PERRLA   Lymph Nodes: no palpable lymph node adenopathy    Neck: supple   Respiratory: intubated with diminished breath sounds to left   Cardiovascular: s1 s2 no m/c/g/r  Abdomen: mild distention , + ascites, soft (+) non tender , + BS x 4 quadrants   Extremities: (+) edema   Skin: Jaundice   Neurological: sedated   Psychiatry: unable to assess due to sedation -       LABS:                        7.7    33.08 )-----------( 142      ( 2021 00:54 )             23.0     06-06    137  |  95<L>  |  35<H>  ----------------------------<  149<H>  3.2<L>   |  22  |  1.92<H>    Ca    7.3<L>      2021 00:54  Phos  3.5       Mg     1.8         TPro  5.9<L>  /  Alb  3.2<L>  /  TBili  17.1<H>  /  DBili  x   /  AST  56<H>  /  ALT  24  /  AlkPhos  125<H>      PT/INR - ( 2021 00:54 )   PT: 26.1 sec;   INR: 2.26 ratio         PTT - ( 2021 00:54 )  PTT:56.3 sec  Urinalysis Basic - ( 2021 21:30 )    Color: Dark Yellow / Appearance: Slightly Turbid / S.022 / pH: x  Gluc: x / Ketone: Negative  / Bili: Large >10 / Urobili: Negative   Blood: x / Protein: 30 mg/dL / Nitrite: Negative   Leuk Esterase: Negative / RBC: 2 /hpf / WBC 0 /HPF   Sq Epi: x / Non Sq Epi: 0 /hpf / Bacteria: Negative    Blood Gas Profile - Arterial (21 @ 05:06)    pH, Arterial: 7.46    pCO2, Arterial: 38 mmHg    pO2, Arterial: 70 mmHg    HCO3, Arterial: 27 mmoL/L    Base Excess, Arterial: 3.1 mmol/L    Oxygen Saturation, Arterial: 94 %    Total CO2, Arterial: 28 mmoL/L    FIO2, Arterial: 70    Blood Gas Source Arterial: Arterial        MICROBIOLOGY:    Culture - Body Fluid with Gram Stain (21 @ 21:30)    Gram Stain:   polymorphonuclear leukocytes seen  No organisms seen  by cytocentrifuge    Specimen Source: .Body Fluid Peritoneal Fluid      Culture - Bronchial (21 @ 15:16)    Gram Stain:   No polymorphonuclear cells seen  No squamous epithelial cells  No organisms seen    Specimen Source: Bronch Wash Combicath CUP    Culture - Sputum . (21 @ 23:02)    Gram Stain:   Numerous polymorphonuclear leukocytes per low power field  Rare Squamous epithelial cells per low power field  No organisms seen per oil power field    Specimen Source: .Sputum Sputum    Culture Results:   No growth to date.      Culture - Blood (21 @ 22:07)    Specimen Source: .Blood Blood-Peripheral    Culture Results:   No growth to date.    Culture - Blood (21 @ 22:07)    Specimen Source: .Blood Blood-Venous    Culture Results:   No growth to date.      RADIOLOGY & ADDITIONAL STUDIES:    < from: Xray Chest 1 View- PORTABLE-Urgent (Xray Chest 1 View- PORTABLE-Urgent .) (21 @ 20:57) >    EXAM:  XR CHEST PORTABLE URGENT 1V                            PROCEDURE DATE:  2021            INTERPRETATION:  A single chest x-ray was obtained on 2021.    INDICATION: Shortness of breath.    IMPRESSION:    The heart is normal in size. Pulmonary edema. Multifocal pneumonia cannot be ruled out as well. Endotracheal tube and NG tube are in good position. The overall appearance of the chest remain unchanged when compared to previous study done the same date at 7:55 PM.    < end of copied text >    < from: CT Angio Chest PE Protocol w/ IV Cont (21 @ 13:21) >    EXAM:  CT ANGIO CHEST PULM [facility]                            PROCEDURE DATE:  2021            INTERPRETATION:  INDICATION, CLINICAL INFORMATION: Shortness of breath, cirrhosis, lower extremity swelling, pain, leukocytosis, fever      TECHNIQUE: CT pulmonary angiogram of the chest was performed. Coronal and sagittal images were reconstructed. Maximum intensity projection images were generated. Images were obtained after the uneventful administration of 90 cc nonionic intravenous Omnipaque 350. 10 cc of Omnipaque 350 was discarded.      COMPARISON: None.    FINDINGS:    PULMONARY VESSELS: Limited exam for evaluation of pulmonary embolus secondary to motion, streak artifact and poor opacification of pulmonary arteries. Main pulmonary artery normal in diameter.    HEART AND VASCULATURE: Heart size is normal. No pericardial effusion. No aortic aneurysm or dissection.    LUNGS, AIRWAYS, PLEURA: Patent central airways. Patchy opacities within bilateral upper lobes and bilateral lower lobes likely representing multifocal pneumonia. 1.2 cm right lower lobe nodule. No pleural effusions or pneumothorax. The right hemidiaphragm is elevated.    MEDIASTINUM: No mass or lymphadenopathy.    UPPER ABDOMEN: Ascites.    BONES AND SOFT TISSUES: No aggressive osseous lesion.    LOWER NECK: Within normal limits.    IMPRESSION:    Limited exam for evaluation of pulmonary embolus secondary to motion, streak artifact and poor opacification of pulmonary arteries.    Patchy opacities within bilateral upper lobes and bilateral lower lobes likely representing multifocal pneumonia.    1.2 cm right lower lobe nodule. Recommend follow-up chest CT in 3 months to determine stability.    < end of copied text >

## 2021-06-06 NOTE — CHART NOTE - NSCHARTNOTEFT_GEN_A_CORE
Pre-Bronchoscopy Tuberculosis Risk Screening Tool  To reduce the risk for airborne transmission of Mycobacterium tuberculosis, this assessment form must be completed prior to bronchoscopy procedures being performed outside of a negative pressure environment.    Procedure Date: 6/6/2021  Health Care Provider Name: Juan Centeno  Form Completed By: Juan Centeno  Reason for the Bronchoscopy: hypoxemic respiratory failure  Date of Procedure: 6/6/2021  Planned Location for the Procedure:  ?[ ] Emergency Department ?[x] Intensive Care Unit ? [ ] Operating Room ? Other: ___________________    Risk Assessment  I. I have personally evaluated this patient for Mycobacterium tuberculosis and I determined the following risk:  ?[x] No Risk for TB     [ ] Low risk or TB     [ ] Significant risk for TB    II. Additional Findings: _________________________    III. Based on the Determined Risk for TB the following Action(s) are Suggested:  1. If there are no risk factors for TB infection proceed with the procedure.  2. If there is low risk or significant risk for TB infection the following recommendations should be followed:            a. Perform the procedure in a negative pressure room, with N95 respirator.            b. If not feasible to move the patient or defer the procedure:                    i. Use a single-bedded room low traffic area to perform the bronchoscopy procedure.                   ii. Place a portable high-efficiency particulate air (HEPA) filter in the space prior to starting the procedure and keep closed.                       Refer to the INF.1132 titled“Tuberculosis Control Strategy Plan” for additional information.                  iii. All Health Care Provider within the procedure room shall wear an N95 respirator.            c. Documentation of the tuberculosis risk assessment should be included within the patient’s medical record.        ICU BRONCHOSCOPY PROCEDURE NOTE                                                             : Lana Huertas  ANESTHETIC: Patient already sedated prior to procedure  PROCEDURE:  Flexible bronchoscopy  Intubation site:  ETT  INDICATION:  Pulmonary toilet/mucous plugging/BAL sampling  FINDINGS: Bronchoscope inserted through the ETT into the distal trachea to the level of the MC with ease. The left and right tracheobronchial tree inspected to segmental and subsegmental levels. No endobronchial lesion. MC non-splayed. +erythema. No friable mucosa. BAL taken from [facility] with good return. At this time the procedure was ended and the bronchoscope removed.   SPECIMENS: BAL: 30cc x 2 (location: [facility] )  COMPLICATIONS:   None  IMPRESSION: Pneumonia  PLAN:  Sent BAL for C&S/gram stain/fungal/viruses/Legionella/Mycoplasma/AFB/PJP Pre-Bronchoscopy Tuberculosis Risk Screening Tool  To reduce the risk for airborne transmission of Mycobacterium tuberculosis, this assessment form must be completed prior to bronchoscopy procedures being performed outside of a negative pressure environment.    Procedure Date: 6/6/2021  Health Care Provider Name: Juan Centeno  Form Completed By: Juan Centeno  Reason for the Bronchoscopy: hypoxemic respiratory failure  Date of Procedure: 6/6/2021  Planned Location for the Procedure:  ?[ ] Emergency Department ?[x] Intensive Care Unit ? [ ] Operating Room ? Other: ___________________    Risk Assessment  I. I have personally evaluated this patient for Mycobacterium tuberculosis and I determined the following risk:  ?[x] No Risk for TB     [ ] Low risk or TB     [ ] Significant risk for TB    II. Additional Findings: _________________________    III. Based on the Determined Risk for TB the following Action(s) are Suggested:  1. If there are no risk factors for TB infection proceed with the procedure.  2. If there is low risk or significant risk for TB infection the following recommendations should be followed:            a. Perform the procedure in a negative pressure room, with N95 respirator.            b. If not feasible to move the patient or defer the procedure:                    i. Use a single-bedded room low traffic area to perform the bronchoscopy procedure.                   ii. Place a portable high-efficiency particulate air (HEPA) filter in the space prior to starting the procedure and keep closed.                       Refer to the INF.1132 titled“Tuberculosis Control Strategy Plan” for additional information.                  iii. All Health Care Provider within the procedure room shall wear an N95 respirator.            c. Documentation of the tuberculosis risk assessment should be included within the patient’s medical record.        ICU BRONCHOSCOPY PROCEDURE NOTE                                                             : Lana Huertas  ANESTHETIC: Patient already sedated prior to procedure  PROCEDURE:  Flexible bronchoscopy  Intubation site:  ETT  INDICATION:  Pulmonary toilet/mucous plugging/BAL sampling  FINDINGS: Bronchoscope inserted through the ETT into the distal trachea to the level of the MC with ease. The left and right tracheobronchial tree inspected to segmental and subsegmental levels. No endobronchial lesion. MC non-splayed. +erythema. No friable mucosa. BAL taken from [facility] with good return. At this time the procedure was ended and the bronchoscope removed.   SPECIMENS: BAL: 30cc x 2 (location: [facility] )  COMPLICATIONS:   None  IMPRESSION: Pneumonia  PLAN:  Sent BAL for C&S/gram stain/fungal/viruses/Legionella/Mycoplasma/AFB/PJP    ATTENDING ADDENDUM:  I was present during the key portions of the procedure and immediately available during the entire procedure.

## 2021-06-06 NOTE — PROGRESS NOTE ADULT - SUBJECTIVE AND OBJECTIVE BOX
INTERVAL HPI/OVERNIGHT EVENTS:    Patient remains intubated  Rash stable      MEDICATIONS  (STANDING):  albuterol/ipratropium for Nebulization 3 milliLiter(s) Nebulizer every 6 hours  caspofungin IVPB 50 milliGRAM(s) IV Intermittent every 24 hours  chlorhexidine 0.12% Liquid 15 milliLiter(s) Oral Mucosa every 12 hours  chlorhexidine 4% Liquid 1 Application(s) Topical <User Schedule>  citric acid/sodium citrate Solution 30 milliLiter(s) Oral every 6 hours  folic acid 1 milliGRAM(s) Oral daily  heparin   Injectable 5000 Unit(s) SubCutaneous every 8 hours  insulin lispro (ADMELOG) corrective regimen sliding scale   SubCutaneous every 6 hours  ketamine Infusion. 0.25 mG/kG/Hr (1.82 mL/Hr) IV Continuous <Continuous>  lactulose Syrup 30 Gram(s) Oral every 12 hours  meropenem  IVPB 500 milliGRAM(s) IV Intermittent every 12 hours  methylPREDNISolone sodium succinate Injectable 40 milliGRAM(s) IV Push every 12 hours  midazolam Infusion 0.02 mG/kG/Hr (1.45 mL/Hr) IV Continuous <Continuous>  midazolam Injectable 4 milliGRAM(s) IV Push once  multivitamin 1 Tablet(s) Oral daily  norepinephrine Infusion 0.05 MICROgram(s)/kG/Min (6.81 mL/Hr) IV Continuous <Continuous>  pantoprazole  Injectable 40 milliGRAM(s) IV Push daily  propofol Infusion 50 MICROgram(s)/kG/Min (21.8 mL/Hr) IV Continuous <Continuous>  thiamine Injectable 100 milliGRAM(s) IV Push daily  trimethoprim / sulfamethoxazole IVPB 360 milliGRAM(s) IV Intermittent every 12 hours    MEDICATIONS  (PRN):      Allergies    No Known Allergies    Intolerances        REVIEW OF SYSTEMS (patient intubated)      Vital Signs Last 24 Hrs  T(C): 37.3 (2021 15:00), Max: 37.7 (2021 20:00)  T(F): 99.1 (2021 15:00), Max: 99.9 (2021 20:00)  HR: 110 (2021 19:15) (82 - 965)  BP: 112/62 (2021 07:30) (98/54 - 116/65)  BP(mean): 81 (2021 07:30) (72 - 84)  RR: 31 (2021 19:15) (23 - 34)  SpO2: 87% (2021 19:15) (86% - 98%)    PHYSICAL EXAM:   The patient was intubated.  Mild lower extremity edema.   + jaundice    Of note on skin exam:     - pink erythematous macules and papules on R thigh/knee, chest/abdomen, trunk admixed with brown hyperpigmented macules and patches at sites of prior rash on L thigh, b/l axilla  - fading non-blanching violaceous macules on b/l dorsal feet w/hemorrhagic crusts on L dorsal foot       LABS:                        7.7    33.08 )-----------( 142      ( 2021 00:54 )             23.0     06-06    135  |  92<L>  |  34<H>  ----------------------------<  225<H>  3.2<L>   |  22  |  1.93<H>    Ca    7.2<L>      2021 05:08  Phos  3.5     06-06  Mg     2.3     06-06    TPro  5.9<L>  /  Alb  3.2<L>  /  TBili  17.1<H>  /  DBili  x   /  AST  56<H>  /  ALT  24  /  AlkPhos  125<H>  06-06    PT/INR - ( 2021 10:37 )   PT: 19.9 sec;   INR: 1.70 ratio         PTT - ( 2021 10:37 )  PTT:44.7 sec  Urinalysis Basic - ( 2021 21:30 )    Color: Dark Yellow / Appearance: Slightly Turbid / S.022 / pH: x  Gluc: x / Ketone: Negative  / Bili: Large >10 / Urobili: Negative   Blood: x / Protein: 30 mg/dL / Nitrite: Negative   Leuk Esterase: Negative / RBC: 2 /hpf / WBC 0 /HPF   Sq Epi: x / Non Sq Epi: 0 /hpf / Bacteria: Negative

## 2021-06-06 NOTE — PROGRESS NOTE ADULT - ASSESSMENT
# Dermatitis, favor overall resolving morbiliform drug eruption given morphology of macules and papules on trunk/extremities w/involvement of b/l axilla. Rash appears to be resolving given hyperpigmented patches admixed with erythematous macules and papules. The natural history of morbilliform drug eruption is that it is presents 7-14 days after drug exposure, but it can occur within 2-3 days if previously sensitized. It typically worsens for 3-5 days even after the drug is stopped, plateaus for 3-5 days, and then resolves over 5-10 days. As it resolves, the rash will turn from pink to red to brown and then fade. Given timeline of reported onset of rash per pt, favor causative agent is most likely medication he received during prior hospitalization at [facility], though if he was previously sensitized, rash onset would be quicker (thus making vanco or ctx possible culprits). It is not possible to definitively identify causative drug. Interestingly, the rash will often resolve even if the culprit medication is continued.   - Low concern for DRESS at this time, given lack of facial involvement and appearance of rash, however please obtain daily cbc w/diff and cmp to monitor for systemic involvement. Notably patient also now on systemic steroids due to c/f PCP pneumonia, thus will be hard to interpret to labs.  - Continue with triamcinolone 0.1% ointment BID to affected areas for up to 2 weeks on followed by 1 week off. Please do not apply to face, axilla, groin.     # Purpuric macules on b/l dorsal feet appear to be fading and are likely 2/2 erythrocyte extravasation due to persistent LE swelling (now improved s/p diuresis) in the context of this patients thrombocytopenia and synthetic dysfunction due to liver failure. Low c/f vasculitis given exam today, and will defer skin biopsy at this time given lesions on b/l dorsal feet appear to be old and resolving. However, as skin rash evolves, can consider skin biopsy in the future. Recommend obtaining serologies at this time for initial work-up given acute decompensation. Rheumatoid factor negative.   - f/u KATIE, ANCA, cryoglobulins, C3, C4    The patient's chart was reviewed in addition to being seen and examined at bedside with the dermatology attending Dr. Zari Mckeon.  Please page [phone] for further related questions (please leave 10 digit call back number because we cover several facilities).    Meme Strong MD  Resident Physician, PGY2  [facility]

## 2021-06-06 NOTE — PROGRESS NOTE ADULT - ASSESSMENT
41M with PMH of ETOH abuse (quit early April) c/b decompensated alcoholic cirrhosis, recent admission (4/20-4/27) for decompensated cirrhosis, presented with b/l LE swelling and LLE foot pain. Now with hypoxic respiratory failure due to progressive bilateral infiltrates of uncertain etiology.    Neuro   Sedation   - Maintain Rass goal of - 2 to -3 while intubated   - Sedation Vacation if/ when medically appropriate     Hyperammonemia  - check ammonia levels daily   - will continue with lactulose and Rifaximin via OGT       Critical care myopathy   - PT/OT once medically appropriate     CV   Hemodynamically stable at this time   - VS q 1 hr will maintain goal map of 65-70 for possible HRS   - vasopressor support if needed   - trend lactate   - bed side POCUS on admission shows a hyperdynamic heart and flat IVC - will albumin resuscitate  - will obtain base line CE and EKG     Pulm   Hypoxic respiratory failure secondary to PNA   -intubated 6/4 during RRT   - will maintain Mechanical vent support :Mode: AC/ CMV (Assist Control/ Continuous Mandatory Ventilation) 28/ 450/ 90/ 10  - ABG PRN   - chest x ray   - will resent sputum   -abx broadened to meropenam and caspofungin added yesterday for possibility of HAP or fungal pna, also on bactrim + steroids for PCP;   - Bronchoscopy  w/ BAL in AM for CMV / bronchial specimens   - will order Hypertonic saline 3% with Duoneb and chest pt as tolerated   - wean to extubate once medically appropriate   - Change Po steroids to IV solumedrol     GI   Nutrition   - OGT placement   - will initiate feed in the am if hemodynamics remain stable   - PPI / Bowel regimen lactulose       (+) Large volume ascites  - -s/p paracentesis 6/5 w/ 1900ml  - Hepatology follow up appreciated   - will continue with 25% of albumin     ETOH   - folic acid 1 mg IV daily   - Thiamine 500mg tid x 3 days if not completed if completed will order 100 mg daily   - lactulose and rifaximin as above for Hyperammonemia    - out of withdrawal window        GINNY   Baseline Creat .4-.5 now 1.   -Person cath placement   - strict I/o   - Will assess fluid status daily   - if volume overloaded will reorder diuretic   - may need to change Bactrim to mepron if worsening renal failure     ID   Multifocal PNA now with hypoxic respiratory failure   - will continue with caspofungin Meropenem Azithromycin and bactrim   - ID follow up appreciated   - follow up on new cx UA sputum and Blood   - possible bronchoscopy   - hold vanco for now due to MRSA (-) will re check   - will send procalcitonin CMV PCR   - follow up with toxoplasma IGM and Quant gold   Fever (persistent), Leukocytosis (increasing), Hypoxic Respiratory Failure (worsening)  - Now elevated Fungitell in setting of progressively worsening resp status  - Empiric Livia (adjust to CrCl), Caspo for now  - Continue IV Bactrim (adjust to CrCl)  - Would send combicath Pneumocystis PCR, COVID19 PCR, Bacterial, AFB, Fungal Cultures  - Bronchoscopy when able  - F/U BCXs  - Trend WBC to normal, monitor for further fevers  - As patient is well covered in terms of bacterial and fungal agents, recommend exploration of noninfectious diagnoses, as he is not responding to current treatments  2) Elevated Fungitell  - Unclear significance; findings equivocal for PCP--prior had lower LDH, time course atypical,   - F/U pending BCXs  - Caspo daily  - Continue IV Bactrim (adjust to CrCl)    Endo   NPO status for now   - will order fs q 6 hrs   - hypoglycemia protocol   - if FS >200 will order ISS     VTE   - will obtain Va Doppler bilateral extremities   - Hold AC (+) chronic Thrombocytopenia secondary to liver failure      41M with PMH of ETOH abuse (quit early April) c/b decompensated alcoholic cirrhosis, recent admission (4/20-4/27) for decompensated cirrhosis, presented with b/l LE swelling and LLE foot pain. Now with hypoxic respiratory failure due to progressive bilateral infiltrates of uncertain etiology.    Neuro   #AMS in setting of metabolic encephalopathy , septic shock   -sedated on Ketamine @2, propofol @50    - Maintain Rass goal of - 2 to -3 while intubated   - Sedation Vacation if/ when medically appropriate     #Hyperammonemia  - check ammonia levels daily   - will continue with lactulose and Rifaximin via OGT    -c/w neuro checks      #Critical care myopathy   - PT/OT once medically appropriate     #ETOH   - folic acid 1 mg IV daily   - Thiamine 500mg tid x 3 days if not completed if completed will order 100 mg daily   - lactulose and rifaximin as above for Hyperammonemia    - out of withdrawal window     CV   Septic shock in setting of intrapulmonary etiology , will r/o other causes      - vasopressor support if needed to maintain goal map of 65-70 for possible HRS   -wean pressors as tolerated   - trend lactate   - bed side POCUS on admission shows a hyperdynamic heart and flat IVC - s/p albumin resuscitate . stopped 6/6       Pulm   #Hypoxic respiratory failure secondary to PNA   -intubated 6/4 during RRT   - will maintain Mechanical vent support :Mode: AC/ CMV (Assist Control/ Continuous Mandatory Ventilation) 28/ 450/ 90/ 10  - ABG PRN   - chest x ray   - will resent sputum   -abx broadened to meropenam and caspofungin added yesterday for possibility of HAP or fungal pna, also on bactrim + steroids for PCP;   - Bronchoscopy  w/ BAL in AM for CMV / bronchial specimens   - Duoneb and chest PT as tolerated    -will stop Hypertonic saline 3% as no secreations   - wean to extubate once medically appropriate       GI   #oropharyngeal dysphagia   - OGT w/ TF   - PPI / Bowel regimen lactulose ; maintain 3-5 Bm's / day   -frequent abdominal exams    -aspiration precaution  -Abdominal Xray ; r/o ileus        #(+) Large volume ascites  - -s/p paracentesis 6/5 w/ 1900ml  - Hepatology follow up appreciated   - will stop 25% of albumin   -pending Abdominal duplex to r/p PVT, and abdominal US to eval for worsening ascites          #GINNY in setting of ATN vs bilirubin cast nephropathy    Baseline Creat .4-.5   -Person cath placement for strict I/o   -s/p lasix ove rnight for low urine output .  will dose lasix 40mg IVP x 1   -renally dose drugs   -avoid nephrotoxins     ID   #Multifocal PNA now with hypoxic respiratory failure   - will continue with caspofungin Meropenem and bactrim   - ID follow up appreciated   - follow up on new cx UA sputum and Blood   - possible bronchoscopy   - hold vanco for now due to MRSA (-) will re check   - will send procalcitonin CMV PCR   - follow up with toxoplasma IGM and Quant gold   -trend WBC   -trend T curve     2) Elevated Fungitell  - Unclear significance; findings equivocal for PCP--prior had lower LDH, time course atypical,   - F/U pending BCXs  - Caspo daily  - Continue IV Bactrim (adjust to CrCl)    Endo   NPO status for now   - will order fs q 6 hrs   - hypoglycemia protocol   - if FS >200 will order ISS     VTE   heparin sq   PPI     FULL code

## 2021-06-06 NOTE — CHART NOTE - NSCHARTNOTEFT_GEN_A_CORE
Nephrology note appreciated - dose meds to estimated CrCl of 20  Would adjust Bactrim IV to 10 mg/kg/day (divided between Q12H Dosing) (360 mg IV Q12H)  Would switch Bactrim 320 mg IV Q8H to 360 mg IV Q12H    Agree with Meropenem 500 mg IV Q12H (continue to correct based on CrCl)  Continue Caspofungin at current dosing    Planned for possible bronchoscopy today per MICU depending on respiratory status     Alcon Noble M.D.  [facility] Division of Infectious Disease  8AM-5PM: Pager Number [phone]  After Hours (or if no response): Please contact the Infectious Diseases Office at [phone]     The above assessment and plan were discussed with MICU Resident

## 2021-06-06 NOTE — PROGRESS NOTE ADULT - SUBJECTIVE AND OBJECTIVE BOX
GI Progress Note      Interval Events:   - yesterday underwent paracentesis 1900cc removed, neg for SBP. received 2u FFP prior to procedure, 1u pRBC  - given lasix 40 yesterday w/ improved UOP, still net positive for day  - worsening oxygenation this morning while cleaning, desat while on 100% and PEEP 12, decision made to paralyze patient w/ improved vent synchrony   - afebrile    Allergies:  No Known Allergies      Hospital Medications:  albuterol/ipratropium for Nebulization 3 milliLiter(s) Nebulizer every 6 hours  calcium gluconate IVPB 2 Gram(s) IV Intermittent once  caspofungin IVPB 50 milliGRAM(s) IV Intermittent every 24 hours  chlorhexidine 0.12% Liquid 15 milliLiter(s) Oral Mucosa every 12 hours  chlorhexidine 4% Liquid 1 Application(s) Topical <User Schedule>  citric acid/sodium citrate Solution 30 milliLiter(s) Oral every 6 hours  folic acid 1 milliGRAM(s) Oral daily  furosemide   Injectable 40 milliGRAM(s) IV Push once  heparin   Injectable 5000 Unit(s) SubCutaneous every 8 hours  insulin lispro (ADMELOG) corrective regimen sliding scale   SubCutaneous every 6 hours  ketamine Infusion. 0.25 mG/kG/Hr IV Continuous <Continuous>  lactulose Syrup 30 Gram(s) Oral every 12 hours  meropenem  IVPB 500 milliGRAM(s) IV Intermittent every 12 hours  methylPREDNISolone sodium succinate Injectable 40 milliGRAM(s) IV Push every 12 hours  multivitamin 1 Tablet(s) Oral daily  norepinephrine Infusion 0.05 MICROgram(s)/kG/Min IV Continuous <Continuous>  phytonadione  IVPB 5 milliGRAM(s) IV Intermittent once  potassium chloride   Solution 40 milliEquivalent(s) Oral once  propofol Infusion 50 MICROgram(s)/kG/Min IV Continuous <Continuous>  thiamine Injectable 100 milliGRAM(s) IV Push daily  trimethoprim / sulfamethoxazole IVPB 360 milliGRAM(s) IV Intermittent every 12 hours        PHYSICAL EXAM:   Vital Signs:  Vital Signs Last 24 Hrs  T(C): 36.3 (2021 07:00), Max: 37.7 (2021 20:00)  T(F): 97.3 (2021 07:00), Max: 99.9 (2021 20:00)  HR: 85 (2021 11:56) (84 - 965)  BP: 112/62 (2021 07:30) (98/54 - 119/68)  BP(mean): 81 (2021 07:30) (71 - 88)  RR: 28 (2021 10:15) (23 - 34)  SpO2: 95% (2021 11:56) (87% - 100%)  Daily     Daily     GENERAL:  intubated, sedated  HEENT:  intubated  CHEST: mechanical breath sounds  HEART:  RRR  ABDOMEN:  distended, jaundice   EXTREMITIES:  No cyanosis, clubbing, or edema  SKIN:  No rash/erythema/ecchymoses/petechiae/wounds/abscess/warm/dry  NEURO:  sedated    LABS:                        7.7    33.08 )-----------( 142      ( 2021 00:54 )             23.0     Mean Cell Volume: 93.9 fl (21 @ 00:54)    -    135  |  92<L>  |  34<H>  ----------------------------<  225<H>  3.2<L>   |  22  |  1.93<H>    Ca    7.2<L>      2021 05:08  Phos  3.5     -  Mg     2.3         TPro  5.9<L>  /  Alb  3.2<L>  /  TBili  17.1<H>  /  DBili  x   /  AST  56<H>  /  ALT  24  /  AlkPhos  125<H>      LIVER FUNCTIONS - ( 2021 00:54 )  Alb: 3.2 g/dL / Pro: 5.9 g/dL / ALK PHOS: 125 U/L / ALT: 24 U/L / AST: 56 U/L / GGT: x           PT/INR - ( 2021 10:37 )   PT: 19.9 sec;   INR: 1.70 ratio         PTT - ( 2021 10:37 )  PTT:44.7 sec  Urinalysis Basic - ( 2021 21:30 )    Color: Dark Yellow / Appearance: Slightly Turbid / S.022 / pH: x  Gluc: x / Ketone: Negative  / Bili: Large >10 / Urobili: Negative   Blood: x / Protein: 30 mg/dL / Nitrite: Negative   Leuk Esterase: Negative / RBC: 2 /hpf / WBC 0 /HPF   Sq Epi: x / Non Sq Epi: 0 /hpf / Bacteria: Negative            Imaging:           GI Progress Note      Interval Events:   - yesterday underwent paracentesis 1900cc removed, neg for SBP. received 2u FFP prior to procedure, 1u pRBC  - given lasix 40 yesterday w/ improved UOP, still net positive for day  - worsening oxygenation this morning while cleaning, desat while on 100% and PEEP 12, decision made to paralyze patient w/ improved vent synchrony, now on FiO2 80% with PEEP 14   - afebrile x24h    Allergies:  No Known Allergies      Hospital Medications:  albuterol/ipratropium for Nebulization 3 milliLiter(s) Nebulizer every 6 hours  calcium gluconate IVPB 2 Gram(s) IV Intermittent once  caspofungin IVPB 50 milliGRAM(s) IV Intermittent every 24 hours  chlorhexidine 0.12% Liquid 15 milliLiter(s) Oral Mucosa every 12 hours  chlorhexidine 4% Liquid 1 Application(s) Topical <User Schedule>  citric acid/sodium citrate Solution 30 milliLiter(s) Oral every 6 hours  folic acid 1 milliGRAM(s) Oral daily  furosemide   Injectable 40 milliGRAM(s) IV Push once  heparin   Injectable 5000 Unit(s) SubCutaneous every 8 hours  insulin lispro (ADMELOG) corrective regimen sliding scale   SubCutaneous every 6 hours  ketamine Infusion. 0.25 mG/kG/Hr IV Continuous <Continuous>  lactulose Syrup 30 Gram(s) Oral every 12 hours  meropenem  IVPB 500 milliGRAM(s) IV Intermittent every 12 hours  methylPREDNISolone sodium succinate Injectable 40 milliGRAM(s) IV Push every 12 hours  multivitamin 1 Tablet(s) Oral daily  norepinephrine Infusion 0.05 MICROgram(s)/kG/Min IV Continuous <Continuous>  phytonadione  IVPB 5 milliGRAM(s) IV Intermittent once  potassium chloride   Solution 40 milliEquivalent(s) Oral once  propofol Infusion 50 MICROgram(s)/kG/Min IV Continuous <Continuous>  thiamine Injectable 100 milliGRAM(s) IV Push daily  trimethoprim / sulfamethoxazole IVPB 360 milliGRAM(s) IV Intermittent every 12 hours    ROS: Unable to assess    PHYSICAL EXAM:   Vital Signs:  Vital Signs Last 24 Hrs  T(C): 36.3 (2021 07:00), Max: 37.7 (2021 20:00)  T(F): 97.3 (2021 07:00), Max: 99.9 (2021 20:00)  HR: 85 (2021 11:56) (84 - 965)  BP: 112/62 (2021 07:30) (98/54 - 119/68)  BP(mean): 81 (2021 07:30) (71 - 88)  RR: 28 (2021 10:15) (23 - 34)  SpO2: 95% (2021 11:56) (87% - 100%)  Daily     Daily     GENERAL:  intubated, sedated  HEENT: +sclera icteruc, intubated  CHEST: mechanical breath sounds  HEART:  RRR, no JVD  ABDOMEN:  +BS, obese, distended, non-tense and soft  EXTREMITIES:  +Mild dependent edema in BLE  SKIN:  Jaundiced, +spider angiomas  NEURO:  sedated and paralyzed    LABS:                        7.7    33.08 )-----------( 142      ( 2021 00:54 )             23.0     Mean Cell Volume: 93.9 fl (21 @ 00:54)        135  |  92<L>  |  34<H>  ----------------------------<  225<H>  3.2<L>   |  22  |  1.93<H>    Ca    7.2<L>      2021 05:08  Phos  3.5       Mg     2.3         TPro  5.9<L>  /  Alb  3.2<L>  /  TBili  17.1<H>  /  DBili  x   /  AST  56<H>  /  ALT  24  /  AlkPhos  125<H>      LIVER FUNCTIONS - ( 2021 00:54 )  Alb: 3.2 g/dL / Pro: 5.9 g/dL / ALK PHOS: 125 U/L / ALT: 24 U/L / AST: 56 U/L / GGT: x           PT/INR - ( 2021 10:37 )   PT: 19.9 sec;   INR: 1.70 ratio         PTT - ( 2021 10:37 )  PTT:44.7 sec  Urinalysis Basic - ( 2021 21:30 )    Color: Dark Yellow / Appearance: Slightly Turbid / S.022 / pH: x  Gluc: x / Ketone: Negative  / Bili: Large >10 / Urobili: Negative   Blood: x / Protein: 30 mg/dL / Nitrite: Negative   Leuk Esterase: Negative / RBC: 2 /hpf / WBC 0 /HPF   Sq Epi: x / Non Sq Epi: 0 /hpf / Bacteria: Negative            Imaging:

## 2021-06-06 NOTE — PROGRESS NOTE ADULT - SUBJECTIVE AND OBJECTIVE BOX
[facility] DIVISION OF KIDNEY DISEASES AND HYPERTENSION -- FOLLOW UP NOTE  --------------------------------------------------------------------------------  If any questions, please feel free to contact me  NS pager: [phone], [facility]: [phone]  Kolby Harrington M.D.  Nephrology Fellow    (After 5 pm or on weekends please page the on-call fellow)  --------------------------------------------------------------------------------  Chief Complaint:  Patient is a 41y old  Male who presents with a chief complaint of cellulitis (06 Jun 2021 05:18)    HPI: Patient is a 40 y/o M w PMH of EtoH abuse complicated by decompensated cirrhosis presented to [facility] for worsening LE edema. Admitted to [facility] for acute hypoxic respiratory failure in setting of PNA. Nephrology consulted for GINNY. baseline Scr of 0.49 on 4/25/21, Scr on admission increased to 0.87 on admission on 5/27/21, Scr increased and peaked to 1.13 on 5/1/21,  Today peaked to 1.9mg/dl. Intubated on 6/4/21 for hypoxic resp failure.     Patient seen and examined at bedside. Patient remains non-oliguric. s/p 40mg IV lasix and UOP up to 75cc/hr. Requiring IV pressor support and now off Bicarb gtt. Vitals/labs/imaging reviewed       PAST HISTORY  --------------------------------------------------------------------------------  No significant changes to PMH, PSH, FHx, SHx, unless otherwise noted    ALLERGIES & MEDICATIONS  --------------------------------------------------------------------------------  Allergies    No Known Allergies    Intolerances      Standing Inpatient Medications  albumin human 25% IVPB 50 milliLiter(s) IV Intermittent every 6 hours  albuterol/ipratropium for Nebulization 3 milliLiter(s) Nebulizer every 6 hours  caspofungin IVPB 50 milliGRAM(s) IV Intermittent every 24 hours  chlorhexidine 0.12% Liquid 15 milliLiter(s) Oral Mucosa every 12 hours  chlorhexidine 4% Liquid 1 Application(s) Topical <User Schedule>  citric acid/sodium citrate Solution 30 milliLiter(s) Oral every 6 hours  folic acid 1 milliGRAM(s) Oral daily  heparin   Injectable 5000 Unit(s) SubCutaneous every 8 hours  insulin lispro (ADMELOG) corrective regimen sliding scale   SubCutaneous every 6 hours  ketamine Infusion. 0.25 mG/kG/Hr IV Continuous <Continuous>  ketamine Injectable 70 milliGRAM(s) IV Push once  lactulose Syrup 30 Gram(s) Oral every 12 hours  meropenem  IVPB 500 milliGRAM(s) IV Intermittent every 12 hours  methylPREDNISolone sodium succinate Injectable 40 milliGRAM(s) IV Push every 12 hours  multivitamin 1 Tablet(s) Oral daily  norepinephrine Infusion 0.05 MICROgram(s)/kG/Min IV Continuous <Continuous>  potassium chloride   Solution 40 milliEquivalent(s) Oral once  propofol Infusion 50 MICROgram(s)/kG/Min IV Continuous <Continuous>  sodium chloride 3%  Inhalation 3 milliLiter(s) Inhalation every 6 hours  thiamine Injectable 100 milliGRAM(s) IV Push daily  trimethoprim / sulfamethoxazole IVPB 320 milliGRAM(s) IV Intermittent every 8 hours    PRN Inpatient Medications      REVIEW OF SYSTEMS  --------------------------------------------------------------------------------  unable to obtain due to current medical conditions      VITALS/PHYSICAL EXAM  --------------------------------------------------------------------------------  T(C): 36.3 (06-06-21 @ 04:00), Max: 37.7 (06-05-21 @ 20:00)  HR: 100 (06-06-21 @ 07:00) (88 - 965)  BP: 106/60 (06-06-21 @ 04:15) (98/54 - 119/68)  RR: 28 (06-06-21 @ 07:00) (23 - 34)  SpO2: 91% (06-06-21 @ 07:00) (89% - 100%)  Wt(kg): --        06-05-21 @ 07:01  -  06-06-21 @ 07:00  --------------------------------------------------------  IN: 6517.3 mL / OUT: 2895 mL / NET: 3622.3 mL        Physical Exam:  	Gen: intubated/sedated  	HEENT: +Et tube   	Pulm: Coarse breath sounds   	CV: S1S2, RRR  	Abd: Soft, +BS, distended    	Ext: no LE edema B/L  	Neuro: sedated  	Skin: +jaundice   	Vascular access: CVC      LABS/STUDIES  --------------------------------------------------------------------------------              7.7    33.08 >-----------<  142      [06-06-21 @ 00:54]              23.0     135  |  92  |  34  ----------------------------<  225      [06-06-21 @ 05:08]  3.2   |  22  |  1.93        Ca     7.2     [06-06-21 @ 05:08]      Mg     2.3     [06-06-21 @ 05:08]      Phos  3.5     [06-06-21 @ 05:08]    TPro  5.9  /  Alb  3.2  /  TBili  17.1  /  DBili  x   /  AST  56  /  ALT  24  /  AlkPhos  125  [06-06-21 @ 00:54]    PT/INR: PT 26.1 , INR 2.26       [06-06-21 @ 00:54]  PTT: 56.3       [06-06-21 @ 00:54]      Creatinine Trend:  SCr 1.93 [06-06 @ 05:08]  SCr 1.92 [06-06 @ 00:54]  SCr 1.83 [06-05 @ 21:22]  SCr 1.57 [06-05 @ 11:26]  SCr 1.47 [06-05 @ 06:32]    Urinalysis - [06-04-21 @ 21:30]      Color Dark Yellow / Appearance Slightly Turbid / SG 1.022 / pH 6.0      Gluc Negative / Ketone Negative  / Bili Large >10 / Urobili Negative       Blood Negative / Protein 30 mg/dL / Leuk Est Negative / Nitrite Negative      RBC 2 / WBC 0 / Hyaline 4 / Gran  / Sq Epi  / Non Sq Epi 0 / Bacteria Negative    Urine Creatinine 49      [06-06-21 @ 02:31]  Urine Sodium 20      [06-06-21 @ 02:31]  Urine Potassium 56      [06-06-21 @ 02:31]    Iron 66, TIBC 132, %sat 50      [04-21-21 @ 09:07]  Ferritin 537      [04-23-21 @ 09:10]    HBsAg Nonreact      [06-04-21 @ 14:54]  HCV 0.25, Nonreact      [06-04-21 @ 14:54]    Rheumatoid Factor <10      [06-05-21 @ 21:33]

## 2021-06-06 NOTE — PROGRESS NOTE ADULT - ASSESSMENT
39yo M with PMH of EtOH abuse w/ recent admission for alc hep enrolled in [id number] RCT (unblited, received placebo infusion plus corticosteroids) readmitted 5/28/21 for sepsis, course complicated by septic shock and hypoxic resp failure now intubated in MICU, with worsening GINNY    # hypoxic resp failure - likely from infectious etiology, longstanding steroid use c/f possible PJP vs other, being followed by ID. On inderjit, caspo, IV bactrim, solumedrol (for PCP, not alc hep)  # GINNY - worsening renal function in setting of septic shock, slightly improved today. Suspect will benefit from increased diuretics to maintain neg even  # Abnormal liver enzymes: Likely acute on chronic liver failure +/- alcoholic hepatitis  # Cirrhosis, decompensated, likely due to alcohol use  MELD-Na 34  Ascites - moderate on exam  Varices - unknown  - HCC - no focal lesions on imaging CT 4/20 (not multiphase)  - HE - no asterixis  # Rash  # Hyponatremia    Recommendations:  - broad spectrum antimicrobials per ID  - agree w/ Combicath vs broncoscopy when stable  - c/w lactulose  - c/w rifaximin 550mg bid  - consider increasing diuretics to maintain net even  - monitor CMP and GINNY, may require CRRT if worsening renal function  - SW for assistance with insurance options, as patient w/ good family support and may be transplant candidate, however currently only has emergency Medicaid  - rest of care per ICU    GI will continue to follow.     Yobany Maloney, PGY4  Gastroenterology Fellow  Available on [employer] Teams  [phone] ([email or web address] Short Range Pager)  [phone] (Long Range Pager)    After 5pm, please contact the on-call GI fellow. [phone]    41yo M with PMH of EtOH abuse w/ recent admission for alc hep enrolled in [id number] RCT (unblited, received placebo infusion plus corticosteroids) readmitted 5/28/21 for sepsis, course complicated by septic shock and hypoxic resp failure now intubated in MICU, with worsening GINNY    # hypoxic resp failure - likely from infectious etiology, longstanding steroid use c/f possible PJP vs other, being followed by ID. On inderjit, caspo, IV bactrim, solumedrol (for PCP, not alc hep)  # GINNY - worsening renal function in setting of septic shock, slightly improved today. Suspect will benefit from increased diuretics to maintain neg even  # Abnormal liver enzymes: Likely acute on chronic liver failure +/- alcoholic hepatitis  # Cirrhosis, decompensated, likely due to alcohol use  MELD-Na 34  Ascites - moderate on exam  Varices - unknown  - HCC - no focal lesions on imaging CT 4/20 (not multiphase)  - HE - no asterixis  # Rash  # Hyponatremia    Recommendations:  - broad spectrum antimicrobials per ID  - f/u results of Combicath, consider broncoscopy when stable enough from pulmonary standpoint  - c/w lactulose  - add rifaximin 550mg bid  - consider repeating IV Lasix for goal net even to improve oxygenation, would hold albumin  - monitor CMP and GINNY, may require CRRT if worsening renal function  - SW for assistance with insurance options, as patient w/ good family support and may be transplant candidate, however currently only has emergency Medicaid  - rest of care per ICU    GI will continue to follow.     Yobany Maloney, PGY4  Gastroenterology Fellow  Available on [employer] Teams  [phone] ([facility] Short Range Pager)  [phone] (Long Range Pager)    After 5pm, please contact the on-call GI fellow. [phone]

## 2021-06-07 NOTE — CHART NOTE - NSCHARTNOTEFT_GEN_A_CORE
: Antony Bryant MD    INDICATION:  Respiratory failure  PROCEDURE:  [x ] LIMITED ECHO  [ x] LIMITED CHEST  [ ] LIMITED RETROPERITONEAL  [ x] LIMITED ABDOMINAL  [ ] LIMITED DVT  [ ] NEEDLE GUIDANCE VASCULAR  [ ] NEEDLE GUIDANCE THORACENTESIS  [ ] NEEDLE GUIDANCE PARACENTESIS  [ ] NEEDLE GUIDANCE PERICARDIOCENTESIS  [ ] OTHER    FINDINGS:  Chest. Diffuse B lines bilateral with small bilateral pleural effusions. Some abnormal pleura.                   Echo: Normal LVF. No pericardial effusion                   Abd: Small amount of free flowing Ascites.      INTERPRETATION: B line predominant with abnormal pleura consistent with pneumonia. Normal LVF. Small amount of ascites.

## 2021-06-07 NOTE — PROGRESS NOTE ADULT - ASSESSMENT
Assessment:  41 yr old male with stated Hx significant for EtOH cirrhosis, decompensated, presented with LE cellulitis, course c/b progressive hypoxic resp failure, septic shock, GINNY, ACLF    Plan:    #Neuro:  -Neuro checks q 2 hrs and prn for changes  -physical therapy consult when stable  -Propofol, ketamine, midazolam, fent gtts - titrate to RASS of -5    #Pulm:  -Hypoxic resp failure  -Mechanical Vent Therapy - titrate to maintain ph 7.35-7.45; PCO2 35-45; SPO2 > 92%  -Bronchodilator therapy q 6 hrs prn sob/wheezes  -Lung protective therapy    #CV:  -Septic shock  -receiving Norepi gtt - titrate to MAP 65-70 mmHg    #GI/:  -EtOH cirrhosis, decompensated cirrhosis  -currently ACLF  -trend LFT  -s/p paracentesis 6/5/21 with removal of 1.9 liters  -GINNY/ATN  -strict I & O's - keep negative  -bumex gtt - titrate to U/O of 75 to 100 cc's/hr  -elevated total bili - pending abd duplex/US  -tube feeds as tolerated  -being followed by hepatology     #ID:  -septic shock secondary to possible pneumonia  -recent RCT steroids vs placebo, unblinded   -6/6/21 BAL cx  with growth  -6/5/21 combicath - NGTD  -6/5/21 Paracentesis/peritoneal fluid - no organisms   -6/5/21 MRSA - neg  -6/4/21 sputum Cx - NGTD  -6/3/21 Legiionella - neg  -6/1/21 blood Cx - No growth  -follow up with toxoplasma IGM and Quant gold   -elevated fungitell (6/1/21)  -diffuse rash - possible drug reaction  -currently on meropenem, bactrim (PCP prophylaxis), caspofungin therapy - continue - adjust for renal function   -vanco d/c'ed 6/6/21    #FEN/ENDO/HEME:  -obtain CMP/Mg++/PO--4/CBC w diff/PT/PTT/INR  q. a.m. and prn  -trend lactate  -trend INR - (received FFP  6/5/21 and 6/6/21 - total 4 units), vit K 10 mg IV (6/5/21) vit K 5 mg IV (6/6/21)  -maintain Hgb > 7.0 ( received PRBC 1 unit 6/5/21)  -trend NH3 -currently 92 (6/7/21)  -continue lactulose therapy  -DVT prophylaxis - heparin subq

## 2021-06-07 NOTE — PROGRESS NOTE ADULT - ASSESSMENT
# Dermatitis, favor overall resolving morbiliform drug eruption given morphology of macules and papules on trunk/extremities w/involvement of b/l axilla. Rash appears to be resolving and continuing to improve clinically given hyperpigmented patches admixed with erythematous macules and papules. The natural history of morbilliform drug eruption is that it is presents 7-14 days after drug exposure, but it can occur within 2-3 days if previously sensitized. It typically worsens for 3-5 days even after the drug is stopped, plateaus for 3-5 days, and then resolves over 5-10 days. As it resolves, the rash will turn from pink to red to brown and then fade. Given timeline of reported onset of rash per pt, favor causative agent is most likely medication he received during prior hospitalization at [facility], though if he was previously sensitized, rash onset would be quicker (thus making vanco or ctx possible culprits). It is not possible to definitively identify causative drug. Interestingly, the rash will often resolve even if the culprit medication is continued.   - Low concern for DRESS at this time, given lack of facial involvement and appearance of rash, however please obtain daily cbc w/diff and cmp to monitor for systemic involvement. Notably patient also now on systemic steroids due to c/f PCP pneumonia, thus will be hard to interpret to labs.  - Continue with triamcinolone 0.1% ointment BID to affected areas for up to 2 weeks on followed by 1 week off. Please do not apply to face, axilla, groin.     # Purpuric macules on b/l dorsal feet appear to be fading and are likely 2/2 erythrocyte extravasation due to persistent LE swelling (now improved s/p diuresis) in the context of this patients thrombocytopenia and synthetic dysfunction due to liver failure. Low c/f vasculitis given exam today, and will defer skin biopsy at this time given lesions on b/l dorsal feet appear to be old and resolving. Recommend  to continue obtaining serologies at this time for initial work-up given acute decompensation. Rheumatoid factor negative.   - f/u KATIE, ANCA, cryoglobulins, C3, C4    Patient's overall poor prognosis does not appear to be related to current skin findings    The patient's chart was reviewed in addition to being seen and examined at bedside with the dermatology attending Dr. Wheeler  Please page [phone] for further related questions (please leave 10 digit call back number because we cover several facilities).    Henry Bernal MD  Resident Physician, PGY3  [facility]  Pager: [phone]  Office: [phone]

## 2021-06-07 NOTE — PROGRESS NOTE ADULT - ASSESSMENT
41 M originally from [facility], [address] PMHx of ETOH abuse (quit early April) c/b decompensated alcoholic cirrhosis, recent admission (4/20-4/27) for decompensated cirrhosis, presented with b/l LE swelling and LLE foot pain  Leukocytosis, fever  Elevated LFTs in setting cirrhosis  CT A/P notes lower lung opacities with concern for possible infection  CT chest with patchy opacities  Discussion with Pulm, Hepatology, Medicine--possibility of PCP?  Note worsening rash on LE  Worsening over weekend--intubated, s/p bronch--studies pending  Overall,  1) Fever, Leukocytosis  - Now elevated Fungitell in setting of progressively worsening resp status  - Empiric Livia, Caspo  - F/U BCXs  - Trend WBC to normal, monitor for further fevers  - As patient is well covered in terms of bacterial and fungal agents, recommend exploration of noninfectious diagnoses, as he is not responding to current treatments  2) Elevated Fungitell  - Unclear significance; findings equivocal for PCP--prior had lower LDH, time course atypical  - F/U pending BCXs  - Caspo daily  - Continue Bactrim/Steroids, monitor renal function  - F/U bronch studies  3) Rash  - F/U Derm    Sourav Jimenez MD  Pager [phone]  After 5pm and on weekends call [phone]

## 2021-06-07 NOTE — CHART NOTE - NSCHARTNOTEFT_GEN_A_CORE
Nutrition Follow Up Note   Patient seen for: consult for tube feeding and nutrition follow up on  ICU     Source: medical record, communication with team.     Chart reviewed, events noted. Adm for LLE cellulitis, decompensated cirrhosis. Transferred to MICU 6/4 for respiratory failure with septic shock requiring intubation and vasopressor therapy, worsening GINNY. Remains intubated. S/P paracentesis 6/5, 1.9 L removed.    Diet Order: Diet, NPO with Tube Feed:   Tube Feeding Modality: Orogastric  Nepro with Carb Steady (NEPRORTH)  Total Volume for 24 Hours (mL): 1170  Intermittent  Starting Tube Feed Rate {mL per Hour}: 10  Increase Tube Feed Rate by (mL): 10    Every 4 hours  Until Goal Tube Feed Rate (mL per Hour): 65  Tube Feeding Hours ON: 18  Tube Feeding OFF (Hours): 6  Tube Feed Start Time: 11:00 (06-05-21 @ 09:11)    CURRENT EN ORDER PROVIDES: 2106 kcals, 95 gm protein    Nutrition Events:   - 24 hr EN intake 6/7 280 cc  - 24 hr propofol intake 575 kcals 6/7, 6/6    GI: no vomiting/abd distention  had BM x 2 today    Anthropometric Measurements:   Height (cm): 158 (05-28-21 @ 10:38)  Weight (kg): 72.6 (05-28-21 @ 10:38)  BMI (kg/m2): 29.1 (05-28-21 @ 10:38)  6/4 wt 74.2 kg    Medications: MEDICATIONS  (STANDING):  albuterol/ipratropium for Nebulization 3 milliLiter(s) Nebulizer every 6 hours  buMETAnide Infusion 3 mG/Hr (15 mL/Hr) IV Continuous <Continuous>  caspofungin IVPB 50 milliGRAM(s) IV Intermittent every 24 hours  chlorhexidine 0.12% Liquid 15 milliLiter(s) Oral Mucosa every 12 hours  chlorhexidine 4% Liquid 1 Application(s) Topical <User Schedule>  chlorhexidine 4% Liquid 1 Application(s) Topical <User Schedule>  citric acid/sodium citrate Solution 30 milliLiter(s) Oral every 6 hours  fentaNYL   Infusion... 2 MICROgram(s)/kG/Hr (7.26 mL/Hr) IV Continuous <Continuous>  folic acid 1 milliGRAM(s) Oral daily  heparin   Injectable 5000 Unit(s) SubCutaneous every 8 hours  insulin lispro (ADMELOG) corrective regimen sliding scale   SubCutaneous every 6 hours  ketamine Infusion. 0.251 mG/kG/Hr (1.82 mL/Hr) IV Continuous <Continuous>  lactulose Syrup 30 Gram(s) Oral every 4 hours  meropenem  IVPB 500 milliGRAM(s) IV Intermittent every 12 hours  methylPREDNISolone sodium succinate Injectable 40 milliGRAM(s) IV Push every 12 hours  midazolam Infusion 0.02 mG/kG/Hr (1.45 mL/Hr) IV Continuous <Continuous>  multivitamin 1 Tablet(s) Oral daily  norepinephrine Infusion 0.05 MICROgram(s)/kG/Min (3.4 mL/Hr) IV Continuous <Continuous>  pantoprazole  Injectable 40 milliGRAM(s) IV Push daily  potassium chloride  10 mEq/50 mL IVPB 10 milliEquivalent(s) IV Intermittent once  propofol Infusion 50.046 MICROgram(s)/kG/Min (21.8 mL/Hr) IV Continuous <Continuous>  thiamine Injectable 100 milliGRAM(s) IV Push daily  trimethoprim / sulfamethoxazole IVPB 360 milliGRAM(s) IV Intermittent every 12 hours    MEDICATIONS  (PRN):  sodium chloride 0.9% lock flush 10 milliLiter(s) IV Push every 1 hour PRN Pre/post blood products, medications, blood draw, and to maintain line patency    Labs: 06-07 @ 08:12: Sodium 140, Potassium 3.3<L>, Calcium 7.8<L>, Magnesium 2.1, Phosphorus 4.2, BUN 45<H>, Creatinine 2.21<H>, Glucose 142<H>, Alk Phos --, ALT/SGPT --, AST/SGOT --, Albumin --, Prealbumin --, Total Bilirubin --, Hemoglobin --, Hematocrit --, Ferritin --, C-Reactive Protein --, Creatine Kinase <<27>  06-07 @ 00:31: Sodium 138, Potassium 3.8, Calcium 8.0<L>, Magnesium 2.2, Phosphorus 4.0, BUN 43<H>, Creatinine 2.20<H>, Glucose 186<H>, Alk Phos 158<H>, ALT/SGPT 34, AST/SGOT 106<H>, Albumin 3.2<L>, Prealbumin --, Total Bilirubin 15.9<H>, Hemoglobin --, Hematocrit --, Ferritin --, C-Reactive Protein --, Creatine Kinase <<27>  06-06 @ 20:54: Sodium 138, Potassium 4.0, Calcium 8.1<L>, Magnesium 2.3, Phosphorus 3.9, BUN 42<H>, Creatinine 2.15<H>, Glucose 147<H>, Alk Phos --, ALT/SGPT --, AST/SGOT --, Albumin --, Prealbumin --, Total Bilirubin --, Hemoglobin --, Hematocrit --, Ferritin --, C-Reactive Protein --, Creatine Kinase <<27>        POCT Blood Glucose.: 153 mg/dL (06-07-21 @ 06:00)    Skin: no pressure injuries documented   Edema: 3+ dependent, 4+ left foot    Estimated Needs: based on 5/28 dosing wt 72.6 kg w/ consideration for intubation  Energy: 0522-6343 kacls (20-23 kcals/kg)  Protein:   gm (1.2-1.4 gm/kg)    Previous Nutrition Diagnosis: moderate malnutrition  Nutrition Diagnosis is: care plan ongoing, pt now intubated w/ EN    Recommended Interventions:   1. D/C Nepro (renal formula not indicated, K is low, phos WDL). Recommend Vital 1.2  at 70cc/hr x 18 hrs provides 1512 kcals, 95 gm protein, 1022cc free water  meets 21 Kcal/Kg, 1.3 Gm protein/kg dosing wt 72.6 kg. Monitor propofol intake, if requirements are high, will need to adjust EN goal rate.   2. Continue multivitamin, thiamine, folic acid (ETOH hx)       Monitoring and Evaluation:   Continue to monitor nutrition provision and tolerance, weights, labs, skin integrity.   RD remains available upon request and will follow up per protocol.

## 2021-06-07 NOTE — PROGRESS NOTE ADULT - ASSESSMENT
Patient is a 40 y/o M w PMH of EtoH abuse complicated by decompensated cirrhosis presented to [facility] for worsening LE edema. Admitted to [facility] for acute hypoxic resiraptoy failure in setting of PNA. Nephrology consulted for GINNY.     #GINNY  Pt with GINNY in the setting of liver disease. Upon review of [facility]  baseline Scr of 0.49 on 4/25/21, Scr on admission increased to 0.87 on 5/27/21, Scr progressively increased and peaked to 1.13 on 5/1/21. Intubated on 6/4 due to hypoxic resp failure. On IV pressor support.  GINNY most likely secondary to ATN (in the setting of septic shock) vs Bilirubin cast nephropathy (serum Bb total at 20, UA with large Bb). Urine Na at 8. Last Scr elevated/plateaued at 2.21 mg/dl     - Currently non-oliguric on Bumex drip 3mg/hr   - Will need to consider HD if renal failure continues to worsen, if indicated by goals of care and prognosis.   - Monitor labs and urine output.   -Avoid NSAIDs, ACEI/ARBS, RCA and nephrotoxins.   -Dose medications as per eGFR.    #hyponatremia:  mild hyponatremia -improved   Last Na 140   at this time no intervention, Monitor serum sodium.     #hypokalemia   this am with K: 3.3  please replete to goal ~4    If any questions, please feel free to contact me     Carlton Jackson  Nephrology Fellow  [facility] Pager: [phone]  BETTY Pager: [phone]

## 2021-06-07 NOTE — PROGRESS NOTE ADULT - SUBJECTIVE AND OBJECTIVE BOX
[facility] DIVISION OF KIDNEY DISEASES AND HYPERTENSION -- FOLLOW UP NOTE  --------------------------------------------------------------------------------    24 hour events/subjective: urine output 1.6 L over 24h period, currently sedated and intubated. Unable to obtain ROS    PAST HISTORY  --------------------------------------------------------------------------------  No significant changes to PMH, PSH, FHx, SHx, unless otherwise noted    ALLERGIES & MEDICATIONS  --------------------------------------------------------------------------------  Allergies    No Known Allergies    Intolerances    Standing Inpatient Medications  albuterol/ipratropium for Nebulization 3 milliLiter(s) Nebulizer every 6 hours  buMETAnide Infusion 3 mG/Hr IV Continuous <Continuous>  caspofungin IVPB 50 milliGRAM(s) IV Intermittent every 24 hours  chlorhexidine 0.12% Liquid 15 milliLiter(s) Oral Mucosa every 12 hours  chlorhexidine 4% Liquid 1 Application(s) Topical <User Schedule>  chlorhexidine 4% Liquid 1 Application(s) Topical <User Schedule>  citric acid/sodium citrate Solution 30 milliLiter(s) Oral every 6 hours  fentaNYL   Infusion... 2 MICROgram(s)/kG/Hr IV Continuous <Continuous>  folic acid 1 milliGRAM(s) Oral daily  heparin   Injectable 5000 Unit(s) SubCutaneous every 8 hours  insulin lispro (ADMELOG) corrective regimen sliding scale   SubCutaneous every 6 hours  ketamine Infusion. 0.251 mG/kG/Hr IV Continuous <Continuous>  lactulose Syrup 30 Gram(s) Oral every 4 hours  meropenem  IVPB 500 milliGRAM(s) IV Intermittent every 12 hours  methylPREDNISolone sodium succinate Injectable 40 milliGRAM(s) IV Push every 12 hours  midazolam Infusion 0.02 mG/kG/Hr IV Continuous <Continuous>  multivitamin 1 Tablet(s) Oral daily  norepinephrine Infusion 0.05 MICROgram(s)/kG/Min IV Continuous <Continuous>  pantoprazole  Injectable 40 milliGRAM(s) IV Push daily  potassium chloride  10 mEq/50 mL IVPB 10 milliEquivalent(s) IV Intermittent once  propofol Infusion 50.046 MICROgram(s)/kG/Min IV Continuous <Continuous>  thiamine Injectable 100 milliGRAM(s) IV Push daily  trimethoprim / sulfamethoxazole IVPB 360 milliGRAM(s) IV Intermittent every 12 hours    PRN Inpatient Medications  sodium chloride 0.9% lock flush 10 milliLiter(s) IV Push every 1 hour PRN    REVIEW OF SYSTEMS  --------------------------------------------------------------------------------  Unable to obtain ROS    VITALS/PHYSICAL EXAM  --------------------------------------------------------------------------------  T(C): 36.9 (06-07-21 @ 04:00), Max: 37.7 (06-06-21 @ 20:00)  HR: 103 (06-07-21 @ 07:00) (82 - 112)  BP: --  RR: 26 (06-07-21 @ 07:00) (24 - 31)  SpO2: 93% (06-07-21 @ 07:00) (86% - 99%)  Wt(kg): --      06-06-21 @ 07:01  -  06-07-21 @ 07:00  --------------------------------------------------------  IN: 3949.8 mL / OUT: 1690 mL / NET: 2259.8 mL    06-07-21 @ 07:01  -  06-07-21 @ 09:23  --------------------------------------------------------  IN: 0 mL / OUT: 675 mL / NET: -675 mL    Physical Exam:  	Gen: sedated, intubated, jaundiced   	HEENT: ETT  	Pulm: mechanical ventilation, on FIO2 70%   	CV: S1S2  	Abd: Soft, +BS   	Ext: No LE edema B/L  	Neuro: sedated  	Skin: Warm and dry  	Vascular access: central line       LABS/STUDIES  --------------------------------------------------------------------------------              7.7    33.08 >-----------<  142      [06-06-21 @ 00:54]              23.0     140  |  96  |  45  ----------------------------<  142      [06-07-21 @ 08:12]  3.3   |  25  |  2.21        Ca     7.8     [06-07-21 @ 08:12]      Mg     2.1     [06-07-21 @ 08:12]      Phos  4.2     [06-07-21 @ 08:12]    TPro  6.1  /  Alb  3.2  /  TBili  15.9  /  DBili  x   /  AST  106  /  ALT  34  /  AlkPhos  158  [06-07-21 @ 00:31]    PT/INR: PT 19.2 , INR 1.64       [06-07-21 @ 00:31]  PTT: 49.0       [06-07-21 @ 00:31]      Creatinine Trend:  SCr 2.21 [06-07 @ 08:12]  SCr 2.20 [06-07 @ 00:31]  SCr 2.15 [06-06 @ 20:54]  SCr 1.93 [06-06 @ 05:08]  SCr 1.92 [06-06 @ 00:54]    Urinalysis - [06-04-21 @ 21:30]      Color Dark Yellow / Appearance Slightly Turbid / SG 1.022 / pH 6.0      Gluc Negative / Ketone Negative  / Bili Large >10 / Urobili Negative       Blood Negative / Protein 30 mg/dL / Leuk Est Negative / Nitrite Negative      RBC 2 / WBC 0 / Hyaline 4 / Gran  / Sq Epi  / Non Sq Epi 0 / Bacteria Negative    Urine Creatinine 49      [06-06-21 @ 02:31]  Urine Sodium 20      [06-06-21 @ 02:31]  Urine Potassium 56      [06-06-21 @ 02:31]    Iron 66, TIBC 132, %sat 50      [04-21-21 @ 09:07]  Ferritin 537      [04-23-21 @ 09:10]    HBsAg Nonreact      [06-04-21 @ 14:54]  HCV 0.25, Nonreact      [06-04-21 @ 14:54]    Rheumatoid Factor <10      [06-05-21 @ 21:33]

## 2021-06-07 NOTE — PROGRESS NOTE ADULT - SUBJECTIVE AND OBJECTIVE BOX
INTERVAL HPI/OVERNIGHT EVENTS:  Patient remains intubated  Rash improving    MEDICATIONS  (STANDING):  albuterol/ipratropium for Nebulization 3 milliLiter(s) Nebulizer every 6 hours  buMETAnide Infusion 4 mG/Hr (20 mL/Hr) IV Continuous <Continuous>  caspofungin IVPB 50 milliGRAM(s) IV Intermittent every 24 hours  chlorhexidine 0.12% Liquid 15 milliLiter(s) Oral Mucosa every 12 hours  chlorhexidine 4% Liquid 1 Application(s) Topical <User Schedule>  chlorhexidine 4% Liquid 1 Application(s) Topical <User Schedule>  fentaNYL   Infusion... 2 MICROgram(s)/kG/Hr (7.26 mL/Hr) IV Continuous <Continuous>  folic acid 1 milliGRAM(s) Oral daily  heparin   Injectable 5000 Unit(s) SubCutaneous every 8 hours  insulin lispro (ADMELOG) corrective regimen sliding scale   SubCutaneous every 6 hours  ketamine Infusion. 0.251 mG/kG/Hr (1.82 mL/Hr) IV Continuous <Continuous>  lactulose Syrup 30 Gram(s) Oral every 4 hours  meropenem  IVPB 500 milliGRAM(s) IV Intermittent every 12 hours  methylPREDNISolone sodium succinate Injectable 40 milliGRAM(s) IV Push every 12 hours  midazolam Infusion 0.02 mG/kG/Hr (1.45 mL/Hr) IV Continuous <Continuous>  multivitamin 1 Tablet(s) Oral daily  norepinephrine Infusion 0.05 MICROgram(s)/kG/Min (3.4 mL/Hr) IV Continuous <Continuous>  pantoprazole  Injectable 40 milliGRAM(s) IV Push daily  potassium chloride  10 mEq/50 mL IVPB 10 milliEquivalent(s) IV Intermittent every 1 hour  propofol Infusion 50.046 MICROgram(s)/kG/Min (21.8 mL/Hr) IV Continuous <Continuous>  thiamine Injectable 100 milliGRAM(s) IV Push daily  trimethoprim / sulfamethoxazole IVPB 280 milliGRAM(s) IV Intermittent every 12 hours    MEDICATIONS  (PRN):  sodium chloride 0.9% lock flush 10 milliLiter(s) IV Push every 1 hour PRN Pre/post blood products, medications, blood draw, and to maintain line patency      Allergies    No Known Allergies    Intolerances        REVIEW OF SYSTEMS    Patient intubated and unable to provide history        Vital Signs Last 24 Hrs  T(C): 36.9 (07 Jun 2021 04:00), Max: 37.7 (06 Jun 2021 20:00)  T(F): 98.4 (07 Jun 2021 04:00), Max: 99.9 (06 Jun 2021 20:00)  HR: 102 (07 Jun 2021 17:26) (90 - 112)  BP: --  BP(mean): --  RR: 28 (07 Jun 2021 15:45) (25 - 35)  SpO2: 93% (07 Jun 2021 17:26) (84% - 99%)    PHYSICAL EXAM:   The patient was intubated.  Mild lower extremity edema.   + jaundice    Of note on skin exam:     - pink erythematous to hyperpigmented macules and papules on R thigh/knee, chest/abdomen, trunk admixed with brown hyperpigmented macules and patches at sites of prior rash on L thigh, b/l axilla  - fading non-blanching violaceous macules on b/l dorsal feet w/hemorrhagic crusts on L dorsal foot       LABS:                        7.7    33.08 )-----------( 142      ( 06 Jun 2021 00:54 )             23.0     06-07    140  |  96  |  45<H>  ----------------------------<  142<H>  3.3<L>   |  25  |  2.21<H>    Ca    7.8<L>      07 Jun 2021 08:12  Phos  4.2     06-07  Mg     2.1     06-07    TPro  6.1  /  Alb  3.2<L>  /  TBili  15.9<H>  /  DBili  x   /  AST  106<H>  /  ALT  34  /  AlkPhos  158<H>  06-07    PT/INR - ( 07 Jun 2021 00:31 )   PT: 19.2 sec;   INR: 1.64 ratio         PTT - ( 07 Jun 2021 00:31 )  PTT:49.0 sec      RADIOLOGY & ADDITIONAL TESTS:     INTERVAL HPI/OVERNIGHT EVENTS:  Patient remains intubated  Rash improving    MEDICATIONS  (STANDING):  albuterol/ipratropium for Nebulization 3 milliLiter(s) Nebulizer every 6 hours  buMETAnide Infusion 4 mG/Hr (20 mL/Hr) IV Continuous <Continuous>  caspofungin IVPB 50 milliGRAM(s) IV Intermittent every 24 hours  chlorhexidine 0.12% Liquid 15 milliLiter(s) Oral Mucosa every 12 hours  chlorhexidine 4% Liquid 1 Application(s) Topical <User Schedule>  chlorhexidine 4% Liquid 1 Application(s) Topical <User Schedule>  fentaNYL   Infusion... 2 MICROgram(s)/kG/Hr (7.26 mL/Hr) IV Continuous <Continuous>  folic acid 1 milliGRAM(s) Oral daily  heparin   Injectable 5000 Unit(s) SubCutaneous every 8 hours  insulin lispro (ADMELOG) corrective regimen sliding scale   SubCutaneous every 6 hours  ketamine Infusion. 0.251 mG/kG/Hr (1.82 mL/Hr) IV Continuous <Continuous>  lactulose Syrup 30 Gram(s) Oral every 4 hours  meropenem  IVPB 500 milliGRAM(s) IV Intermittent every 12 hours  methylPREDNISolone sodium succinate Injectable 40 milliGRAM(s) IV Push every 12 hours  midazolam Infusion 0.02 mG/kG/Hr (1.45 mL/Hr) IV Continuous <Continuous>  multivitamin 1 Tablet(s) Oral daily  norepinephrine Infusion 0.05 MICROgram(s)/kG/Min (3.4 mL/Hr) IV Continuous <Continuous>  pantoprazole  Injectable 40 milliGRAM(s) IV Push daily  potassium chloride  10 mEq/50 mL IVPB 10 milliEquivalent(s) IV Intermittent every 1 hour  propofol Infusion 50.046 MICROgram(s)/kG/Min (21.8 mL/Hr) IV Continuous <Continuous>  thiamine Injectable 100 milliGRAM(s) IV Push daily  trimethoprim / sulfamethoxazole IVPB 280 milliGRAM(s) IV Intermittent every 12 hours    MEDICATIONS  (PRN):  sodium chloride 0.9% lock flush 10 milliLiter(s) IV Push every 1 hour PRN Pre/post blood products, medications, blood draw, and to maintain line patency      Allergies    No Known Allergies    Intolerances        REVIEW OF SYSTEMS    Patient intubated and unable to provide history        Vital Signs Last 24 Hrs  T(C): 36.9 (07 Jun 2021 04:00), Max: 37.7 (06 Jun 2021 20:00)  T(F): 98.4 (07 Jun 2021 04:00), Max: 99.9 (06 Jun 2021 20:00)  HR: 102 (07 Jun 2021 17:26) (90 - 112)  BP: --  BP(mean): --  RR: 28 (07 Jun 2021 15:45) (25 - 35)  SpO2: 93% (07 Jun 2021 17:26) (84% - 99%)    PHYSICAL EXAM:   The patient was intubated.  Mild lower extremity edema.   + jaundice    Of note on skin exam:     - pink erythematous to hyperpigmented macules and papules on R thigh/knee, chest/abdomen, trunk admixed with brown hyperpigmented macules and patches at sites of prior rash on L thigh, b/l axilla  - fading non-blanching violaceous macules on b/l dorsal feet w/hemorrhagic crusts on L dorsal foot       LABS:                        7.7    33.08 )-----------( 142      ( 06 Jun 2021 00:54 )             23.0     06-07    140  |  96  |  45<H>  ----------------------------<  142<H>  3.3<L>   |  25  |  2.21<H>    Ca    7.8<L>      07 Jun 2021 08:12  Phos  4.2     06-07  Mg     2.1     06-07    TPro  6.1  /  Alb  3.2<L>  /  TBili  15.9<H>  /  DBili  x   /  AST  106<H>  /  ALT  34  /  AlkPhos  158<H>  06-07    PT/INR - ( 07 Jun 2021 00:31 )   PT: 19.2 sec;   INR: 1.64 ratio         PTT - ( 07 Jun 2021 00:31 )  PTT:49.0 sec      RADIOLOGY & ADDITIONAL TESTS: no additional relevant studies

## 2021-06-07 NOTE — PROGRESS NOTE ADULT - SUBJECTIVE AND OBJECTIVE BOX
Chief Complaint:  Patient is a 41y old  Male who presents with a chief complaint of cellulitis (07 Jun 2021 09:22)      Interval Events:     On Bumex infusion with improved UOP  Bedside bronchoscopy performed - BAL studies sent   Oxygen increased today, FiO2 100%       Allergies:  No Known Allergies      Hospital Medications:  albuterol/ipratropium for Nebulization 3 milliLiter(s) Nebulizer every 6 hours  buMETAnide Infusion 4 mG/Hr IV Continuous <Continuous>  caspofungin IVPB 50 milliGRAM(s) IV Intermittent every 24 hours  chlorhexidine 0.12% Liquid 15 milliLiter(s) Oral Mucosa every 12 hours  chlorhexidine 4% Liquid 1 Application(s) Topical <User Schedule>  chlorhexidine 4% Liquid 1 Application(s) Topical <User Schedule>  fentaNYL   Infusion... 2 MICROgram(s)/kG/Hr IV Continuous <Continuous>  folic acid 1 milliGRAM(s) Oral daily  heparin   Injectable 5000 Unit(s) SubCutaneous every 8 hours  insulin lispro (ADMELOG) corrective regimen sliding scale   SubCutaneous every 6 hours  ketamine Infusion. 0.251 mG/kG/Hr IV Continuous <Continuous>  lactulose Syrup 30 Gram(s) Oral every 4 hours  meropenem  IVPB 500 milliGRAM(s) IV Intermittent every 12 hours  methylPREDNISolone sodium succinate Injectable 40 milliGRAM(s) IV Push every 12 hours  midazolam Infusion 0.02 mG/kG/Hr IV Continuous <Continuous>  multivitamin 1 Tablet(s) Oral daily  norepinephrine Infusion 0.05 MICROgram(s)/kG/Min IV Continuous <Continuous>  pantoprazole  Injectable 40 milliGRAM(s) IV Push daily  propofol Infusion 50.046 MICROgram(s)/kG/Min IV Continuous <Continuous>  sodium chloride 0.9% lock flush 10 milliLiter(s) IV Push every 1 hour PRN  thiamine Injectable 100 milliGRAM(s) IV Push daily  trimethoprim / sulfamethoxazole IVPB 280 milliGRAM(s) IV Intermittent every 12 hours      PMHX/PSHX:  No pertinent past medical history    Alcohol abuse    Cirrhosis    No significant past surgical history        Family history:  No pertinent family history in first degree relatives        ROS: Unobtainable       PHYSICAL EXAM:     GENERAL:  Sedated, critically ill  HEENT:  Conjunctivae clear +sclera icteric +ET tube  CHEST:  Ventilated breath sounds  HEART:  Regular rhythm & rate  ABDOMEN:  Non-tender +softly distended +no palpable masses   : +Person draining clear urine   EXTREMITIES:  1-2+ pitting carpopedal edema   SKIN:  No rash/erythema/ecchymoses/petechiae +jaundice   NEURO:  Alert, orientedx3      Vital Signs:  Vital Signs Last 24 Hrs  T(C): 36.9 (07 Jun 2021 04:00), Max: 37.7 (06 Jun 2021 20:00)  T(F): 98.4 (07 Jun 2021 04:00), Max: 99.9 (06 Jun 2021 20:00)  HR: 99 (07 Jun 2021 12:00) (90 - 112)  BP: --  BP(mean): --  RR: 26 (07 Jun 2021 12:00) (24 - 35)  SpO2: 93% (07 Jun 2021 12:00) (86% - 99%)  Daily     Daily     LABS:                        7.7    33.08 )-----------( 142      ( 06 Jun 2021 00:54 )             23.0     06-07    140  |  96  |  45<H>  ----------------------------<  142<H>  3.3<L>   |  25  |  2.21<H>    Ca    7.8<L>      07 Jun 2021 08:12  Phos  4.2     06-07  Mg     2.1     06-07    TPro  6.1  /  Alb  3.2<L>  /  TBili  15.9<H>  /  DBili  x   /  AST  106<H>  /  ALT  34  /  AlkPhos  158<H>  06-07    LIVER FUNCTIONS - ( 07 Jun 2021 00:31 )  Alb: 3.2 g/dL / Pro: 6.1 g/dL / ALK PHOS: 158 U/L / ALT: 34 U/L / AST: 106 U/L / GGT: x           PT/INR - ( 07 Jun 2021 00:31 )   PT: 19.2 sec;   INR: 1.64 ratio    PTT - ( 07 Jun 2021 00:31 )  PTT:49.0 sec    Amylase Serum--      Lipase serum--       [id number]      Imaging:

## 2021-06-07 NOTE — PROGRESS NOTE ADULT - SUBJECTIVE AND OBJECTIVE BOX
CC: F/U for PNA?    Saw patient. Intubated. Ill appearing in ICU. Not able to interact with me.    Allergies  No Known Allergies    ANTIMICROBIALS:  caspofungin IVPB 50 every 24 hours  meropenem  IVPB 500 every 12 hours  trimethoprim / sulfamethoxazole IVPB 280 every 12 hours    PE:    Vital Signs Last 24 Hrs  T(C): 36.9 (07 Jun 2021 04:00), Max: 37.7 (06 Jun 2021 20:00)  T(F): 98.4 (07 Jun 2021 04:00), Max: 99.9 (06 Jun 2021 20:00)  HR: 107 (07 Jun 2021 13:15) (90 - 112)  RR: 26 (07 Jun 2021 13:15) (24 - 35)  SpO2: 93% (07 Jun 2021 13:15) (84% - 99%)    Gen: AOx0, intubated, ill appearing  CV: S1+S2 normal, tachycardic  Resp: Intubated  Abd: Soft, nontender, +BS, OGT  Ext: No LE edema, no wounds    LABS:                        7.7    33.08 )-----------( 142      ( 06 Jun 2021 00:54 )             23.0     06-07    140  |  96  |  45<H>  ----------------------------<  142<H>  3.3<L>   |  25  |  2.21<H>    Ca    7.8<L>      07 Jun 2021 08:12  Phos  4.2     06-07  Mg     2.1     06-07    TPro  6.1  /  Alb  3.2<L>  /  TBili  15.9<H>  /  DBili  x   /  AST  106<H>  /  ALT  34  /  AlkPhos  158<H>  06-07    MICROBIOLOGY:    Bronch Wash BAL  06-06-21   Testing in progress  --    Few polymorphonuclear leukocytes per low power field  No squamous epithelial cells per low power field  No organisms seen per oil power field    BAL Combicath  06-06-21 --  --  --    .Body Fluid Peritoneal Fluid  06-05-21   No growth  --    polymorphonuclear leukocytes seen  No organisms seen  by cytocentrifuge    Bronch Wash Combicath CUP  06-05-21   No growth to date.  --    No polymorphonuclear cells seen  No squamous epithelial cells  No organisms seen    .Sputum Sputum  06-04-21   No growth at 48 hours  --    Numerous polymorphonuclear leukocytes per low power field  Rare Squamous epithelial cells per low power field  No organisms seen per oil power field    .Blood Blood-Venous  06-03-21   No growth to date.     .Sputum Sputum  06-01-21   Normal Respiratory Minda present  --    Moderate polymorphonuclear leukocytes per low power field  Moderate Squamous epithelial cells per low power field  Few Gram positive cocci in pairs seen per oil power field  Few Gram Variable Rods seen per oil power field    .Blood Blood-Peripheral  06-01-21   No Growth Final     .Stool  06-01-21   Cryptosporidium parvum antigen negative  performed by rapid immunoassay (non-enzymatic).  (It is recommended that all specimens tested by  an immunochromatographic card test be  confirmed by another method.)  --  --    .Urine Clean Catch (Midstream)  05-30-21   No growth     .Blood Blood, isolator tube  05-30-21   Testing in progress  --  --    Toxoplasma IgG Screen: 54.9 IU/mL (06-05-21 @ 10:07)    (otherwise reviewed)    RADIOLOGY:    6/6 CXR:    IMPRESSION:    The heart is normal in size on the current study. Diffuse airspace opacity seen throughout both lungs compatible with multifocal pneumonia and interstitial edema. ARDS. This appears to be slightly improved when compared to previous study done June 4, 2021 at 8:52 PM. Endotracheal tube is in good position. NG tube is in the stomach however its tip is not seen on the current study. A central line is seen on the right and the tip is in the superior vena cava. No pleural effusion. No pneumothorax.

## 2021-06-07 NOTE — PROGRESS NOTE ADULT - ASSESSMENT
40 year old man with PMH of AUD  w/ recent admission for alcoholic hepatitis enrolled in clinical trial [id number] RCT (unblinded, received placebo infusion plus corticosteroids) readmitted 5/28/21 for sepsis, course now complicated by septic shock and hypoxic respiratory failure now intubated in MICU, with worsening GINNY, with suspicion for PJP pneumonia.     # Hypoxic respiratory failure - Maintain high FiO2 requirements; likely from infectious etiology given longstanding steroid use with c/f possible PJP vs. other fungal pneumonia. Currently on meropenem, caspofungin and  IV bactrim, and Solumedrol (for PCP, not alc hep)  # GINNY - worsening renal function in setting of septic shock; UOP (150-200 cc / hr)  improved on Bumex infusion started 6/6/21 - will likely need diuretics to maintain negative volume status   # Abnormal liver enzymes: Acute on chronic liver failure secondary to alcoholic hepatitis vs. sepsis  # Alcohol-related liver disease, decompensated likely due to alcohol use  - Ascites: moderate on exam  - Varices: unknown  - HCC: No focal lesions on imaging CT 4/20 (not multiphase)  - HE: Unable to assess for HE at this time  - MELD-Na 30 6/7/21  # Rash  # Hyponatremia    Recommendations:  - medical management of sepsis/suspected PJP per ID and MICU teams  - will f/u BAL studies, PJP PCR and serum Fungitell   - cont lactulose 30 mg every 4 hours - monitor for gaseous distention  - cont rifaximin 550mg BID  - agree with Bumex infusion as per Nephrology   - Case Management consult for application for Medicaid (patient currently hospitalized on Emergency Medicaid which is not a sustainable healthcare coverage option, and may be a transplant candidate if he can recover from acute illness/septic shock)   - rest of care per ICU  - Transplant Hepatology will continue to follow

## 2021-06-08 NOTE — PROVIDER CONTACT NOTE (OTHER) - REASON
Pt admitted ti MICU with Rivastigmine patch on
Temp 101.9, , /74
Patient oral temp 38.4
Temp 102.4
pt's vital signs
Febrile and Tachycardia
Febrile and tachycardic
Temp 101.3

## 2021-06-08 NOTE — PROGRESS NOTE ADULT - SUBJECTIVE AND OBJECTIVE BOX
CC: F/U for SOB    Saw  patient. No fevers, ill appearing. Intubated. Not able to interact with me.    Allergies  No Known Allergies    ANTIMICROBIALS:  caspofungin IVPB 50 every 24 hours  meropenem  IVPB 500 every 12 hours  trimethoprim / sulfamethoxazole IVPB 280 every 12 hours    PE:    Vital Signs Last 24 Hrs  T(C): 37.7 (08 Jun 2021 10:15), Max: 37.7 (08 Jun 2021 10:15)  T(F): 99.9 (08 Jun 2021 10:15), Max: 99.9 (08 Jun 2021 10:15)  HR: 104 (08 Jun 2021 11:00) (92 - 111)  BP: 112/58 (07 Jun 2021 20:00) (112/58 - 112/58)  BP(mean): 79 (07 Jun 2021 20:00) (79 - 79)  RR: 19 (08 Jun 2021 11:00) (19 - 30)  SpO2: 96% (08 Jun 2021 11:00) (84% - 96%)    Gen: AOx0, chronically ill appearing, sedated  CV: S1+S2 normal, tachycardic  Resp: Intubated  Abd: Soft, nontender, +BS  Ext: No LE edema, no wounds    LABS:                        7.9    33.84 )-----------( 227      ( 08 Jun 2021 00:14 )             24.3     06-08    143  |  100  |  60<H>  ----------------------------<  143<H>  3.5   |  25  |  2.30<H>    Ca    7.8<L>      08 Jun 2021 08:40  Phos  4.1     06-08  Mg     1.9     06-08    TPro  6.1  /  Alb  3.0<L>  /  TBili  14.2<H>  /  DBili  x   /  AST  144<H>  /  ALT  46<H>  /  AlkPhos  197<H>  06-08    MICROBIOLOGY:    BAL Other  06-06-21 AFB neg    Bronch Wash BAL  06-06-21   No growth  --    Few polymorphonuclear leukocytes per low power field  No squamous epithelial cells per low power field  No organisms seen per oil power field    BAL Combicath  06-06-21 Neg    .Body Fluid Peritoneal Fluid  06-05-21   No growth  --    polymorphonuclear leukocytes seen  No organisms seen  by cytocentrifuge    Bronch Wash Combicath CUP  06-05-21   No growth at 48 hours  --    No polymorphonuclear cells seen  No squamous epithelial cells  No organisms seen    .Sputum Sputum  06-04-21   No growth at 48 hours  --    Numerous polymorphonuclear leukocytes per low power field  Rare Squamous epithelial cells per low power field  No organisms seen per oil power field    .Blood Blood-Venous  06-03-21   No growth to date.      .Sputum Sputum  06-01-21   Normal Respiratory Minda present  --    Moderate polymorphonuclear leukocytes per low power field  Moderate Squamous epithelial cells per low power field  Few Gram positive cocci in pairs seen per oil power field  Few Gram Variable Rods seen per oil power field    .Stool  06-01-21   Cryptosporidium parvum antigen negative  performed by rapid immunoassay (non-enzymatic).  (It is recommended that all specimens tested by  an immunochromatographic card test be  confirmed by another method.)  --  --    Toxoplasma IgG Screen: 54.9 IU/mL (06-05-21 @ 10:07)    (otherwise reviewed)    RADIOLOGY:    6/7 USG:    FINDINGS:  Liver: Nodular contour. Coarsened echotexture.  Bile ducts: Normal caliber. Common bile duct measures 5 mm in diameter.  Gallbladder: Within normal limits.  Ascites: Mild right upper quadrant. Moderate in the right lower quadrant. Mild and left upper quadrant. Mild to moderate in the left lower quadrant.    Splenic Vein: Patent with bidirectional flow.  Main Portal Vein: Hepatopetal flow, 25 cm/s. 1.5 cm maximum diameter.  Left Portal Vein: Patent with slow hepatopetal flow.  Anterior Right Portal Vein: Patent with hepatopetal flow.  Posterior Right Portal Vein: Patent with hepatopetal flow.    Hepatic Artery: Normal direction of flow, peak systolic velocity 272 cm/s.    Hepatic Veins and Inferior Vena Cava: Patent with normal direction of flow.    IMPRESSION:    Cirrhosis with portal hypertension. No evidence of portal vein thrombosis. Bidirectional flow in the splenic vein as a splenorenal shunt is documented respectively on CT examination 5/30/2021.    Mild to moderate abdominal ascites as described above.

## 2021-06-08 NOTE — PROGRESS NOTE ADULT - SUBJECTIVE AND OBJECTIVE BOX
[facility] DIVISION OF KIDNEY DISEASES AND HYPERTENSION -- FOLLOW UP NOTE  --------------------------------------------------------------------------------    24 hour events/subjective: urine output 3L over 24h, currently net 2L +, on Bumex drip at 4 mg/hr. Patient was seen and examined at bedside. Unable to obtain ROS    PAST HISTORY  --------------------------------------------------------------------------------  No significant changes to PMH, PSH, FHx, SHx, unless otherwise noted    ALLERGIES & MEDICATIONS  --------------------------------------------------------------------------------  Allergies    No Known Allergies    Intolerances    Standing Inpatient Medications  albuterol/ipratropium for Nebulization 3 milliLiter(s) Nebulizer every 6 hours  buMETAnide Infusion 4 mG/Hr IV Continuous <Continuous>  caspofungin IVPB 50 milliGRAM(s) IV Intermittent every 24 hours  chlorhexidine 0.12% Liquid 15 milliLiter(s) Oral Mucosa every 12 hours  chlorhexidine 4% Liquid 1 Application(s) Topical <User Schedule>  chlorhexidine 4% Liquid 1 Application(s) Topical <User Schedule>  [id number]   Infusion... 2 MICROgram(s)/kG/Hr IV Continuous <Continuous>  folic acid 1 milliGRAM(s) Oral daily  heparin   Injectable 5000 Unit(s) SubCutaneous every 8 hours  insulin lispro (ADMELOG) corrective regimen sliding scale   SubCutaneous every 6 hours  ketamine Infusion. 0.251 mG/kG/Hr IV Continuous <Continuous>  lactulose Syrup 30 Gram(s) Oral every 4 hours  meropenem  IVPB 500 milliGRAM(s) IV Intermittent every 12 hours  methylPREDNISolone sodium succinate Injectable 40 milliGRAM(s) IV Push every 12 hours  metolazone 10 milliGRAM(s) Oral <User Schedule>  multivitamin 1 Tablet(s) Oral daily  norepinephrine Infusion 0.05 MICROgram(s)/kG/Min IV Continuous <Continuous>  pantoprazole  Injectable 40 milliGRAM(s) IV Push daily  propofol Infusion 50.046 MICROgram(s)/kG/Min IV Continuous <Continuous>  thiamine Injectable 100 milliGRAM(s) IV Push daily  trimethoprim / sulfamethoxazole IVPB 280 milliGRAM(s) IV Intermittent every 12 hours    PRN Inpatient Medications  sodium chloride 0.9% lock flush 10 milliLiter(s) IV Push every 1 hour PRN    REVIEW OF SYSTEMS  --------------------------------------------------------------------------------  Unable to obtain ROS    VITALS/PHYSICAL EXAM  --------------------------------------------------------------------------------  T(C): 37.5 (06-08-21 @ 08:00), Max: 37.6 (06-08-21 @ 04:00)  HR: 106 (06-08-21 @ 08:45) (90 - 111)  BP: 112/58 (06-07-21 @ 20:00) (112/58 - 112/58)  RR: 28 (06-08-21 @ 08:45) (25 - 35)  SpO2: 94% (06-08-21 @ 08:45) (84% - 97%)  Wt(kg): --    06-07-21 @ 07:01  -  06-08-21 @ 07:00  --------------------------------------------------------  IN: 5035.9 mL / OUT: 3040 mL / NET: 1995.9 mL    Physical Exam:  	Gen: sedated, intubated, jaundiced   	HEENT: ETT  	Pulm: CTA B/L, no crackles   	CV: S1S2  	Abd: Soft, +BS   	Ext: No LE edema B/L  	Neuro: sedated  	Skin: Warm and dry    LABS/STUDIES  --------------------------------------------------------------------------------              7.9    33.84 >-----------<  227      [06-08-21 @ 00:14]              24.3     139  |  98  |  54  ----------------------------<  205      [06-08-21 @ 00:14]  3.7   |  22  |  2.25        Ca     7.7     [06-08-21 @ 00:14]      Mg     1.9     [06-08-21 @ 00:14]      Phos  3.8     [06-08-21 @ 00:14]    TPro  6.1  /  Alb  3.0  /  TBili  14.2  /  DBili  x   /  AST  144  /  ALT  46  /  AlkPhos  197  [06-08-21 @ 00:14]    PT/INR: PT 19.8 , INR 1.69       [06-08-21 @ 00:14]  PTT: 51.1       [06-08-21 @ 00:14]      Creatinine Trend:  SCr 2.25 [06-08 @ 00:14]  SCr 2.21 [06-07 @ 18:06]  SCr 2.21 [06-07 @ 08:12]  SCr 2.20 [06-07 @ 00:31]  SCr 2.15 [06-06 @ 20:54]    Urinalysis - [06-04-21 @ 21:30]      Color Dark Yellow / Appearance Slightly Turbid / SG 1.022 / pH 6.0      Gluc Negative / Ketone Negative  / Bili Large >10 / Urobili Negative       Blood Negative / Protein 30 mg/dL / Leuk Est Negative / Nitrite Negative      RBC 2 / WBC 0 / Hyaline 4 / Gran  / Sq Epi  / Non Sq Epi 0 / Bacteria Negative    Urine Creatinine 49      [06-06-21 @ 02:31]  Urine Sodium 20      [06-06-21 @ 02:31]  Urine Potassium 56      [06-06-21 @ 02:31]    Iron 66, TIBC 132, %sat 50      [04-21-21 @ 09:07]  Ferritin 537      [04-23-21 @ 09:10]    HBsAg Nonreact      [06-04-21 @ 14:54]  HCV 0.25, Nonreact      [06-04-21 @ 14:54]    C3 Complement 25      [06-04-21 @ 14:54]  C4 Complement 2      [06-04-21 @ 14:54]  Rheumatoid Factor <10      [06-05-21 @ 21:33]

## 2021-06-08 NOTE — PROGRESS NOTE ADULT - ASSESSMENT
40 year old man with PMH of AUD w/ recent admission for alcoholic hepatitis enrolled in clinical trial [id number] RCT (unblinded, received placebo infusion plus corticosteroids) readmitted 5/28/21 for sepsis, course now complicated by septic shock and hypoxic respiratory failure now intubated in MICU, with worsening GINNY, with suspicion for PJP vs. other fungal pneumonia.       Impression:    # Hypoxic respiratory failure - Maintain high FiO2 requirements; likely from infectious etiology given longstanding steroid use with c/f possible PJP vs. other fungal pneumonia. Currently on meropenem, caspofungin and  IV bactrim, and Solumedrol (for PCP, not alc hep)  # Leukocytosis - Suspect acute worsening due to effect of IV steroids superimposed on infection.  # GINNY - worsening renal function in setting of septic shock; UOP (150-200 cc / hr)  improved on Bumex infusion started 6/6/21 - will likely need diuretics to maintain negative volume status   # Abnormal liver enzymes: Acute on chronic liver failure secondary to alcoholic hepatitis vs. sepsis  # Alcohol-related liver disease, decompensated likely due to alcohol use  - Ascites: moderate on exam  - Varices: unknown  - HCC: No focal lesions on imaging CT 4/20 (not multiphase)  - HE: Unable to assess for HE at this time  - MELD-Na 30 6/7/21  # Rash  # Hyponatremia    Recommendations:  - medical management of sepsis/suspected PJP per ID and MICU teams  - will f/u BAL studies, PJP PCR and serum Fungitell   - cont lactulose 30 mg every 4 hours - monitor for gaseous distention  - cont rifaximin 550mg BID  - agree with Bumex infusion as per Nephrology   - Case Management consult for application for Medicaid (patient currently hospitalized on Emergency Medicaid which is not a sustainable healthcare coverage option, and may be a transplant candidate if he can recover from acute illness/septic shock)   - rest of care per ICU  - Transplant Hepatology will continue to follow       Asmita Carrera PGY-5  Gastroenterology/Hepatology Fellow  Pager #[phone]  E-mail [email or web address]  Call [phone] to speak to answering service for on-call GI fellow 5pm-7am and weekends.

## 2021-06-08 NOTE — PROVIDER CONTACT NOTE (OTHER) - ASSESSMENT
BP 98/56
Pt has no complaints of pain or discomfort. VS in flowsheet
See flow sheet. Pt had no complaints of pain or discomfort at this time.
Pt has a Rivastigmine patch on his l shoulder
see VS flowsheet
See VS flow sheet

## 2021-06-08 NOTE — PROGRESS NOTE ADULT - SUBJECTIVE AND OBJECTIVE BOX
CHIEF COMPLAINT:  Patient is a 41y old  Male who presents with a chief complaint of cellulitis (07 Jun 2021 17:51)    HPI:  41 yr old male with PMHx EtOH abuse , alcoholic cirrhosis, recent decompensated cirrhosis requiring hospitalization (4/20-27/2021) placed on clinical trial [id number] RCT (steroids vs placebo- unblinded and found to have received steroid therapy) who was originally admitted 5/27/21 with LLE cellulitis. Course c/b progressive worsening hypoxic resp failure with septic shock eventually requiring intubation and vasopressor therapy  requiring admission to MICU (6/4/21). Course further c/b GINNY and ACLF, development of diffuse macular purpuric rash (felt due to drug reaction - resolving). CT chest 6/1/21 demonstrated Patchy opacities within bilateral upper lobes and bilateral lower lobes likely representing multifocal pneumonia.     MICU course to date consist of Paracentesis with removal of 1.9 liters (6/5/21), vasopressor therapy, PCP/PJP therapy with bactrim, s/p vitamin K and FFP, addition of bumex/metolazone therapy, s/p bronchoscopy 6/6/21)      Interval Events:      REVIEW OF SYSTEMS:          OBJECTIVE:  ICU Vital Signs Last 24 Hrs  T(C): 37.6 (08 Jun 2021 04:00), Max: 37.6 (08 Jun 2021 04:00)  T(F): 99.7 (08 Jun 2021 04:00), Max: 99.7 (08 Jun 2021 04:00)  HR: 107 (08 Jun 2021 05:46) (90 - 110)  BP: 112/58 (07 Jun 2021 20:00) (112/58 - 112/58)  BP(mean): 79 (07 Jun 2021 20:00) (79 - 79)  ABP: 121/55 (08 Jun 2021 04:00) (93/38 - 140/68)  ABP(mean): 75 (08 Jun 2021 04:00) (55 - 96)  RR: 28 (08 Jun 2021 04:00) (25 - 35)  SpO2: 93% (08 Jun 2021 05:46) (84% - 97%)    Mode: AC/ CMV (Assist Control/ Continuous Mandatory Ventilation), RR (machine): 28, TV (machine): 410, FiO2: 75, PEEP: 14, ITime: 0.85, MAP: 14, PIP: 32    06-06 @ 07:01  -  06-07 @ 07:00  --------------------------------------------------------  IN: 3949.8 mL / OUT: 1690 mL / NET: 2259.8 mL    06-07 @ 07:01  -  06-08 @ 06:05  --------------------------------------------------------  IN: 4617.5 mL / OUT: 2730 mL / NET: 1887.5 mL      CAPILLARY BLOOD GLUCOSE      POCT Blood Glucose.: 205 mg/dL (08 Jun 2021 05:26)          PHYSICAL EXAM:          HOSPITAL MEDICATIONS:  MEDICATIONS  (STANDING):  albuterol/ipratropium for Nebulization 3 milliLiter(s) Nebulizer every 6 hours  buMETAnide Infusion 4 mG/Hr (20 mL/Hr) IV Continuous <Continuous>  caspofungin IVPB 50 milliGRAM(s) IV Intermittent every 24 hours  chlorhexidine 0.12% Liquid 15 milliLiter(s) Oral Mucosa every 12 hours  chlorhexidine 4% Liquid 1 Application(s) Topical <User Schedule>  chlorhexidine 4% Liquid 1 Application(s) Topical <User Schedule>  fentaNYL   Infusion... 2 MICROgram(s)/kG/Hr (7.26 mL/Hr) IV Continuous <Continuous>  folic acid 1 milliGRAM(s) Oral daily  heparin   Injectable 5000 Unit(s) SubCutaneous every 8 hours  insulin lispro (ADMELOG) corrective regimen sliding scale   SubCutaneous every 6 hours  ketamine Infusion. 0.251 mG/kG/Hr (1.82 mL/Hr) IV Continuous <Continuous>  lactulose Syrup 30 Gram(s) Oral every 4 hours  meropenem  IVPB 500 milliGRAM(s) IV Intermittent every 12 hours  methylPREDNISolone sodium succinate Injectable 40 milliGRAM(s) IV Push every 12 hours  metolazone 10 milliGRAM(s) Oral <User Schedule>  multivitamin 1 Tablet(s) Oral daily  norepinephrine Infusion 0.05 MICROgram(s)/kG/Min (3.4 mL/Hr) IV Continuous <Continuous>  pantoprazole  Injectable 40 milliGRAM(s) IV Push daily  propofol Infusion 50.046 MICROgram(s)/kG/Min (21.8 mL/Hr) IV Continuous <Continuous>  thiamine Injectable 100 milliGRAM(s) IV Push daily  trimethoprim / sulfamethoxazole IVPB 280 milliGRAM(s) IV Intermittent every 12 hours    MEDICATIONS  (PRN):  sodium chloride 0.9% lock flush 10 milliLiter(s) IV Push every 1 hour PRN Pre/post blood products, medications, blood draw, and to maintain line patency      LABS:                        7.9    33.84 )-----------( 227      ( 08 Jun 2021 00:14 )             24.3     Hgb Trend: 7.9<--, 7.7<--, 7.1<--, 6.7<--, 8.8<--  06-08    139  |  98  |  54<H>  ----------------------------<  205<H>  3.7   |  22  |  2.25<H>    Ca    7.7<L>      08 Jun 2021 00:14  Phos  3.8     06-08  Mg     1.9     06-08    TPro  6.1  /  Alb  3.0<L>  /  TBili  14.2<H>  /  DBili  x   /  AST  144<H>  /  ALT  46<H>  /  AlkPhos  197<H>  06-08    LIVER FUNCTIONS - ( 08 Jun 2021 00:14 )  Alb: 3.0 g/dL / Pro: 6.1 g/dL / ALK PHOS: 197 U/L / ALT: 46 U/L / AST: 144 U/L / GGT: x           Creatinine Trend: 2.25<--, 2.21<--, 2.21<--, 2.20<--, 2.15<--, 1.93<--  PT/INR - ( 08 Jun 2021 00:14 )   PT: 19.8 sec;   INR: 1.69 ratio         PTT - ( 08 Jun 2021 00:14 )  PTT:51.1 sec    Arterial Blood Gas:  06-08 @ 05:58  7.32/54/90/27/95/1.2  ABG lactate: --  Arterial Blood Gas:  06-08 @ 00:09  7.33/52/71/27/91/1.3  ABG lactate: --  Arterial Blood Gas:  06-07 @ 18:04  7.38/49/77/29/94/3.8  ABG lactate: --  Arterial Blood Gas:  06-07 @ 14:39  7.37/51/75/29/93/3.5  ABG lactate: --  Arterial Blood Gas:  06-07 @ 00:25  7.40/46/126/28/99/2.8  ABG lactate: --  Arterial Blood Gas:  06-06 @ 17:26  7.45/41/61/28/90/4.5  ABG lactate: --  Arterial Blood Gas:  06-06 @ 12:26  7.45/42/87/29/98/4.9  ABG lactate: --  Arterial Blood Gas:  06-06 @ 10:28  7.48/38/114/29/99/5.1  ABG lactate: --    Venous Blood Gas:  06-06 @ 22:38  7.40/38/62/22/89  VBG Lactate: 2.7      MICROBIOLOGY:     RADIOLOGY:  [ ] Reviewed and interpreted by me    EKG:      [facility] (ext [id number]) CHIEF COMPLAINT:  Patient is a 41y old  Male who presents with a chief complaint of cellulitis (07 Jun 2021 17:51)    HPI:  41 yr old male with PMHx EtOH abuse , alcoholic cirrhosis, recent decompensated cirrhosis requiring hospitalization (4/20-27/2021) placed on clinical trial DUR-928 RCT (steroids vs placebo- unblinded and found to have received steroid therapy) who was originally admitted 5/27/21 with LLE cellulitis. Course c/b progressive worsening hypoxic resp failure with septic shock eventually requiring intubation and vasopressor therapy  requiring admission to MICU (6/4/21). Course further c/b GINNY and ACLF, development of diffuse macular purpuric rash (felt due to drug reaction - resolving). CT chest 6/1/21 demonstrated Patchy opacities within bilateral upper lobes and bilateral lower lobes likely representing multifocal pneumonia.     MICU course to date consist of Paracentesis with removal of 1.9 liters (6/5/21), vasopressor therapy, PCP/PJP therapy with bactrim, s/p vitamin K and FFP, addition of bumex/metolazone therapy, s/p bronchoscopy 6/6/21)      Interval Events:      REVIEW OF SYSTEMS:  Unable secondary to acute critical illness        OBJECTIVE:  ICU Vital Signs Last 24 Hrs  T(C): 37.6 (08 Jun 2021 04:00), Max: 37.6 (08 Jun 2021 04:00)  T(F): 99.7 (08 Jun 2021 04:00), Max: 99.7 (08 Jun 2021 04:00)  HR: 107 (08 Jun 2021 05:46) (90 - 110)  BP: 112/58 (07 Jun 2021 20:00) (112/58 - 112/58)  BP(mean): 79 (07 Jun 2021 20:00) (79 - 79)  ABP: 121/55 (08 Jun 2021 04:00) (93/38 - 140/68)  ABP(mean): 75 (08 Jun 2021 04:00) (55 - 96)  RR: 28 (08 Jun 2021 04:00) (25 - 35)  SpO2: 93% (08 Jun 2021 05:46) (84% - 97%)    Mode: AC/ CMV (Assist Control/ Continuous Mandatory Ventilation), RR (machine): 28, TV (machine): 410, FiO2: 75, PEEP: 14, ITime: 0.85, MAP: 14, PIP: 32    06-06 @ 07:01  -  06-07 @ 07:00  --------------------------------------------------------  IN: 3949.8 mL / OUT: 1690 mL / NET: 2259.8 mL    06-07 @ 07:01  -  06-08 @ 06:05  --------------------------------------------------------  IN: 4617.5 mL / OUT: 2730 mL / NET: 1887.5 mL      CAPILLARY BLOOD GLUCOSE      POCT Blood Glucose.: 205 mg/dL (08 Jun 2021 05:26)          PHYSICAL EXAM:    GENERAL:   sedated    HEAD:    Atraumatic, Normocephalic    EYES:   PERRL 2mm sluggish, conjunctiva and sclera icteric    ENMT:   No oropharyngeal exudates, erythema or lesions,  Moist mucous membranes    NECK:   Supple, no cervical lymphadenopathy, no JVD    NERVOUS SYSTEM:  sedated, extremities flaccid    CHEST/LUNG:   orally intubated, A/C 26 Vt 450 P14 FIO2 70% DP 18, not triggering vent., Pip  38  Ppl 33  DP 19  .Breath sounds bilaterally,  without crackles, rhonchi, wheezing, or rubs. POCUS B lines anteriorly, small bilateral non dynamic bronchograms in lower lung fields    HEART:   cardiac monitor sinus tach without ectopy   ; S1/S2 No murmurs, rubs, or gallops, POCUS Good LVFx, RV slight less then LV, VTI 25, IVC 2.3    ABDOMEN:   Soft,  distended, positive wave form, hypoactive Bowel sounds present, Bladder non distended, non palpable. POCUS with moderate amount of ascites appreciated    EXTREMITIES:   Pulses palpable radial pulses 2+ bilat, DP/PT 1+/1+ bilat, without clubbing, cyanosis. Digits warm to touch with good cap refill < 3 secs, 2+ anasarca    SKIN:   warm, dry, intact,  color jaundice, + light erythema, macular diffuse rash , without palpable nodes    Rt IJ TLC site without redness, infiltration, drainage, swelling. dressing dry and intact., burnham in place        HOSPITAL MEDICATIONS:  MEDICATIONS  (STANDING):  albuterol/ipratropium for Nebulization 3 milliLiter(s) Nebulizer every 6 hours  buMETAnide Infusion 4 mG/Hr (20 mL/Hr) IV Continuous <Continuous>  caspofungin IVPB 50 milliGRAM(s) IV Intermittent every 24 hours  chlorhexidine 0.12% Liquid 15 milliLiter(s) Oral Mucosa every 12 hours  chlorhexidine 4% Liquid 1 Application(s) Topical <User Schedule>  chlorhexidine 4% Liquid 1 Application(s) Topical <User Schedule>  fentaNYL   Infusion... 2 MICROgram(s)/kG/Hr (7.26 mL/Hr) IV Continuous <Continuous>  folic acid 1 milliGRAM(s) Oral daily  heparin   Injectable 5000 Unit(s) SubCutaneous every 8 hours  insulin lispro (ADMELOG) corrective regimen sliding scale   SubCutaneous every 6 hours  ketamine Infusion. 0.251 mG/kG/Hr (1.82 mL/Hr) IV Continuous <Continuous>  lactulose Syrup 30 Gram(s) Oral every 4 hours  meropenem  IVPB 500 milliGRAM(s) IV Intermittent every 12 hours  methylPREDNISolone sodium succinate Injectable 40 milliGRAM(s) IV Push every 12 hours  metolazone 10 milliGRAM(s) Oral <User Schedule>  multivitamin 1 Tablet(s) Oral daily  norepinephrine Infusion 0.05 MICROgram(s)/kG/Min (3.4 mL/Hr) IV Continuous <Continuous>  pantoprazole  Injectable 40 milliGRAM(s) IV Push daily  propofol Infusion 50.046 MICROgram(s)/kG/Min (21.8 mL/Hr) IV Continuous <Continuous>  thiamine Injectable 100 milliGRAM(s) IV Push daily  trimethoprim / sulfamethoxazole IVPB 280 milliGRAM(s) IV Intermittent every 12 hours    MEDICATIONS  (PRN):  sodium chloride 0.9% lock flush 10 milliLiter(s) IV Push every 1 hour PRN Pre/post blood products, medications, blood draw, and to maintain line patency      LABS:                        7.9    33.84 )-----------( 227      ( 08 Jun 2021 00:14 )             24.3     Hgb Trend: 7.9<--, 7.7<--, 7.1<--, 6.7<--, 8.8<--  06-08    139  |  98  |  54<H>  ----------------------------<  205<H>  3.7   |  22  |  2.25<H>    Ca    7.7<L>      08 Jun 2021 00:14  Phos  3.8     06-08  Mg     1.9     06-08    TPro  6.1  /  Alb  3.0<L>  /  TBili  14.2<H>  /  DBili  x   /  AST  144<H>  /  ALT  46<H>  /  AlkPhos  197<H>  06-08    LIVER FUNCTIONS - ( 08 Jun 2021 00:14 )  Alb: 3.0 g/dL / Pro: 6.1 g/dL / ALK PHOS: 197 U/L / ALT: 46 U/L / AST: 144 U/L / GGT: x           Creatinine Trend: 2.25<--, 2.21<--, 2.21<--, 2.20<--, 2.15<--, 1.93<--  PT/INR - ( 08 Jun 2021 00:14 )   PT: 19.8 sec;   INR: 1.69 ratio         PTT - ( 08 Jun 2021 00:14 )  PTT:51.1 sec    Arterial Blood Gas:  06-08 @ 05:58  7.32/54/90/27/95/1.2  ABG lactate: --  Arterial Blood Gas:  06-08 @ 00:09  7.33/52/71/27/91/1.3  ABG lactate: --  Arterial Blood Gas:  06-07 @ 18:04  7.38/49/77/29/94/3.8  ABG lactate: --  Arterial Blood Gas:  06-07 @ 14:39  7.37/51/75/29/93/3.5  ABG lactate: --  Arterial Blood Gas:  06-07 @ 00:25  7.40/46/126/28/99/2.8  ABG lactate: --  Arterial Blood Gas:  06-06 @ 17:26  7.45/41/61/28/90/4.5  ABG lactate: --  Arterial Blood Gas:  06-06 @ 12:26  7.45/42/87/29/98/4.9  ABG lactate: --  Arterial Blood Gas:  06-06 @ 10:28  7.48/38/114/29/99/5.1  ABG lactate: --    Venous Blood Gas:  06-06 @ 22:38  7.40/38/62/22/89  VBG Lactate: 2.7      MICROBIOLOGY:     RADIOLOGY:  [ ] Reviewed and interpreted by me    EKG:      [facility] (ext [id number]) CHIEF COMPLAINT:  Patient is a 41y old  Male who presents with a chief complaint of cellulitis (07 Jun 2021 17:51)    HPI:  41 yr old male with PMHx EtOH abuse , alcoholic cirrhosis, recent decompensated cirrhosis requiring hospitalization (4/20-27/2021) placed on clinical trial DUR-928 RCT (steroids vs placebo- unblinded and found to have received steroid therapy) who was originally admitted 5/27/21 with LLE cellulitis. Course c/b progressive worsening hypoxic resp failure with septic shock eventually requiring intubation and vasopressor therapy  requiring admission to MICU (6/4/21). Course further c/b GINNY and ACLF, development of diffuse macular purpuric rash (felt due to drug reaction - resolving). CT chest 6/1/21 demonstrated Patchy opacities within bilateral upper lobes and bilateral lower lobes likely representing multifocal pneumonia.     MICU course to date consist of Paracentesis with removal of 1.9 liters (6/5/21), vasopressor therapy, PCP/PJP therapy with bactrim, s/p vitamin K and FFP, addition of bumex/metolazone therapy, s/p bronchoscopy 6/6/21)      Interval Events:      REVIEW OF SYSTEMS:  Unable secondary to acute critical illness        OBJECTIVE:  ICU Vital Signs Last 24 Hrs  T(C): 37.6 (08 Jun 2021 04:00), Max: 37.6 (08 Jun 2021 04:00)  T(F): 99.7 (08 Jun 2021 04:00), Max: 99.7 (08 Jun 2021 04:00)  HR: 107 (08 Jun 2021 05:46) (90 - 110)  BP: 112/58 (07 Jun 2021 20:00) (112/58 - 112/58)  BP(mean): 79 (07 Jun 2021 20:00) (79 - 79)  ABP: 121/55 (08 Jun 2021 04:00) (93/38 - 140/68)  ABP(mean): 75 (08 Jun 2021 04:00) (55 - 96)  RR: 28 (08 Jun 2021 04:00) (25 - 35)  SpO2: 93% (08 Jun 2021 05:46) (84% - 97%)    Mode: AC/ CMV (Assist Control/ Continuous Mandatory Ventilation), RR (machine): 28, TV (machine): 410, FiO2: 75, PEEP: 14, ITime: 0.85, MAP: 14, PIP: 32    06-06 @ 07:01  -  06-07 @ 07:00  --------------------------------------------------------  IN: 3949.8 mL / OUT: 1690 mL / NET: 2259.8 mL    06-07 @ 07:01  -  06-08 @ 06:05  --------------------------------------------------------  IN: 4617.5 mL / OUT: 2730 mL / NET: 1887.5 mL      CAPILLARY BLOOD GLUCOSE      POCT Blood Glucose.: 205 mg/dL (08 Jun 2021 05:26)          PHYSICAL EXAM:    GENERAL:   sedated    HEAD:    Atraumatic, Normocephalic    EYES:   PERRL 2mm sluggish, conjunctiva and sclera icteric    ENMT:   No oropharyngeal exudates, erythema or lesions,  Moist mucous membranes    NECK:   Supple, no cervical lymphadenopathy, no JVD    NERVOUS SYSTEM:  sedated, extremities flaccid    CHEST/LUNG:   orally intubated, A/C 26 Vt 410 P14 FIO2 95% DP 17, not triggering vent., Pip  38  Ppl 31  DP 17  .Breath sounds bilaterally,  without crackles, rhonchi, wheezing, or rubs. POCUS B lines anteriorly, small bilateral non dynamic bronchograms in lower lung fields    HEART:   cardiac monitor sinus tach without ectopy   ; S1/S2 No murmurs, rubs, or gallops, POCUS Good LVFx, RV slight less then LV, VTI 22, IVC 2.3    ABDOMEN:   Soft,  distended, positive wave form, hypoactive Bowel sounds present, Bladder non distended, non palpable. POCUS with moderate amount of ascites appreciated    EXTREMITIES:   Pulses palpable radial pulses 2+ bilat, DP/PT 1+/1+ bilat, without clubbing, cyanosis. Digits warm to touch with good cap refill < 3 secs, 2+ anasarca    SKIN:   warm, dry, intact,  color jaundice, + light erythema, macular diffuse rash , without palpable nodes    Rt IJ TLC site without redness, infiltration, drainage, swelling. dressing dry and intact., burnham in place        HOSPITAL MEDICATIONS:  MEDICATIONS  (STANDING):  albuterol/ipratropium for Nebulization 3 milliLiter(s) Nebulizer every 6 hours  buMETAnide Infusion 4 mG/Hr (20 mL/Hr) IV Continuous <Continuous>  caspofungin IVPB 50 milliGRAM(s) IV Intermittent every 24 hours  chlorhexidine 0.12% Liquid 15 milliLiter(s) Oral Mucosa every 12 hours  chlorhexidine 4% Liquid 1 Application(s) Topical <User Schedule>  chlorhexidine 4% Liquid 1 Application(s) Topical <User Schedule>  fentaNYL   Infusion... 2 MICROgram(s)/kG/Hr (7.26 mL/Hr) IV Continuous <Continuous>  folic acid 1 milliGRAM(s) Oral daily  heparin   Injectable 5000 Unit(s) SubCutaneous every 8 hours  insulin lispro (ADMELOG) corrective regimen sliding scale   SubCutaneous every 6 hours  ketamine Infusion. 0.251 mG/kG/Hr (1.82 mL/Hr) IV Continuous <Continuous>  lactulose Syrup 30 Gram(s) Oral every 4 hours  meropenem  IVPB 500 milliGRAM(s) IV Intermittent every 12 hours  methylPREDNISolone sodium succinate Injectable 40 milliGRAM(s) IV Push every 12 hours  metolazone 10 milliGRAM(s) Oral <User Schedule>  multivitamin 1 Tablet(s) Oral daily  norepinephrine Infusion 0.05 MICROgram(s)/kG/Min (3.4 mL/Hr) IV Continuous <Continuous>  pantoprazole  Injectable 40 milliGRAM(s) IV Push daily  propofol Infusion 50.046 MICROgram(s)/kG/Min (21.8 mL/Hr) IV Continuous <Continuous>  thiamine Injectable 100 milliGRAM(s) IV Push daily  trimethoprim / sulfamethoxazole IVPB 280 milliGRAM(s) IV Intermittent every 12 hours    MEDICATIONS  (PRN):  sodium chloride 0.9% lock flush 10 milliLiter(s) IV Push every 1 hour PRN Pre/post blood products, medications, blood draw, and to maintain line patency      LABS:                        7.9    33.84 )-----------( 227      ( 08 Jun 2021 00:14 )             24.3     Hgb Trend: 7.9<--, 7.7<--, 7.1<--, 6.7<--, 8.8<--  06-08    139  |  98  |  54<H>  ----------------------------<  205<H>  3.7   |  22  |  2.25<H>    Ca    7.7<L>      08 Jun 2021 00:14  Phos  3.8     06-08  Mg     1.9     06-08    TPro  6.1  /  Alb  3.0<L>  /  TBili  14.2<H>  /  DBili  x   /  AST  144<H>  /  ALT  46<H>  /  AlkPhos  197<H>  06-08    LIVER FUNCTIONS - ( 08 Jun 2021 00:14 )  Alb: 3.0 g/dL / Pro: 6.1 g/dL / ALK PHOS: 197 U/L / ALT: 46 U/L / AST: 144 U/L / GGT: x           Creatinine Trend: 2.25<--, 2.21<--, 2.21<--, 2.20<--, 2.15<--, 1.93<--  PT/INR - ( 08 Jun 2021 00:14 )   PT: 19.8 sec;   INR: 1.69 ratio         PTT - ( 08 Jun 2021 00:14 )  PTT:51.1 sec    Arterial Blood Gas:  06-08 @ 05:58  7.32/54/90/27/95/1.2  ABG lactate: --  Arterial Blood Gas:  06-08 @ 00:09  7.33/52/71/27/91/1.3  ABG lactate: --  Arterial Blood Gas:  06-07 @ 18:04  7.38/49/77/29/94/3.8  ABG lactate: --  Arterial Blood Gas:  06-07 @ 14:39  7.37/51/75/29/93/3.5  ABG lactate: --  Arterial Blood Gas:  06-07 @ 00:25  7.40/46/126/28/99/2.8  ABG lactate: --  Arterial Blood Gas:  06-06 @ 17:26  7.45/41/61/28/90/4.5  ABG lactate: --  Arterial Blood Gas:  06-06 @ 12:26  7.45/42/87/29/98/4.9  ABG lactate: --  Arterial Blood Gas:  06-06 @ 10:28  7.48/38/114/29/99/5.1  ABG lactate: --    Venous Blood Gas:  06-06 @ 22:38  7.40/38/62/22/89  VBG Lactate: 2.7      MICROBIOLOGY:     RADIOLOGY:  [ ] Reviewed and interpreted by me    EKG:      [facility] (ext [id number])

## 2021-06-08 NOTE — PROGRESS NOTE ADULT - ASSESSMENT
41 yr old male with stated Hx significant for EtOH cirrhosis, decompensated, presented with LE cellulitis, course c/b progressive hypoxic resp failure, septic shock, GINNY, ACLF    Plan:    #Neuro:  -Neuro checks q 2 hrs and prn for changes  -physical therapy consult when stable  -Propofol, ketamine, midazolam, fent gtts - titrate to RASS of -5    #Pulm:  -Hypoxic resp failure  -Mechanical Vent Therapy - titrate to maintain ph 7.35-7.45; PCO2 35-45; SPO2 > 92%  -Bronchodilator therapy q 6 hrs prn sob/wheezes  -Lung protective therapy  -consider paralytic for vent synchronization, facilitate oxygenation and ventilation     #CV:  -Septic shock  -receiving Norepi gtt - titrate to MAP 65-70 mmHg  -will obtain TTE for bubble study    #GI/:  -EtOH cirrhosis, decompensated cirrhosis  -currently ACLF  -trend LFT  -s/p paracentesis 6/5/21 with removal of 1.9 liters  -GINNY/ATN  -strict I & O's - keep negative  -bumex gtt - titrate to U/O of 75 to 100 cc's/hr  -elevated total bili - pending abd duplex/US  -tube feeds as tolerated  -being followed by hepatology     #ID:  -septic shock secondary to possible pneumonia  -recent RCT steroids vs placebo, unblinded   -6/6/21 BAL cx  with growth  -6/5/21 combicath - NGTD  -6/5/21 Paracentesis/peritoneal fluid - no organisms   -6/5/21 MRSA - neg  -6/4/21 sputum Cx - NGTD  -6/3/21 Legiionella - neg  -6/1/21 blood Cx - No growth  -follow up with toxoplasma IGM and Quant gold   -elevated fungitell (6/1/21)  -diffuse rash - possible drug reaction  -currently on meropenem, bactrim (PCP prophylaxis), caspofungin therapy - continue - adjust for renal function   -vanco d/c'ed 6/6/21    #FEN/ENDO/HEME:  -obtain CMP/Mg++/PO--4/CBC w diff/PT/PTT/INR  q. a.m. and prn  -trend lactate  -trend INR - (received FFP  6/5/21 and 6/6/21 - total 4 units), vit K 10 mg IV (6/5/21) vit K 5 mg IV (6/6/21)  -maintain Hgb > 7.0 ( received PRBC 1 unit 6/5/21)  -trend NH3 -currently 92 (6/7/21)  -continue lactulose therapy  -DVT prophylaxis - heparin subq

## 2021-06-08 NOTE — PROVIDER CONTACT NOTE (OTHER) - ACTION/TREATMENT ORDERED:
Given oral Tylenol
MD aware, will give PRN tylenol
As per provider to still administer lasix 40mg
WMD will order Blood cultures, IV Zosyn, and give tylenol as ordered
Cold packs and ibuprofen
Ice packs. Meds pending
Remove medication patch
MD aware, will order ibuprofen. Will continue to monitor

## 2021-06-08 NOTE — PROGRESS NOTE ADULT - SUBJECTIVE AND OBJECTIVE BOX
Chief Complaint:  Patient is a 41y old  Male who presents with a chief complaint of cellulitis (2021 09:06)      Interval Events:     Started on metolazone  BAL studies pending  IV pressor requirements slightly increased today  Maintains on high ventilator requirements     Allergies:  No Known Allergies      Hospital Medications:  albuterol/ipratropium for Nebulization 3 milliLiter(s) Nebulizer every 6 hours  artificial  tears Solution 2 Drop(s) Both EYES every 4 hours  buMETAnide Infusion 4 mG/Hr IV Continuous <Continuous>  Caspofungin 50 milliGRAM(s),Sodium Chloride 0.9% 100 milliLiter(s) 50 milliGRAM(s) IV Intermittent every 24 hours  chlorhexidine 0.12% Liquid 15 milliLiter(s) Oral Mucosa every 12 hours  chlorhexidine 4% Liquid 1 Application(s) Topical <User Schedule>  chlorhexidine 4% Liquid 1 Application(s) Topical <User Schedule>  cisatracurium Infusion 3.007 MICROgram(s)/kG/Min IV Continuous <Continuous>  fentaNYL   Infusion... 2 MICROgram(s)/kG/Hr IV Continuous <Continuous>  folic acid 1 milliGRAM(s) Oral daily  heparin   Injectable 5000 Unit(s) SubCutaneous every 8 hours  insulin lispro (ADMELOG) corrective regimen sliding scale   SubCutaneous every 6 hours  ketamine Infusion. 0.251 mG/kG/Hr IV Continuous <Continuous>  lactulose Syrup 30 Gram(s) Oral every 4 hours  meropenem  IVPB 500 milliGRAM(s) IV Intermittent every 12 hours  methylPREDNISolone sodium succinate Injectable 40 milliGRAM(s) IV Push every 12 hours  metolazone 10 milliGRAM(s) Oral <User Schedule>  multivitamin 1 Tablet(s) Oral daily  norepinephrine Infusion 0.2 MICROgram(s)/kG/Min IV Continuous <Continuous>  pantoprazole  Injectable 40 milliGRAM(s) IV Push daily  propofol Infusion 50.046 MICROgram(s)/kG/Min IV Continuous <Continuous>  sodium chloride 0.9% lock flush 10 milliLiter(s) IV Push every 1 hour PRN  thiamine Injectable 100 milliGRAM(s) IV Push daily  trimethoprim / sulfamethoxazole IVPB 280 milliGRAM(s) IV Intermittent every 12 hours      PMHX/PSHX:  No pertinent past medical history    Alcohol abuse    Cirrhosis    No significant past surgical history        Family history:  No pertinent family history in first degree relatives        ROS:     General:  No weight loss, fevers, chills, night sweats, fatigue   Eyes:  No vision changes  ENT:  No sore throat, pain, runny nose  CV:  No chest pain, palpitations, dizziness   Resp:  No SOB, cough, wheezing  GI:  See HPI  :  No burning with urination, hematuria  Muscle:  No pain, weakness  Neuro:  No weakness/tingling, memory problems  Psych:  No fatigue, insomnia, mood problems, depression  Heme:  No easy bruisability  Skin:  No rash, edema      PHYSICAL EXAM:       GENERAL:  Sedated, critically ill  HEENT:  Conjunctivae clear +sclera icteric +ET tube  CHEST:  Ventilated breath sounds  HEART:  Regular rhythm & rate  ABDOMEN:  Non-tender +softly distended +no palpable masses   : +Person draining clear urine   EXTREMITIES:  1-2+ pitting carpopedal edema   SKIN:  No rash/erythema/ecchymoses/petechiae +jaundice   NEURO:  Alert, orientedx3      Vital Signs:  Vital Signs Last 24 Hrs  T(C): 37.6 (2021 16:00), Max: 37.8 (2021 12:00)  T(F): 99.7 (2021 16:00), Max: 100 (2021 12:00)  HR: 102 (2021 18:15) (100 - 111)  BP: 112/58 (2021 20:00) (112/58 - 112/58)  BP(mean): 79 (2021 20:00) (79 - 79)  RR: 28 (2021 18:15) (0 - 30)  SpO2: 93% (2021 18:15) (89% - 98%)  Daily     Daily Weight in k.5 (2021 04:00)    LABS:                        7.9    33.84 )-----------( 227      ( 2021 00:14 )             24.3     06-08    140  |  100  |  61<H>  ----------------------------<  149<H>  3.6   |  23  |  2.31<H>    Ca    7.5<L>      2021 15:29  Phos  4.5     06-08  Mg     1.9     06-08    TPro  6.1  /  Alb  3.0<L>  /  TBili  14.2<H>  /  DBili  x   /  AST  144<H>  /  ALT  46<H>  /  AlkPhos  197<H>      LIVER FUNCTIONS - ( 2021 00:14 )  Alb: 3.0 g/dL / Pro: 6.1 g/dL / ALK PHOS: 197 U/L / ALT: 46 U/L / AST: 144 U/L / GGT: x           PT/INR - ( 2021 00:14 )   PT: 19.8 sec;   INR: 1.69 ratio         PTT - ( 2021 00:14 )  PTT:51.1 sec    Amylase Serum--      Lipase serum--       [id number]      Imaging:

## 2021-06-08 NOTE — PROVIDER CONTACT NOTE (OTHER) - NAME OF MD/NP/PA/DO NOTIFIED:
MD Jaswant Ludwig
Dr. Peter/T3
MD Armstrong
MD Ludwig
Oziel GREGG
Oziel GREGG
Team 3 MD Jaswant Ludwig

## 2021-06-08 NOTE — PROVIDER CONTACT NOTE (OTHER) - DATE AND TIME:
28-May-2021 14:35
29-May-2021 05:30
01-Jun-2021 23:56
31-May-2021 05:05
31-May-2021 19:45
31-May-2021 19:46
08-Jun-2021 05:30
02-Jun-2021 23:56

## 2021-06-08 NOTE — PROGRESS NOTE ADULT - ASSESSMENT
41 M originally from [facility] PMHx of ETOH abuse (quit early April) c/b decompensated alcoholic cirrhosis, recent admission (4/20-4/27) for decompensated cirrhosis, presented with b/l LE swelling and LLE foot pain  Leukocytosis, fever  Elevated LFTs in setting cirrhosis  CT A/P notes lower lung opacities with concern for possible infection  CT chest with patchy opacities  Discussion with Pulm, Hepatology, Medicine--possibility of PCP?  Critically ill, in ICU, on pressors, intubated, s/p bronch--studies pending  Pressors slightly decreased  Overall,  1) Fever, Leukocytosis  - Now elevated Fungitell in setting of progressively worsening resp status  - Empiric Livia, Caspo  - F/U BCXs  - Trend WBC to normal, monitor for further fevers  2) Elevated Fungitell  - Unclear significance; findings equivocal for PCP--prior had lower LDH, time course atypical  - F/U pending BCXs  - Caspo daily  - Continue Bactrim/Steroids, monitor renal function  - F/U bronch studies  3) Rash  - F/U Derm    Sourav Jimenez MD  Pager [phone]  After 5pm and on weekends call [phone]

## 2021-06-08 NOTE — PROGRESS NOTE ADULT - ASSESSMENT
Patient is a 40 y/o M w PMH of EtoH abuse complicated by decompensated cirrhosis presented to [facility] for worsening LE edema. Admitted to [facility] for acute hypoxic resiraptoy failure in setting of PNA. Nephrology consulted for GINNY.     #GINNY  Pt with GINNY in the setting of liver disease. [facility]  baseline Scr of 0.49 on 4/25/21, Scr on admission increased to 0.87 on 5/27/21, Scr progressively increased and peaked to 1.13 on 5/1/21. Intubated on 6/4 due to hypoxic resp failure. On IV pressor support.  GINNY most likely secondary to ATN (in the setting of septic shock) vs Bilirubin cast nephropathy (serum Bb total at 20, UA with large Bb). Urine Na at 8. Last Scr elevated/plateaued at 2.25 mg/dl     - Currently non-oliguric on Bumex drip 4mg/hr and metolazone 10 mg PO daily   - Will need to consider HD if renal failure continues to worsen, if indicated by goals of care and prognosis.   - Monitor labs and urine output.   -Avoid NSAIDs, ACEI/ARBS, RCA and nephrotoxins.   -Dose medications as per eGFR.    #hyponatremia:  mild hyponatremia -improved   Last Na 139   at this time no intervention, Monitor serum sodium.     #hypokalemia   this am with K: 3.7  please replete to goal ~4    If any questions, please feel free to contact me     Carlton Jackson  Nephrology Fellow  [facility] Pager: [phone]  JANE Pager: [phone]

## 2021-06-09 NOTE — PROGRESS NOTE ADULT - SUBJECTIVE AND OBJECTIVE BOX
CC: F/U for PCP    Saw/spoke to patient. Intubated. Remains poorly responsive, sedated.    Allergies  No Known Allergies    ANTIMICROBIALS:  meropenem  IVPB 500 every 12 hours  trimethoprim / sulfamethoxazole IVPB 280 every 12 hours    PE:    Vital Signs Last 24 Hrs  T(C): 36.5 (09 Jun 2021 12:00), Max: 37.8 (08 Jun 2021 14:00)  T(F): 97.7 (09 Jun 2021 12:00), Max: 100 (08 Jun 2021 14:00)  HR: 89 (09 Jun 2021 13:30) (84 - 104)  RR: 28 (09 Jun 2021 13:30) (28 - 28)  SpO2: 96% (09 Jun 2021 13:30) (92% - 98%)    Gen: AOx0, sedated  CV: S1+S2 normal, nontachycardic  Resp: Intubated  Abd: Soft, nontender, +BS  Ext: No LE edema, no wounds    LABS:                        8.2    35.10 )-----------( 268      ( 09 Jun 2021 00:17 )             25.1     06-09    139  |  101  |  68<H>  ----------------------------<  177<H>  3.7   |  20<L>  |  2.40<H>    Ca    7.8<L>      09 Jun 2021 01:46  Phos  4.8     06-09  Mg     1.9     06-09    TPro  5.9<L>  /  Alb  2.9<L>  /  TBili  12.5<H>  /  DBili  x   /  AST  184<H>  /  ALT  59<H>  /  AlkPhos  214<H>  06-09    MICROBIOLOGY:    PCP PCR+    Bronch Wash BAL  06-06-21   No growth at 48 hours  --    Few polymorphonuclear leukocytes per low power field  No squamous epithelial cells per low power field  No organisms seen per oil power field    BAL Combicath  06-06-21 --  --  --    .Body Fluid Peritoneal Fluid  06-05-21   No growth  --    polymorphonuclear leukocytes seen  No organisms seen  by cytocentrifuge      Bronch Wash Combicath CUP  06-05-21   No growth at 48 hours  --    No polymorphonuclear cells seen  No squamous epithelial cells  No organisms seen    (otherwise reviewed)    RADIOLOGY:    6/7 US:    IMPRESSION:    Cirrhosis with portal hypertension. No evidence of portal vein thrombosis. Bidirectional flow in the splenic vein as a splenorenal shunt is documented respectively on CT examination 5/30/2021.    Mild to moderate abdominal ascites as described above.

## 2021-06-09 NOTE — PROGRESS NOTE ADULT - ASSESSMENT
41 M originally from [facility] PMHx of ETOH abuse (quit early April) c/b decompensated alcoholic cirrhosis, recent admission (4/20-4/27) for decompensated cirrhosis, presented with b/l LE swelling and LLE foot pain  Leukocytosis, fever  Elevated LFTs in setting cirrhosis  CT A/P notes lower lung opacities with concern for possible infection  CT chest with patchy opacities  Critically ill, in ICU, on pressors, intubated, s/p bronch  Now PCR positive for PCP  Overall,  1) Fever, Leukocytosis  - Now elevated Fungitell in setting of progressively worsening resp status  - Empiric Livia through 6/10/21 then discontinue  - F/U BCXs  - Trend WBC to normal, monitor for further fevers  2) PJP  - Fungitell positive, LDH equivocal, but now PCR positive  - Continue Bactrim (dose based on CrCl)  - Continue steroids with scheduled taper  - O2 support per ICU team  - Agree DC Caspo  3) Rash  - F/U Derm    Sourav Jimenez MD  Pager [phone]  After 5pm and on weekends call [phone]

## 2021-06-09 NOTE — PROGRESS NOTE ADULT - ASSESSMENT
Patient is a 42 y/o M w PMH of EtoH abuse complicated by decompensated cirrhosis presented to [facility] for worsening LE edema. Admitted to [facility] for acute hypoxic resiraptoy failure in setting of PNA. Nephrology consulted for GINNY.     #GINNY  Pt with GINNY in the setting of liver disease. [facility]  [facility] Scr of 0.49 on 4/25/21, Scr on admission increased to 0.87 on 5/27/21, Scr progressively increased and peaked to 1.13 on 5/1/21. Intubated on 6/4 due to hypoxic resp failure. On IV pressor support.  GINNY most likely secondary to ATN (in the setting of septic shock) vs Bilirubin cast nephropathy (serum Bb total at 20, UA with large Bb). Urine Na at 8. Last Scr elevated at 2.4 mg/dl today.     - Currently non-oliguric on Bumex drip 2mg/hr and metolazone   - Will need to consider HD if renal failure continues to worsen, if indicated by goals of care and prognosis.   - Monitor labs and urine output.   -Avoid NSAIDs, ACEI/ARBS, RCA and nephrotoxins.   -Dose medications as per eGFR.    #hyponatremia:  mild hyponatremia -improved   Last Na 139   at this time no intervention, Monitor serum sodium.     #hypokalemia   this am with K: 3.7  please replete to goal ~4    If any questions, please feel free to contact me     Carlton Jackson  Nephrology Fellow  [facility] Pager: [phone]  JANE Pager: [phone]

## 2021-06-09 NOTE — PROGRESS NOTE ADULT - SUBJECTIVE AND OBJECTIVE BOX
CHIEF COMPLAINT:  Patient is a 41y old  Male who presents with a chief complaint of cellulitis (08 Jun 2021 18:37)    HPI:  41 yr old male with PMHx EtOH abuse , alcoholic cirrhosis, recent decompensated cirrhosis requiring hospitalization (4/20-27/2021) placed on clinical trial DUR-928 RCT (steroids vs placebo- unblinded and found to have received steroid therapy) who was originally admitted 5/27/21 with LLE cellulitis. Course c/b progressive worsening hypoxic resp failure with septic shock eventually requiring intubation and vasopressor therapy  requiring admission to MICU (6/4/21). Course further c/b GINNY and ACLF, development of diffuse macular purpuric rash (felt due to drug reaction - resolving). CT chest 6/1/21 demonstrated Patchy ground glass opacities within bilateral upper lobes and bilateral lower lobes likely representing multifocal pneumonia, positive fungitell with unknown significance.    MICU course to date consist of Paracentesis with removal of 1.9 liters (6/5/21), vasopressor therapy, PCP/PJP therapy with bactrim, s/p vitamin K and FFP, addition of bumex/metolazone therapy, s/p bronchoscopy 6/6/21), poor P/F ratios (ARDS) prompting utilization of nimbex(6/8/21)    Interval Events:      REVIEW OF SYSTEMS:  Unable secondary to acute critical illness        OBJECTIVE:  ICU Vital Signs Last 24 Hrs  T(C): 36.2 (09 Jun 2021 04:00), Max: 37.8 (08 Jun 2021 12:00)  T(F): 97.2 (09 Jun 2021 04:00), Max: 100 (08 Jun 2021 12:00)  HR: 86 (09 Jun 2021 05:33) (86 - 110)  BP: --  BP(mean): --  ABP: 114/50 (09 Jun 2021 05:15) (100/43 - 140/68)  ABP(mean): 71 (09 Jun 2021 05:15) (61 - 94)  RR: 28 (09 Jun 2021 05:15) (0 - 28)  SpO2: 95% (09 Jun 2021 05:33) (92% - 98%)    Mode: AC/ CMV (Assist Control/ Continuous Mandatory Ventilation), RR (machine): 28, TV (machine): 410, FiO2: 65, PEEP: 12, ITime: 0.75, MAP: 17, PIP: 33    06-07 @ 07:01  - 06-08 @ 07:00  --------------------------------------------------------  IN: 5035.9 mL / OUT: 3040 mL / NET: 1995.9 mL    06-08 @ 07:01 - 06-09 @ 06:38  --------------------------------------------------------  IN: 3720.8 mL / OUT: 5315 mL / NET: -1594.2 mL      CAPILLARY BLOOD GLUCOSE      POCT Blood Glucose.: 185 mg/dL (08 Jun 2021 22:59)          PHYSICAL EXAM:          HOSPITAL MEDICATIONS:  MEDICATIONS  (STANDING):  albuterol/ipratropium for Nebulization 3 milliLiter(s) Nebulizer every 6 hours  artificial  tears Solution 2 Drop(s) Both EYES every 4 hours  buMETAnide Infusion 2 mG/Hr (10 mL/Hr) IV Continuous <Continuous>  Caspofungin 50 milliGRAM(s),Sodium Chloride 0.9% 100 milliLiter(s) 50 milliGRAM(s) IV Intermittent every 24 hours  chlorhexidine 0.12% Liquid 15 milliLiter(s) Oral Mucosa every 12 hours  chlorhexidine 4% Liquid 1 Application(s) Topical <User Schedule>  chlorhexidine 4% Liquid 1 Application(s) Topical <User Schedule>  cisatracurium Infusion 3.007 MICROgram(s)/kG/Min (13.1 mL/Hr) IV Continuous <Continuous>  fentaNYL   Infusion... 2 MICROgram(s)/kG/Hr (7.26 mL/Hr) IV Continuous <Continuous>  folic acid 1 milliGRAM(s) Oral daily  heparin   Injectable 5000 Unit(s) SubCutaneous every 8 hours  insulin lispro (ADMELOG) corrective regimen sliding scale   SubCutaneous every 6 hours  ketamine Infusion. 0.251 mG/kG/Hr (1.82 mL/Hr) IV Continuous <Continuous>  lactulose Syrup 30 Gram(s) Oral every 4 hours  meropenem  IVPB 500 milliGRAM(s) IV Intermittent every 12 hours  methylPREDNISolone sodium succinate Injectable 40 milliGRAM(s) IV Push every 12 hours  metolazone 10 milliGRAM(s) Oral <User Schedule>  multivitamin 1 Tablet(s) Oral daily  norepinephrine Infusion 0.2 MICROgram(s)/kG/Min (13.6 mL/Hr) IV Continuous <Continuous>  pantoprazole  Injectable 40 milliGRAM(s) IV Push daily  propofol Infusion 50.046 MICROgram(s)/kG/Min (21.8 mL/Hr) IV Continuous <Continuous>  thiamine Injectable 100 milliGRAM(s) IV Push daily  trimethoprim / sulfamethoxazole IVPB 280 milliGRAM(s) IV Intermittent every 12 hours    MEDICATIONS  (PRN):  sodium chloride 0.9% lock flush 10 milliLiter(s) IV Push every 1 hour PRN Pre/post blood products, medications, blood draw, and to maintain line patency      LABS:                        8.2    35.10 )-----------( 268      ( 09 Jun 2021 00:17 )             25.1     Hgb Trend: 8.2<--, 7.9<--, 7.7<--, 7.1<--, 6.7<--  06-09    139  |  101  |  68<H>  ----------------------------<  177<H>  3.7   |  20<L>  |  2.40<H>    Ca    7.8<L>      09 Jun 2021 01:46  Phos  4.8     06-09  Mg     1.9     06-09    TPro  5.9<L>  /  Alb  2.9<L>  /  TBili  12.5<H>  /  DBili  x   /  AST  184<H>  /  ALT  59<H>  /  AlkPhos  214<H>  06-09    LIVER FUNCTIONS - ( 09 Jun 2021 01:46 )  Alb: 2.9 g/dL / Pro: 5.9 g/dL / ALK PHOS: 214 U/L / ALT: 59 U/L / AST: 184 U/L / GGT: x           Creatinine Trend: 2.40<--, 2.40<--, 2.31<--, 2.30<--, 2.25<--, 2.21<--  PT/INR - ( 09 Jun 2021 00:17 )   PT: 19.8 sec;   INR: 1.69 ratio         PTT - ( 09 Jun 2021 00:17 )  PTT:43.4 sec    Arterial Blood Gas:  06-09 @ 05:43  7.35/46/95/25/96/-.2  ABG lactate: --  Arterial Blood Gas:  06-09 @ 00:14  7.33/50/99/26/97/.4  ABG lactate: --  Arterial Blood Gas:  06-08 @ 15:31  7.34/49/109/26/97/.2  ABG lactate: --  Arterial Blood Gas:  06-08 @ 14:06  7.33/50/125/26/99/.2  ABG lactate: --  Arterial Blood Gas:  06-08 @ 11:53  7.35/49/119/26/98/.8  ABG lactate: --  Arterial Blood Gas:  06-08 @ 05:58  7.32/54/90/27/95/1.2  ABG lactate: --  Arterial Blood Gas:  06-08 @ 00:09  7.33/52/71/27/91/1.3  ABG lactate: --  Arterial Blood Gas:  06-07 @ 18:04  7.38/49/77/29/94/3.8  ABG lactate: --  Arterial Blood Gas:  06-07 @ 14:39  7.37/51/75/29/93/3.5  ABG lactate: --        MICROBIOLOGY:     RADIOLOGY:  [ ] Reviewed and interpreted by me    EKG:      [facility] (ext [phone]) CHIEF COMPLAINT:  Patient is a 41y old  Male who presents with a chief complaint of cellulitis (08 Jun 2021 18:37)    HPI:  41 yr old male with PMHx EtOH abuse , alcoholic cirrhosis, recent decompensated cirrhosis requiring hospitalization (4/20-27/2021) placed on clinical trial DUR-928 RCT (steroids vs placebo- unblinded and found to have received steroid therapy) who was originally admitted 5/27/21 with LLE cellulitis. Course c/b progressive worsening hypoxic resp failure with septic shock eventually requiring intubation and vasopressor therapy  requiring admission to MICU (6/4/21). Course further c/b GINNY and ACLF, development of diffuse macular purpuric rash (felt due to drug reaction - resolving). CT chest 6/1/21 demonstrated Patchy ground glass opacities within bilateral upper lobes and bilateral lower lobes likely representing multifocal pneumonia, positive fungitell with unknown significance.    MICU course to date consist of Paracentesis with removal of 1.9 liters (6/5/21), vasopressor therapy, PCP/PJP therapy with bactrim, s/p vitamin K and FFP, addition of bumex/metolazone therapy, s/p bronchoscopy 6/6/21), poor P/F ratios (ARDS) prompting utilization of nimbex(6/8/21)    Interval Events:      REVIEW OF SYSTEMS:  Unable secondary to acute critical illness        OBJECTIVE:  ICU Vital Signs Last 24 Hrs  T(C): 36.2 (09 Jun 2021 04:00), Max: 37.8 (08 Jun 2021 12:00)  T(F): 97.2 (09 Jun 2021 04:00), Max: 100 (08 Jun 2021 12:00)  HR: 86 (09 Jun 2021 05:33) (86 - 110)  BP: --  BP(mean): --  ABP: 114/50 (09 Jun 2021 05:15) (100/43 - 140/68)  ABP(mean): 71 (09 Jun 2021 05:15) (61 - 94)  RR: 28 (09 Jun 2021 05:15) (0 - 28)  SpO2: 95% (09 Jun 2021 05:33) (92% - 98%)    Mode: AC/ CMV (Assist Control/ Continuous Mandatory Ventilation), RR (machine): 28, TV (machine): 410, FiO2: 65, PEEP: 12, ITime: 0.75, MAP: 17, PIP: 33    06-07 @ 07:01  -  06-08 @ 07:00  --------------------------------------------------------  IN: 5035.9 mL / OUT: 3040 mL / NET: 1995.9 mL    06-08 @ 07:01  -  06-09 @ 06:38  --------------------------------------------------------  IN: 3720.8 mL / OUT: 5315 mL / NET: -1594.2 mL      CAPILLARY BLOOD GLUCOSE      POCT Blood Glucose.: 185 mg/dL (08 Jun 2021 22:59)          PHYSICAL EXAM:  GENERAL:   sedated    HEAD:    Atraumatic, Normocephalic    EYES:   PERRL 4 mm sluggish, conjunctiva and sclera icteric    ENMT:   No oropharyngeal exudates, erythema or lesions,  Moist mucous membranes    NECK:   Supple, no cervical lymphadenopathy, no JVD    NERVOUS SYSTEM:  sedated, extremities flaccid    CHEST/LUNG:   orally intubated, A/C 26 Vt 410 P12 FIO2 60% DP 14, not triggering vent., Pip  34  Ppl 26  DP 14  .Breath sounds bilaterally,  without crackles, rhonchi, wheezing, or rubs. POCUS B lines anteriorly, small bilateral non dynamic bronchograms in lower lung fields    HEART:   cardiac monitor sinus tach without ectopy   ; S1/S2 No murmurs, rubs, or gallops, POCUS Good LVFx, RV slight less then LV, VTI 22, IVC 2.3    ABDOMEN:   Soft,  distended, positive wave form, hypoactive Bowel sounds present, Bladder non distended, non palpable. POCUS with moderate amount of ascites appreciated    EXTREMITIES:   Pulses palpable radial pulses 2+ bilat, DP/PT 1+/1+ bilat, without clubbing, cyanosis. Digits warm to touch with good cap refill < 3 secs, 2+ anasarca    SKIN:   warm, dry, intact,  color jaundice, + light erythema, macular diffuse rash , without palpable nodes    Rt IJ TLC site without redness, infiltration, drainage, swelling. dressing dry and intact., burnham in place          HOSPITAL MEDICATIONS:  MEDICATIONS  (STANDING):  albuterol/ipratropium for Nebulization 3 milliLiter(s) Nebulizer every 6 hours  artificial  tears Solution 2 Drop(s) Both EYES every 4 hours  buMETAnide Infusion 2 mG/Hr (10 mL/Hr) IV Continuous <Continuous>  Caspofungin 50 milliGRAM(s),Sodium Chloride 0.9% 100 milliLiter(s) 50 milliGRAM(s) IV Intermittent every 24 hours  chlorhexidine 0.12% Liquid 15 milliLiter(s) Oral Mucosa every 12 hours  chlorhexidine 4% Liquid 1 Application(s) Topical <User Schedule>  chlorhexidine 4% Liquid 1 Application(s) Topical <User Schedule>  cisatracurium Infusion 3.007 MICROgram(s)/kG/Min (13.1 mL/Hr) IV Continuous <Continuous>  fentaNYL   Infusion... 2 MICROgram(s)/kG/Hr (7.26 mL/Hr) IV Continuous <Continuous>  folic acid 1 milliGRAM(s) Oral daily  heparin   Injectable 5000 Unit(s) SubCutaneous every 8 hours  insulin lispro (ADMELOG) corrective regimen sliding scale   SubCutaneous every 6 hours  ketamine Infusion. 0.251 mG/kG/Hr (1.82 mL/Hr) IV Continuous <Continuous>  lactulose Syrup 30 Gram(s) Oral every 4 hours  meropenem  IVPB 500 milliGRAM(s) IV Intermittent every 12 hours  methylPREDNISolone sodium succinate Injectable 40 milliGRAM(s) IV Push every 12 hours  metolazone 10 milliGRAM(s) Oral <User Schedule>  multivitamin 1 Tablet(s) Oral daily  norepinephrine Infusion 0.2 MICROgram(s)/kG/Min (13.6 mL/Hr) IV Continuous <Continuous>  pantoprazole  Injectable 40 milliGRAM(s) IV Push daily  propofol Infusion 50.046 MICROgram(s)/kG/Min (21.8 mL/Hr) IV Continuous <Continuous>  thiamine Injectable 100 milliGRAM(s) IV Push daily  trimethoprim / sulfamethoxazole IVPB 280 milliGRAM(s) IV Intermittent every 12 hours    MEDICATIONS  (PRN):  sodium chloride 0.9% lock flush 10 milliLiter(s) IV Push every 1 hour PRN Pre/post blood products, medications, blood draw, and to maintain line patency      LABS:                        8.2    35.10 )-----------( 268      ( 09 Jun 2021 00:17 )             25.1     Hgb Trend: 8.2<--, 7.9<--, 7.7<--, 7.1<--, 6.7<--  06-09    139  |  101  |  68<H>  ----------------------------<  177<H>  3.7   |  20<L>  |  2.40<H>    Ca    7.8<L>      09 Jun 2021 01:46  Phos  4.8     06-09  Mg     1.9     06-09    TPro  5.9<L>  /  Alb  2.9<L>  /  TBili  12.5<H>  /  DBili  x   /  AST  184<H>  /  ALT  59<H>  /  AlkPhos  214<H>  06-09    LIVER FUNCTIONS - ( 09 Jun 2021 01:46 )  Alb: 2.9 g/dL / Pro: 5.9 g/dL / ALK PHOS: 214 U/L / ALT: 59 U/L / AST: 184 U/L / GGT: x           Creatinine Trend: 2.40<--, 2.40<--, 2.31<--, 2.30<--, 2.25<--, 2.21<--  PT/INR - ( 09 Jun 2021 00:17 )   PT: 19.8 sec;   INR: 1.69 ratio         PTT - ( 09 Jun 2021 00:17 )  PTT:43.4 sec    Arterial Blood Gas:  06-09 @ 05:43  7.35/46/95/25/96/-.2  ABG lactate: --  Arterial Blood Gas:  06-09 @ 00:14  7.33/50/99/26/97/.4  ABG lactate: --  Arterial Blood Gas:  06-08 @ 15:31  7.34/49/109/26/97/.2  ABG lactate: --  Arterial Blood Gas:  06-08 @ 14:06  7.33/50/125/26/99/.2  ABG lactate: --  Arterial Blood Gas:  06-08 @ 11:53  7.35/49/119/26/98/.8  ABG lactate: --  Arterial Blood Gas:  06-08 @ 05:58  7.32/54/90/27/95/1.2  ABG lactate: --  Arterial Blood Gas:  06-08 @ 00:09  7.33/52/71/27/91/1.3  ABG lactate: --  Arterial Blood Gas:  06-07 @ 18:04  7.38/49/77/29/94/3.8  ABG lactate: --  Arterial Blood Gas:  06-07 @ 14:39  7.37/51/75/29/93/3.5  ABG lactate: --        MICROBIOLOGY:     RADIOLOGY:  [ ] Reviewed and interpreted by me    EKG:      [facility] (ext [phone])

## 2021-06-09 NOTE — PROGRESS NOTE ADULT - ASSESSMENT
41 yr old male with stated Hx significant for EtOH cirrhosis, decompensated, presented with LE cellulitis, course c/b progressive hypoxic resp failure, septic shock, GINNY, ACLF    Plan:    #Neuro:  -Neuro checks q 2 hrs and prn for changes  -physical therapy consult when stable  -Propofol, ketamine, midazolam, fent gtts - titrate to RASS of -5  -MELD-Na 6/9/21 of 26    #Pulm:  -Hypoxic resp failure  -poor P/F ratio (ARDS)  -Mechanical Vent Therapy - titrate to maintain ph 7.35-7.45; PCO2 35-45; SPO2 > 92%  -Bronchodilator therapy q 6 hrs prn sob/wheezes  -Lung protective therapy  -started on nimbex 6/8/21 - will continue to TOF 2/4  -placed on paralytic (6/8/21) for vent synchronization, facilitate oxygenation and ventilation     #CV:  -Septic shock  -receiving Norepi gtt - titrate to MAP 65-70 mmHg  -will obtain TTE for bubble study    #GI/:  -EtOH cirrhosis, decompensated cirrhosis  -currently ACLF  -trend LFT  -s/p paracentesis 6/5/21 with removal of 1.9 liters  -GINNY/ATN  -strict I & O's - keep negative  -bumex gtt - titrate to U/O of 75 to 100 cc's/hr  -elevated total bili - pending abd duplex/US  -tube feeds as tolerated  -being followed by hepatology   -continue lactulose therapy     #ID:  -septic shock secondary to possible pneumonia  -recent RCT steroids vs placebo, unblinded   -6/6/21 BAL cx  with growth  -6/5/21 combicath - NGTD  -6/5/21 Paracentesis/peritoneal fluid - no organisms   -6/5/21 MRSA - neg  -6/4/21 sputum Cx - NGTD  -6/3/21 Legiionella - neg  -6/1/21 blood Cx - No growth  -follow up with toxoplasma IGM and Quant gold   -elevated fungitell (6/1/21)  -diffuse rash - possible drug reaction  -currently on meropenem, bactrim (PCP prophylaxis), caspofungin therapy - continue - adjust for renal function   -vanco d/c'ed 6/6/21    #FEN/ENDO/HEME:  -obtain CMP/Mg++/PO--4/CBC w diff/PT/PTT/INR  q. a.m. and prn  -trend lactate  -trend INR - (received FFP  6/5/21 and 6/6/21 - total 4 units), vit K 10 mg IV (6/5/21) vit K 5 mg IV (6/6/21)  -maintain Hgb > 7.0 ( received PRBC 1 unit 6/5/21)  -trend NH3 -currently 126 (6/9/21)  -continue lactulose therapy  -DVT prophylaxis - heparin subq     41 yr old male with stated Hx significant for EtOH cirrhosis, decompensated, presented with LE cellulitis, course c/b progressive hypoxic resp failure, septic shock, GINNY, ACLF    Plan:    #Neuro:  -Neuro checks q 2 hrs and prn for changes  -physical therapy consult when stable  -Propofol, ketamine, midazolam, fent gtts - titrate to RASS of -5  -MELD-Na 6/9/21 of 26    #Pulm:  -Hypoxic resp failure  -poor P/F ratio (ARDS)  -Mechanical Vent Therapy - titrate to maintain ph 7.35-7.45; PCO2 35-45; SPO2 > 92%  -Bronchodilator therapy q 6 hrs prn sob/wheezes  -Lung protective therapy  -started on nimbex 6/8/21 - will continue to TOF 2/4  -placed on paralytic (6/8/21) for vent synchronization, facilitate oxygenation and ventilation     #CV:  -Septic shock  -receiving Norepi gtt - titrate to MAP 65-70 mmHg  -will obtain TTE for bubble study    #GI/:  -EtOH cirrhosis, decompensated cirrhosis  -currently ACLF  -trend LFT  -s/p paracentesis 6/5/21 with removal of 1.9 liters  -GINNY/ATN  -strict I & O's - keep negative  -bumex gtt - titrate to U/O of 75 to 100 cc's/hr  -elevated total bili - pending abd duplex/US  -tube feeds as tolerated  -being followed by hepatology   -continue lactulose therapy     #ID:  -septic shock secondary to possible pneumonia  -recent RCT steroids vs placebo, unblinded   -6/7/21 Pneumocystis PCR -POSITIVE  -6/6/21 BAL cx  with growth  -6/5/21 combicath - NGTD  -6/5/21 Paracentesis/peritoneal fluid - no organisms   -6/5/21 MRSA - neg  -6/4/21 sputum Cx - NGTD  -6/3/21 Legiionella - neg  -6/1/21 blood Cx - No growth  -follow up with toxoplasma IGM and Quant gold   -elevated fungitell (6/1/21)  -diffuse rash - possible drug reaction  -currently on meropenem, bactrim (PCP prophylaxis), caspofungin therapy - continue - adjust for renal function   -vanco d/c'ed 6/6/21    #FEN/ENDO/HEME:  -obtain CMP/Mg++/PO--4/CBC w diff/PT/PTT/INR  q. a.m. and prn  -trend lactate  -trend INR - (received FFP  6/5/21 and 6/6/21 - total 4 units), vit K 10 mg IV (6/5/21) vit K 5 mg IV (6/6/21)  -maintain Hgb > 7.0 ( received PRBC 1 unit 6/5/21)  -trend NH3 -currently 126 (6/9/21)  -continue lactulose therapy  -DVT prophylaxis - heparin subq

## 2021-06-09 NOTE — PROGRESS NOTE ADULT - ASSESSMENT
40 year old man with PMH of AUD w/ recent admission for alcoholic hepatitis enrolled in clinical trial DUR-928 RCT (unblinded, received placebo infusion plus corticosteroids) readmitted 5/28/21 for sepsis, course now complicated by septic shock and hypoxic respiratory failure due to to multifocal pneumonia confirmed secondary to PJP by PCR testing, also with worsening GINNY but maintain good UOP with aggressive IV diuresis       Impression:    # Hypoxic respiratory failure - Secondary to PJP pneumonia, confirmed by PCR testing. Ventilator requirements decreasing today   (FiO2 60% and PEEP 14)  on caspofungin, Solumedrol and meropenem; PJP PCR and Fungitell positive    # Leukocytosis - Suspect acute worsening due to effect of IV steroids superimposed on multifocal PNA     # GINNY - Stable; likely ATN in setting of septic shock however UOP (150-200 cc / hr) and euvolemia maintained on Bumex infusion started 6/6/21 - will likely need diuretics to maintain euvolemic status     # Abnormal aminotransferases: Stable. Acute on chronic liver failure secondary to alcoholic hepatitis vs. septic shock    # Alcohol-related liver disease, progressive decompensation of disease due to active infection/PJP penumonia with MELD-Na 30 on 6/9/21  - Ascites: Moderate on exam  - Varices: Unknown - no prior screening endoscopy   - HCC: No focal lesions on imaging CT A/P 4/20 (not multiphase)  - HE: Sedated -unable to assess for HE at this time    # Rash  # Hyponatremia    Recommendations:  - medical management of sepsis/PJP pneumonia per ID and MICU teams  - will f/u BAL studies  - cont lactulose 30 mg every 4 hours - monitor for gaseous distention  - cont rifaximin 550mg BID  - agree with Bumex infusion to maintain net negative volume status as per Nephrology   - please discuss with Case Management application for Medicaid this hospitalization (patient currently on Emergency Medicaid for current hospital admission which is not a sustainable healthcare coverage option, and may be a transplant candidate if he can recover from acute illness/septic shock)   - rest of care per MICU team  - Transplant Hepatology will continue to follow       Asmita Carrera PGY-5  Gastroenterology/Hepatology Fellow  Pager #[phone]  E-mail [email or web address]  Call [phone] to speak to answering service for on-call GI fellow 5pm-7am and weekends.

## 2021-06-09 NOTE — PROGRESS NOTE ADULT - SUBJECTIVE AND OBJECTIVE BOX
Chief Complaint:  Patient is a 41y old  Male who presents with a chief complaint of cellulitis (09 Jun 2021 09:33)      Interval Events:     Fungitell elevated  PJP PCR positive  IV pressor requirements same as yesterday  O2 requirements decreased today (FiO2 90 --> 65%)    Allergies:  No Known Allergies      Hospital Medications:  albuterol/ipratropium for Nebulization 3 milliLiter(s) Nebulizer every 6 hours  artificial  tears Solution 2 Drop(s) Both EYES every 4 hours  buMETAnide Infusion 2 mG/Hr IV Continuous <Continuous>  Caspofungin 50 milliGRAM(s),Sodium Chloride 0.9% 100 milliLiter(s) 50 milliGRAM(s) IV Intermittent every 24 hours  chlorhexidine 0.12% Liquid 15 milliLiter(s) Oral Mucosa every 12 hours  chlorhexidine 4% Liquid 1 Application(s) Topical <User Schedule>  chlorhexidine 4% Liquid 1 Application(s) Topical <User Schedule>  cisatracurium Infusion 3.007 MICROgram(s)/kG/Min IV Continuous <Continuous>  fentaNYL   Infusion... 2 MICROgram(s)/kG/Hr IV Continuous <Continuous>  folic acid 1 milliGRAM(s) Oral daily  heparin   Injectable 5000 Unit(s) SubCutaneous every 8 hours  insulin lispro (ADMELOG) corrective regimen sliding scale   SubCutaneous every 6 hours  ketamine Infusion. 0.251 mG/kG/Hr IV Continuous <Continuous>  lactulose Syrup 30 Gram(s) Oral every 4 hours  meropenem  IVPB 500 milliGRAM(s) IV Intermittent every 12 hours  methylPREDNISolone sodium succinate Injectable 40 milliGRAM(s) IV Push every 12 hours  metolazone 10 milliGRAM(s) Oral <User Schedule>  multivitamin 1 Tablet(s) Oral daily  norepinephrine Infusion 0.2 MICROgram(s)/kG/Min IV Continuous <Continuous>  pantoprazole  Injectable 40 milliGRAM(s) IV Push daily  propofol Infusion 50.046 MICROgram(s)/kG/Min IV Continuous <Continuous>  sodium chloride 0.9% lock flush 10 milliLiter(s) IV Push every 1 hour PRN  thiamine Injectable 100 milliGRAM(s) IV Push daily  trimethoprim / sulfamethoxazole IVPB 280 milliGRAM(s) IV Intermittent every 12 hours      PMHX/PSHX:  No pertinent past medical history    Alcohol abuse    Cirrhosis    No significant past surgical history        Family history:  No pertinent family history in first degree relatives        ROS: Unobtainable       PHYSICAL EXAM:     GENERAL:  Sedated, critically ill  HEENT:  Conjunctivae clear +sclera icteric +ET tube  CHEST:  Ventilated breath sounds  HEART:  Regular rhythm & rate  ABDOMEN:  Non-tender +softly distended +no palpable masses   : +Person draining clear urine   EXTREMITIES:  1-2+ pitting carpopedal edema   SKIN:  No rash/erythema/ecchymoses/petechiae +jaundice   NEURO:  Alert, orientedx3      Vital Signs:  Vital Signs Last 24 Hrs  T(C): 36.4 (09 Jun 2021 11:00), Max: 37.8 (08 Jun 2021 14:00)  T(F): 97.5 (09 Jun 2021 11:00), Max: 100 (08 Jun 2021 14:00)  HR: 84 (09 Jun 2021 11:34) (84 - 105)  BP: --  BP(mean): --  RR: 28 (09 Jun 2021 11:15) (28 - 28)  SpO2: 97% (09 Jun 2021 11:34) (92% - 98%)  Daily     Daily     LABS:                        8.2    35.10 )-----------( 268      ( 09 Jun 2021 00:17 )             25.1     06-09    139  |  101  |  68<H>  ----------------------------<  177<H>  3.7   |  20<L>  |  2.40<H>    Ca    7.8<L>      09 Jun 2021 01:46  Phos  4.8     06-09  Mg     1.9     06-09    TPro  5.9<L>  /  Alb  2.9<L>  /  TBili  12.5<H>  /  DBili  x   /  AST  184<H>  /  ALT  59<H>  /  AlkPhos  214<H>  06-09    LIVER FUNCTIONS - ( 09 Jun 2021 01:46 )  Alb: 2.9 g/dL / Pro: 5.9 g/dL / ALK PHOS: 214 U/L / ALT: 59 U/L / AST: 184 U/L / GGT: x           PT/INR - ( 09 Jun 2021 00:17 )   PT: 19.8 sec;   INR: 1.69 ratio    PTT - ( 09 Jun 2021 00:17 )  PTT:43.4 sec    Amylase Serum--      Lipase serum--       [id number]      Imaging:

## 2021-06-09 NOTE — CHART NOTE - NSCHARTNOTEFT_GEN_A_CORE
Today is D45 +/- 1 day of patient on DUR-928 trial currently off all study treatments.  He is currently hospitalized.

## 2021-06-09 NOTE — PROGRESS NOTE ADULT - SUBJECTIVE AND OBJECTIVE BOX
[facility] DIVISION OF KIDNEY DISEASES AND HYPERTENSION -- FOLLOW UP NOTE  --------------------------------------------------------------------------------    24 hour events/subjective: urine output 5.7L over 24 h period. Currently sedated and intubated. Unable to obtain ROS    PAST HISTORY  --------------------------------------------------------------------------------  No significant changes to PMH, PSH, FHx, SHx, unless otherwise noted    ALLERGIES & MEDICATIONS  --------------------------------------------------------------------------------  Allergies    No Known Allergies    Intolerances    Standing Inpatient Medications  albuterol/ipratropium for Nebulization 3 milliLiter(s) Nebulizer every 6 hours  artificial  tears Solution 2 Drop(s) Both EYES every 4 hours  buMETAnide Infusion 2 mG/Hr IV Continuous <Continuous>  Caspofungin 50 milliGRAM(s),Sodium Chloride 0.9% 100 milliLiter(s) 50 milliGRAM(s) IV Intermittent every 24 hours  chlorhexidine 0.12% Liquid 15 milliLiter(s) Oral Mucosa every 12 hours  chlorhexidine 4% Liquid 1 Application(s) Topical <User Schedule>  chlorhexidine 4% Liquid 1 Application(s) Topical <User Schedule>  cisatracurium Infusion 3.007 MICROgram(s)/kG/Min IV Continuous <Continuous>  fentaNYL   Infusion... 2 MICROgram(s)/kG/Hr IV Continuous <Continuous>  folic acid 1 milliGRAM(s) Oral daily  heparin   Injectable 5000 Unit(s) SubCutaneous every 8 hours  insulin lispro (ADMELOG) corrective regimen sliding scale   SubCutaneous every 6 hours  ketamine Infusion. 0.251 mG/kG/Hr IV Continuous <Continuous>  lactulose Syrup 30 Gram(s) Oral every 4 hours  meropenem  IVPB 500 milliGRAM(s) IV Intermittent every 12 hours  methylPREDNISolone sodium succinate Injectable 40 milliGRAM(s) IV Push every 12 hours  metolazone 10 milliGRAM(s) Oral <User Schedule>  multivitamin 1 Tablet(s) Oral daily  norepinephrine Infusion 0.2 MICROgram(s)/kG/Min IV Continuous <Continuous>  pantoprazole  Injectable 40 milliGRAM(s) IV Push daily  propofol Infusion 50.046 MICROgram(s)/kG/Min IV Continuous <Continuous>  thiamine Injectable 100 milliGRAM(s) IV Push daily  trimethoprim / sulfamethoxazole IVPB 280 milliGRAM(s) IV Intermittent every 12 hours    PRN Inpatient Medications  sodium chloride 0.9% lock flush 10 milliLiter(s) IV Push every 1 hour PRN    REVIEW OF SYSTEMS  --------------------------------------------------------------------------------  Unable to obtain ROS    VITALS/PHYSICAL EXAM  --------------------------------------------------------------------------------  T(C): 36.3 (06-09-21 @ 08:00), Max: 37.8 (06-08-21 @ 12:00)  HR: 85 (06-09-21 @ 09:15) (84 - 107)  BP: --  RR: 28 (06-09-21 @ 09:15) (0 - 28)  SpO2: 95% (06-09-21 @ 09:15) (92% - 98%)  Wt(kg): --    06-08-21 @ 07:01  -  06-09-21 @ 07:00  --------------------------------------------------------  IN: 3930.9 mL / OUT: 5715 mL / NET: -1784.1 mL    06-09-21 @ 07:01  -  06-09-21 @ 09:33  --------------------------------------------------------  IN: 303.6 mL / OUT: 425 mL / NET: -121.4 mL    Physical Exam:  	Gen: sedated, intubated, jaundiced   	HEENT: ETT  	Pulm: CTA B/L, no crackles   	CV: S1S2  	Abd: Soft, +BS   	Ext: trace LE edema B/L  	Neuro: sedated, intubated   	Skin: Warm and dry      LABS/STUDIES  --------------------------------------------------------------------------------              8.2    35.10 >-----------<  268      [06-09-21 @ 00:17]              25.1     139  |  101  |  68  ----------------------------<  177      [06-09-21 @ 01:46]  3.7   |  20  |  2.40        Ca     7.8     [06-09-21 @ 01:46]      Mg     1.9     [06-09-21 @ 00:17]      Phos  4.8     [06-09-21 @ 00:17]    TPro  5.9  /  Alb  2.9  /  TBili  12.5  /  DBili  x   /  AST  184  /  ALT  59  /  AlkPhos  214  [06-09-21 @ 01:46]    PT/INR: PT 19.8 , INR 1.69       [06-09-21 @ 00:17]  PTT: 43.4       [06-09-21 @ 00:17]      Creatinine Trend:  SCr 2.40 [06-09 @ 01:46]  SCr 2.40 [06-09 @ 00:17]  SCr 2.31 [06-08 @ 15:29]  SCr 2.30 [06-08 @ 08:40]  SCr 2.25 [06-08 @ 00:14]    Urinalysis - [06-04-21 @ 21:30]      Color Dark Yellow / Appearance Slightly Turbid / SG 1.022 / pH 6.0      Gluc Negative / Ketone Negative  / Bili Large >10 / Urobili Negative       Blood Negative / Protein 30 mg/dL / Leuk Est Negative / Nitrite Negative      RBC 2 / WBC 0 / Hyaline 4 / Gran  / Sq Epi  / Non Sq Epi 0 / Bacteria Negative    Urine Creatinine 49      [06-06-21 @ 02:31]  Urine Sodium 20      [06-06-21 @ 02:31]  Urine Potassium 56      [06-06-21 @ 02:31]    Iron 66, TIBC 132, %sat 50      [04-21-21 @ 09:07]  Ferritin 537      [04-23-21 @ 09:10]    HBsAg Nonreact      [06-04-21 @ 14:54]  HCV 0.25, Nonreact      [06-04-21 @ 14:54]    C3 Complement 25      [06-04-21 @ 14:54]  C4 Complement 2      [06-04-21 @ 14:54]  Rheumatoid Factor <10      [06-05-21 @ 21:33]  ANCA: cANCA Negative, pANCA Negative, atypical ANCA Negative      [06-05-21 @ 22:08]  Cryoglobulin: Negative      [06-05-21 @ 20:10]

## 2021-06-10 NOTE — PROGRESS NOTE ADULT - ASSESSMENT
41 yr old male with stated Hx significant for EtOH cirrhosis, decompensated, presented with LE cellulitis, course c/b progressive hypoxic resp failure, septic shock, GINNY, ACLF    Plan:    #Neuro:  -Neuro checks q 2 hrs and prn for changes  -physical therapy consult when stable- and off of sedation / paralytics  -c/w Propofol, ketamine, midazolam, fent gtts - titrate to RASS of -5  -d/c Nimbex  -MELD-Na 6/9/21 of 26- now 32- ammonia level worsening     #Pulm: Hypoxic Resp Failure / ARDS (poor P/F ratio) s/p intubation c/w VC  -VC 28/410/50%/+12= abg- 7/33/47/96/24/97%- decrease PEEP 10  -c/w Bronchodilator therapy q 6 hrs prn sob/wheezes  -Lung protective therapy    #CV: -Septic shock  -c/w norepinephrine - maintain MAP >/= 65  -TTE done yesterday - bubble study negative    #GI: -EtOH cirrhosis, decompensated cirrhosis, Acute on Chronic Liver Failure,  s/p paracentesis 6/5/21 with removal of 1.9 liters,   -trend LFT  -tube feeds as tolerated  -f/u hepatology   -continue lactulose therapy - goal 3-4 BM / lg  -Rifaximin 550mg q 12H    Renal: -GINNY/ATN -   -strict I & O's - keep negative  -bumex gtt - titrate to U/O of 75 to 100 cc's/hr  -c/w metolazone 10mg q 12H     #ID: septic shock / PNA / ARDS, -recent RCT steroids vs placebo, unblinded   -6/7/21 Pneumocystis PCR -POSITIVE- c/w Bactrim  -d/c meropenem- -6/6/21 BAL cx  with growth, -6/5/21 combicath - NGTD,-6/5/21 Paracentesis/peritoneal fluid - no organisms,  6/5/21 MRSA - neg.-6/4/21 sputum Cx - NGTD, 6/3 U. Legionella neg, 6/1 BC neg,   -follow up with toxoplasma IGM and Quant gold          41 yr old male with stated Hx significant for EtOH cirrhosis, decompensated, presented with LE cellulitis, course c/b progressive hypoxic resp failure, septic shock, GINNY, ACLF    Plan:    #Neuro:  -Neuro checks q 2 hrs and prn for changes  -physical therapy consult when stable- and off of sedation / paralytics  -c/w Propofol, ketamine, fent gtts - titrate to RASS of -5  -d/c Nimbex  -MELD-Na 6/9/21 of 26- now 32- ammonia level worsening     #Pulm: Hypoxic Resp Failure / ARDS (poor P/F ratio) s/p intubation c/w VC  -VC 28/410/50%/+12= abg- 7/33/47/96/24/97%- decrease PEEP 10  -c/w Bronchodilator therapy q 6 hrs prn sob/wheezes  -Lung protective therapy    #CV: -Septic shock  -c/w norepinephrine - maintain MAP >/= 65  -TTE done yesterday - bubble study negative    #GI: -EtOH cirrhosis, decompensated cirrhosis, Acute on Chronic Liver Failure,  s/p paracentesis 6/5/21 with removal of 1.9 liters,   -trend LFT  -tube feeds as tolerated  -f/u hepatology   -continue lactulose therapy - goal 3-4 BM / lg  -Rifaximin 550mg q 12H    Renal: -GINNY/ATN -   -strict I & O's - keep negative  -bumex gtt - titrate to U/O of 75 to 100 cc's/hr  -c/w metolazone 10mg q 12H     #ID: septic shock / PNA / ARDS, -recent RCT steroids vs placebo, unblinded   -6/7/21 Pneumocystis PCR -POSITIVE- c/w Bactrim  -d/c meropenem- -6/6/21 BAL cx  with growth, -6/5/21 combicath - NGTD,-6/5/21 Paracentesis/peritoneal fluid - no organisms,  6/5/21 MRSA - neg.-6/4/21 sputum Cx - NGTD, 6/3 U. Legionella neg, 6/1 BC neg,   -follow up with toxoplasma IGM and Quant gold

## 2021-06-10 NOTE — PROGRESS NOTE ADULT - ASSESSMENT
Patient is a 40 y/o M w PMH of EtoH abuse complicated by decompensated cirrhosis presented to [facility] for worsening LE edema. Admitted to [facility] for acute hypoxic resiraptoy failure in setting of PNA. Nephrology consulted for GINNY.     #GINNY  Pt with GINNY in the setting of liver disease. Upon review of [facility]  baseline Scr of 0.49 on 4/25/21, Scr on admission increased to 0.87 on 5/27/21, Scr progressively increased and peaked to 1.13 on 5/1/21. Intubated on 6/4 due to hypoxic resp failure. On IV pressor support.  GINNY most likely secondary to ATN (in the setting of septic shock) vs Bilirubin cast nephropathy (serum Bb total at 20, UA with large Bb). Urine Na at 8. Last Scr elevated at 2.69 mg/dl today.     - Currently non-oliguric on Bumex drip 2mg/hr and metolazone   - Will need to consider HD if renal failure continues to worsen, if indicated by goals of care and prognosis.   - Monitor labs and urine output.   -Avoid NSAIDs, ACEI/ARBS, RCA and nephrotoxins.   -Dose medications as per eGFR.    #hyponatremia:  mild hyponatremia -improved   Last Na 141   at this time no intervention, Monitor serum sodium.     #hypokalemia   this am with K: 3.8  please replete to goal ~4    If any questions, please feel free to contact me     Carlton Jackson  Nephrology Fellow  [facility] Pager: [phone]  JANE Pager: [phone]

## 2021-06-10 NOTE — PROGRESS NOTE ADULT - SUBJECTIVE AND OBJECTIVE BOX
[facility] DIVISION OF KIDNEY DISEASES AND HYPERTENSION -- FOLLOW UP NOTE  --------------------------------------------------------------------------------    24 hour events/subjective: urine output 4.3L over 24h period. Patient was seen and examined at bedside. Remains sedated and intubated. Unable to obtain ROS    PAST HISTORY  --------------------------------------------------------------------------------  No significant changes to PMH, PSH, FHx, SHx, unless otherwise noted    ALLERGIES & MEDICATIONS  --------------------------------------------------------------------------------  Allergies    No Known Allergies    Intolerances    Standing Inpatient Medications  albuterol/ipratropium for Nebulization 3 milliLiter(s) Nebulizer every 6 hours  artificial  tears Solution 2 Drop(s) Both EYES every 4 hours  buMETAnide Infusion 2 mG/Hr IV Continuous <Continuous>  chlorhexidine 0.12% Liquid 15 milliLiter(s) Oral Mucosa every 12 hours  chlorhexidine 4% Liquid 1 Application(s) Topical <User Schedule>  chlorhexidine 4% Liquid 1 Application(s) Topical <User Schedule>  cisatracurium Infusion 3.007 MICROgram(s)/kG/Min IV Continuous <Continuous>  fentaNYL   Infusion... 2 MICROgram(s)/kG/Hr IV Continuous <Continuous>  folic acid 1 milliGRAM(s) Oral daily  heparin   Injectable 5000 Unit(s) SubCutaneous every 8 hours  insulin lispro (ADMELOG) corrective regimen sliding scale   SubCutaneous every 6 hours  ketamine Infusion. 0.251 mG/kG/Hr IV Continuous <Continuous>  lactulose Syrup 30 Gram(s) Oral every 4 hours  meropenem  IVPB 500 milliGRAM(s) IV Intermittent every 12 hours  methylPREDNISolone sodium succinate Injectable 40 milliGRAM(s) IV Push every 12 hours  metolazone 10 milliGRAM(s) Oral <User Schedule>  multivitamin 1 Tablet(s) Oral daily  norepinephrine Infusion 0.2 MICROgram(s)/kG/Min IV Continuous <Continuous>  pantoprazole  Injectable 40 milliGRAM(s) IV Push daily  propofol Infusion 50.046 MICROgram(s)/kG/Min IV Continuous <Continuous>  thiamine Injectable 100 milliGRAM(s) IV Push daily  trimethoprim / sulfamethoxazole IVPB 280 milliGRAM(s) IV Intermittent every 12 hours    PRN Inpatient Medications  sodium chloride 0.9% lock flush 10 milliLiter(s) IV Push every 1 hour PRN    REVIEW OF SYSTEMS  --------------------------------------------------------------------------------  Unable to obtain ROS    VITALS/PHYSICAL EXAM  --------------------------------------------------------------------------------  T(C): 37 (06-10-21 @ 07:45), Max: 37.8 (06-10-21 @ 00:00)  HR: 93 (06-10-21 @ 08:00) (84 - 100)  BP: --  RR: 28 (06-10-21 @ 08:00) (18 - 28)  SpO2: 93% (06-10-21 @ 08:00) (91% - 100%)  Wt(kg): --    06-09-21 @ 07:01  -  06-10-21 @ 07:00  --------------------------------------------------------  IN: 4393.1 mL / OUT: 5100 mL / NET: -706.9 mL    06-10-21 @ 07:01  -  06-10-21 @ 09:00  --------------------------------------------------------  IN: 0 mL / OUT: 200 mL / NET: -200 mL    Physical Exam:  	Gen: jaundiced, sedated, intubated    	HEENT: ETT  	Pulm: CTA B/L, no crackles   	CV: S1S2  	Abd: Soft, +BS   	Ext: No LE edema B/L  	Neuro: sedated  	Skin: Warm and dry  	Vascular access: central line       LABS/STUDIES  --------------------------------------------------------------------------------              7.9    37.64 >-----------<  282      [06-10-21 @ 00:20]              24.2     141  |  101  |  88  ----------------------------<  253      [06-10-21 @ 07:49]  3.8   |  19  |  2.69        Ca     7.6     [06-10-21 @ 07:49]      Mg     2.0     [06-10-21 @ 07:49]      Phos  5.8     [06-10-21 @ 07:49]    TPro  6.1  /  Alb  2.9  /  TBili  11.7  /  DBili  x   /  AST  164  /  ALT  65  /  AlkPhos  227  [06-10-21 @ 00:20]    PT/INR: PT 22.2 , INR 1.91       [06-10-21 @ 00:20]  PTT: 42.7       [06-10-21 @ 00:20]      Creatinine Trend:  SCr 2.69 [06-10 @ 07:49]  SCr 2.60 [06-10 @ 00:20]  SCr 2.45 [06-09 @ 22:26]  SCr 2.42 [06-09 @ 13:29]  SCr 2.40 [06-09 @ 01:46]    Urinalysis - [06-04-21 @ 21:30]      Color Dark Yellow / Appearance Slightly Turbid / SG 1.022 / pH 6.0      Gluc Negative / Ketone Negative  / Bili Large >10 / Urobili Negative       Blood Negative / Protein 30 mg/dL / Leuk Est Negative / Nitrite Negative      RBC 2 / WBC 0 / Hyaline 4 / Gran  / Sq Epi  / Non Sq Epi 0 / Bacteria Negative    Urine Creatinine 49      [06-06-21 @ 02:31]  Urine Sodium 20      [06-06-21 @ 02:31]  Urine Potassium 56      [06-06-21 @ 02:31]    Iron 66, TIBC 132, %sat 50      [04-21-21 @ 09:07]  Ferritin 537      [04-23-21 @ 09:10]    HBsAg Nonreact      [06-04-21 @ 14:54]  HCV 0.25, Nonreact      [06-04-21 @ 14:54]    C3 Complement 25      [06-04-21 @ 14:54]  C4 Complement 2      [06-04-21 @ 14:54]  Rheumatoid Factor <10      [06-05-21 @ 21:33]  ANCA: cANCA Negative, pANCA Negative, atypical ANCA Negative      [06-05-21 @ 22:08]  Cryoglobulin: Negative      [06-05-21 @ 20:10]

## 2021-06-10 NOTE — PROGRESS NOTE ADULT - ASSESSMENT
40 year old man with PMH of AUD w/ recent admission for alcoholic hepatitis enrolled in clinical trial [id number] RCT (unblinded, received placebo infusion plus corticosteroids) readmitted 5/28/21 for sepsis, course now complicated by septic shock and hypoxic respiratory failure due to to multifocal pneumonia confirmed secondary to PJP by PCR testing, also with worsening GINNY but maintain good UOP with aggressive IV diuresis       Impression:    # Hypoxic respiratory failure - Secondary to PJP pneumonia, confirmed by PCR testing. Ventilator requirements decreasing today   (FiO2 60% and PEEP 14)  on caspofungin, Solumedrol and meropenem; PJP PCR and Fungitell positive    # Leukocytosis - Suspect acute worsening due to effect of IV steroids superimposed on multifocal PNA     # GINNY - Stable; likely ATN in setting of septic shock however UOP (150-200 cc / hr) and euvolemia maintained on Bumex infusion started 6/6/21 - will likely need diuretics to maintain euvolemic status     # Abnormal aminotransferases: Stable. Acute on chronic liver failure secondary to alcoholic hepatitis vs. septic shock    # Alcohol-related liver disease, progressive decompensation of disease due to active infection/PJP penumonia with MELD-Na 32 on 6/10/21  - Ascites: Moderate on exam  - Varices: Unknown - no prior screening endoscopy   - HCC: No focal lesions on imaging CT A/P 4/20 (not multiphase)  - HE: Sedated -unable to assess for HE at this time    # Rash  # Hyponatremia    Recommendations:  - start lactulose 20 mg every 8 hours (titrated to 2-3 BMs per day)  - Case Management consult to submit application for Medicaid   - medical management of sepsis/PJP pneumonia per ID and MICU teams  - continue rifaximin 550 mg BID   - rest of care per MICU team  - Transplant Hepatology will continue to follow       Asmita Carrera PGY-5  Gastroenterology/Hepatology Fellow  Pager #[phone]  E-mail [email or web address]  Call [phone] to speak to answering service for on-call GI fellow 5pm-7am and weekends.

## 2021-06-10 NOTE — PROGRESS NOTE ADULT - SUBJECTIVE AND OBJECTIVE BOX
CC: F/U for PCP    Saw/spoke to patient. Intubated. Poorly verbal. Generally ill appearing.    Allergies  No Known Allergies    ANTIMICROBIALS:  rifAXIMin 550 two times a day  trimethoprim / sulfamethoxazole IVPB 360 every 12 hours    PE:    Vital Signs Last 24 Hrs  T(C): 37.7 (10 Eliezer 2021 12:00), Max: 37.8 (10 Eliezer 2021 00:00)  T(F): 99.9 (10 Eliezer 2021 12:00), Max: 100 (10 Eliezer 2021 00:00)  HR: 105 (10 Eliezer 2021 12:00) (89 - 105)  RR: 28 (10 Eliezer 2021 12:00) (18 - 28)  SpO2: 95% (10 Eliezer 2021 12:00) (91% - 100%)    Gen: AOx0, sedated, poorly responsive  CV: S1+S2 normal, tachycardic  Resp: Intubated  Abd: Soft, nontender, +BS  Ext: No LE edema, no wounds    LABS:                        7.9    37.64 )-----------( 282      ( 10 Eliezer 2021 00:20 )             24.2     06-10    141  |  101  |  88<H>  ----------------------------<  253<H>  3.8   |  19<L>  |  2.69<H>    Ca    7.6<L>      10 Eliezer 2021 07:49  Phos  5.8     06-10  Mg     2.0     06-10    TPro  6.1  /  Alb  2.9<L>  /  TBili  11.7<H>  /  DBili  x   /  AST  164<H>  /  ALT  65<H>  /  AlkPhos  227<H>  06-10    MICROBIOLOGY:    BAL Other  06-06-21   Culture is being performed.      [place] BAL  06-06-21   No growth at 48 hours  --    Few polymorphonuclear leukocytes per low power field  No squamous epithelial cells per low power field  No organisms seen per oil power field    BAL Combicath  06-06-21   Culture is being performed.      .Body Fluid Peritoneal Fluid  06-05-21   No growth  --    polymorphonuclear leukocytes seen  No organisms seen  by cytocentrifuge    [place] Combicath CUP  06-05-21   No growth at 48 hours  --    No polymorphonuclear cells seen  No squamous epithelial cells  No organisms seen    .Sputum Sputum  06-04-21   No growth at 48 hours  --    Numerous polymorphonuclear leukocytes per low power field  Rare Squamous epithelial cells per low power field  No organisms seen per oil power field    .Blood Blood-Venous  06-03-21   No Growth Final     .Sputum Sputum  06-01-21   Normal Respiratory Minda present  --    Moderate polymorphonuclear leukocytes per low power field  Moderate Squamous epithelial cells per low power field  Few Gram positive cocci in pairs seen per oil power field  Few Gram Variable Rods seen per oil power field    .Blood Blood-Peripheral  06-01-21   No Growth Final     .Stool  06-01-21   Cryptosporidium parvum antigen negative  performed by rapid immunoassay (non-enzymatic).  (It is recommended that all specimens tested by  an immunochromatographic card test be  confirmed by another method.)      Toxoplasma IgG Screen: 54.9 IU/mL (06-05-21 @ 10:07)    (otherwise reviewed)    RADIOLOGY:    6/7 US:    IMPRESSION:    Cirrhosis with portal hypertension. No evidence of portal vein thrombosis. Bidirectional flow in the splenic vein as a splenorenal shunt is documented respectively on CT examination 5/30/2021.    Mild to moderate abdominal ascites as described above.

## 2021-06-10 NOTE — PROGRESS NOTE ADULT - SUBJECTIVE AND OBJECTIVE BOX
Chief Complaint:  Patient is a 41y old  Male who presents with a chief complaint of cellulitis (10 Eliezer 2021 14:04)      Interval Events:     Ventilator requirements continue to decrease - FiO2 50% and PEEP 10  Only on minimal dose of Levophed  Very good UOP on Bumex infusion    Allergies:  No Known Allergies      Hospital Medications:  albuterol/ipratropium for Nebulization 3 milliLiter(s) Nebulizer every 6 hours  artificial  tears Solution 2 Drop(s) Both EYES every 4 hours  buMETAnide Infusion 2 mG/Hr IV Continuous <Continuous>  chlorhexidine 0.12% Liquid 15 milliLiter(s) Oral Mucosa every 12 hours  chlorhexidine 4% Liquid 1 Application(s) Topical <User Schedule>  chlorhexidine 4% Liquid 1 Application(s) Topical <User Schedule>  fentaNYL   Infusion... 2 MICROgram(s)/kG/Hr IV Continuous <Continuous>  folic acid 1 milliGRAM(s) Oral daily  heparin   Injectable 5000 Unit(s) SubCutaneous every 8 hours  insulin lispro (ADMELOG) corrective regimen sliding scale   SubCutaneous every 4 hours  ketamine Infusion. 0.251 mG/kG/Hr IV Continuous <Continuous>  lactulose Syrup 30 Gram(s) Oral every 4 hours  methylPREDNISolone sodium succinate Injectable 40 milliGRAM(s) IV Push every 12 hours  metolazone 10 milliGRAM(s) Oral <User Schedule>  multivitamin 1 Tablet(s) Oral daily  norepinephrine Infusion 0.2 MICROgram(s)/kG/Min IV Continuous <Continuous>  pantoprazole  Injectable 40 milliGRAM(s) IV Push daily  propofol Infusion 50.046 MICROgram(s)/kG/Min IV Continuous <Continuous>  rifAXIMin 550 milliGRAM(s) Oral two times a day  sodium chloride 0.9% lock flush 10 milliLiter(s) IV Push every 1 hour PRN  thiamine Injectable 100 milliGRAM(s) IV Push daily  trimethoprim / sulfamethoxazole IVPB 360 milliGRAM(s) IV Intermittent every 12 hours      PMHX/PSHX:  No pertinent past medical history    Alcohol abuse    Cirrhosis    No significant past surgical history        Family history:  No pertinent family history in first degree relatives        ROS:     General:  No weight loss, fevers, chills, night sweats, fatigue   Eyes:  No vision changes  ENT:  No sore throat, pain, runny nose  CV:  No chest pain, palpitations, dizziness   Resp:  No SOB, cough, wheezing  GI:  See HPI  :  No burning with urination, hematuria  Muscle:  No pain, weakness  Neuro:  No weakness/tingling, memory problems  Psych:  No fatigue, insomnia, mood problems, depression  Heme:  No easy bruisability  Skin:  No rash, edema      PHYSICAL EXAM:     GENERAL:  Sedated, critically ill  HEENT:  Conjunctivae clear +sclera icteric +ET tube  CHEST:  Ventilated breath sounds  HEART:  Regular rhythm & rate  ABDOMEN:  Non-tender +softly distended +no palpable masses   : +Person draining clear urine   EXTREMITIES:  1-2+ pitting carpopedal edema   SKIN:  No rash/erythema/ecchymoses/petechiae +jaundice   NEURO:  Alert, orientedx3    Vital Signs:  Vital Signs Last 24 Hrs  T(C): 38 (10 Eliezer 2021 13:45), Max: 38 (10 Eliezer 2021 13:45)  T(F): 100.4 (10 Eliezer 2021 13:45), Max: 100.4 (10 Eliezer 2021 13:45)  HR: 101 (10 Eliezer 2021 15:22) (90 - 105)  BP: --  BP(mean): --  RR: 28 (10 Eliezer 2021 14:45) (16 - 29)  SpO2: 92% (10 Eliezer 2021 15:22) (91% - 100%)  Daily     Daily     LABS:                        7.9    37.64 )-----------( 282      ( 10 Eliezer 2021 00:20 )             24.2     06-10    141  |  101  |  88<H>  ----------------------------<  253<H>  3.8   |  19<L>  |  2.69<H>    Ca    7.6<L>      10 Eliezer 2021 07:49  Phos  5.8     06-10  Mg     2.0     06-10    TPro  6.1  /  Alb  2.9<L>  /  TBili  11.7<H>  /  DBili  x   /  AST  164<H>  /  ALT  65<H>  /  AlkPhos  227<H>  06-10    LIVER FUNCTIONS - ( 10 Eliezer 2021 00:20 )  Alb: 2.9 g/dL / Pro: 6.1 g/dL / ALK PHOS: 227 U/L / ALT: 65 U/L / AST: 164 U/L / GGT: x           PT/INR - ( 10 Eliezer 2021 00:20 )   PT: 22.2 sec;   INR: 1.91 ratio    PTT - ( 10 Eliezer 2021 00:20 )  PTT:42.7 sec    Amylase Serum--      Lipase serum--       [id number]      Imaging:

## 2021-06-10 NOTE — ADVANCED PRACTICE NURSE CONSULT - REASON FOR CONSULT
Requested by staff to assess skin status: left buttocks- staff is concerned that the pt has developed a deep tissue injury. PMH is noted:  41 M originally from [facility] PMHx of ETOH abuse (quit early April) c/b decompensated alcoholic cirrhosis, recent admission (4/20-4/27) for decompensated cirrhosis, presented with b/l LE swelling and LLE foot pain

## 2021-06-10 NOTE — ADVANCED PRACTICE NURSE CONSULT - ASSESSMENT
The pt is in the MICU- intubated sedated and on ventilator support. Mr Jay receives enteral feeds and is being followed by nutrition. He is on a [employer] support surface and is being assisted with turning and positioning. with a z- lydia cushion.  staff have applied Complete Cair  boots to off-load his heels.   A FMS is in place to divert liquid stool from the skin  Upon assessment, the pt presents with an area of darkened pigmentation on the left buttocks it measures 7.5cmc 3cmx 0cm - a similar area is noted on the right buttocks measuring 6cmx 2cm x0cm- question if this may be the pts natural pigmentation or the beginning of a deep tissue injury- will recommend to continue to monitor to see if it evolves.

## 2021-06-10 NOTE — PROGRESS NOTE ADULT - SUBJECTIVE AND OBJECTIVE BOX
CHIEF COMPLAINT:    Interval Events:    REVIEW OF SYSTEMS:  Constitutional: [ ] fevers [ ] chills [ ] weight loss [ ] weight gain  HEENT: [ ] dry eyes [ ] eye irritation [ ] postnasal drip [ ] nasal congestion  CV: [ ] chest pain [ ] orthopnea [ ] palpitations [ ] murmur  Resp: [ ] cough [ ] shortness of breath [ ] dyspnea [ ] wheezing [ ] sputum [ ] hemoptysis  GI: [ ] nausea [ ] vomiting [ ] diarrhea [ ] constipation [ ] abd pain [ ] dysphagia   : [ ] dysuria [ ] nocturia [ ] hematuria [ ] increased urinary frequency  Musculoskeletal: [ ] back pain [ ] myalgias [ ] arthralgias [ ] fracture  Skin: [ ] rash [ ] itch  Neurological: [ ] headache [ ] dizziness [ ] syncope [ ] weakness [ ] numbness  Psychiatric: [ ] anxiety [ ] depression  Endocrine: [ ] diabetes [ ] thyroid problem  Hematologic/Lymphatic: [ ] anemia [ ] bleeding problem  Allergic/Immunologic: [ ] itchy eyes [ ] nasal discharge [ ] hives [ ] angioedema  [ ] All other systems negative  [ ] Unable to assess ROS because ________    OBJECTIVE:  ICU Vital Signs Last 24 Hrs  T(C): 38 (10 Eliezer 2021 13:45), Max: 38 (10 Eliezer 2021 13:45)  T(F): 100.4 (10 Eliezer 2021 13:45), Max: 100.4 (10 Eliezer 2021 13:45)  HR: 101 (10 Eliezer 2021 14:00) (89 - 105)  BP: --  BP(mean): --  ABP: 107/46 (10 Eliezer 2021 14:00) (103/45 - 125/53)  ABP(mean): 64 (10 Eliezer 2021 14:00) (62 - 79)  RR: 29 (10 Eliezer 2021 14:00) (16 - 29)  SpO2: 93% (10 Eliezer 2021 14:00) (91% - 100%)    Mode: AC/ CMV (Assist Control/ Continuous Mandatory Ventilation), RR (machine): 28, TV (machine): 410, FiO2: 50, PEEP: 10, ITime: 0.75, MAP: 15, PIP: 31  I & O's    06-09 @ 07:01  -  06-10 @ 07:00  --------------------------------------------------------  IN: 4393.1 mL / OUT: 5100 mL / NET: -706.9 mL    06-10 @ 07:01  -  06-10 @ 14:05  --------------------------------------------------------  IN: 1626.1 mL / OUT: 1600 mL / NET: 26.1 mL      CAPILLARY BLOOD GLUCOSE      POCT Blood Glucose.: 290 mg/dL (10 Eliezer 2021 13:34)      PHYSICAL EXAM:  General:   HEENT:   Lymph Nodes:  Neck:   Respiratory:   Cardiovascular:   Abdomen:   Extremities:   Skin:   Neurological:  Psychiatry:    LINES:    HOSPITAL MEDICATIONS:  MEDICATIONS  (STANDING):  albuterol/ipratropium for Nebulization 3 milliLiter(s) Nebulizer every 6 hours  artificial  tears Solution 2 Drop(s) Both EYES every 4 hours  buMETAnide Infusion 2 mG/Hr (10 mL/Hr) IV Continuous <Continuous>  chlorhexidine 0.12% Liquid 15 milliLiter(s) Oral Mucosa every 12 hours  chlorhexidine 4% Liquid 1 Application(s) Topical <User Schedule>  chlorhexidine 4% Liquid 1 Application(s) Topical <User Schedule>  fentaNYL   Infusion... 2 MICROgram(s)/kG/Hr (7.26 mL/Hr) IV Continuous <Continuous>  folic acid 1 milliGRAM(s) Oral daily  heparin   Injectable 5000 Unit(s) SubCutaneous every 8 hours  insulin lispro (ADMELOG) corrective regimen sliding scale   SubCutaneous every 4 hours  ketamine Infusion. 0.251 mG/kG/Hr (1.82 mL/Hr) IV Continuous <Continuous>  lactulose Syrup 30 Gram(s) Oral every 4 hours  methylPREDNISolone sodium succinate Injectable 40 milliGRAM(s) IV Push every 12 hours  metolazone 10 milliGRAM(s) Oral <User Schedule>  multivitamin 1 Tablet(s) Oral daily  norepinephrine Infusion 0.2 MICROgram(s)/kG/Min (13.6 mL/Hr) IV Continuous <Continuous>  pantoprazole  Injectable 40 milliGRAM(s) IV Push daily  propofol Infusion 50.046 MICROgram(s)/kG/Min (21.8 mL/Hr) IV Continuous <Continuous>  rifAXIMin 550 milliGRAM(s) Oral two times a day  thiamine Injectable 100 milliGRAM(s) IV Push daily  trimethoprim / sulfamethoxazole IVPB 360 milliGRAM(s) IV Intermittent every 12 hours    MEDICATIONS  (PRN):  sodium chloride 0.9% lock flush 10 milliLiter(s) IV Push every 1 hour PRN Pre/post blood products, medications, blood draw, and to maintain line patency      LABS:                        7.9    37.64 )-----------( 282      ( 10 Eliezer 2021 00:20 )             24.2     Hgb Trend: 7.9<--, 8.2<--, 7.9<--, 7.7<--, 7.1<--  06-10    141  |  101  |  88<H>  ----------------------------<  253<H>  3.8   |  19<L>  |  2.69<H>    Ca    7.6<L>      10 Eliezer 2021 07:49  Phos  5.8     06-10  Mg     2.0     06-10    TPro  6.1  /  Alb  2.9<L>  /  TBili  11.7<H>  /  DBili  x   /  AST  164<H>  /  ALT  65<H>  /  AlkPhos  227<H>  06-10    Creatinine Trend: 2.69<--, 2.60<--, 2.45<--, 2.42<--, 2.40<--, 2.40<--  PT/INR - ( 10 Eliezer 2021 00:20 )   PT: 22.2 sec;   INR: 1.91 ratio         PTT - ( 10 Eliezer 2021 00:20 )  PTT:42.7 sec    Arterial Blood Gas:  06-10 @ 00:15  7.33/47/96/24/97/-1.3  ABG lactate: --  Arterial Blood Gas:  06-09 @ 05:43  7.35/46/95/25/96/-.2  ABG lactate: --  Arterial Blood Gas:  06-09 @ 00:14  7.33/50/99/26/97/.4  ABG lactate: --  Arterial Blood Gas:  06-08 @ 15:31  7.34/49/109/26/97/.2  ABG lactate: --  Arterial Blood Gas:  06-08 @ 14:06  7.33/50/125/26/99/.2  ABG lactate: --        MICROBIOLOGY:     RADIOLOGY:  [ ] Reviewed and interpreted by me    EKG: Chief Complaint: Cellulitis     Overnight events: None- ammonia levels c/w increasing     HPI:  41 yr old male with PMHx EtOH abuse , alcoholic cirrhosis, recent decompensated cirrhosis requiring hospitalization (4/20-27/2021) placed on clinical trial [id number] RCT (steroids vs placebo- unblinded and found to have received steroid therapy) who was originally admitted 5/27/21 with LLE cellulitis. Course c/b progressive worsening hypoxic resp failure with septic shock eventually requiring intubation and vasopressor therapy  requiring admission to MICU (6/4/21). Course further c/b GINNY and ACLF, development of diffuse macular purpuric rash (felt due to drug reaction - resolving). CT chest 6/1/21 demonstrated Patchy ground glass opacities within bilateral upper lobes and bilateral lower lobes likely representing multifocal pneumonia, positive fungitell with unknown significance.    MICU course to date consist of Paracentesis with removal of 1.9 liters (6/5/21), vasopressor therapy, PCP/PJP therapy with bactrim, s/p vitamin K and FFP, addition of bumex/metolazone therapy, s/p bronchoscopy 6/6/21), poor P/F ratios (ARDS) prompting utilization of nimbex(6/8/21)    REVIEW OF SYSTEMS:  [x ] Unable to assess ROS because pt is sedated / chemically paralyzed on Nimbex ________    OBJECTIVE:  ICU Vital Signs Last 24 Hrs  T(C): 38 (10 Eliezer 2021 13:45), Max: 38 (10 Eliezer 2021 13:45)  T(F): 100.4 (10 Eliezer 2021 13:45), Max: 100.4 (10 Eliezer 2021 13:45)  HR: 101 (10 Eliezer 2021 14:00) (89 - 105)  BP: --  BP(mean): --  ABP: 107/46 (10 Eliezer 2021 14:00) (103/45 - 125/53)  ABP(mean): 64 (10 Eliezer 2021 14:00) (62 - 79)  RR: 29 (10 Eliezer 2021 14:00) (16 - 29)  SpO2: 93% (10 Eliezer 2021 14:00) (91% - 100%)    Mode: AC/ CMV (Assist Control/ Continuous Mandatory Ventilation), RR (machine): 28, TV (machine): 410, FiO2: 50, PEEP: 10, ITime: 0.75, MAP: 15, PIP: 31  I & O's    06-09 @ 07:01  -  06-10 @ 07:00  --------------------------------------------------------  IN: 4393.1 mL / OUT: 5100 mL / NET: -706.9 mL    06-10 @ 07:01  -  06-10 @ 14:05  --------------------------------------------------------  IN: 1626.1 mL / OUT: 1600 mL / NET: 26.1 mL      CAPILLARY BLOOD GLUCOSE  POCT Blood Glucose.: 290 mg/dL (10 Eliezer 2021 13:34)      PHYSICAL EXAM:  GENERAL: heavily sedated / chemically paralyzed on Nimbex    HEENT: normocephalic, atraumatic, conjunctiva clear, sclarea icteric. oral mucosa moist , neck supple, neg JVD    Neuro: Sedated / on paralytics, +PERRL- pinpoint - sluggish, TOF 2/4    CV: SR w/o ectopy, all pulses palp, S1S2 RRR, no murmurs, rubs, gallops    Pulm: orally intubated, VC 28/410/50%/+12- not triggering vent -bilaterally equal BS / chest excursion, POCUS: Blines predominant on POCUS    GI: distended, +hypoactive BS x 4, +BM- liquid / rectal tube in place, soft, + moderated ascites noted    : burnham to BSD- clear light yellow / on BUMEX gtt u/o 100cc/hr    SKIN: warm, dry, intact,  color jaundice, + light erythema, macular diffuse rash , without palpable nodes- refer to nsg - skin note- +DTR     LINES:    HOSPITAL MEDICATIONS:  MEDICATIONS  (STANDING):  albuterol/ipratropium for Nebulization 3 milliLiter(s) Nebulizer every 6 hours  artificial  tears Solution 2 Drop(s) Both EYES every 4 hours  buMETAnide Infusion 2 mG/Hr (10 mL/Hr) IV Continuous <Continuous>  chlorhexidine 0.12% Liquid 15 milliLiter(s) Oral Mucosa every 12 hours  chlorhexidine 4% Liquid 1 Application(s) Topical <User Schedule>  chlorhexidine 4% Liquid 1 Application(s) Topical <User Schedule>  fentaNYL   Infusion... 2 MICROgram(s)/kG/Hr (7.26 mL/Hr) IV Continuous <Continuous>  folic acid 1 milliGRAM(s) Oral daily  heparin   Injectable 5000 Unit(s) SubCutaneous every 8 hours  insulin lispro (ADMELOG) corrective regimen sliding scale   SubCutaneous every 4 hours  ketamine Infusion. 0.251 mG/kG/Hr (1.82 mL/Hr) IV Continuous <Continuous>  lactulose Syrup 30 Gram(s) Oral every 4 hours  methylPREDNISolone sodium succinate Injectable 40 milliGRAM(s) IV Push every 12 hours  metolazone 10 milliGRAM(s) Oral <User Schedule>  multivitamin 1 Tablet(s) Oral daily  norepinephrine Infusion 0.2 MICROgram(s)/kG/Min (13.6 mL/Hr) IV Continuous <Continuous>  pantoprazole  Injectable 40 milliGRAM(s) IV Push daily  propofol Infusion 50.046 MICROgram(s)/kG/Min (21.8 mL/Hr) IV Continuous <Continuous>  rifAXIMin 550 milliGRAM(s) Oral two times a day  thiamine Injectable 100 milliGRAM(s) IV Push daily  trimethoprim / sulfamethoxazole IVPB 360 milliGRAM(s) IV Intermittent every 12 hours    MEDICATIONS  (PRN):  sodium chloride 0.9% lock flush 10 milliLiter(s) IV Push every 1 hour PRN Pre/post blood products, medications, blood draw, and to maintain line patency      LABS:                        7.9    37.64 )-----------( 282      ( 10 Eliezer 2021 00:20 )             24.2     Hgb Trend: 7.9<--, 8.2<--, 7.9<--, 7.7<--, 7.1<--  06-10    141  |  101  |  88<H>  ----------------------------<  253<H>  3.8   |  19<L>  |  2.69<H>    Ca    7.6<L>      10 Eliezer 2021 07:49  Phos  5.8     06-10  Mg     2.0     06-10    TPro  6.1  /  Alb  2.9<L>  /  TBili  11.7<H>  /  DBili  x   /  AST  164<H>  /  ALT  65<H>  /  AlkPhos  227<H>  06-10    Creatinine Trend: 2.69<--, 2.60<--, 2.45<--, 2.42<--, 2.40<--, 2.40<--  PT/INR - ( 10 Eliezer 2021 00:20 )   PT: 22.2 sec;   INR: 1.91 ratio         PTT - ( 10 Eliezer 2021 00:20 )  PTT:42.7 sec    Arterial Blood Gas:  06-10 @ 00:15  7.33/47/96/24/97/-1.3  ABG lactate: --  Arterial Blood Gas:  06-09 @ 05:43  7.35/46/95/25/96/-.2  ABG lactate: --  Arterial Blood Gas:  06-09 @ 00:14  7.33/50/99/26/97/.4  ABG lactate: --  Arterial Blood Gas:  06-08 @ 15:31  7.34/49/109/26/97/.2  ABG lactate: --  Arterial Blood Gas:  06-08 @ 14:06  7.33/50/125/26/99/.2  ABG lactate: --      MICROBIOLOGY:   Pneumocystis PCR Result: Positive: Critical Result.   Culture - Acid Fast - Bronchial w/Smear (06.06.21 @ 20:56)   Specimen Source: BAL Other   Acid Fast Bacilli Smear:   No acid fast bacilli seen by fluorochrome stain   Culture Results:   Culture is being performed.   Culture - Fungal, Bronchial (06.06.21 @ 20:55)   Specimen Source: BAL BAL   Culture Results:   Testing in progress     Culture - Fungal, Body Fluid (06.05.21 @ 21:30)   Specimen Source: .Body Fluid Peritoneal Fluid   Culture Results:   Testing in progress     Body Fluid with Gram Stain (06.05.21 @ 21:30)   Gram Stain:   polymorphonuclear leukocytes seen   No organisms seen   by cytocentrifuge   Specimen Source: .Body Fluid Peritoneal Fluid   Culture Results:   No growth     RADIOLOGY:  < from: CT Angio Chest PE Protocol w/ IV Cont (06.01.21 @ 13:21) >    EXAM:  CT ANGIO CHEST PULM ART [facility]                            PROCEDURE DATE:  06/01/2021            INTERPRETATION:  INDICATION, CLINICAL INFORMATION: Shortness of breath, cirrhosis, lower extremity swelling, pain, leukocytosis, fever      TECHNIQUE: CT pulmonary angiogram of the chest was performed. Coronal and sagittal images were reconstructed. Maximum intensity projection images were generated. Images were obtained after the uneventful administration of 90 cc nonionic intravenous Omnipaque 350. 10 cc of Omnipaque 350 was discarded.      COMPARISON: None.    FINDINGS:    PULMONARY VESSELS: Limited exam for evaluation of pulmonary embolus secondary to motion, streak artifact and poor opacification of pulmonary arteries. Main pulmonary artery normal in diameter.    HEART AND VASCULATURE: Heart size is normal. No pericardial effusion. No aortic aneurysm or dissection.    LUNGS, AIRWAYS, PLEURA: Patent central airways. Patchy opacities within bilateral upper lobes and bilateral lower lobes likely representing multifocal pneumonia. 1.2 cm right lower lobe nodule. No pleural effusions or pneumothorax. The right hemidiaphragm is elevated.    MEDIASTINUM: No mass or lymphadenopathy.    UPPER ABDOMEN: Ascites.    BONES AND SOFT TISSUES: No aggressive osseous lesion.    LOWER NECK: Within normal limits.    IMPRESSION:    Limited exam for evaluation of pulmonary embolus secondary to motion, streak artifact and poor opacification of pulmonary arteries.    Patchy opacities within bilateral upper lobes and bilateral lower lobes likely representing multifocal pneumonia.    1.2 cm right lower lobe nodule. Recommend follow-up chest CT in 3 months to determine stability.      < from: TTE with Doppler (w/Cont) (06.09.21 @ 07:38) >    Patient name: OSIRIS HALE  YOB: 1980   Age: 41 (M)   MR#: [id number]  Study Date: 6/9/2021  Location: [place] [id number]: Rowan Babin RDCS  Study quality: Technically fair  Referring Physician: Adela Chan MD  Blood Pressure: 117/52 mmHg  Height: 157 cm  Weight: 73 kg  BSA: 1.7 m2  Heart Rate: 86 mmHg  ------------------------------------------------------------------------  PROCEDURE: Transthoracic echocardiogram with 2-D, M-Mode  and complete spectral and color flow Doppler. Patient was  injected with 10 cc's of aerosolized saline. Patient was  injected with 10 cc's of aerosolized saline. Verbal consent  was obtained for injection of  Ultrasonic Enhancing Agent  following a discussion of risks and benefits. Following  intravenous injection of Ultrasonic Enhancing Agent ,  harmonic imaging was performed.  INDICATION: Shock, unspecified (R57.9), Acute respiratory  failure with hypoxia (J96.01)  ------------------------------------------------------------------------  Dimensions:    Normal Values:  LA:     4.5    2.0 - 4.0 cm  Ao:     3.4    2.0 - 3.8 cm  SEPTUM: 0.9    0.6 - 1.2 cm  PWT:    0.9    0.6 - 1.1 cm  LVIDd:  5.1    3.0 - 5.6 cm  LVIDs:  2.3    1.8 - 4.0 cm  Derived variables:  LVMI: 94 g/m2  RWT: 0.35  Fractional short: 55 %  EF (Carrillo Rule): 76 %  ------------------------------------------------------------------------  Observations:  Mitral Valve: Normal mitral valve. Minimal mitral  regurgitation.  Aortic Valve/Aorta: Normal trileaflet aortic valve. No  aortic valve regurgitation seen.  Aortic Root: 3.4 cm.  Left Atrium: Mildly dilated left atrium.  LA volume index =  37 cc/m2.  Left Ventricle: Hyperdynamic left ventricular systolic  function.  Endocardial visualization enhanced with  intravenous injection of Ultrasonic Enhancing Agent  (Definity). No left ventricular thrombus. Normal left  ventricular internal dimensions and wall thicknesses.  Normal diastolic function  Right Heart: Normal right atrium. Normal right ventricular  size and function. Normal tricuspid valve. Minimal  tricuspid regurgitation. Normal pulmonic valve. No pulmonic  regurgitation.  Pericardium/Pleura: Normal pericardium with no pericardial  effusion.  Hemodynamic: Estimated right atrial pressure is 8 mmHg.  Estimated right ventricular systolic pressure equals 42 mm  Hg, assuming right atrial pressure equals 8 mm Hg,

## 2021-06-10 NOTE — ADVANCED PRACTICE NURSE CONSULT - RECOMMEDATIONS
Will recommend the following  1. B/l buttocks: continue to monitor, routine pericare with Taylor  2. Continue with turning and positioning  3. Complete Cair boots  4. Nutrition support as pt condition allows  Tx plan discussed with RN

## 2021-06-10 NOTE — PROGRESS NOTE ADULT - ASSESSMENT
41 M originally from [facility] PMHx of ETOH abuse (quit early April) c/b decompensated alcoholic cirrhosis, recent admission (4/20-4/27) for decompensated cirrhosis, presented with b/l LE swelling and LLE foot pain  Leukocytosis, fever  Elevated LFTs in setting cirrhosis  CT A/P notes lower lung opacities with concern for possible infection  CT chest with patchy opacities  Critically ill, in ICU, on pressors, intubated, s/p bronch  Now PCR positive for PCP  Overall,  1) Fever, Leukocytosis  - Now elevated Fungitell in setting of progressively worsening resp status  - Empiric Livia through 6/10/21 then discontinue  - F/U BCXs  - Trend WBC to normal, monitor for further fevers  2) PJP  - Fungitell positive, LDH equivocal, but now PCR positive  - Continue Bactrim (dose based on CrCl), agree with dose increase  - Continue steroids with scheduled taper  - O2 support per ICU team  3) Rash  - F/U Derm    Sourav Jimenez MD  Pager [phone]  After 5pm and on weekends call [phone]

## 2021-06-11 NOTE — PROGRESS NOTE ADULT - SUBJECTIVE AND OBJECTIVE BOX
CHIEF COMPLAINT:    Interval Events:    REVIEW OF SYSTEMS:  Constitutional: [ ] fevers [ ] chills [ ] weight loss [ ] weight gain  HEENT: [ ] dry eyes [ ] eye irritation [ ] postnasal drip [ ] nasal congestion  CV: [ ] chest pain [ ] orthopnea [ ] palpitations [ ] murmur  Resp: [ ] cough [ ] shortness of breath [ ] dyspnea [ ] wheezing [ ] sputum [ ] hemoptysis  GI: [ ] nausea [ ] vomiting [ ] diarrhea [ ] constipation [ ] abd pain [ ] dysphagia   : [ ] dysuria [ ] nocturia [ ] hematuria [ ] increased urinary frequency  Musculoskeletal: [ ] back pain [ ] myalgias [ ] arthralgias [ ] fracture  Skin: [ ] rash [ ] itch  Neurological: [ ] headache [ ] dizziness [ ] syncope [ ] weakness [ ] numbness  Psychiatric: [ ] anxiety [ ] depression  Endocrine: [ ] diabetes [ ] thyroid problem  Hematologic/Lymphatic: [ ] anemia [ ] bleeding problem  Allergic/Immunologic: [ ] itchy eyes [ ] nasal discharge [ ] hives [ ] angioedema  [ ] All other systems negative  [ ] Unable to assess ROS because ________    OBJECTIVE:  ICU Vital Signs Last 24 Hrs  T(C): 36.9 (2021 08:00), Max: 38.2 (10 Eliezer 2021 15:00)  T(F): 98.5 (2021 08:00), Max: 100.8 (10 Eliezer 2021 15:00)  HR: 102 (2021 12:15) (91 - 107)  BP: --  BP(mean): --  ABP: 107/47 (2021 12:15) (104/44 - 119/54)  ABP(mean): 68 (2021 12:15) (63 - 78)  RR: 30 (2021 12:15) (16 - 39)  SpO2: 91% (2021 12:15) (90% - 100%)    Mode: AC/ CMV (Assist Control/ Continuous Mandatory Ventilation), RR (machine): 30, TV (machine): 410, FiO2: 50, PEEP: 8, ITime: 0.75, MAP: 14, PIP: 30  I & O's    -10 @ 07:01  -   @ 07:00  --------------------------------------------------------  IN: 4531.4 mL / OUT: 2845 mL / NET: 1686.4 mL     @ 07:01   @ 12:46  --------------------------------------------------------  IN: 693.5 mL / OUT: 100 mL / NET: 593.5 mL      CAPILLARY BLOOD GLUCOSE      POCT Blood Glucose.: 328 mg/dL (2021 11:01)      PHYSICAL EXAM:  General:   HEENT:   Lymph Nodes:  Neck:   Respiratory:   Cardiovascular:   Abdomen:   Extremities:   Skin:   Neurological:  Psychiatry:    LINES:    HOSPITAL MEDICATIONS:  MEDICATIONS  (STANDING):  albuterol/ipratropium for Nebulization 3 milliLiter(s) Nebulizer every 6 hours  artificial  tears Solution 2 Drop(s) Both EYES every 4 hours  buMETAnide Infusion 2 mG/Hr (10 mL/Hr) IV Continuous <Continuous>  chlorhexidine 0.12% Liquid 15 milliLiter(s) Oral Mucosa every 12 hours  chlorhexidine 4% Liquid 1 Application(s) Topical <User Schedule>  dextrose 50% Injectable 50 milliLiter(s) IV Push every 15 minutes  fentaNYL   Infusion... 2 MICROgram(s)/kG/Hr (7.26 mL/Hr) IV Continuous <Continuous>  folic acid 1 milliGRAM(s) Oral daily  heparin   Injectable 5000 Unit(s) SubCutaneous every 8 hours  insulin regular  human recombinant. 5 Unit(s) IV Push once  insulin regular Infusion 3 Unit(s)/Hr (3 mL/Hr) IV Continuous <Continuous>  ketamine Infusion. 0.251 mG/kG/Hr (1.82 mL/Hr) IV Continuous <Continuous>  metolazone 10 milliGRAM(s) Oral <User Schedule>  multivitamin 1 Tablet(s) Oral daily  norepinephrine Infusion 0.2 MICROgram(s)/kG/Min (13.6 mL/Hr) IV Continuous <Continuous>  pantoprazole  Injectable 40 milliGRAM(s) IV Push daily  predniSONE  Solution 40 milliGRAM(s) Oral daily  propofol Infusion 50.046 MICROgram(s)/kG/Min (21.8 mL/Hr) IV Continuous <Continuous>  rifAXIMin 550 milliGRAM(s) Oral two times a day  thiamine Injectable 100 milliGRAM(s) IV Push daily  trimethoprim / sulfamethoxazole IVPB 360 milliGRAM(s) IV Intermittent every 12 hours    MEDICATIONS  (PRN):  sodium chloride 0.9% lock flush 10 milliLiter(s) IV Push every 1 hour PRN Pre/post blood products, medications, blood draw, and to maintain line patency      LABS:                        7.6    44.88 )-----------( 297      ( 2021 00:27 )             24.6     Hgb Trend: 7.6<--, 7.9<--, 8.2<--, 7.9<--, 7.7<--  06-11    139  |  101  |  102<H>  ----------------------------<  297<H>  3.9   |  14<L>  |  3.13<H>    Ca    7.1<L>      2021 05:33  Phos  7.0     06-11  Mg     2.1     06-11    TPro  5.7<L>  /  Alb  2.6<L>  /  TBili  10.0<H>  /  DBili  x   /  AST  123<H>  /  ALT  65<H>  /  AlkPhos  260<H>  -11    Creatinine Trend: 3.13<--, 3.07<--, 2.89<--, 2.69<--, 2.60<--, 2.45<--  PT/INR - ( 2021 00:27 )   PT: 25.1 sec;   INR: 2.17 ratio         PTT - ( 2021 00:27 )  PTT:47.4 sec  Urinalysis Basic - ( 10 Eliezer 2021 17:12 )    Color: Yellow / Appearance: Clear / S.016 / pH: x  Gluc: x / Ketone: Negative  / Bili: Small / Urobili: Negative   Blood: x / Protein: Trace / Nitrite: Negative   Leuk Esterase: Negative / RBC: 2 /hpf / WBC 0 /HPF   Sq Epi: x / Non Sq Epi: 0 /hpf / Bacteria: Negative      Arterial Blood Gas:   @ 05:27  7.24/44/108/18/97/-7.9  ABG lactate: --  Arterial Blood Gas:   @ 00:29  7.27/47/129/21/98/-5.5  ABG lactate: --  Arterial Blood Gas:  06-10 @ 00:15  7.33/47/96/24/97/-1.3  ABG lactate: --        MICROBIOLOGY:     RADIOLOGY:  [ ] Reviewed and interpreted by me    EKG: CHIEF COMPLAINT:    Interval Events:    REVIEW OF SYSTEMS:  Constitutional: [ ] fevers [ ] chills [ ] weight loss [ ] weight gain  HEENT: [ ] dry eyes [ ] eye irritation [ ] postnasal drip [ ] nasal congestion  CV: [ ] chest pain [ ] orthopnea [ ] palpitations [ ] murmur  Resp: [ ] cough [ ] shortness of breath [ ] dyspnea [ ] wheezing [ ] sputum [ ] hemoptysis  GI: [ ] nausea [ ] vomiting [ ] diarrhea [ ] constipation [ ] abd pain [ ] dysphagia   : [ ] dysuria [ ] nocturia [ ] hematuria [ ] increased urinary frequency  Musculoskeletal: [ ] back pain [ ] myalgias [ ] arthralgias [ ] fracture  Skin: [ ] rash [ ] itch  Neurological: [ ] headache [ ] dizziness [ ] syncope [ ] weakness [ ] numbness  Psychiatric: [ ] anxiety [ ] depression  Endocrine: [ ] diabetes [ ] thyroid problem  Hematologic/Lymphatic: [ ] anemia [ ] bleeding problem  Allergic/Immunologic: [ ] itchy eyes [ ] nasal discharge [ ] hives [ ] angioedema  [ ] All other systems negative  [ ] Unable to assess ROS because ________    OBJECTIVE:  ICU Vital Signs Last 24 Hrs  T(C): 37 (2021 12:00), Max: 37.6 (2021 04:00)  T(F): 98.6 (2021 12:00), Max: 99.7 (2021 04:00)  HR: 104 (2021 15:00) (91 - 107)  BP: --  BP(mean): --  ABP: 130/51 (2021 15:00) (104/44 - 135/53)  ABP(mean): 72 (2021 15:00) (63 - 78)  RR: 32 (2021 15:00) (28 - 39)  SpO2: 94% (2021 15:00) (89% - 100%)    Mode: AC/ CMV (Assist Control/ Continuous Mandatory Ventilation), RR (machine): 30, TV (machine): 410, FiO2: 50, PEEP: 8, ITime: 0.75, MAP: 14, PIP: 30  I & O's    -10 @ 07:01  -  - @ 07:00  --------------------------------------------------------  IN: 4531.4 mL / OUT: 2845 mL / NET: 1686.4 mL     @ 07:01   @ 16:04  --------------------------------------------------------  IN: 1570.5 mL / OUT: 125 mL / NET: 1445.5 mL      CAPILLARY BLOOD GLUCOSE      POCT Blood Glucose.: 322 mg/dL (2021 15:14)      PHYSICAL EXAM:  General:   HEENT:   Lymph Nodes:  Neck:   Respiratory:   Cardiovascular:   Abdomen:   Extremities:   Skin:   Neurological:  Psychiatry:    LINES:    HOSPITAL MEDICATIONS:  MEDICATIONS  (STANDING):  albuterol/ipratropium for Nebulization 3 milliLiter(s) Nebulizer every 6 hours  artificial  tears Solution 2 Drop(s) Both EYES every 4 hours  buMETAnide Infusion 2 mG/Hr (10 mL/Hr) IV Continuous <Continuous>  chlorhexidine 0.12% Liquid 15 milliLiter(s) Oral Mucosa every 12 hours  chlorhexidine 4% Liquid 1 Application(s) Topical <User Schedule>  CRRT Treatment    <Continuous>  dextrose 50% Injectable 50 milliLiter(s) IV Push every 15 minutes  fentaNYL   Infusion... 2 MICROgram(s)/kG/Hr (7.26 mL/Hr) IV Continuous <Continuous>  folic acid 1 milliGRAM(s) Oral daily  heparin   Injectable 5000 Unit(s) SubCutaneous every 8 hours  insulin regular Infusion 3 Unit(s)/Hr (3 mL/Hr) IV Continuous <Continuous>  ketamine Infusion. 0.251 mG/kG/Hr (1.82 mL/Hr) IV Continuous <Continuous>  metolazone 10 milliGRAM(s) Oral <User Schedule>  multivitamin 1 Tablet(s) Oral daily  norepinephrine Infusion 0.2 MICROgram(s)/kG/Min (13.6 mL/Hr) IV Continuous <Continuous>  pantoprazole  Injectable 40 milliGRAM(s) IV Push daily  predniSONE  Solution 40 milliGRAM(s) Oral daily  PrismaSATE Dialysate BK 0 / 3.5 5000 milliLiter(s) (1000 mL/Hr) CRRT <Continuous>  PrismaSOL Filtration BGK 4 / 2.5 5000 milliLiter(s) (1000 mL/Hr) CRRT <Continuous>  PrismaSOL Filtration BGK 4 / 2.5 5000 milliLiter(s) (200 mL/Hr) CRRT <Continuous>  propofol Infusion 50.046 MICROgram(s)/kG/Min (21.8 mL/Hr) IV Continuous <Continuous>  rifAXIMin 550 milliGRAM(s) Oral two times a day  thiamine Injectable 100 milliGRAM(s) IV Push daily  trimethoprim / sulfamethoxazole IVPB 360 milliGRAM(s) IV Intermittent every 12 hours    MEDICATIONS  (PRN):  sodium chloride 0.9% lock flush 10 milliLiter(s) IV Push every 1 hour PRN Pre/post blood products, medications, blood draw, and to maintain line patency      LABS:                        7.6    44.88 )-----------( 297      ( 2021 00:27 )             24.6     Hgb Trend: 7.6<--, 7.9<--, 8.2<--, 7.9<--, 7.7<--  0611    139  |  100  |  116<H>  ----------------------------<  357<H>  4.3   |  13<L>  |  3.43<H>    Ca    7.2<L>      2021 13:16  Phos  7.8       Mg     2.3         TPro  5.7<L>  /  Alb  2.6<L>  /  TBili  10.0<H>  /  DBili  x   /  AST  123<H>  /  ALT  65<H>  /  AlkPhos  260<H>      Creatinine Trend: 3.43<--, 3.13<--, 3.07<--, 2.89<--, 2.69<--, 2.60<--  PT/INR - ( 2021 00:27 )   PT: 25.1 sec;   INR: 2.17 ratio         PTT - ( 2021 00:27 )  PTT:47.4 sec  Urinalysis Basic - ( 10 Eliezer 2021 17:12 )    Color: Yellow / Appearance: Clear / S.016 / pH: x  Gluc: x / Ketone: Negative  / Bili: Small / Urobili: Negative   Blood: x / Protein: Trace / Nitrite: Negative   Leuk Esterase: Negative / RBC: 2 /hpf / WBC 0 /HPF   Sq Epi: x / Non Sq Epi: 0 /hpf / Bacteria: Negative      Arterial Blood Gas:   @ 13:12  7.20/43/82/16/93/-10.7  ABG lactate: --  Arterial Blood Gas:   @ 05:27  7.24/44/108/18/97/-7.9  ABG lactate: --  Arterial Blood Gas:   @ 00:29  7.27/47/129/21/98/-5.5  ABG lactate: --  Arterial Blood Gas:  06-10 @ 00:15  7.33/47/96/24/97/-1.3  ABG lactate: --        MICROBIOLOGY:     RADIOLOGY:  [ ] Reviewed and interpreted by me    EKG: Chief Complaint: Cellulitis     Overnight events: Worsening metabolic acidosis- serum HCO3 14 / PH 7.27, Pao2 129 - peep decreased to +8, pressor - norepinephrine requirements increased, worsening leukocytosis may be r/t steroidal use vs pt was febrile yesterday- pan cx yest, ammonia levels c/w increasing 157 today from 140 and 120 the day prior despite lactulose and rifaximin started yesterday, and pt with worsening hyperglycemia- NPH started overnight in addition to pt moderate scale AISS FS q 4H, worsening renal function with decreased U/O 20cc/H from >100cc/h.    HPI:  41 yr old male with PMHx EtOH abuse , alcoholic cirrhosis, recent decompensated cirrhosis requiring hospitalization (-) placed on clinical trial [id number] RCT (steroids vs placebo- unblinded and found to have received steroid therapy) who was originally admitted 21 with LLE cellulitis. Course c/b progressive worsening hypoxic resp failure with septic shock eventually requiring intubation and vasopressor therapy  requiring admission to MICU (21). Course further c/b GINNY and ACLF, development of diffuse macular purpuric rash (felt due to drug reaction - resolving). CT chest 21 demonstrated Patchy ground glass opacities within bilateral upper lobes and bilateral lower lobes likely representing multifocal pneumonia, positive fungitell with unknown significance.    MICU course to date consist of Paracentesis with removal of 1.9 liters (21), vasopressor therapy, PCP/PJP therapy with bactrim, s/p vitamin K and FFP, addition of bumex/metolazone therapy, s/p bronchoscopy 21), poor P/F ratios (ARDS) prompting utilization of nimbex(21)- off yesterday 6/10. dd    REVIEW OF SYSTEMS:  [x ] Unable to assess ROS because pt is sedated / chemically paralyzed on Nimbex ________    OBJECTIVE:  ICU Vital Signs Last 24 Hrs  T(C): 37 (2021 12:00), Max: 37.6 (2021 04:00)  T(F): 98.6 (2021 12:00), Max: 99.7 (2021 04:00)  HR: 104 (2021 15:00) (91 - 107)  BP: --  BP(mean): --  ABP: 130/51 (2021 15:00) (104/44 - 135/53)  ABP(mean): 72 (2021 15:00) (63 - 78)  RR: 32 (2021 15:00) (28 - 39)  SpO2: 94% (2021 15:00) (89% - 100%)    Mode: AC/ CMV (Assist Control/ Continuous Mandatory Ventilation), RR (machine): 30, TV (machine): 410, FiO2: 50, PEEP: 8, ITime: 0.75, MAP: 14, PIP: 30  I & O's    06-10 @ 07:  -   @ 07:00  --------------------------------------------------------  IN: 4531.4 mL / OUT: 2845 mL / NET: 1686.4 mL     @ 07:01  -   @ 16:04  --------------------------------------------------------  IN: 1570.5 mL / OUT: 125 mL / NET: 1445.5 mL      CAPILLARY BLOOD GLUCOSE  POCT Blood Glucose.: 322 mg/dL (2021 15:14)    PHYSICAL EXAM:  GENERAL: heavily sedated - non responsive    HEENT: normocephalic, atraumatic, conjunctiva clear, sclarea icteric. oral mucosa moist , neck supple, neg JVD    Neuro: Sedated , +PERRL- pinpoint - sluggish    CV: SR w/o ectopy, all pulses palp, S1S2 RRR, no murmurs, rubs, gallops    Pulm: orally intubated, VC 28/410/50%/+8- not triggering vent -bilaterally equal BS / chest excursion, POCUS: Blines predominant on POCUS- improving    GI: distended, +hypoactive BS x 4, +BM- liquid / rectal tube in place, soft, + moderated ascites noted    : [place] to BSD- clear light yellow / on BUMEX gtt u/o now only 20cc/h    SKIN: warm, dry, intact,  color jaundice, + light erythema, macular diffuse rash , without palpable nodes- refer to nsg - skin note- +DTR     LINES: [facility], [facility] MEDICATIONS:  MEDICATIONS  (STANDING):  albuterol/ipratropium for Nebulization 3 milliLiter(s) Nebulizer every 6 hours  artificial  tears Solution 2 Drop(s) Both EYES every 4 hours  buMETAnide Infusion 2 mG/Hr (10 mL/Hr) IV Continuous <Continuous>  chlorhexidine 0.12% Liquid 15 milliLiter(s) Oral Mucosa every 12 hours  chlorhexidine 4% Liquid 1 Application(s) Topical <User Schedule>  CRRT Treatment    <Continuous>  dextrose 50% Injectable 50 milliLiter(s) IV Push every 15 minutes  fentaNYL   Infusion... 2 MICROgram(s)/kG/Hr (7.26 mL/Hr) IV Continuous <Continuous>  folic acid 1 milliGRAM(s) Oral daily  heparin   Injectable 5000 Unit(s) SubCutaneous every 8 hours  insulin regular Infusion 3 Unit(s)/Hr (3 mL/Hr) IV Continuous <Continuous>  ketamine Infusion. 0.251 mG/kG/Hr (1.82 mL/Hr) IV Continuous <Continuous>  metolazone 10 milliGRAM(s) Oral <User Schedule>  multivitamin 1 Tablet(s) Oral daily  norepinephrine Infusion 0.2 MICROgram(s)/kG/Min (13.6 mL/Hr) IV Continuous <Continuous>  pantoprazole  Injectable 40 milliGRAM(s) IV Push daily  predniSONE  Solution 40 milliGRAM(s) Oral daily  PrismaSATE Dialysate BK 0 / 3.5 5000 milliLiter(s) (1000 mL/Hr) CRRT <Continuous>  PrismaSOL Filtration BGK 4 / 2.5 5000 milliLiter(s) (1000 mL/Hr) CRRT <Continuous>  PrismaSOL Filtration BGK 4 / 2.5 5000 milliLiter(s) (200 mL/Hr) CRRT <Continuous>  propofol Infusion 50.046 MICROgram(s)/kG/Min (21.8 mL/Hr) IV Continuous <Continuous>  rifAXIMin 550 milliGRAM(s) Oral two times a day  thiamine Injectable 100 milliGRAM(s) IV Push daily  trimethoprim / sulfamethoxazole IVPB 360 milliGRAM(s) IV Intermittent every 12 hours    MEDICATIONS  (PRN):  sodium chloride 0.9% lock flush 10 milliLiter(s) IV Push every 1 hour PRN Pre/post blood products, medications, blood draw, and to maintain line patency      LABS:                        7.6    44.88 )-----------( 297      ( 2021 00:27 )             24.6     Hgb Trend: 7.6<--, 7.9<--, 8.2<--, 7.9<--, 7.7<--  11    139  |  100  |  116<H>  ----------------------------<  357<H>  4.3   |  13<L>  |  3.43<H>    Ca    7.2<L>      2021 13:16  Phos  7.8       Mg     2.3         TPro  5.7<L>  /  Alb  2.6<L>  /  TBili  10.0<H>  /  DBili  x   /  AST  123<H>  /  ALT  65<H>  /  AlkPhos  260<H>      Creatinine Trend: 3.43<--, 3.13<--, 3.07<--, 2.89<--, 2.69<--, 2.60<--  PT/INR - ( 2021 00:27 )   PT: 25.1 sec;   INR: 2.17 ratio         PTT - ( 2021 00:27 )  PTT:47.4 sec  Urinalysis Basic - ( 10 Eliezer 2021 17:12 )    Color: Yellow / Appearance: Clear / S.016 / pH: x  Gluc: x / Ketone: Negative  / Bili: Small / Urobili: Negative   Blood: x / Protein: Trace / Nitrite: Negative   Leuk Esterase: Negative / RBC: 2 /hpf / WBC 0 /HPF   Sq Epi: x / Non Sq Epi: 0 /hpf / Bacteria: Negative      Arterial Blood Gas:   @ 13:12  7.20/43/82/16/93/-10.7  ABG lactate: --  Arterial Blood Gas:   @ 05:27  7.24/44/108/18/97/-7.9  ABG lactate: --  Arterial Blood Gas:   @ 00:29  7.27/47/129/21/98/-5.5  ABG lactate: --  Arterial Blood Gas:  06-10 @ 00:15  7.33/47/96/24/97/-1.3  ABG lactate: --    MICROBIOLOGY:   MICROBIOLOGY:   Pneumocystis PCR Result: Positive: Critical Result.   Culture - Acid Fast - Bronchial w/Smear (21 @ 20:56)   Specimen Source: BAL Other   Acid Fast Bacilli Smear:   No acid fast bacilli seen by fluorochrome stain   Culture Results:   Culture is being performed.   Culture - Fungal, Bronchial (21 @ 20:55)   Specimen Source: BAL BAL   Culture Results:   Testing in progress     Culture - Fungal, Body Fluid (21 @ 21:30)   Specimen Source: .Body Fluid Peritoneal Fluid   Culture Results:   Testing in progress     Body Fluid with Gram Stain (21 @ 21:30)   Gram Stain:   polymorphonuclear leukocytes seen   No organisms seen   by cytocentrifuge   Specimen Source: .Body Fluid Peritoneal Fluid   Culture Results:   No growth   RADIOLOGY:      RADIOLOGY:  < from: CT Angio Chest PE Protocol w/ IV Cont (21 @ 13:21) >    EXAM:  CT ANGIO CHEST PULM [facility]                            PROCEDURE DATE:  2021            INTERPRETATION:  INDICATION, CLINICAL INFORMATION: Shortness of breath, cirrhosis, lower extremity swelling, pain, leukocytosis, fever      TECHNIQUE: CT pulmonary angiogram of the chest was performed. Coronal and sagittal images were reconstructed. Maximum intensity projection images were generated. Images were obtained after the uneventful administration of 90 cc nonionic intravenous Omnipaque 350. 10 cc of Omnipaque 350 was discarded.      COMPARISON: None.    FINDINGS:    PULMONARY VESSELS: Limited exam for evaluation of pulmonary embolus secondary to motion, streak artifact and poor opacification of pulmonary arteries. Main pulmonary artery normal in diameter.    HEART AND VASCULATURE: Heart size is normal. No pericardial effusion. No aortic aneurysm or dissection.    LUNGS, AIRWAYS, PLEURA: Patent central airways. Patchy opacities within bilateral upper lobes and bilateral lower lobes likely representing multifocal pneumonia. 1.2 cm right lower lobe nodule. No pleural effusions or pneumothorax. The right hemidiaphragm is elevated.    MEDIASTINUM: No mass or lymphadenopathy.    UPPER ABDOMEN: Ascites.    BONES AND SOFT TISSUES: No aggressive osseous lesion.    LOWER NECK: Within normal limits.    IMPRESSION:    Limited exam for evaluation of pulmonary embolus secondary to motion, streak artifact and poor opacification of pulmonary arteries.    Patchy opacities within bilateral upper lobes and bilateral lower lobes likely representing multifocal pneumonia.    1.2 cm right lower lobe nodule. Recommend follow-up chest CT in 3 months to determine stability.      < from: TTE with Doppler (w/Cont) (21 @ 07:38) >    Patient name: OSIRIS HALE  YOB: 1980   Age: 41 (M)   MR#: [id number]  Study Date: 2021  Location: [place] [id number]: Rowan Babin RDCS  Study quality: Technically fair  Referring Physician: Adela Chan MD  Blood Pressure: 117/52 mmHg  Height: 157 cm  Weight: 73 kg  BSA: 1.7 m2  Heart Rate: 86 mmHg  ------------------------------------------------------------------------  PROCEDURE: Transthoracic echocardiogram with 2-D, M-Mode  and complete spectral and color flow Doppler. Patient was  injected with 10 cc's of aerosolized saline. Patient was  injected with 10 cc's of aerosolized saline. Verbal consent  was obtained for injection of  Ultrasonic Enhancing Agent  following a discussion of risks and benefits. Following  intravenous injection of Ultrasonic Enhancing Agent ,  harmonic imaging was performed.  INDICATION: Shock, unspecified (R57.9), Acute respiratory  failure with hypoxia (J96.01)  ------------------------------------------------------------------------  Dimensions:    Normal Values:  LA:     4.5    2.0 - 4.0 cm  Ao:     3.4    2.0 - 3.8 cm  SEPTUM: 0.9    0.6 - 1.2 cm  PWT:    0.9    0.6 - 1.1 cm  LVIDd:  5.1    3.0 - 5.6 cm  LVIDs:  2.3    1.8 - 4.0 cm  Derived variables:  LVMI: 94 g/m2  RWT: 0.35  Fractional short: 55 %  EF (Carrillo Rule): 76 %  ------------------------------------------------------------------------  Observations:  Mitral Valve: Normal mitral valve. Minimal mitral  regurgitation.  Aortic Valve/Aorta: Normal trileaflet aortic valve. No  aortic valve regurgitation seen.  Aortic Root: 3.4 cm.  Left Atrium: Mildly dilated left atrium.  LA volume index =  37 cc/m2.  Left Ventricle: Hyperdynamic left ventricular systolic  function.  Endocardial visualization enhanced with  intravenous injection of Ultrasonic Enhancing Agent  (Definity). No left ventricular thrombus. Normal left  ventricular internal dimensions and wall thicknesses.  Normal diastolic function  Right Heart: Normal right atrium. Normal right ventricular  size and function. Normal tricuspid valve. Minimal  tricuspid regurgitation. Normal pulmonic valve. No pulmonic  regurgitation.  Pericardium/Pleura: Normal pericardium with no pericardial  effusion.  Hemodynamic: Estimated right atrial pressure is 8 mmHg.  Estimated right ventricular systolic pressure equals 42 mm  Hg, assuming right atrial pressure equals 8 mm Hg,

## 2021-06-11 NOTE — PROCEDURE NOTE - NSINDICATIONS_GEN_A_CORE
respiratory distress
dialysis/CRRT
arterial puncture to obtain ABG's/blood sampling/critical patient/monitoring purposes
ascites
dialysis/CRRT/emergency venous access/hypertonic/irritant infusion/transvenous pacemaker insertion/venous access/volume resuscitation
airway protection/respiratory distress/respiratory failure
arterial puncture to obtain ABG's/blood sampling/critical patient/monitoring purposes
ascites
arterial puncture to obtain ABG's/blood sampling/critical patient/monitoring purposes

## 2021-06-11 NOTE — PROCEDURE NOTE - NSICDXPROCEDURE_GEN_ALL_CORE_FT
PROCEDURES:  Insertion of arterial catheter with ultrasound guidance 11-Jun-2021 14:02:41  Nissa Parisi  
PROCEDURES:  Insertion of temporary dialysis access catheter 11-Jun-2021 15:23:44  Nissa Parisi

## 2021-06-11 NOTE — PROGRESS NOTE ADULT - ASSESSMENT
41 yr old male with stated Hx significant for EtOH cirrhosis, decompensated, presented with LE cellulitis, course c/b progressive hypoxic resp failure / multi focal PNA / ARDS, septic shock, GINNY /ATN, ACLF    Plan:  #Neuro:  -Neuro checks q 4 hrs and prn for changes  -c/w Propofol, ketamine, fent gtts - titrate to RASS of -5  -MELD-Na 6/9/21 of 26- now 32- ammonia level worsening     #Pulm: Hypoxic Resp Failure / ARDS (poor P/F ratio) s/p intubation c/w VC- now improving- compliant with vent off of Nimbex since yesterday  -VC 28/410/50%/+12= abg- 7/33/47/96/24/97%- decrease PEEP 10  -c/w Bronchodilator therapy q 6 hrs prn sob/wheezes  -Lung protective therapy    #CV: -Septic shock  -c/w norepinephrine - maintain MAP >/= 65  -TTE done yesterday - bubble study negative    #GI: -EtOH cirrhosis, decompensated cirrhosis, Acute on Chronic Liver Failure,  s/p paracentesis 6/5/21 with removal of 1.9 liters,   -trend LFT  -tube feeds as tolerated  -f/u hepatology   -continue lactulose therapy - goal 3-4 BM / lg  -Rifaximin 550mg q 12H    Renal: -GINNY/ATN -   -strict I & O's - keep negative  -bumex gtt - titrate to U/O of 75 to 100 cc's/hr  -c/w metolazone 10mg q 12H     #ID: septic shock / PNA / ARDS, -recent RCT steroids vs placebo, unblinded   -6/7/21 Pneumocystis PCR -POSITIVE- c/w Bactrim  -d/c meropenem- -6/6/21 BAL cx  with growth, -6/5/21 combicath - NGTD,-6/5/21 Paracentesis/peritoneal fluid - no organisms,  6/5/21 MRSA - neg.-6/4/21 sputum Cx - NGTD, 6/3 U. Legionella neg, 6/1 BC neg,   -follow up with toxoplasma IGM and Quant gold      41 yr old male with stated Hx significant for EtOH cirrhosis, decompensated, presented with LE cellulitis, course c/b progressive hypoxic resp failure / multi focal PNA / ARDS, septic shock, GINNY /ATN, ACLF    Plan:  #Neuro:  -Neuro checks q 4 hrs and prn for changes  -c/w Propofol, ketamine, fent gtts - titrate to RASS of -2  -ammonia level worsening - 157 (140) c/w lactulose and rifaxamin    #Pulm: Hypoxic Resp Failure / ARDS (poor P/F ratio) s/p intubation c/w VC- now improving- compliant with vent off of Nimbex since yesterday  -VC 28/410/50%/+10= abg- 7/22/44/108/18/97%- PEEP decreased to 8   -c/w Bronchodilator therapy q 6 hrs prn sob/wheezes  -Lung protective therapy    #CV: -Distributive Shock / septic shock / hepatic shock  -c/w norepinephrine - maintain MAP >/= 65- pressor requirements continue to increase - now on double consentration  -TTE done yesterday - bubble study negative    #GI: -EtOH cirrhosis, decompensated cirrhosis, Acute on Chronic Liver Failure,  s/p paracentesis 6/5/21 with removal of 1.9 liters,   -trend LFT  -tube feeds as tolerated  -f/u hepatology   -continue lactulose therapy - goal 3-4 BM / lg  -Rifaximin 550mg q 12H    Renal: -GINNY/ATN -   -strict I & O's - keep negative  -bumex gtt - titrate to U/O 20cc/h now - diminished from prior days of >100cc hr  -c/w metolazone 10mg q 12H / c/w Bumex gtt-  -f/u renal- consider CVVHD in the setting of worsening metabolic acidosis, low U/O, worsening renal function    #ID: septic shock / PNA / ARDS, -recent RCT steroids vs placebo, unblinded, febrile yesterday- worsening leukocytosis  -6/7/21 Pneumocystis PCR -POSITIVE- c/w Bactrim  -d/c'd meropenem 6/10- -6/6/21+ BAL cx  with growth, -6/5/21 combicath - NGTD,-6/5/21 Paracentesis/peritoneal fluid - no organisms,  6/5/21 MRSA - neg.-6/4/21 sputum Cx - NGTD, 6/3 U. Legionella neg, 6/1 BC neg,   -follow up with toxoplasma IGM and Quant gold

## 2021-06-11 NOTE — PROCEDURE NOTE - NSPOSTPRCRAD_GEN_A_CORE
chest radiograph/feeding tube in correct gastric position/feeding tube in correct intestinal position/post-procedure radiography performed
central line located in the/central line located in the superior vena cava/depth of insertion/line adjusted to depth of insertion/no pneumothorax/post-procedure radiography performed
central line located in the/central line located in the superior vena cava/depth of insertion/line adjusted to depth of insertion/post-procedure radiography performed

## 2021-06-11 NOTE — PROGRESS NOTE ADULT - ASSESSMENT
40 year old man with PMH of AUD w/ recent admission for alcoholic hepatitis enrolled in clinical trial [id number] RCT (unblinded, received placebo infusion plus corticosteroids) readmitted 5/28/21 for sepsis, course now complicated by septic shock and hypoxic respiratory failure due to to multifocal pneumonia confirmed secondary to PJP by PCR testing, also with worsening GINNY but maintain good UOP with aggressive IV diuresis       Impression:    # Hypoxic respiratory failure - Secondary to PJP pneumonia, confirmed by PCR testing. Ventilator requirements decreasing today   (FiO2 60% and PEEP 14)  on caspofungin, Solumedrol and meropenem; PJP PCR and Fungitell positive    # Leukocytosis - Suspect acute worsening due to effect of IV steroids superimposed on multifocal PNA     # GINNY - Worsening, serum Cr rising and UOP decreasing on lower dose Bumex infusion; likely ATN in setting of septic shock vs. bile cast nephropathy vs. less likely HRS (urin Na 8 6/4/21 --> 20 6/6/21 )     # Abnormal aminotransferases: Stable. Acute on chronic liver failure secondary to alcoholic hepatitis vs. septic shock    # Alcohol-related liver disease, progressive decompensation of disease due to active infection/PJP penumonia with MELD-Na 32 on 6/10/21  - Ascites: Moderate on exam  - Varices: Unknown - no prior screening endoscopy   - HCC: No focal lesions on imaging CT A/P 4/20 (not multiphase)  - HE: Sedated -unable to assess for HE at this time    # Rash  # Hyponatremia    Recommendations:  - would discontinue Bumex drip for hypovolemia and consider RRT as per Nephrology   - needs Case Management consult to submit application for Medicaid   - medical management of sepsis/PJP pneumonia per ID and MICU teams  - continue rifaximin 550 mg BID   - cont lactulose 20 mg every 8 hours (titrated to 2-3 BMs per day)  - weaning from IV pressors as per MICU team   - Transplant Hepatology will continue to follow       Asmita Carrera PGY-5  Gastroenterology/Hepatology Fellow  Pager #[phone]  E-mail [email or web address]  Call [phone] to speak to answering service for on-call GI fellow 5pm-7am and weekends.

## 2021-06-11 NOTE — PROGRESS NOTE ADULT - ASSESSMENT
Patient is a 42 y/o M w PMH of EtoH abuse complicated by decompensated cirrhosis presented to [facility] for worsening LE edema. Admitted to [facility] for acute hypoxic resiraptoy failure in setting of PNA. Nephrology consulted for GINNY.     #GINNY  Pt with GINNY in the setting of liver disease. [facility]  baseline Scr of 0.49 on 4/25/21, Scr on admission increased to 0.87 on 5/27/21, Scr progressively increased and peaked to 1.13 on 5/1/21. Intubated on 6/4 due to hypoxic resp failure. On IV pressor support.  GINNY most likely secondary to ATN (in the setting of septic shock) vs Bilirubin cast nephropathy (serum Bb total at 20, UA with large Bb). Urine Na at 8. Last Scr further increased to 3.13 mg/dl today.     - Currently non-oliguric on Bumex drip 2mg/hr and metolazone   - Will need to consider HD if renal failure continues to worsen, if indicated by goals of care and prognosis.   - Monitor labs and urine output.   -Avoid NSAIDs, ACEI/ARBS, RCA and nephrotoxins.   -Dose medications as per eGFR.    #hyponatremia:  Resolved   Last Na 139  at this time no intervention, Monitor serum sodium.     #hypokalemia   Resolved   this am with K: 3.9  Monitor K     If any questions, please feel free to contact me     Carlton Jackson  Nephrology Fellow  [facility] Pager: [phone]  BETTY Pager: [phone]

## 2021-06-11 NOTE — PROGRESS NOTE ADULT - SUBJECTIVE AND OBJECTIVE BOX
[facility] DIVISION OF KIDNEY DISEASES AND HYPERTENSION -- FOLLOW UP NOTE  --------------------------------------------------------------------------------    24 hour events/subjective: urine output 1.6 L over 24h period, currently net 1.6L positive over 24h. Pt. was seen and examined at bedside. Currently sedated and intubated. Unable to examine ROS.    PAST HISTORY  --------------------------------------------------------------------------------  No significant changes to PMH, PSH, FHx, SHx, unless otherwise noted    ALLERGIES & MEDICATIONS  --------------------------------------------------------------------------------  Allergies    No Known Allergies    Intolerances      Standing Inpatient Medications  albuterol/ipratropium for Nebulization 3 milliLiter(s) Nebulizer every 6 hours  artificial  tears Solution 2 Drop(s) Both EYES every 4 hours  buMETAnide Infusion 2 mG/Hr IV Continuous <Continuous>  chlorhexidine 0.12% Liquid 15 milliLiter(s) Oral Mucosa every 12 hours  chlorhexidine 4% Liquid 1 Application(s) Topical <User Schedule>  fentaNYL   Infusion... 2 MICROgram(s)/kG/Hr IV Continuous <Continuous>  folic acid 1 milliGRAM(s) Oral daily  heparin   Injectable 5000 Unit(s) SubCutaneous every 8 hours  insulin lispro (ADMELOG) corrective regimen sliding scale   SubCutaneous every 4 hours  insulin NPH human recombinant 4 Unit(s) SubCutaneous every 6 hours  ketamine Infusion. 0.251 mG/kG/Hr IV Continuous <Continuous>  lactulose Syrup 30 Gram(s) Oral every 4 hours  methylPREDNISolone sodium succinate Injectable 40 milliGRAM(s) IV Push every 12 hours  metolazone 10 milliGRAM(s) Oral <User Schedule>  multivitamin 1 Tablet(s) Oral daily  norepinephrine Infusion 0.2 MICROgram(s)/kG/Min IV Continuous <Continuous>  pantoprazole  Injectable 40 milliGRAM(s) IV Push daily  propofol Infusion 50.046 MICROgram(s)/kG/Min IV Continuous <Continuous>  rifAXIMin 550 milliGRAM(s) Oral two times a day  thiamine Injectable 100 milliGRAM(s) IV Push daily  trimethoprim / sulfamethoxazole IVPB 360 milliGRAM(s) IV Intermittent every 12 hours    PRN Inpatient Medications  sodium chloride 0.9% lock flush 10 milliLiter(s) IV Push every 1 hour PRN      REVIEW OF SYSTEMS  --------------------------------------------------------------------------------  Unable to obtain ROS    VITALS/PHYSICAL EXAM  --------------------------------------------------------------------------------  T(C): 36.9 (06-11-21 @ 08:00), Max: 38.2 (06-10-21 @ 15:00)  HR: 101 (06-11-21 @ 08:45) (94 - 107)  BP: --  RR: 30 (06-11-21 @ 08:45) (16 - 39)  SpO2: 96% (06-11-21 @ 08:45) (91% - 100%)  Wt(kg): --    06-10-21 @ 07:01  -  06-11-21 @ 07:00  --------------------------------------------------------  IN: 4531.4 mL / OUT: 2845 mL / NET: 1686.4 mL    Physical Exam:  	Gen: ETT, jaundiced, sedated, intubated    	HEENT: ETT, icteric  	Pulm: CTA B/L, no crackles   	CV: S1S2  	Abd: Soft, +BS   	Ext: pitting LE edema B/L  	Neuro: sedated  	Skin: Warm and dry  	      LABS/STUDIES  --------------------------------------------------------------------------------              7.6    44.88 >-----------<  297      [06-11-21 @ 00:27]              24.6     139  |  101  |  102  ----------------------------<  297      [06-11-21 @ 05:33]  3.9   |  14  |  3.13        Ca     7.1     [06-11-21 @ 05:33]      Mg     2.1     [06-11-21 @ 05:33]      Phos  7.0     [06-11-21 @ 05:33]    TPro  5.7  /  Alb  2.6  /  TBili  10.0  /  DBili  x   /  AST  123  /  ALT  65  /  AlkPhos  260  [06-11-21 @ 00:27]    PT/INR: PT 25.1 , INR 2.17       [06-11-21 @ 00:27]  PTT: 47.4       [06-11-21 @ 00:27]      Creatinine Trend:  SCr 3.13 [06-11 @ 05:33]  SCr 3.07 [06-11 @ 00:27]  SCr 2.89 [06-10 @ 17:26]  SCr 2.69 [06-10 @ 07:49]  SCr 2.60 [06-10 @ 00:20]    Urinalysis - [06-10-21 @ 17:12]      Color Yellow / Appearance Clear / SG 1.016 / pH 6.0      Gluc Negative / Ketone Negative  / Bili Small / Urobili Negative       Blood Moderate / Protein Trace / Leuk Est Negative / Nitrite Negative      RBC 2 / WBC 0 / Hyaline 1 / Gran  / Sq Epi  / Non Sq Epi 0 / Bacteria Negative    Urine Creatinine 49      [06-06-21 @ 02:31]  Urine Sodium 20      [06-06-21 @ 02:31]  Urine Potassium 56      [06-06-21 @ 02:31]    Iron 66, TIBC 132, %sat 50      [04-21-21 @ 09:07]  Ferritin 537      [04-23-21 @ 09:10]    HBsAg Nonreact      [06-04-21 @ 14:54]  HCV 0.25, Nonreact      [06-04-21 @ 14:54]    KATIE: titer Negative, pattern --      [06-05-21 @ 22:08]  C3 Complement 25      [06-04-21 @ 14:54]  C4 Complement 2      [06-04-21 @ 14:54]  Rheumatoid Factor <10      [06-05-21 @ 21:33]  ANCA: cANCA Negative, pANCA Negative, atypical ANCA Negative      [06-05-21 @ 22:08]  Cryoglobulin: Negative      [06-05-21 @ 20:10]

## 2021-06-11 NOTE — PROCEDURE NOTE - NSPROCNAME_GEN_A_CORE
Central Line Insertion
Arterial Puncture/Cannulation
Arterial Puncture/Cannulation
Feeding Tube Placement
Tracheal Intubation
Paracentesis
Central Line Insertion
Arterial Puncture/Cannulation
Paracentesis

## 2021-06-11 NOTE — CHART NOTE - NSCHARTNOTEFT_GEN_A_CORE
Nutrition Follow Up Note   Patient seen for: nutrition follow up on  ICU     Source: medical record, communication with team.     Chart reviewed, events noted. Adm for LLE cellulitis, decompensated cirrhosis. Transferred to MICU 6/4 for respiratory failure with septic shock requiring intubation, worsening GINNY. Remains intubated.    Diet Order: Diet, NPO:   Tube Feeding Modality: Orogastric  Vital AF (VITALAFRTH)  Total Volume for 24 Hours (mL): 960  Continuous  Starting Tube Feed Rate {mL per Hour}: 40  Increase Tube Feed Rate by (mL): 0  Until Goal Tube Feed Rate (mL per Hour): 40  Tube Feed Duration (in Hours): 24  Tube Feed Start Time: 11:00 (06-09-21 @ 13:32)    CURRENT EN ORDER PROVIDES: 1152 kcals, 72 gm protein    Nutrition Events:   - EN change to 24 hr feeds 6/9, < 80% of estimated nutrition needs met over past 4 days (tube feeds were held 6/8 for possible proning)  - propofol proving average od 575 kcals over past 3 days  - BG elevated, NPH increased today    GI: no vomiting/abd distention   rectal tube D/C today    Anthropometric Measurements:   Height (cm): 158 (05-28-21 @ 10:38), 154.9 (04-21-21 @ 04:31)  Weight (kg): 72.6 (05-28-21 @ 10:38), 86.7 (04-21-21 @ 04:31)  BMI (kg/m2): 29.1 (05-28-21 @ 10:38), 36.1 (04-21-21 @ 04:31)      Medications: MEDICATIONS  (STANDING):  albuterol/ipratropium for Nebulization 3 milliLiter(s) Nebulizer every 6 hours  artificial  tears Solution 2 Drop(s) Both EYES every 4 hours  buMETAnide Infusion 2 mG/Hr (10 mL/Hr) IV Continuous <Continuous>  chlorhexidine 0.12% Liquid 15 milliLiter(s) Oral Mucosa every 12 hours  chlorhexidine 4% Liquid 1 Application(s) Topical <User Schedule>  fentaNYL   Infusion... 2 MICROgram(s)/kG/Hr (7.26 mL/Hr) IV Continuous <Continuous>  folic acid 1 milliGRAM(s) Oral daily  heparin   Injectable 5000 Unit(s) SubCutaneous every 8 hours  insulin lispro (ADMELOG) corrective regimen sliding scale   SubCutaneous every 4 hours  insulin NPH human recombinant 4 Unit(s) SubCutaneous every 6 hours  ketamine Infusion. 0.251 mG/kG/Hr (1.82 mL/Hr) IV Continuous <Continuous>  metolazone 10 milliGRAM(s) Oral <User Schedule>  multivitamin 1 Tablet(s) Oral daily  norepinephrine Infusion 0.2 MICROgram(s)/kG/Min (13.6 mL/Hr) IV Continuous <Continuous>  pantoprazole  Injectable 40 milliGRAM(s) IV Push daily  predniSONE  Solution 40 milliGRAM(s) Oral daily  propofol Infusion 50.046 MICROgram(s)/kG/Min (21.8 mL/Hr) IV Continuous <Continuous>  rifAXIMin 550 milliGRAM(s) Oral two times a day  thiamine Injectable 100 milliGRAM(s) IV Push daily  trimethoprim / sulfamethoxazole IVPB 360 milliGRAM(s) IV Intermittent every 12 hours    MEDICATIONS  (PRN):  sodium chloride 0.9% lock flush 10 milliLiter(s) IV Push every 1 hour PRN Pre/post blood products, medications, blood draw, and to maintain line patency    Labs: 06-11 @ 05:33: Sodium 139, Potassium 3.9, Calcium 7.1<L>, Magnesium 2.1, Phosphorus 7.0<H>, <H>, Creatinine 3.13<H>, Glucose 297<H>, Alk Phos --, ALT/SGPT --, AST/SGOT --, Albumin --, Prealbumin --, Total Bilirubin --, Hemoglobin --, Hematocrit --, Ferritin --, C-Reactive Protein --, Creatine Kinase <<27>  06-11 @ 00:27: Sodium 140, Potassium 4.2, Calcium 7.3<L>, Magnesium 2.1, Phosphorus 6.4<H>, <H>, Creatinine 3.07<H>, Glucose 343<H>, Alk Phos 260<H>, ALT/SGPT 65<H>, AST/SGOT 123<H>, Albumin 2.6<L>, Prealbumin --, Total Bilirubin 10.0<H>, Hemoglobin 7.6<L>, Hematocrit 24.6<L>, Ferritin --, C-Reactive Protein --, Creatine Kinase <<27>  06-10 @ 17:26: Sodium 141, Potassium 3.5, Calcium 7.7<L>, Magnesium 2.1, Phosphorus 5.9<H>, <H>, Creatinine 2.89<H>, Glucose 244<H>, Alk Phos --, ALT/SGPT --, AST/SGOT --, Albumin --, Prealbumin --, Total Bilirubin --, Hemoglobin --, Hematocrit --, Ferritin --, C-Reactive Protein --, Creatine Kinase <<27>        POCT Blood Glucose.: 333 mg/dL (06-11-21 @ 02:33)  POCT Blood Glucose.: 308 mg/dL (06-11-21 @ 01:25)  POCT Blood Glucose.: 236 mg/dL (06-10-21 @ 21:13)  POCT Blood Glucose.: 236 mg/dL (06-10-21 @ 18:10)  POCT Blood Glucose.: 290 mg/dL (06-10-21 @ 13:34)  POCT Blood Glucose.: 267 mg/dL (06-10-21 @ 11:04)    Skin: left buttock DTI  Edema: 2+ B/L leg    Estimated Needs: based on 5/28 dosing wt 72.6 kg w/ consideration for intubation  Energy: 3622-7212 kacls (20-23 kcals/kg)  Protein:   gm (1.2-1.4 gm/kg)    Previous Nutrition Diagnosis: moderate malnutrition  Nutrition Diagnosis is: care plan ongoing, addressed w/ EN (will recommend increasing rate to meet needs)    New Nutrition Diagnosis:  none     Recommended Interventions:   1. If pt to continue on 24 hr feeds, recommend increase rate to 50 cc/hr x 24 hrs to provide 1440 kcals, 90 gm protein (20 kcals, 1.2 gm protein/kg dosing wt 72.6 kg)  2. If changed to 18 hr feeds, recommend Vital AF goal 70cc/hr x 18 hrs provides 1512 kcals, 95 gm protein, 1022cc free water  meets 21 Kcal/Kg, 1.3 Gm protein/kg dosing wt 72.6 kg.   3.  Continue multivitamin, thiamine, folic acid      Monitoring and Evaluation:   Continue to monitor nutrition provision and tolerance, weights, labs, skin integrity.   RD remains available upon request and will follow up per protocol.

## 2021-06-11 NOTE — PROGRESS NOTE ADULT - SUBJECTIVE AND OBJECTIVE BOX
Chief Complaint:  Patient is a 41y old  Male who presents with a chief complaint of cellulitis (2021 12:46)      Interval Events:     Ventilator requirements decreasing  IV pressor requirements stable  Serum Cr worsening and UOP decreasing on reduced dose Bumex    Allergies:  No Known Allergies      Hospital Medications:  albuterol/ipratropium for Nebulization 3 milliLiter(s) Nebulizer every 6 hours  artificial  tears Solution 2 Drop(s) Both EYES every 4 hours  buMETAnide Infusion 2 mG/Hr IV Continuous <Continuous>  chlorhexidine 0.12% Liquid 15 milliLiter(s) Oral Mucosa every 12 hours  chlorhexidine 4% Liquid 1 Application(s) Topical <User Schedule>  dextrose 50% Injectable 50 milliLiter(s) IV Push every 15 minutes  fentaNYL   Infusion... 2 MICROgram(s)/kG/Hr IV Continuous <Continuous>  folic acid 1 milliGRAM(s) Oral daily  heparin   Injectable 5000 Unit(s) SubCutaneous every 8 hours  insulin regular Infusion 3 Unit(s)/Hr IV Continuous <Continuous>  ketamine Infusion. 0.251 mG/kG/Hr IV Continuous <Continuous>  metolazone 10 milliGRAM(s) Oral <User Schedule>  multivitamin 1 Tablet(s) Oral daily  norepinephrine Infusion 0.2 MICROgram(s)/kG/Min IV Continuous <Continuous>  pantoprazole  Injectable 40 milliGRAM(s) IV Push daily  predniSONE  Solution 40 milliGRAM(s) Oral daily  propofol Infusion 50.046 MICROgram(s)/kG/Min IV Continuous <Continuous>  rifAXIMin 550 milliGRAM(s) Oral two times a day  sodium chloride 0.9% lock flush 10 milliLiter(s) IV Push every 1 hour PRN  thiamine Injectable 100 milliGRAM(s) IV Push daily  trimethoprim / sulfamethoxazole IVPB 360 milliGRAM(s) IV Intermittent every 12 hours      PMHX/PSHX:  No pertinent past medical history    Alcohol abuse    Cirrhosis    No significant past surgical history        Family history:  No pertinent family history in first degree relatives        ROS:     General:  No weight loss, fevers, chills, night sweats, fatigue   Eyes:  No vision changes  ENT:  No sore throat, pain, runny nose  CV:  No chest pain, palpitations, dizziness   Resp:  No SOB, cough, wheezing  GI:  See HPI  :  No burning with urination, hematuria  Muscle:  No pain, weakness  Neuro:  No weakness/tingling, memory problems  Psych:  No fatigue, insomnia, mood problems, depression  Heme:  No easy bruisability  Skin:  No rash, edema      PHYSICAL EXAM:     GENERAL:  Sedated, critically ill  HEENT:  Conjunctivae clear +sclera icteric +ET tube  CHEST:  Ventilated breath sounds  HEART:  Regular rhythm & rate  ABDOMEN:  Non-tender +softly distended +no palpable masses   : +Person draining clear urine   EXTREMITIES:  1-2+ pitting carpopedal edema   SKIN:  No rash/erythema/ecchymoses/petechiae +jaundice   NEURO:  Alert, orientedx3    Vital Signs:  Vital Signs Last 24 Hrs  T(C): 37 (2021 12:00), Max: 38.2 (10 Eliezer 2021 15:00)  T(F): 98.6 (2021 12:00), Max: 100.8 (10 Eliezer 2021 15:00)  HR: 103 (2021 14:00) (91 - 107)  BP: --  BP(mean): --  RR: 30 (2021 14:00) (28 - 39)  SpO2: 89% (2021 14:00) (89% - 100%)  Daily     Daily     LABS:                        7.6    44.88 )-----------( 297      ( 2021 00:27 )             24.6     -11    139  |  100  |  116<H>  ----------------------------<  357<H>  4.3   |  13<L>  |  3.43<H>    Ca    7.2<L>      2021 13:16  Phos  7.8       Mg     2.3         TPro  5.7<L>  /  Alb  2.6<L>  /  TBili  10.0<H>  /  DBili  x   /  AST  123<H>  /  ALT  65<H>  /  AlkPhos  260<H>      LIVER FUNCTIONS - ( 2021 00:27 )  Alb: 2.6 g/dL / Pro: 5.7 g/dL / ALK PHOS: 260 U/L / ALT: 65 U/L / AST: 123 U/L / GGT: x           PT/INR - ( 2021 00:27 )   PT: 25.1 sec;   INR: 2.17 ratio    PTT - ( 2021 00:27 )  PTT:47.4 sec  Urinalysis Basic - ( 10 Eliezer 2021 17:12 )    Color: Yellow / Appearance: Clear / S.016 / pH: x  Gluc: x / Ketone: Negative  / Bili: Small / Urobili: Negative   Blood: x / Protein: Trace / Nitrite: Negative   Leuk Esterase: Negative / RBC: 2 /hpf / WBC 0 /HPF   Sq Epi: x / Non Sq Epi: 0 /hpf / Bacteria: Negative      Amylase Serum--      Lipase serum--       [id number]      Imaging:

## 2021-06-11 NOTE — PROCEDURE NOTE - PROCEDURE DATE TIME, MLM
04-Jun-2021 22:34
05-Jun-2021 16:15
28-May-2021 12:09
07-Jun-2021 00:13
11-Jun-2021 14:00
11-Jun-2021 15:00
05-Jun-2021 17:15

## 2021-06-11 NOTE — PROCEDURE NOTE - NSPROCDETAILS_GEN_ALL_CORE
location identified, draped/prepped, sterile technique used, needle inserted/introduced/positive blood return obtained via catheter/connected to a pressurized flush line/sutured in place/hemostasis with direct pressure, dressing applied/Seldinger technique/all materials/supplies accounted for at end of procedure
location identified, draped/prepped, sterile technique used, needle inserted/introduced/positive blood return obtained via catheter/connected to a pressurized flush line/sutured in place/hemostasis with direct pressure, dressing applied/Seldinger technique/all materials/supplies accounted for at end of procedure
patient pre-oxygenated, tube inserted, placement confirmed
location identified, sterile technique used, catheter introduced, fluid drained/Seldinger technique/sterile dressing applied
guidewire recovered/lumen(s) aspirated and flushed/sterile dressing applied/sterile technique, catheter placed/ultrasound guidance with use of sterile gel and probe cove
guidewire recovered/lumen(s) aspirated and flushed/sterile dressing applied/sterile technique, catheter placed/ultrasound guidance with use of sterile gel and probe cove
location identified, sterile technique used, catheter introduced, fluid drained/Seldinger technique

## 2021-06-11 NOTE — PROGRESS NOTE ADULT - SUBJECTIVE AND OBJECTIVE BOX
CC: F/U for PCP    Saw/spoke to patient. Remains intubated, ill appearing. Team concerned for rising Cr.    Allergies  No Known Allergies    ANTIMICROBIALS:  rifAXIMin 550 two times a day  trimethoprim / sulfamethoxazole IVPB 360 every 12 hours    PE:    Vital Signs Last 24 Hrs  T(C): 36.9 (2021 08:00), Max: 38.2 (10 Eliezer 2021 15:00)  T(F): 98.5 (2021 08:00), Max: 100.8 (10 Eliezer 2021 15:00)  HR: 100 (2021 11:30) (94 - 107)  RR: 30 (2021 11:30) (16 - 39)  SpO2: 90% (2021 11:30) (90% - 100%)    Gen: AOx0, sedated, ill appearing  CV: S1+S2 normal, nontachycardic  Resp: Intubated  Abd: Soft, nontender, +BS  Ext: Significant LE edema    LABS:                        7.6    44.88 )-----------( 297      ( 2021 00:27 )             24.6     06-    139  |  101  |  102<H>  ----------------------------<  297<H>  3.9   |  14<L>  |  3.13<H>    Ca    7.1<L>      2021 05:33  Phos  7.0       Mg     2.1         TPro  5.7<L>  /  Alb  2.6<L>  /  TBili  10.0<H>  /  DBili  x   /  AST  123<H>  /  ALT  65<H>  /  AlkPhos  260<H>      Urinalysis Basic - ( 10 Eliezer 2021 17:12 )    Color: Yellow / Appearance: Clear / S.016 / pH: x  Gluc: x / Ketone: Negative  / Bili: Small / Urobili: Negative   Blood: x / Protein: Trace / Nitrite: Negative   Leuk Esterase: Negative / RBC: 2 /hpf / WBC 0 /HPF   Sq Epi: x / Non Sq Epi: 0 /hpf / Bacteria: Negative    MICROBIOLOGY:    BAL Other  21   Culture is being performed.     Bronch Wash BAL  21   No growth at 48 hours  --    Few polymorphonuclear leukocytes per low power field  No squamous epithelial cells per low power field  No organisms seen per oil power field    BAL Combicath  21   Culture is being performed.     .Body Fluid Peritoneal Fluid  21   No growth at 5 days  --    polymorphonuclear leukocytes seen  No organisms seen  by cytocentrifuge    Bronch Wash Combicath CUP  21   No growth at 48 hours  --    No polymorphonuclear cells seen  No squamous epithelial cells  No organisms seen    (otherwise reviewed)    RADIOLOGY:     USG:    IMPRESSION:    Cirrhosis with portal hypertension. No evidence of portal vein thrombosis. Bidirectional flow in the splenic vein as a splenorenal shunt is documented respectively on CT examination 2021.    Mild to moderate abdominal ascites as described above.

## 2021-06-11 NOTE — PROCEDURE NOTE - NSINFORMCONSENT_GEN_A_CORE
This was an emergent procedure.
S/P intubation/This was an emergent procedure.
This was an emergent procedure.
Benefits, risks, and possible complications of procedure explained to patient/caregiver who verbalized understanding and gave verbal consent.
This was an emergent procedure.
Wife Steph/Benefits, risks, and possible complications of procedure explained to patient/caregiver who verbalized understanding and gave verbal consent.
This was an emergent procedure.

## 2021-06-11 NOTE — PROGRESS NOTE ADULT - ASSESSMENT
41 M originally from [facility] PMHx of ETOH abuse (quit early April) c/b decompensated alcoholic cirrhosis, recent admission (4/20-4/27) for decompensated cirrhosis, presented with b/l LE swelling and LLE foot pain  Leukocytosis, fever  Elevated LFTs in setting cirrhosis  CT A/P notes lower lung opacities with concern for possible infection  CT chest with patchy opacities  Critically ill, in ICU, on pressors, intubated, s/p bronch  Now PCR positive for PCP  Overall,  1) Fever, Leukocytosis  - Now elevated Fungitell in setting of progressively worsening resp status  - S/p course Livia, agree with stopping  - Trend WBC to normal, monitor for further fevers  2) PJP  - Fungitell positive, LDH equivocal, but now PCR positive  - Continue Bactrim (dose based on CrCl)  - Continue steroids with scheduled taper  - O2 support per ICU team  3) Rash  - F/U Derm  4) GINNY  - Note rising Cr  - If continues to worsen and nephro suspecting related to Bactrim, can considering changing to Atovaquone (conversely, note that PCP is primary illness, so would need deep assessment of risks/benefits); discussed with MICU attending    Sourav Jimenez MD  Pager [phone]  After 5pm and on weekends call [phone]

## 2021-06-11 NOTE — CHART NOTE - NSCHARTNOTEFT_GEN_A_CORE
Spoke to MICU team, patient noted to have worsening metabolic acidosis with serum CO2 of 13, and pH of 7.2, with decrease urine output and increasing lactate. Also significantly volume overloaded. Will start on CRRT. Unable to obtain consent from family, after multiple attempts, will do 2 attending consent.     If any questions, please feel free to contact me     Carlton Jackson  Nephrology Fellow  [facility] Pager: [phone]  [facility] Pager: [phone]

## 2021-06-12 NOTE — PROGRESS NOTE ADULT - ASSESSMENT
Patient is a 42 y/o M w PMH of EtoH abuse complicated by decompensated cirrhosis presented to [facility] for worsening LE edema. Admitted to [facility] for acute hypoxic resiraptoy failure in setting of PNA. Nephrology consulted for GINNY.     # GINNY  Pt with oliguric GINNY likely 2/2 hemodynamically mediated ATN in the setting of septic shock and/or bilirubin cast nephropathy.  Upon review of [facility] HIE/[employer], baseline Scr of 0.49 on 4/25/21, Scr on admission increased to 0.87 on 5/27/21, Scr progressively increased and peaked to 1.13 on 5/1/21. Intubated on 6/4 due to hypoxic resp failure. Initiated on CRRT on 6/11 for acidosis.  Currently pt intubated/sedated on multiple pressors, CRRT.    - continue CRRT/CVVHDF, UF goal net negative 100-200 cc/min as tolerated  - BP optimization/antibiotic/blood transfusion per ICU team  - monitor BMP, strict I/O, avoid nephrotoxic agents (NSAIDs, PPI, contrast), renally dose medications per CRRT.  Antibiotic dosing CRRT    # Metabolic acidosis  likely 2/2 GINNY, initiated on CRRT on 6/11 for acidosis, last serum bicarb 12, pH 7.20  - continue CRRT/CVVHDF as outline above, increase dialysate rate for better clearance.  Monitor blood gas, lactate   Patient is a 40 y/o M w PMH of EtoH abuse complicated by decompensated cirrhosis presented to [facility] for worsening LE edema. Admitted to [facility] for acute hypoxic resiraptoy failure in setting of PNA. Nephrology consulted for GINNY.     # GINNY  Pt with oliguric GINNY likely 2/2 hemodynamically mediated ATN in the setting of septic shock and/or bilirubin cast nephropathy.  Upon review of [facility] HIE/[employer], baseline Scr of 0.49 on 4/25/21, Scr on admission increased to 0.87 on 5/27/21, Scr progressively increased and peaked to 1.13 on 5/1/21. Intubated on 6/4 due to hypoxic resp failure. Initiated on CRRT on 6/11 for acidosis.  Currently pt intubated/sedated on multiple pressors, CRRT.    - continue CRRT/CVVHDF, UF goal net negative 100-200 cc/min as tolerated  - BP optimization/antibiotic/blood transfusion per ICU team  - monitor BMP, strict I/O, avoid nephrotoxic agents (NSAIDs, PPI, contrast), renally dose medications per CRRT.  Antibiotic dosing CRRT    # Metabolic acidosis  likely 2/2 GINNY, initiated on CRRT on 6/11 for acidosis, last serum bicarb 12, pH 7.20  - continue CRRT/CVVHDF as outline above, increase dialysate rate for better clearance. Overall very poor prognosis, lactic acidosis despite CRRT.  Can start bicarb w/ D5 drip 3 amp.  Recommend GOC with family. Monitor blood gas, lactate

## 2021-06-12 NOTE — PROGRESS NOTE ADULT - SUBJECTIVE AND OBJECTIVE BOX
[facility] DIVISION OF KIDNEY DISEASES AND HYPERTENSION   -- FOLLOW UP NOTE --   Dwight Trinh  Nephrology Fellow  Pager [facility]: [phone]   /  Pager [facility]: [phone]  (after 5pm or weekend please page the on-call fellow via [email or web address])  ======================================================  24 hour events/subjective:  - overnight no events reported, vitals febrile tmax 100.4F, total UOP [place]ham 213cc, CRRT 2.2L (net neg 1.8L) in the past 24hr  - patient seen and examined at bedside this morning intubated/sedated FIO2 50/PEEP 8, on CRRT and pressors  - vitals/lab/medications reviewed, noted for lactate 5.5, bicarb 12, pH 7.2/pCO2 44    PAST HISTORY  --------------------------------------------------------------------------------  No significant changes to PMH, PSH, FHx, SHx, unless otherwise noted    ALLERGIES & MEDICATIONS  --------------------------------------------------------------------------------  Allergies  No Known Allergies    Intolerances    Standing Inpatient Medications  albuterol/ipratropium for Nebulization 3 milliLiter(s) Nebulizer every 6 hours  artificial  tears Solution 2 Drop(s) Both EYES every 4 hours  chlorhexidine 0.12% Liquid 15 milliLiter(s) Oral Mucosa every 12 hours  chlorhexidine 4% Liquid 1 Application(s) Topical <User Schedule>  CRRT Treatment    <Continuous>  DAPTOmycin IVPB 600 milliGRAM(s) IV Intermittent every 48 hours  dextrose 50% Injectable 50 milliLiter(s) IV Push every 15 minutes  fentaNYL   Infusion... 2 MICROgram(s)/kG/Hr IV Continuous <Continuous>  folic acid 1 milliGRAM(s) Oral daily  insulin regular Infusion 2 Unit(s)/Hr IV Continuous <Continuous>  ketamine Infusion. 0.251 mG/kG/Hr IV Continuous <Continuous>  multivitamin 1 Tablet(s) Oral daily  norepinephrine Infusion 0.2 MICROgram(s)/kG/Min IV Continuous <Continuous>  pantoprazole  Injectable 40 milliGRAM(s) IV Push daily  phenylephrine    Infusion 0.1 MICROgram(s)/kG/Min IV Continuous <Continuous>  predniSONE  Solution 40 milliGRAM(s) Oral daily  PrismaSATE Dialysate BK 0 / 3.5 5000 milliLiter(s) CRRT <Continuous>  PrismaSOL Filtration BGK 4 / 2.5 5000 milliLiter(s) CRRT <Continuous>  PrismaSOL Filtration BGK 4 / 2.5 5000 milliLiter(s) CRRT <Continuous>  propofol Infusion 50.046 MICROgram(s)/kG/Min IV Continuous <Continuous>  rifAXIMin 550 milliGRAM(s) Oral two times a day  thiamine Injectable 100 milliGRAM(s) IV Push daily  trimethoprim / sulfamethoxazole IVPB 400 milliGRAM(s) IV Intermittent every 8 hours  vasopressin Infusion 0.04 Unit(s)/Min IV Continuous <Continuous>    PRN Inpatient Medications  sodium chloride 0.9% lock flush 10 milliLiter(s) IV Push every 1 hour PRN    REVIEW OF SYSTEMS  unable to obtain d/t intubated/sedated    VITALS/PHYSICAL EXAM  --------------------------------------------------------------------------------  T(C): 37.4 (06-12-21 @ 04:00), Max: 38 (06-11-21 @ 18:00)  HR: 96 (06-12-21 @ 07:45) (90 - 106)  BP: --  RR: 30 (06-12-21 @ 07:45) (20 - 43)  SpO2: 99% (06-12-21 @ 07:45) (84% - 100%)  Wt(kg): --    06-11-21 @ 07:01  -  06-12-21 @ 07:00  --------------------------------------------------------  IN: 5086.4 mL / OUT: 4213 mL / NET: 873.4 mL    06-12-21 @ 07:01  -  06-12-21 @ 07:57  --------------------------------------------------------  IN: 147.6 mL / OUT: 0 mL / NET: 147.6 mL    Physical Exam:  	Gen: intubated/sedated   	HEENT: ETT  	Pulm: vent sounds  	CV: S1S2  	Abd: Soft, +BS   	Ext: + LE edema B/L  	Neuro: sedated  	Skin: jaundice  	Access: [facility] non tunnel hd catheter    LABS/STUDIES  --------------------------------------------------------------------------------              7.6    65.28 >-----------<  295      [06-12-21 @ 05:54]              23.8     136  |  100  |  84  ----------------------------<  148      [06-12-21 @ 05:54]  4.1   |  12  |  2.40        Ca     7.5     [06-12-21 @ 05:54]      Mg     2.2     [06-12-21 @ 05:54]      Phos  6.0     [06-12-21 @ 05:54]    TPro  5.6  /  Alb  2.6  /  TBili  9.8  /  DBili  x   /  AST  145  /  ALT  76  /  AlkPhos  304  [06-12-21 @ 00:54]    PT/INR: PT 26.5 , INR 2.30       [06-12-21 @ 00:54]  PTT: 62.8       [06-12-21 @ 00:54]    Creatinine Trend:  SCr 2.40 [06-12 @ 05:54]  SCr 2.79 [06-12 @ 00:54]  SCr 3.43 [06-11 @ 13:16]  SCr 3.13 [06-11 @ 05:33]  SCr 3.07 [06-11 @ 00:27]    Urinalysis - [06-10-21 @ 17:12]      Color Yellow / Appearance Clear / SG 1.016 / pH 6.0      Gluc Negative / Ketone Negative  / Bili Small / Urobili Negative       Blood Moderate / Protein Trace / Leuk Est Negative / Nitrite Negative      RBC 2 / WBC 0 / Hyaline 1 / Gran  / Sq Epi  / Non Sq Epi 0 / Bacteria Negative    Urine Creatinine 49      [06-06-21 @ 02:31]  Urine Sodium 20      [06-06-21 @ 02:31]  Urine Potassium 56      [06-06-21 @ 02:31]    Iron 66, TIBC 132, %sat 50      [04-21-21 @ 09:07]  Ferritin 537      [04-23-21 @ 09:10]    HBsAg Nonreact      [06-04-21 @ 14:54]  HCV 0.25, Nonreact      [06-04-21 @ 14:54]    KATIE: titer Negative, pattern --      [06-05-21 @ 22:08]  C3 Complement 25      [06-04-21 @ 14:54]  C4 Complement 2      [06-04-21 @ 14:54]  Rheumatoid Factor <10      [06-05-21 @ 21:33]  ANCA: cANCA Negative, pANCA Negative, atypical ANCA Negative      [06-05-21 @ 22:08]  Cryoglobulin: Negative      [06-05-21 @ 20:10]

## 2021-06-12 NOTE — PROGRESS NOTE ADULT - SUBJECTIVE AND OBJECTIVE BOX
CHIEF COMPLAINT:    Interval Events:    REVIEW OF SYSTEMS:  Constitutional: [ ] fevers [ ] chills [ ] weight loss [ ] weight gain  HEENT: [ ] dry eyes [ ] eye irritation [ ] postnasal drip [ ] nasal congestion  CV: [ ] chest pain [ ] orthopnea [ ] palpitations [ ] murmur  Resp: [ ] cough [ ] shortness of breath [ ] dyspnea [ ] wheezing [ ] sputum [ ] hemoptysis  GI: [ ] nausea [ ] vomiting [ ] diarrhea [ ] constipation [ ] abd pain [ ] dysphagia   : [ ] dysuria [ ] nocturia [ ] hematuria [ ] increased urinary frequency  Musculoskeletal: [ ] back pain [ ] myalgias [ ] arthralgias [ ] fracture  Skin: [ ] rash [ ] itch  Neurological: [ ] headache [ ] dizziness [ ] syncope [ ] weakness [ ] numbness  Psychiatric: [ ] anxiety [ ] depression  Endocrine: [ ] diabetes [ ] thyroid problem  Hematologic/Lymphatic: [ ] anemia [ ] bleeding problem  Allergic/Immunologic: [ ] itchy eyes [ ] nasal discharge [ ] hives [ ] angioedema  [ ] All other systems negative  [ ] Unable to assess ROS because ________    OBJECTIVE:  ICU Vital Signs Last 24 Hrs  T(C): 36.4 (2021 08:00), Max: 38 (2021 18:00)  T(F): 97.5 (2021 08:00), Max: 100.4 (2021 18:00)  HR: 97 (2021 09:45) (90 - 106)  BP: --  BP(mean): --  ABP: 130/47 (2021 09:45) (74/63 - 144/67)  ABP(mean): 68 (2021 09:45) (60 - 90)  RR: 30 (2021 09:45) (20 - 43)  SpO2: 99% (2021 09:45) (84% - 100%)    Mode: AC/ CMV (Assist Control/ Continuous Mandatory Ventilation), RR (machine): 30, TV (machine): 410, FiO2: 50, PEEP: 8, ITime: 0.75, MAP: 17, PIP: 33  I & O's    - @ 07:01  -  - @ 07:00  --------------------------------------------------------  IN: 5086.4 mL / OUT: 4378 mL / NET: 708.4 mL     @ 07:01   @ 10:42  --------------------------------------------------------  IN: 308.8 mL / OUT: 406 mL / NET: -97.2 mL      CAPILLARY BLOOD GLUCOSE      POCT Blood Glucose.: 147 mg/dL (2021 22:58)      PHYSICAL EXAM:  General:   HEENT:   Lymph Nodes:  Neck:   Respiratory:   Cardiovascular:   Abdomen:   Extremities:   Skin:   Neurological:  Psychiatry:    LINES:    HOSPITAL MEDICATIONS:  MEDICATIONS  (STANDING):  albuterol/ipratropium for Nebulization 3 milliLiter(s) Nebulizer every 6 hours  artificial  tears Solution 2 Drop(s) Both EYES every 4 hours  chlorhexidine 0.12% Liquid 15 milliLiter(s) Oral Mucosa every 12 hours  chlorhexidine 4% Liquid 1 Application(s) Topical <User Schedule>  CRRT Treatment    <Continuous>  DAPTOmycin IVPB 600 milliGRAM(s) IV Intermittent every 48 hours  dextrose 50% Injectable 50 milliLiter(s) IV Push every 15 minutes  fentaNYL   Infusion... 2 MICROgram(s)/kG/Hr (7.26 mL/Hr) IV Continuous <Continuous>  folic acid 1 milliGRAM(s) Oral daily  insulin regular Infusion 2 Unit(s)/Hr (2 mL/Hr) IV Continuous <Continuous>  ketamine Infusion. 0.251 mG/kG/Hr (1.82 mL/Hr) IV Continuous <Continuous>  multivitamin 1 Tablet(s) Oral daily  norepinephrine Infusion 0.2 MICROgram(s)/kG/Min (13.6 mL/Hr) IV Continuous <Continuous>  pantoprazole  Injectable 40 milliGRAM(s) IV Push daily  phenylephrine    Infusion 0.1 MICROgram(s)/kG/Min (1.36 mL/Hr) IV Continuous <Continuous>  predniSONE  Solution 40 milliGRAM(s) Oral daily  PrismaSATE Dialysate BK 0 / 3.5 5000 milliLiter(s) (1400 mL/Hr) CRRT <Continuous>  PrismaSOL Filtration BGK 4 / 2.5 5000 milliLiter(s) (1000 mL/Hr) CRRT <Continuous>  PrismaSOL Filtration BGK 4 / 2.5 5000 milliLiter(s) (200 mL/Hr) CRRT <Continuous>  propofol Infusion 50.046 MICROgram(s)/kG/Min (21.8 mL/Hr) IV Continuous <Continuous>  rifAXIMin 550 milliGRAM(s) Oral two times a day  thiamine Injectable 100 milliGRAM(s) IV Push daily  trimethoprim / sulfamethoxazole IVPB 400 milliGRAM(s) IV Intermittent every 8 hours  vasopressin Infusion 0.04 Unit(s)/Min (2.4 mL/Hr) IV Continuous <Continuous>    MEDICATIONS  (PRN):  sodium chloride 0.9% lock flush 10 milliLiter(s) IV Push every 1 hour PRN Pre/post blood products, medications, blood draw, and to maintain line patency      LABS:                        7.6    65.28 )-----------( 295      ( 2021 05:54 )             23.8     Hgb Trend: 7.6<--, 7.8<--, 7.6<--, 7.9<--, 8.2<--      136  |  100  |  84<H>  ----------------------------<  148<H>  4.1   |  12<L>  |  2.40<H>    Ca    7.5<L>      2021 05:54  Phos  6.0       Mg     2.2     12    TPro  5.6<L>  /  Alb  2.6<L>  /  TBili  9.8<H>  /  DBili  x   /  AST  145<H>  /  ALT  76<H>  /  AlkPhos  304<H>      Creatinine Trend: 2.40<--, 2.79<--, 3.43<--, 3.13<--, 3.07<--, 2.89<--  PT/INR - ( 2021 00:54 )   PT: 26.5 sec;   INR: 2.30 ratio         PTT - ( 2021 00:54 )  PTT:62.8 sec  Urinalysis Basic - ( 10 Eliezer 2021 17:12 )    Color: Yellow / Appearance: Clear / S.016 / pH: x  Gluc: x / Ketone: Negative  / Bili: Small / Urobili: Negative   Blood: x / Protein: Trace / Nitrite: Negative   Leuk Esterase: Negative / RBC: 2 /hpf / WBC 0 /HPF   Sq Epi: x / Non Sq Epi: 0 /hpf / Bacteria: Negative      Arterial Blood Gas:   @ 00:50  7.20/44/109/16/97/-10.6  ABG lactate: --  Arterial Blood Gas:   @ 13:12  7.20/43/82/16/93/-10.7  ABG lactate: --  Arterial Blood Gas:   @ 05:27  7.24/44/108/18/97/-7.9  ABG lactate: --  Arterial Blood Gas:   @ 00:29  7.27/47/129/21/98/-5.5  ABG lactate: --        MICROBIOLOGY:     RADIOLOGY:  [ ] Reviewed and interpreted by me    EKG: CHIEF COMPLAINT: A/W Cellulitis    Interval Events: Worsening Leucocytosis now 65.28 from 44, ammonia levels now 184 (157), Increasing pressor requirements, metabolic acidosis - serum bicarb now 12 despite CVVHD, and now + VRE Bacteremia.     HPI:  41 yr old male with PMHx EtOH abuse , alcoholic cirrhosis, recent decompensated cirrhosis requiring hospitalization (-) placed on clinical trial [id number] RCT (steroids vs placebo- unblinded and found to have received steroid therapy) who was originally admitted 21 with LLE cellulitis. Course c/b progressive worsening hypoxic resp failure with septic shock eventually requiring intubation and vasopressor therapy  requiring admission to MICU (21). Course further c/b GINNY and ACLF, development of diffuse macular purpuric rash (felt due to drug reaction - resolving). CT chest 21 demonstrated Patchy ground glass opacities within bilateral upper lobes and bilateral lower lobes likely representing multifocal pneumonia, positive fungitell with unknown significance.    MICU course to date consist of Paracentesis with removal of 1.9 liters (21), vasopressor therapy, PCP/PJP therapy with bactrim, s/p vitamin K and FFP, addition of bumex/metolazone therapy, s/p bronchoscopy 21), poor P/F ratios (ARDS) prompting utilization of nimbex(21) - which was discontinued > 48 hours ago- remains synchronous with the ventilator.    REVIEW OF SYSTEMS:  [x ] Unable to assess ROS because pt is sedated / chemically paralyzed on Nimbex ________      REVIEW OF SYSTEMS:  Constitutional: [ ] fevers [ ] chills [ ] weight loss [ ] weight gain  HEENT: [ ] dry eyes [ ] eye irritation [ ] postnasal drip [ ] nasal congestion  CV: [ ] chest pain [ ] orthopnea [ ] palpitations [ ] murmur  Resp: [ ] cough [ ] shortness of breath [ ] dyspnea [ ] wheezing [ ] sputum [ ] hemoptysis  GI: [ ] nausea [ ] vomiting [ ] diarrhea [ ] constipation [ ] abd pain [ ] dysphagia   : [ ] dysuria [ ] nocturia [ ] hematuria [ ] increased urinary frequency  Musculoskeletal: [ ] back pain [ ] myalgias [ ] arthralgias [ ] fracture  Skin: [ ] rash [ ] itch  Neurological: [ ] headache [ ] dizziness [ ] syncope [ ] weakness [ ] numbness  Psychiatric: [ ] anxiety [ ] depression  Endocrine: [ ] diabetes [ ] thyroid problem  Hematologic/Lymphatic: [ ] anemia [ ] bleeding problem  Allergic/Immunologic: [ ] itchy eyes [ ] nasal discharge [ ] hives [ ] angioedema  [ ] All other systems negative  [ ] Unable to assess ROS because ________    OBJECTIVE:  ICU Vital Signs Last 24 Hrs  T(C): 36.4 (2021 08:00), Max: 38 (2021 18:00)  T(F): 97.5 (2021 08:00), Max: 100.4 (2021 18:00)  HR: 97 (2021 09:45) (90 - 106)  BP: --  BP(mean): --  ABP: 130/47 (2021 09:45) (74/63 - 144/67)  ABP(mean): 68 (2021 09:45) (60 - 90)  RR: 30 (2021 09:45) (20 - 43)  SpO2: 99% (2021 09:45) (84% - 100%)    Mode: AC/ CMV (Assist Control/ Continuous Mandatory Ventilation), RR (machine): 30, TV (machine): 410, FiO2: 50, PEEP: 8, ITime: 0.75, MAP: 17, PIP: 33  I & O's     @ 07: @ 07:00  --------------------------------------------------------  IN: 5086.4 mL / OUT: 4378 mL / NET: 708.4 mL     @ 07: @ 10:42  --------------------------------------------------------  IN: 308.8 mL / OUT: 406 mL / NET: -97.2 mL      CAPILLARY BLOOD GLUCOSE      POCT Blood Glucose.: 147 mg/dL (2021 22:58)      PHYSICAL EXAM:  General:   HEENT:   Lymph Nodes:  Neck:   Respiratory:   Cardiovascular:   Abdomen:   Extremities:   Skin:   Neurological:  Psychiatry:    LINES:    HOSPITAL MEDICATIONS:  MEDICATIONS  (STANDING):  albuterol/ipratropium for Nebulization 3 milliLiter(s) Nebulizer every 6 hours  artificial  tears Solution 2 Drop(s) Both EYES every 4 hours  chlorhexidine 0.12% Liquid 15 milliLiter(s) Oral Mucosa every 12 hours  chlorhexidine 4% Liquid 1 Application(s) Topical <User Schedule>  CRRT Treatment    <Continuous>  DAPTOmycin IVPB 600 milliGRAM(s) IV Intermittent every 48 hours  dextrose 50% Injectable 50 milliLiter(s) IV Push every 15 minutes  fentaNYL   Infusion... 2 MICROgram(s)/kG/Hr (7.26 mL/Hr) IV Continuous <Continuous>  folic acid 1 milliGRAM(s) Oral daily  insulin regular Infusion 2 Unit(s)/Hr (2 mL/Hr) IV Continuous <Continuous>  ketamine Infusion. 0.251 mG/kG/Hr (1.82 mL/Hr) IV Continuous <Continuous>  multivitamin 1 Tablet(s) Oral daily  norepinephrine Infusion 0.2 MICROgram(s)/kG/Min (13.6 mL/Hr) IV Continuous <Continuous>  pantoprazole  Injectable 40 milliGRAM(s) IV Push daily  phenylephrine    Infusion 0.1 MICROgram(s)/kG/Min (1.36 mL/Hr) IV Continuous <Continuous>  predniSONE  Solution 40 milliGRAM(s) Oral daily  PrismaSATE Dialysate BK 0 / 3.5 5000 milliLiter(s) (1400 mL/Hr) CRRT <Continuous>  PrismaSOL Filtration BGK 4 / 2.5 5000 milliLiter(s) (1000 mL/Hr) CRRT <Continuous>  PrismaSOL Filtration BGK 4 / 2.5 5000 milliLiter(s) (200 mL/Hr) CRRT <Continuous>  propofol Infusion 50.046 MICROgram(s)/kG/Min (21.8 mL/Hr) IV Continuous <Continuous>  rifAXIMin 550 milliGRAM(s) Oral two times a day  thiamine Injectable 100 milliGRAM(s) IV Push daily  trimethoprim / sulfamethoxazole IVPB 400 milliGRAM(s) IV Intermittent every 8 hours  vasopressin Infusion 0.04 Unit(s)/Min (2.4 mL/Hr) IV Continuous <Continuous>    MEDICATIONS  (PRN):  sodium chloride 0.9% lock flush 10 milliLiter(s) IV Push every 1 hour PRN Pre/post blood products, medications, blood draw, and to maintain line patency      LABS:                        7.6    65.28 )-----------( 295      ( 2021 05:54 )             23.8     Hgb Trend: 7.6<--, 7.8<--, 7.6<--, 7.9<--, 8.2<--  12    136  |  100  |  84<H>  ----------------------------<  148<H>  4.1   |  12<L>  |  2.40<H>    Ca    7.5<L>      2021 05:54  Phos  6.0       Mg     2.2     12    TPro  5.6<L>  /  Alb  2.6<L>  /  TBili  9.8<H>  /  DBili  x   /  AST  145<H>  /  ALT  76<H>  /  AlkPhos  304<H>      Creatinine Trend: 2.40<--, 2.79<--, 3.43<--, 3.13<--, 3.07<--, 2.89<--  PT/INR - ( 2021 00:54 )   PT: 26.5 sec;   INR: 2.30 ratio         PTT - ( 2021 00:54 )  PTT:62.8 sec  Urinalysis Basic - ( 10 Eliezer 2021 17:12 )    Color: Yellow / Appearance: Clear / S.016 / pH: x  Gluc: x / Ketone: Negative  / Bili: Small / Urobili: Negative   Blood: x / Protein: Trace / Nitrite: Negative   Leuk Esterase: Negative / RBC: 2 /hpf / WBC 0 /HPF   Sq Epi: x / Non Sq Epi: 0 /hpf / Bacteria: Negative      Arterial Blood Gas:   @ 00:50  7.20/44/109/16/97/-10.6  ABG lactate: --  Arterial Blood Gas:   @ 13:12  7.20/43/82/16/93/-10.7  ABG lactate: --  Arterial Blood Gas:   @ 05:27  7.24/44/108/18/97/-7.9  ABG lactate: --  Arterial Blood Gas:   @ 00:29  7.27/47/129/21/98/-5.5  ABG lactate: --        MICROBIOLOGY:     RADIOLOGY:  [ ] Reviewed and interpreted by me    EKG: CHIEF COMPLAINT: A/W Cellulitis    Interval Events: Worsening Leucocytosis now 65.28 from 44, ammonia levels now 184 (157), Increasing pressor requirements, metabolic acidosis - serum bicarb now 12 despite CVVHD, and now + VRE Bacteremia.     HPI:  41 yr old male with PMHx EtOH abuse , alcoholic cirrhosis, recent decompensated cirrhosis requiring hospitalization (-) placed on clinical trial [id number] RCT (steroids vs placebo- unblinded and found to have received steroid therapy) who was originally admitted 21 with LLE cellulitis. Course c/b progressive worsening hypoxic resp failure with septic shock eventually requiring intubation and vasopressor therapy  requiring admission to MICU (21). Course further c/b GINNY and ACLF, development of diffuse macular purpuric rash (felt due to drug reaction - resolving). CT chest 21 demonstrated Patchy ground glass opacities within bilateral upper lobes and bilateral lower lobes likely representing multifocal pneumonia, positive fungitell with unknown significance.    MICU course to date consist of Paracentesis with removal of 1.9 liters (21), vasopressor therapy, PCP/PJP therapy with bactrim, s/p vitamin K and FFP, addition of bumex/metolazone therapy, s/p bronchoscopy 21), poor P/F ratios (ARDS) prompting utilization of nimbex(21) - which was discontinued > 48 hours ago- remains synchronous with the ventilator.    REVIEW OF SYSTEMS:  [x ] Unable to assess ROS because pt is sedated / chemically paralyzed on Nimbex ________    OBJECTIVE:  ICU Vital Signs Last 24 Hrs  T(C): 36.4 (2021 08:00), Max: 38 (2021 18:00)  T(F): 97.5 (2021 08:00), Max: 100.4 (2021 18:00)  HR: 97 (2021 09:45) (90 - 106)  BP: --  BP(mean): --  ABP: 130/47 (2021 09:45) (74/63 - 144/67)  ABP(mean): 68 (2021 09:45) (60 - 90)  RR: 30 (2021 09:45) (20 - 43)  SpO2: 99% (2021 09:45) (84% - 100%)    Mode: AC/ CMV (Assist Control/ Continuous Mandatory Ventilation), RR (machine): 30, TV (machine): 410, FiO2: 50, PEEP: 8, ITime: 0.75, MAP: 17, PIP: 33  I & O's     @ 07: @ 07:00  --------------------------------------------------------  IN: 5086.4 mL / OUT: 4378 mL / NET: 708.4 mL     @ 07:  -   @ 10:42  --------------------------------------------------------  IN: 308.8 mL / OUT: 406 mL / NET: -97.2 mL      CAPILLARY BLOOD GLUCOSE  POCT Blood Glucose.: 147 mg/dL (2021 22:58)      PHYSICAL EXAM:  General: obtunded / sedated/ unresponsive  HEENT: normocephalic, atraumatic, sclera icteric, oral mucosa pinkish w overall pallor, lips dry  Lymph Nodes: non palp  Neck: supple, +JVD  Respiratory: Bilaterally equal BS / chest excursion, orally intubated on full support- not overbreathing the ventilator  Cardiovascular: S1S2 RRR, SR, pulses pal 1+/4, aortic heaves   Abdomen: distended, ascites, hypoactive BS / minimal, rectal tube in place draining liquid brown stools  Extremities: +BUE/BLE/Sacral edema 3+  Skin: Jaundice, warm to touch / DTR- f/u NSG  Neurological: pupils dilated - extreme sluggish reaction, L eye appears to have slight Left sided deviation.  Psychiatry: unable to assess     LINES: JANE Sanchez, [facility], [facility] MEDICATIONS:  MEDICATIONS  (STANDING):  albuterol/ipratropium for Nebulization 3 milliLiter(s) Nebulizer every 6 hours  artificial  tears Solution 2 Drop(s) Both EYES every 4 hours  chlorhexidine 0.12% Liquid 15 milliLiter(s) Oral Mucosa every 12 hours  chlorhexidine 4% Liquid 1 Application(s) Topical <User Schedule>  CRRT Treatment    <Continuous>  DAPTOmycin IVPB 600 milliGRAM(s) IV Intermittent every 48 hours  dextrose 50% Injectable 50 milliLiter(s) IV Push every 15 minutes  fentaNYL   Infusion... 2 MICROgram(s)/kG/Hr (7.26 mL/Hr) IV Continuous <Continuous>  folic acid 1 milliGRAM(s) Oral daily  insulin regular Infusion 2 Unit(s)/Hr (2 mL/Hr) IV Continuous <Continuous>  ketamine Infusion. 0.251 mG/kG/Hr (1.82 mL/Hr) IV Continuous <Continuous>  multivitamin 1 Tablet(s) Oral daily  norepinephrine Infusion 0.2 MICROgram(s)/kG/Min (13.6 mL/Hr) IV Continuous <Continuous>  pantoprazole  Injectable 40 milliGRAM(s) IV Push daily  phenylephrine    Infusion 0.1 MICROgram(s)/kG/Min (1.36 mL/Hr) IV Continuous <Continuous>  predniSONE  Solution 40 milliGRAM(s) Oral daily  PrismaSATE Dialysate BK 0 / 3.5 5000 milliLiter(s) (1400 mL/Hr) CRRT <Continuous>  PrismaSOL Filtration BGK 4 / 2.5 5000 milliLiter(s) (1000 mL/Hr) CRRT <Continuous>  PrismaSOL Filtration BGK 4 / 2.5 5000 milliLiter(s) (200 mL/Hr) CRRT <Continuous>  propofol Infusion 50.046 MICROgram(s)/kG/Min (21.8 mL/Hr) IV Continuous <Continuous>  rifAXIMin 550 milliGRAM(s) Oral two times a day  thiamine Injectable 100 milliGRAM(s) IV Push daily  trimethoprim / sulfamethoxazole IVPB 400 milliGRAM(s) IV Intermittent every 8 hours  vasopressin Infusion 0.04 Unit(s)/Min (2.4 mL/Hr) IV Continuous <Continuous>    MEDICATIONS  (PRN):  sodium chloride 0.9% lock flush 10 milliLiter(s) IV Push every 1 hour PRN Pre/post blood products, medications, blood draw, and to maintain line patency      LABS:                        7.6    65.28 )-----------( 295      ( 2021 05:54 )             23.8     Hgb Trend: 7.6<--, 7.8<--, 7.6<--, 7.9<--, 8.2<--  12    136  |  100  |  84<H>  ----------------------------<  148<H>  4.1   |  12<L>  |  2.40<H>    Ca    7.5<L>      2021 05:54  Phos  6.0       Mg     2.2     12    TPro  5.6<L>  /  Alb  2.6<L>  /  TBili  9.8<H>  /  DBili  x   /  AST  145<H>  /  ALT  76<H>  /  AlkPhos  304<H>      Creatinine Trend: 2.40<--, 2.79<--, 3.43<--, 3.13<--, 3.07<--, 2.89<--  PT/INR - ( 2021 00:54 )   PT: 26.5 sec;   INR: 2.30 ratio         PTT - ( 2021 00:54 )  PTT:62.8 sec  Urinalysis Basic - ( 10 Eliezer 2021 17:12 )    Color: Yellow / Appearance: Clear / S.016 / pH: x  Gluc: x / Ketone: Negative  / Bili: Small / Urobili: Negative   Blood: x / Protein: Trace / Nitrite: Negative   Leuk Esterase: Negative / RBC: 2 /hpf / WBC 0 /HPF   Sq Epi: x / Non Sq Epi: 0 /hpf / Bacteria: Negative      Arterial Blood Gas:   @ 00:50  7.20/44/109/16/97/-10.6  ABG lactate: --  Arterial Blood Gas:   @ 13:12  7.20/43/82/16/93/-10.7  ABG lactate: --  Arterial Blood Gas:   @ 05:27  7.24/44/108/18/97/-7.9  ABG lactate: --  Arterial Blood Gas:   @ 00:29  7.27/47/129/21/98/-5.5  ABG lactate: --      MICROBIOLOGY:   Culture - Blood (06.10.21 @ 23:01)   Gram Stain:   Growth in anaerobic bottle: Gram positive cocci in pairs   - Enterococcus faecium,VRE: Detec   Specimen Source: .Blood Blood-Peripheral   Organism: Blood Culture PCR   Pneumocystis jiroveci PCR (21 @ 13:20)   Pneumocystis Specimen Source: bal   Pneumocystis PCR Result: Positive: Critical Result.   RADIOLOGY:  [ ] Reviewed and interpreted by me    EXAM:  CT ANGIO CHEST PULM ART [facility]                            PROCEDURE DATE:  2021            INTERPRETATION:  INDICATION, CLINICAL INFORMATION: Shortness of breath, cirrhosis, lower extremity swelling, pain, leukocytosis, fever      TECHNIQUE: CT pulmonary angiogram of the chest was performed. Coronal and sagittal images were reconstructed. Maximum intensity projection images were generated. Images were obtained after the uneventful administration of 90 cc nonionic intravenous Omnipaque 350. 10 cc of Omnipaque 350 was discarded.      COMPARISON: None.    FINDINGS:    PULMONARY VESSELS: Limited exam for evaluation of pulmonary embolus secondary to motion, streak artifact and poor opacification of pulmonary arteries. Main pulmonary artery normal in diameter.    HEART AND VASCULATURE: Heart size is normal. No pericardial effusion. No aortic aneurysm or dissection.    LUNGS, AIRWAYS, PLEURA: Patent central airways. Patchy opacities within bilateral upper lobes and bilateral lower lobes likely representing multifocal pneumonia. 1.2 cm right lower lobe nodule. No pleural effusions or pneumothorax. The right hemidiaphragm is elevated.    MEDIASTINUM: No mass or lymphadenopathy.    UPPER ABDOMEN: Ascites.    BONES AND SOFT TISSUES: No aggressive osseous lesion.    LOWER NECK: Within normal limits.    IMPRESSION:    Limited exam for evaluation of pulmonary embolus secondary to motion, streak artifact and poor opacification of pulmonary arteries.    Patchy opacities within bilateral upper lobes and bilateral lower lobes likely representing multifocal pneumonia.    1.2 cm right lower lobe nodule. Recommend follow-up chest CT in 3 months to determine stability.      < from: TTE with Doppler (w/Cont) (21 @ 07:38) >    Patient name: OSIRIS HALE  YOB: 1980   Age: 41 (M)   MR#: [id number]  Study Date: 2021  Location: [place] [id number]: Rowan Babin RDCS  Study quality: Technically fair  Referring Physician: Adela Chan MD  Blood Pressure: 117/52 mmHg  Height: 157 cm  Weight: 73 kg  BSA: 1.7 m2  Heart Rate: 86 mmHg  ------------------------------------------------------------------------  PROCEDURE: Transthoracic echocardiogram with 2-D, M-Mode  and complete spectral and color flow Doppler. Patient was  injected with 10 cc's of aerosolized saline. Patient was  injected with 10 cc's of aerosolized saline. Verbal consent  was obtained for injection of  Ultrasonic Enhancing Agent  following a discussion of risks and benefits. Following  intravenous injection of Ultrasonic Enhancing Agent ,  harmonic imaging was performed.  INDICATION: Shock, unspecified (R57.9), Acute respiratory  failure with hypoxia (J96.01)  ------------------------------------------------------------------------  Dimensions:    Normal Values:  LA:     4.5    2.0 - 4.0 cm  Ao:     3.4    2.0 - 3.8 cm  SEPTUM: 0.9    0.6 - 1.2 cm  PWT:    0.9    0.6 - 1.1 cm  LVIDd:  5.1    3.0 - 5.6 cm  LVIDs:  2.3    1.8 - 4.0 cm  Derived variables:  LVMI: 94 g/m2  RWT: 0.35  Fractional short: 55 %  EF (Carrillo Rule): 76 %  ------------------------------------------------------------------------  Observations:  Mitral Valve: Normal mitral valve. Minimal mitral  regurgitation.  Aortic Valve/Aorta: Normal trileaflet aortic valve. No  aortic valve regurgitation seen.  Aortic Root: 3.4 cm.  Left Atrium: Mildly dilated left atrium.  LA volume index =  37 cc/m2.  Left Ventricle: Hyperdynamic left ventricular systolic  function.  Endocardial visualization enhanced with  intravenous injection of Ultrasonic Enhancing Agent  (Definity). No left ventricular thrombus. Normal left  ventricular internal dimensions and wall thicknesses.  Normal diastolic function  Right Heart: Normal right atrium. Normal right ventricular  size and function. Normal tricuspid valve. Minimal  tricuspid regurgitation. Normal pulmonic valve. No pulmonic  regurgitation.  Pericardium/Pleura: Normal pericardium with no pericardial  effusion.  Hemodynamic: Estimated right atrial pressure is 8 mmHg.  Estimated right ventricular systolic pressure equals 42 mm  Hg, assuming right atrial pressure equals 8 mm Hg,

## 2021-06-12 NOTE — PROGRESS NOTE ADULT - ASSESSMENT
41 yr old male with stated Hx significant for EtOH cirrhosis, decompensated, presented with LE cellulitis, course c/b progressive hypoxic resp failure / multi focal PNA / ARDS, septic shock, GINNY /ATN, ACLF. The pt c/w declining now w severe metabolic / lactic acidosis, now on tri-pressor therapy with increasing doses, worsening leukocytosis, anuric on CVVHD, now infected with PCP and VRE bacteremia.     Plan:  #Neuro: sedated, pupils dilated with minimal reaction  -Neuro checks q 4 hrs and prn for changes  -plan CT of the brain stabilized - pt now to0 unstable to be transported to CT at this time  -c/w Propofol, ketamine, fent gtts - titrate to RASS of -2  -ammonia level worsening - 184 (157  <140) c/w rifaximin- hold lactulose now in the setting of high stool output    #Pulm: Hypoxic Resp Failure / ARDS (poor P/F ratio) s/p intubation c/w VC- now improving- compliant with vent off of Nimbex since yesterday  -VC 30 /410/50%/+8-= abg- 7.2 / 44/ 109 / 16 / 97%- decrease FIO2 to 45%  -c/w Bronchodilator therapy q 6 hrs prn sob/wheezes  -Lung protective therapy  / aspiration precautions    #CV: -Distributive Shock / septic shock / hepatic shock  -c/w norepinephrine / Vasopressin and start phenylephrine  -    #GI: -EtOH cirrhosis, decompensated cirrhosis, Acute on Chronic Liver Failure,  s/p paracentesis 6/5/21 with removal of 1.9 liters,   -trend LFT  -tube feeds as tolerated  -f/u hepatology   -continue lactulose therapy - goal 3-4 BM / lg  -Rifaximin 550mg q 12H    Renal: -GINNY/ATN -   -strict I & O's - keep negative  -bumex gtt - titrate to U/O 20cc/h now - diminished from prior days of >100cc hr  -c/w metolazone 10mg q 12H / c/w Bumex gtt-  -f/u renal- consider CVVHD in the setting of worsening metabolic acidosis, low U/O, worsening renal function    #ID: septic shock / PNA / ARDS, -recent RCT steroids vs placebo, unblinded, febrile yesterday- worsening leukocytosis  -6/7/21 Pneumocystis PCR -POSITIVE- c/w Bactrim  -d/c'd meropenem 6/10- -6/6/21+ BAL cx  with growth, -6/5/21 combicath - NGTD,-6/5/21 Paracentesis/peritoneal fluid - no organisms,  6/5/21 MRSA - neg.-6/4/21 sputum Cx - NGTD, 6/3 U. Legionella neg, 6/1 BC neg,   -follow up with toxoplasma IGM and Quant gold    41 yr old male with stated Hx significant for EtOH cirrhosis, decompensated, presented with LE cellulitis, course c/b progressive hypoxic resp failure / multi focal PNA / ARDS, septic shock, GINNY /ATN, ACLF. The pt c/w declining now w severe metabolic / lactic acidosis, now on tri-pressor therapy with increasing doses, worsening leukocytosis, anuric on CVVHD, now infected with PCP and VRE bacteremia.     Plan:  #Neuro: sedated, pupils dilated with minimal reaction  -Neuro checks q 4 hrs and prn for changes  -plan CT of the brain stabilized - pt now to0 unstable to be transported to CT at this time  -c/w Propofol, ketamine, fent gtts - titrate to RASS of -2  -ammonia level worsening - 184 (157  <140) c/w rifaximin- hold lactulose now in the setting of high stool output    #Pulm: Hypoxic Resp Failure / ARDS (poor P/F ratio) s/p intubation c/w VC- now improving- compliant with vent off of Nimbex since yesterday  -VC 30 /410/50%/+8-= abg- 7.2 / 44/ 109 / 16 / 97%- decrease FIO2 to 45%  -c/w Bronchodilator therapy q 6 hrs prn sob/wheezes  -Lung protective therapy  / aspiration precautions    #CV: -Distributive Shock / septic shock / hepatic shock  -c/w norepinephrine / Vasopressin and start phenylephrine- maintain MAP >60  -bubble study negative - f/u echo    #GI: -EtOH cirrhosis, decompensated cirrhosis, Acute on Chronic Liver Failure,  s/p paracentesis 6/5/21 with removal of 1.9 liters, C/w raising ammonia levels / lactic acidosis  -c/w Rifaximin   -hold lactulose - pt w high volume output- consider sending c'diff in the setting of worsening leukocytosis and massive diarrhea  -c/w trending  LFT- c/w raising levels  -tube feeds as tolerated  -f/u hepatology     Renal: -GINNY/ATN, severe +AG Metabolic Acidosis. anuric, on CVVHD  -strict I & O's - keep negative  -c/w CVVHD- off bumex gtt/ metalazone X 24 hours  -f/u renal    #ID: septic shock / PNA / ARDS, -recent RCT steroids vs placebo, unblinded, febrile yesterday- worsening leukocytosis, + PCP , +  -6/7/21 Pneumocystis PCR -POSITIVE- c/w Bactrim  -+VRE Bacteremia - + 6/10-c /w Daptomycin  -restart meropenem, Vanco PO - C'Diff emperic coverage- collect specimen 48H s/p stop of lactulose  -restart caspofungin  [-d/c'd meropenem 6/10- -6/6/21+ BAL cx  with growth, -6/5/21 combicath - NGTD,-6/5/21 Paracentesis/peritoneal fluid - no organisms,  6/5/21 MRSA - neg.-6/4/21 sputum Cx - NGTD, 6/3 U. Legionella neg, 6/1 BC neg, ]    GOC: Call pt wife- pt prognosis is poor. Plan a face to face meeting

## 2021-06-13 NOTE — PROGRESS NOTE ADULT - ASSESSMENT
Patient is a 42 y/o M w PMH of EtoH abuse complicated by decompensated cirrhosis presented to [facility] for worsening LE edema. Admitted to [facility] for acute hypoxic resiraptoy failure in setting of PNA. Nephrology consulted for GINNY.     # GINNY  Pt with oliguric GINNY likely 2/2 hemodynamically mediated ATN in the setting of septic shock and/or bilirubin cast nephropathy.  Upon review of [facility] HIE/[employer], baseline Scr of 0.49 on 4/25/21, Scr on admission increased to 0.87 on 5/27/21, Scr progressively increased and peaked to 1.13 on 5/1/21. Intubated on 6/4 due to hypoxic resp failure. Initiated on CRRT on 6/11 for acidosis.  Currently pt intubated/sedated on multiple pressors, CRRT.    - continue CRRT/CVVHDF, UF goal net negative 100-200 cc/min as tolerated.  GOC on-going   - BP optimization/antibiotic/blood transfusion per ICU team  - monitor BMP, strict I/O, avoid nephrotoxic agents (NSAIDs, PPI, contrast), renally dose medications per CRRT.  Antibiotic dosing CRRT    # Metabolic acidosis  likely 2/2 GINNY, initiated on CRRT on 6/11 for acidosis, last serum bicarb 11, pH 7.22  - continue CRRT/CVVHDF as outline above, increase dialysate rate for better clearance. Overall very poor prognosis, lactic acidosis despite CRRT.  Continue bicarb drip.  Recommend GOC with family. Monitor blood gas, lactate

## 2021-06-13 NOTE — PROGRESS NOTE ADULT - SUBJECTIVE AND OBJECTIVE BOX
[facility] DIVISION OF KIDNEY DISEASES AND HYPERTENSION   -- FOLLOW UP NOTE --   Dwight Trinh  Nephrology Fellow  Pager [facility]: [phone]   /  Pager [facility]: [phone]  (after 5pm or weekend please page the on-call fellow via [email or web address])  ======================================================  24 hour events/subjective:  - overnight no events reported, vitals afebrile, total UOP [place] no output, CRRT UF 9.1L (Net neg 0.5L) in the past 24hr  - patient seen and examined at bedside this morning intubated/sedated FIO2 45/PEEP 8 on multiple pressors and CRRT (no clotting issues)  - vitals/lab/medications reviewed, noted for lactate 13.7    PAST HISTORY  --------------------------------------------------------------------------------  No significant changes to PMH, PSH, FHx, SHx, unless otherwise noted    ALLERGIES & MEDICATIONS  --------------------------------------------------------------------------------  Allergies  No Known Allergies    Intolerances    Standing Inpatient Medications  albumin human 25% IVPB 100 milliLiter(s) IV Intermittent every 6 hours  albuterol/ipratropium for Nebulization 3 milliLiter(s) Nebulizer every 6 hours  artificial  tears Solution 2 Drop(s) Both EYES every 4 hours  caspofungin IVPB 50 milliGRAM(s) IV Intermittent every 24 hours  chlorhexidine 0.12% Liquid 15 milliLiter(s) Oral Mucosa every 12 hours  chlorhexidine 4% Liquid 1 Application(s) Topical <User Schedule>  CRRT Treatment    <Continuous>  DAPTOmycin IVPB 600 milliGRAM(s) IV Intermittent every 48 hours  dextrose 50% Injectable 50 milliLiter(s) IV Push every 15 minutes  fentaNYL   Infusion... 2 MICROgram(s)/kG/Hr IV Continuous <Continuous>  folic acid 1 milliGRAM(s) Oral daily  hydrocortisone sodium succinate Injectable 100 milliGRAM(s) IV Push every 8 hours  insulin lispro (ADMELOG) corrective regimen sliding scale   SubCutaneous every 6 hours  ketamine Infusion. 0.251 mG/kG/Hr IV Continuous <Continuous>  meropenem  IVPB 500 milliGRAM(s) IV Intermittent every 8 hours  meropenem  IVPB      multivitamin 1 Tablet(s) Oral daily  norepinephrine Infusion 0.2 MICROgram(s)/kG/Min IV Continuous <Continuous>  pantoprazole  Injectable 40 milliGRAM(s) IV Push daily  phenylephrine    Infusion 0.1 MICROgram(s)/kG/Min IV Continuous <Continuous>  PrismaSATE Dialysate BK 0 / 3.5 5000 milliLiter(s) CRRT <Continuous>  PrismaSOL Filtration BGK 4 / 2.5 5000 milliLiter(s) CRRT <Continuous>  PrismaSOL Filtration BGK 4 / 2.5 5000 milliLiter(s) CRRT <Continuous>  rifAXIMin 550 milliGRAM(s) Oral two times a day  sodium bicarbonate  Infusion 0.258 mEq/kG/Hr IV Continuous <Continuous>  thiamine Injectable 100 milliGRAM(s) IV Push daily  trimethoprim / sulfamethoxazole IVPB 400 milliGRAM(s) IV Intermittent every 8 hours  vancomycin    Solution 125 milliGRAM(s) Oral every 6 hours  vasopressin Infusion 0.04 Unit(s)/Min IV Continuous <Continuous>    PRN Inpatient Medications  sodium chloride 0.9% lock flush 10 milliLiter(s) IV Push every 1 hour PRN    REVIEW OF SYSTEMS  unable to obtain d/t intubated/sedated    VITALS/PHYSICAL EXAM  --------------------------------------------------------------------------------  T(C): 36.6 (06-13-21 @ 08:00), Max: 36.6 (06-12-21 @ 12:00)  HR: 90 (06-13-21 @ 09:30) (90 - 100)  BP: --  RR: 34 (06-13-21 @ 09:30) (30 - 34)  SpO2: 100% (06-13-21 @ 09:30) (92% - 100%)  Wt(kg): --    06-12-21 @ 07:01  -  06-13-21 @ 07:00  --------------------------------------------------------  IN: 8861.2 mL / OUT: 9455 mL / NET: -593.8 mL    06-13-21 @ 07:01  -  06-13-21 @ 10:50  --------------------------------------------------------  IN: 990 mL / OUT: 2081 mL / NET: -1091 mL    Physical Exam:  	Gen: intubated/sedated   	HEENT: ETT  	Pulm: vent sounds  	CV: S1S2  	Abd: Soft, +BS   	Ext: + LE edema B/L  	Neuro: sedated  	Skin: jaundice  	Access: [facility] non tunnel hd catheter    LABS/STUDIES  --------------------------------------------------------------------------------              7.9    65.80 >-----------<  219      [06-13-21 @ 05:41]              26.1     132  |  95  |  38  ----------------------------<  98      [06-13-21 @ 05:41]  4.0   |  11  |  1.18        Ca     7.9     [06-13-21 @ 05:41]      Mg     2.4     [06-13-21 @ 05:41]      Phos  5.5     [06-13-21 @ 05:41]    TPro  5.1  /  Alb  2.8  /  TBili  11.3  /  DBili  x   /  AST  681  /  ALT  212  /  AlkPhos  284  [06-13-21 @ 00:05]    PT/INR: PT 37.0 , INR 3.26       [06-13-21 @ 00:06]  PTT: 70.7       [06-13-21 @ 00:06]    Creatinine Trend:  SCr 1.18 [06-13 @ 05:41]  SCr 1.41 [06-13 @ 00:05]  SCr 1.72 [06-12 @ 17:49]  SCr 2.03 [06-12 @ 11:43]  SCr 2.40 [06-12 @ 05:54]    Urinalysis - [06-10-21 @ 17:12]      Color Yellow / Appearance Clear / SG 1.016 / pH 6.0      Gluc Negative / Ketone Negative  / Bili Small / Urobili Negative       Blood Moderate / Protein Trace / Leuk Est Negative / Nitrite Negative      RBC 2 / WBC 0 / Hyaline 1 / Gran  / Sq Epi  / Non Sq Epi 0 / Bacteria Negative      Iron 66, TIBC 132, %sat 50      [04-21-21 @ 09:07]  Ferritin 537      [04-23-21 @ 09:10]  Lipid: chol --, , HDL --, LDL --      [06-12-21 @ 11:43]    HBsAg Nonreact      [06-04-21 @ 14:54]  HCV 0.25, Nonreact      [06-04-21 @ 14:54]    KATIE: titer Negative, pattern --      [06-05-21 @ 22:08]  C3 Complement 25      [06-04-21 @ 14:54]  C4 Complement 2      [06-04-21 @ 14:54]  Rheumatoid Factor <10      [06-05-21 @ 21:33]  ANCA: cANCA Negative, pANCA Negative, atypical ANCA Negative      [06-05-21 @ 22:08]  Cryoglobulin: Negative      [06-05-21 @ 20:10]

## 2021-06-13 NOTE — PROGRESS NOTE ADULT - SUBJECTIVE AND OBJECTIVE BOX
CHIEF COMPLAINT:    Interval Events:    REVIEW OF SYSTEMS:  Constitutional: [ ] fevers [ ] chills [ ] weight loss [ ] weight gain  HEENT: [ ] dry eyes [ ] eye irritation [ ] postnasal drip [ ] nasal congestion  CV: [ ] chest pain [ ] orthopnea [ ] palpitations [ ] murmur  Resp: [ ] cough [ ] shortness of breath [ ] dyspnea [ ] wheezing [ ] sputum [ ] hemoptysis  GI: [ ] nausea [ ] vomiting [ ] diarrhea [ ] constipation [ ] abd pain [ ] dysphagia   : [ ] dysuria [ ] nocturia [ ] hematuria [ ] increased urinary frequency  Musculoskeletal: [ ] back pain [ ] myalgias [ ] arthralgias [ ] fracture  Skin: [ ] rash [ ] itch  Neurological: [ ] headache [ ] dizziness [ ] syncope [ ] weakness [ ] numbness  Psychiatric: [ ] anxiety [ ] depression  Endocrine: [ ] diabetes [ ] thyroid problem  Hematologic/Lymphatic: [ ] anemia [ ] bleeding problem  Allergic/Immunologic: [ ] itchy eyes [ ] nasal discharge [ ] hives [ ] angioedema  [ ] All other systems negative  [ ] Unable to assess ROS because ________    OBJECTIVE:  ICU Vital Signs Last 24 Hrs  T(C): 36.4 (13 Jun 2021 04:00), Max: 36.6 (12 Jun 2021 12:00)  T(F): 97.5 (13 Jun 2021 04:00), Max: 97.9 (12 Jun 2021 12:00)  HR: 92 (13 Jun 2021 07:00) (90 - 100)  BP: --  BP(mean): --  ABP: 108/39 (13 Jun 2021 07:00) (89/33 - 142/55)  ABP(mean): 55 (13 Jun 2021 07:00) (47 - 78)  RR: 34 (13 Jun 2021 07:00) (30 - 34)  SpO2: 99% (13 Jun 2021 07:00) (92% - 100%)    Mode: AC/ CMV (Assist Control/ Continuous Mandatory Ventilation), RR (machine): 34, TV (machine): 450, FiO2: 45, PEEP: 8, ITime: 0.75, MAP: 20, PIP: 38  I & O's    06-12 @ 07:01  -  06-13 @ 07:00  --------------------------------------------------------  IN: 8861.2 mL / OUT: 9455 mL / NET: -593.8 mL      CAPILLARY BLOOD GLUCOSE      POCT Blood Glucose.: 102 mg/dL (13 Jun 2021 05:35)      PHYSICAL EXAM:  General:   HEENT:   Lymph Nodes:  Neck:   Respiratory:   Cardiovascular:   Abdomen:   Extremities:   Skin:   Neurological:  Psychiatry:    LINES:    HOSPITAL MEDICATIONS:  MEDICATIONS  (STANDING):  albuterol/ipratropium for Nebulization 3 milliLiter(s) Nebulizer every 6 hours  artificial  tears Solution 2 Drop(s) Both EYES every 4 hours  caspofungin IVPB 50 milliGRAM(s) IV Intermittent every 24 hours  chlorhexidine 0.12% Liquid 15 milliLiter(s) Oral Mucosa every 12 hours  chlorhexidine 4% Liquid 1 Application(s) Topical <User Schedule>  CRRT Treatment    <Continuous>  DAPTOmycin IVPB 600 milliGRAM(s) IV Intermittent every 48 hours  dextrose 50% Injectable 50 milliLiter(s) IV Push every 15 minutes  fentaNYL   Infusion... 2 MICROgram(s)/kG/Hr (7.26 mL/Hr) IV Continuous <Continuous>  folic acid 1 milliGRAM(s) Oral daily  hydrocortisone sodium succinate Injectable 100 milliGRAM(s) IV Push every 8 hours  insulin lispro (ADMELOG) corrective regimen sliding scale   SubCutaneous every 6 hours  ketamine Infusion. 0.251 mG/kG/Hr (1.82 mL/Hr) IV Continuous <Continuous>  meropenem  IVPB 500 milliGRAM(s) IV Intermittent every 8 hours  meropenem  IVPB      multivitamin 1 Tablet(s) Oral daily  norepinephrine Infusion 0.2 MICROgram(s)/kG/Min (13.6 mL/Hr) IV Continuous <Continuous>  pantoprazole  Injectable 40 milliGRAM(s) IV Push daily  phenylephrine    Infusion 0.1 MICROgram(s)/kG/Min (1.36 mL/Hr) IV Continuous <Continuous>  PrismaSATE Dialysate BK 0 / 3.5 5000 milliLiter(s) (1400 mL/Hr) CRRT <Continuous>  PrismaSOL Filtration BGK 4 / 2.5 5000 milliLiter(s) (1000 mL/Hr) CRRT <Continuous>  PrismaSOL Filtration BGK 4 / 2.5 5000 milliLiter(s) (200 mL/Hr) CRRT <Continuous>  rifAXIMin 550 milliGRAM(s) Oral two times a day  sodium bicarbonate  Infusion 0.258 mEq/kG/Hr (125 mL/Hr) IV Continuous <Continuous>  thiamine Injectable 100 milliGRAM(s) IV Push daily  trimethoprim / sulfamethoxazole IVPB 400 milliGRAM(s) IV Intermittent every 8 hours  vancomycin    Solution 125 milliGRAM(s) Oral every 6 hours  vasopressin Infusion 0.04 Unit(s)/Min (2.4 mL/Hr) IV Continuous <Continuous>    MEDICATIONS  (PRN):  sodium chloride 0.9% lock flush 10 milliLiter(s) IV Push every 1 hour PRN Pre/post blood products, medications, blood draw, and to maintain line patency      LABS:                        7.9    65.80 )-----------( 219      ( 13 Jun 2021 05:41 )             26.1     Hgb Trend: 7.9<--, 6.8<--, 7.5<--, 7.6<--, 7.8<--  06-13    132<L>  |  95<L>  |  38<H>  ----------------------------<  98  4.0   |  11<L>  |  1.18    Ca    7.9<L>      13 Jun 2021 05:41  Phos  5.5     06-13  Mg     2.4     06-13    TPro  5.1<L>  /  Alb  2.8<L>  /  TBili  11.3<H>  /  DBili  x   /  AST  681<H>  /  ALT  212<H>  /  AlkPhos  284<H>  06-13    Creatinine Trend: 1.18<--, 1.41<--, 1.72<--, 2.03<--, 2.40<--, 2.79<--  PT/INR - ( 13 Jun 2021 00:06 )   PT: 37.0 sec;   INR: 3.26 ratio         PTT - ( 13 Jun 2021 00:06 )  PTT:70.7 sec    Arterial Blood Gas:  06-13 @ 05:38  7.22/35/99/14/96/-12.2  ABG lactate: --  Arterial Blood Gas:  06-13 @ 00:03  7.14/44/121/14/97/-13.2  ABG lactate: --  Arterial Blood Gas:  06-12 @ 17:47  7.21/40/116/16/97/-11.0  ABG lactate: --  Arterial Blood Gas:  06-12 @ 15:23  7.23/42/98/17/96/-9.7  ABG lactate: --  Arterial Blood Gas:  06-12 @ 11:42  7.16/43/111/15/96/-12.6  ABG lactate: --  Arterial Blood Gas:  06-12 @ 00:50  7.20/44/109/16/97/-10.6  ABG lactate: --  Arterial Blood Gas:  06-11 @ 13:12  7.20/43/82/16/93/-10.7  ABG lactate: --        MICROBIOLOGY:     RADIOLOGY:  [ ] Reviewed and interpreted by me    EKG: CHIEF COMPLAINT: A/W Cellulitis    Interval Events: Worsening Leucocytosis now 65.28 from 44, ammonia levels now 184 (157), Increasing pressor requirements, metabolic acidosis - serum bicarb now 12 despite CVVHD, and now + VRE Bacteremia.     HPI:  41 yr old male with PMHx EtOH abuse , alcoholic cirrhosis, recent decompensated cirrhosis requiring hospitalization (4/20-27/2021) placed on clinical trial [id number] RCT (steroids vs placebo- unblinded and found to have received steroid therapy). The pt was re-admitted 5/27/21 with LLE cellulitis, his hospital course c/b Hypoxic Respiratory Failure 2/2 PCP PNA and ARDS, Septic Shock / Distributive shock, Hepatic shock w + PCP PNA (6/7), along with VRE Bacteremia (6/11), GINNY / ATN now anuric in the setting of severe +AG Metabolic Acidosis now on CVVHD, and hyper ammoniemia. The pt has a poor prognosis, family meeting last night and they are aware.       REVIEW OF SYSTEMS:  [x ] Unable to assess ROS because pt is sedated / chemically paralyzed on Nimbex ________    OBJECTIVE:  ICU Vital Signs Last 24 Hrs  T(C): 36.4 (13 Jun 2021 04:00), Max: 36.6 (12 Jun 2021 12:00)  T(F): 97.5 (13 Jun 2021 04:00), Max: 97.9 (12 Jun 2021 12:00)  HR: 92 (13 Jun 2021 07:00) (90 - 100)  BP: --  BP(mean): --  ABP: 108/39 (13 Jun 2021 07:00) (89/33 - 142/55)  ABP(mean): 55 (13 Jun 2021 07:00) (47 - 78)  RR: 34 (13 Jun 2021 07:00) (30 - 34)  SpO2: 99% (13 Jun 2021 07:00) (92% - 100%)    Mode: AC/ CMV (Assist Control/ Continuous Mandatory Ventilation), RR (machine): 34, TV (machine): 450, FiO2: 45, PEEP: 8, ITime: 0.75, MAP: 20, PIP: 38  I & O's    06-12 @ 07:01  -  06-13 @ 07:00  --------------------------------------------------------  IN: 8861.2 mL / OUT: 9455 mL / NET: -593.8 mL      CAPILLARY BLOOD GLUCOSE      POCT Blood Glucose.: 102 mg/dL (13 Jun 2021 05:35)      PHYSICAL EXAM:  General:   HEENT:   Lymph Nodes:  Neck:   Respiratory:   Cardiovascular:   Abdomen:   Extremities:   Skin:   Neurological:  Psychiatry:    LINES:    HOSPITAL MEDICATIONS:  MEDICATIONS  (STANDING):  albuterol/ipratropium for Nebulization 3 milliLiter(s) Nebulizer every 6 hours  artificial  tears Solution 2 Drop(s) Both EYES every 4 hours  caspofungin IVPB 50 milliGRAM(s) IV Intermittent every 24 hours  chlorhexidine 0.12% Liquid 15 milliLiter(s) Oral Mucosa every 12 hours  chlorhexidine 4% Liquid 1 Application(s) Topical <User Schedule>  CRRT Treatment    <Continuous>  DAPTOmycin IVPB 600 milliGRAM(s) IV Intermittent every 48 hours  dextrose 50% Injectable 50 milliLiter(s) IV Push every 15 minutes  fentaNYL   Infusion... 2 MICROgram(s)/kG/Hr (7.26 mL/Hr) IV Continuous <Continuous>  folic acid 1 milliGRAM(s) Oral daily  hydrocortisone sodium succinate Injectable 100 milliGRAM(s) IV Push every 8 hours  insulin lispro (ADMELOG) corrective regimen sliding scale   SubCutaneous every 6 hours  ketamine Infusion. 0.251 mG/kG/Hr (1.82 mL/Hr) IV Continuous <Continuous>  meropenem  IVPB 500 milliGRAM(s) IV Intermittent every 8 hours  meropenem  IVPB      multivitamin 1 Tablet(s) Oral daily  norepinephrine Infusion 0.2 MICROgram(s)/kG/Min (13.6 mL/Hr) IV Continuous <Continuous>  pantoprazole  Injectable 40 milliGRAM(s) IV Push daily  phenylephrine    Infusion 0.1 MICROgram(s)/kG/Min (1.36 mL/Hr) IV Continuous <Continuous>  PrismaSATE Dialysate BK 0 / 3.5 5000 milliLiter(s) (1400 mL/Hr) CRRT <Continuous>  PrismaSOL Filtration BGK 4 / 2.5 5000 milliLiter(s) (1000 mL/Hr) CRRT <Continuous>  PrismaSOL Filtration BGK 4 / 2.5 5000 milliLiter(s) (200 mL/Hr) CRRT <Continuous>  rifAXIMin 550 milliGRAM(s) Oral two times a day  sodium bicarbonate  Infusion 0.258 mEq/kG/Hr (125 mL/Hr) IV Continuous <Continuous>  thiamine Injectable 100 milliGRAM(s) IV Push daily  trimethoprim / sulfamethoxazole IVPB 400 milliGRAM(s) IV Intermittent every 8 hours  vancomycin    Solution 125 milliGRAM(s) Oral every 6 hours  vasopressin Infusion 0.04 Unit(s)/Min (2.4 mL/Hr) IV Continuous <Continuous>    MEDICATIONS  (PRN):  sodium chloride 0.9% lock flush 10 milliLiter(s) IV Push every 1 hour PRN Pre/post blood products, medications, blood draw, and to maintain line patency      LABS:                        7.9    65.80 )-----------( 219      ( 13 Jun 2021 05:41 )             26.1     Hgb Trend: 7.9<--, 6.8<--, 7.5<--, 7.6<--, 7.8<--  06-13    132<L>  |  95<L>  |  38<H>  ----------------------------<  98  4.0   |  11<L>  |  1.18    Ca    7.9<L>      13 Jun 2021 05:41  Phos  5.5     06-13  Mg     2.4     06-13    TPro  5.1<L>  /  Alb  2.8<L>  /  TBili  11.3<H>  /  DBili  x   /  AST  681<H>  /  ALT  212<H>  /  AlkPhos  284<H>  06-13    Creatinine Trend: 1.18<--, 1.41<--, 1.72<--, 2.03<--, 2.40<--, 2.79<--  PT/INR - ( 13 Jun 2021 00:06 )   PT: 37.0 sec;   INR: 3.26 ratio         PTT - ( 13 Jun 2021 00:06 )  PTT:70.7 sec    Arterial Blood Gas:  06-13 @ 05:38  7.22/35/99/14/96/-12.2  ABG lactate: --  Arterial Blood Gas:  06-13 @ 00:03  7.14/44/121/14/97/-13.2  ABG lactate: --  Arterial Blood Gas:  06-12 @ 17:47  7.21/40/116/16/97/-11.0  ABG lactate: --  Arterial Blood Gas:  06-12 @ 15:23  7.23/42/98/17/96/-9.7  ABG lactate: --  Arterial Blood Gas:  06-12 @ 11:42  7.16/43/111/15/96/-12.6  ABG lactate: --  Arterial Blood Gas:  06-12 @ 00:50  7.20/44/109/16/97/-10.6  ABG lactate: --  Arterial Blood Gas:  06-11 @ 13:12  7.20/43/82/16/93/-10.7  ABG lactate: --        MICROBIOLOGY:     RADIOLOGY:  [ ] Reviewed and interpreted by me    EKG: CHIEF COMPLAINT: A/W Cellulitis    Interval Events: Worsening Leucocytosis now 65.28 from 44, ammonia levels now 214,, Increasing pressor requirements- on Tri pressor therapy, c/w Severe +AG metabolic acidosis - on a NaHCO3 gttsdespite CVVHD, and Lactate levels c/w increasing  - 13.7 this am.     HPI:  41 yr old male with PMHx EtOH abuse , alcoholic cirrhosis, recent decompensated cirrhosis requiring hospitalization (4/20-27/2021) placed on clinical trial DUR-928 RCT (steroids vs placebo- unblinded and found to have received steroid therapy). The pt was re-admitted 5/27/21 with LLE cellulitis, his hospital course c/b Hypoxic Respiratory Failure 2/2 PCP PNA and ARDS, Septic Shock / Distributive shock, Hepatic shock w + PCP PNA (6/7), along with VRE Bacteremia (6/11), GINNY / ATN now anuric in the setting of severe +AG Metabolic Acidosis now on CVVHD, and hyper ammoniemia. The pt has a poor prognosis, family meeting last night and they are aware.     REVIEW OF SYSTEMS:  [x ] Unable to assess ROS because pt is sedated / chemically paralyzed on Nimbex ________    OBJECTIVE:  ICU Vital Signs Last 24 Hrs  T(C): 36.4 (13 Jun 2021 04:00), Max: 36.6 (12 Jun 2021 12:00)  T(F): 97.5 (13 Jun 2021 04:00), Max: 97.9 (12 Jun 2021 12:00)  HR: 92 (13 Jun 2021 07:00) (90 - 100)  BP: --  BP(mean): --  ABP: 108/39 (13 Jun 2021 07:00) (89/33 - 142/55)  ABP(mean): 55 (13 Jun 2021 07:00) (47 - 78)  RR: 34 (13 Jun 2021 07:00) (30 - 34)  SpO2: 99% (13 Jun 2021 07:00) (92% - 100%)    Mode: AC/ CMV (Assist Control/ Continuous Mandatory Ventilation), RR (machine): 34, TV (machine): 450, FiO2: 45, PEEP: 8, ITime: 0.75, MAP: 20, PIP: 38  I & O's    06-12 @ 07:01  -  06-13 @ 07:00  --------------------------------------------------------  IN: 8861.2 mL / OUT: 9455 mL / NET: -593.8 mL    CAPILLARY BLOOD GLUCOSE  POCT Blood Glucose.: 102 mg/dL (13 Jun 2021 05:35)    PHYSICAL EXAM:  General:  unresponsive  HEENT: normocephalic, atraumatic, sclera icteric, papillary edema w noted bilateral eyes with yellowish drainage, oral mucosa palish hue / dry  Lymph Nodes: non palp  Neck: supple, +JVD  Respiratory: Bilaterally equal BS / chest excursion, orally intubated on full support- not overbreathing the ventilator  Cardiovascular: S1S2 RRR, SR, pulses pal 1+/4, aortic heaves / lifts neg bruit  Abdomen: distended, ascites, minimal BS / minimal, rectal tube in place minimal stool draining this am  Extremities: +BUE/BLE/Sacral pitting edema 3+ / anasarca   Skin: Jaundice, warm to touch / DTR- f/u NSG  Neurological: pupils dilated / minimal to no reaction neg corneal reflex's noted, bilateral eyes appear with deviations laterally  - extreme sluggish reaction  Psychiatry: unable to assess       HOSPITAL MEDICATIONS:  MEDICATIONS  (STANDING):  albuterol/ipratropium for Nebulization 3 milliLiter(s) Nebulizer every 6 hours  artificial  tears Solution 2 Drop(s) Both EYES every 4 hours  caspofungin IVPB 50 milliGRAM(s) IV Intermittent every 24 hours  chlorhexidine 0.12% Liquid 15 milliLiter(s) Oral Mucosa every 12 hours  chlorhexidine 4% Liquid 1 Application(s) Topical <User Schedule>  CRRT Treatment    <Continuous>  DAPTOmycin IVPB 600 milliGRAM(s) IV Intermittent every 48 hours  dextrose 50% Injectable 50 milliLiter(s) IV Push every 15 minutes  fentaNYL   Infusion... 2 MICROgram(s)/kG/Hr (7.26 mL/Hr) IV Continuous <Continuous>  folic acid 1 milliGRAM(s) Oral daily  hydrocortisone sodium succinate Injectable 100 milliGRAM(s) IV Push every 8 hours  insulin lispro (ADMELOG) corrective regimen sliding scale   SubCutaneous every 6 hours  ketamine Infusion. 0.251 mG/kG/Hr (1.82 mL/Hr) IV Continuous <Continuous>  meropenem  IVPB 500 milliGRAM(s) IV Intermittent every 8 hours  meropenem  IVPB      multivitamin 1 Tablet(s) Oral daily  norepinephrine Infusion 0.2 MICROgram(s)/kG/Min (13.6 mL/Hr) IV Continuous <Continuous>  pantoprazole  Injectable 40 milliGRAM(s) IV Push daily  phenylephrine    Infusion 0.1 MICROgram(s)/kG/Min (1.36 mL/Hr) IV Continuous <Continuous>  PrismaSATE Dialysate BK 0 / 3.5 5000 milliLiter(s) (1400 mL/Hr) CRRT <Continuous>  PrismaSOL Filtration BGK 4 / 2.5 5000 milliLiter(s) (1000 mL/Hr) CRRT <Continuous>  PrismaSOL Filtration BGK 4 / 2.5 5000 milliLiter(s) (200 mL/Hr) CRRT <Continuous>  rifAXIMin 550 milliGRAM(s) Oral two times a day  sodium bicarbonate  Infusion 0.258 mEq/kG/Hr (125 mL/Hr) IV Continuous <Continuous>  thiamine Injectable 100 milliGRAM(s) IV Push daily  trimethoprim / sulfamethoxazole IVPB 400 milliGRAM(s) IV Intermittent every 8 hours  vancomycin    Solution 125 milliGRAM(s) Oral every 6 hours  vasopressin Infusion 0.04 Unit(s)/Min (2.4 mL/Hr) IV Continuous <Continuous>    MEDICATIONS  (PRN):  sodium chloride 0.9% lock flush 10 milliLiter(s) IV Push every 1 hour PRN Pre/post blood products, medications, blood draw, and to maintain line patency      LABS:                        7.9    65.80 )-----------( 219      ( 13 Jun 2021 05:41 )             26.1     Hgb Trend: 7.9<--, 6.8<--, 7.5<--, 7.6<--, 7.8<--  06-13    132<L>  |  95<L>  |  38<H>  ----------------------------<  98  4.0   |  11<L>  |  1.18    Ca    7.9<L>      13 Jun 2021 05:41  Phos  5.5     06-13  Mg     2.4     06-13    TPro  5.1<L>  /  Alb  2.8<L>  /  TBili  11.3<H>  /  DBili  x   /  AST  681<H>  /  ALT  212<H>  /  AlkPhos  284<H>  06-13    Creatinine Trend: 1.18<--, 1.41<--, 1.72<--, 2.03<--, 2.40<--, 2.79<--  PT/INR - ( 13 Jun 2021 00:06 )   PT: 37.0 sec;   INR: 3.26 ratio         PTT - ( 13 Jun 2021 00:06 )  PTT:70.7 sec    Arterial Blood Gas:  06-13 @ 05:38  7.22/35/99/14/96/-12.2  ABG lactate: --  Arterial Blood Gas:  06-13 @ 00:03  7.14/44/121/14/97/-13.2  ABG lactate: --  Arterial Blood Gas:  06-12 @ 17:47  7.21/40/116/16/97/-11.0  ABG lactate: --  Arterial Blood Gas:  06-12 @ 15:23  7.23/42/98/17/96/-9.7  ABG lactate: --  Arterial Blood Gas:  06-12 @ 11:42  7.16/43/111/15/96/-12.6  ABG lactate: --  Arterial Blood Gas:  06-12 @ 00:50  7.20/44/109/16/97/-10.6  ABG lactate: --  Arterial Blood Gas:  06-11 @ 13:12  7.20/43/82/16/93/-10.7  ABG lactate: --    MICROBIOLOGY:   Culture - Blood (06.10.21 @ 23:01)   Gram Stain:   Growth in anaerobic bottle: Gram positive cocci in pairs   - Enterococcus faecium,VRE: Detec   Specimen Source: .Blood Blood-Peripheral   Organism: Blood Culture PCR   Pneumocystis jiroveci PCR (06.07.21 @ 13:20)   Pneumocystis Specimen Source: bal   Pneumocystis PCR Result: Positive: Critical Result.   RADIOLOGY:  [ ] Reviewed and interpreted by me    EXAM:  CT ANGIO CHEST PULM ART WAWIC                            PROCEDURE DATE:  06/01/2021            INTERPRETATION:  INDICATION, CLINICAL INFORMATION: Shortness of breath, cirrhosis, lower extremity swelling, pain, leukocytosis, fever      TECHNIQUE: CT pulmonary angiogram of the chest was performed. Coronal and sagittal images were reconstructed. Maximum intensity projection images were generated. Images were obtained after the uneventful administration of 90 cc nonionic intravenous Omnipaque 350. 10 cc of Omnipaque 350 was discarded.      COMPARISON: None.    FINDINGS:    PULMONARY VESSELS: Limited exam for evaluation of pulmonary embolus secondary to motion, streak artifact and poor opacification of pulmonary arteries. Main pulmonary artery normal in diameter.    HEART AND VASCULATURE: Heart size is normal. No pericardial effusion. No aortic aneurysm or dissection.    LUNGS, AIRWAYS, PLEURA: Patent central airways. Patchy opacities within bilateral upper lobes and bilateral lower lobes likely representing multifocal pneumonia. 1.2 cm right lower lobe nodule. No pleural effusions or pneumothorax. The right hemidiaphragm is elevated.    MEDIASTINUM: No mass or lymphadenopathy.    UPPER ABDOMEN: Ascites.    BONES AND SOFT TISSUES: No aggressive osseous lesion.    LOWER NECK: Within normal limits.    IMPRESSION:    Limited exam for evaluation of pulmonary embolus secondary to motion, streak artifact and poor opacification of pulmonary arteries.    Patchy opacities within bilateral upper lobes and bilateral lower lobes likely representing multifocal pneumonia.    1.2 cm right lower lobe nodule. Recommend follow-up chest CT in 3 months to determine stability.      < from: TTE with Doppler (w/Cont) (06.09.21 @ 07:38) >    Patient name: OSIRIS HALE  YOB: 1980   Age: 41 (M)   MR#: [id number]  Study Date: 6/9/2021  Location: [place] [id number]: Rowan Babin RDCS  Study quality: Technically fair  Referring Physician: Adela Chan MD  Blood Pressure: 117/52 mmHg  Height: 157 cm  Weight: 73 kg  BSA: 1.7 m2  Heart Rate: 86 mmHg  ------------------------------------------------------------------------  PROCEDURE: Transthoracic echocardiogram with 2-D, M-Mode  and complete spectral and color flow Doppler. Patient was  injected with 10 cc's of aerosolized saline. Patient was  injected with 10 cc's of aerosolized saline. Verbal consent  was obtained for injection of  Ultrasonic Enhancing Agent  following a discussion of risks and benefits. Following  intravenous injection of Ultrasonic Enhancing Agent ,  harmonic imaging was performed.  INDICATION: Shock, unspecified (R57.9), Acute respiratory  failure with hypoxia (J96.01)  ------------------------------------------------------------------------  Dimensions:    Normal Values:  LA:     4.5    2.0 - 4.0 cm  Ao:     3.4    2.0 - 3.8 cm  SEPTUM: 0.9    0.6 - 1.2 cm  PWT:    0.9    0.6 - 1.1 cm  LVIDd:  5.1    3.0 - 5.6 cm  LVIDs:  2.3    1.8 - 4.0 cm  Derived variables:  LVMI: 94 g/m2  RWT: 0.35  Fractional short: 55 %  EF (Carrillo Rule): 76 %  ------------------------------------------------------------------------  Observations:  Mitral Valve: Normal mitral valve. Minimal mitral  regurgitation.  Aortic Valve/Aorta: Normal trileaflet aortic valve. No  aortic valve regurgitation seen.  Aortic Root: 3.4 cm.  Left Atrium: Mildly dilated left atrium.  LA volume index =  37 cc/m2.  Left Ventricle: Hyperdynamic left ventricular systolic  function.  Endocardial visualization enhanced with  intravenous injection of Ultrasonic Enhancing Agent  (Definity). No left ventricular thrombus. Normal left  ventricular internal dimensions and wall thicknesses.  Normal diastolic function  Right Heart: Normal right atrium. Normal right ventricular  size and function. Normal tricuspid valve. Minimal  tricuspid regurgitation. Normal pulmonic valve. No pulmonic  regurgitation.  Pericardium/Pleura: Normal pericardium with no pericardial  effusion.  Hemodynamic: Estimated right atrial pressure is 8 mmHg.  Estimated right ventricular systolic pressure equals 42 mm  Hg, assuming right atrial pressure equals 8 mm Hg,

## 2021-06-13 NOTE — DISCHARGE NOTE FOR THE EXPIRED PATIENT - HOSPITAL COURSE
This is a 41 yr old male with PMHx EtOH abuse , alcoholic cirrhosis, recent decompensated cirrhosis requiring hospitalization placed on clinical trial DUR-928 RCT (steroids vs placebo- unblinded and found to have received steroid therapy) who was originally admitted 21 with LLE cellulitis. Course c/b progressive worsening hypoxic respiratory failure with septic shock eventually requiring intubation and vasopressor therapy  requiring admission to MICU. Course further c/b GINNY and ACLF. CT chest 21 demonstrated Patchy ground glass opacities within bilateral upper lobes and bilateral lower lobes likely representing multifocal pneumonia, positive fungitell with unknown significance. MICU course to date consist of Paracentesis with removal of 1.9 liters (21), vasopressor therapy, PCP/PJP therapy with bactrim, s/p vitamin K and FFP, addition of bumex/metolazone therapy, CRRT, s/p bronchoscopy 21), poor P/F ratios (ARDS). Condition cont worsened w/ VRE bacteremia leading to refractory shock. Family updated about the poor prognosis  w/ decompensated cirrhosis and septic shock refractory to pressors. Patient  at 08:42 with family at bedside. No autopsy as per the family. Emotional support provided. Dr. Chan MICU attending aware.

## 2021-06-13 NOTE — CHART NOTE - NSCHARTNOTEFT_GEN_A_CORE
Pronounced Mr. Elie Thompson dead. Patient examined, unresponsive to verbal or tactile stimuli, no spontaneous respirations, heart sounds not audible, pulses absent, pupils fixed and dilated. Pt pronounced at 08:42pm. Pt's wife and other family members at bedside. Attending Dr. Chan notified.

## 2021-06-13 NOTE — PROGRESS NOTE ADULT - ATTENDING COMMENTS
42 yo M with AUD and alcoholic hepatitis complicated by ascites and peripheral edema, previously hospitalized 4/20/21-4/27/21 and enrolled in [id number] RCT (now unblinded and received placebo infusion plus corticosteroids), re-admitted 5/28/21 and transferred to MICU yesterday, with septic shock, acute hypoxic respiratory failure (s/p intubation 6/4), GINNY with metabolic acidosis, and acute on chronic liver failure (ACLF).    # Septic shock and acute hypoxic respiratory failure: On low dose norepinephrine, and receiving IV albumin in addition to sodium bicarbonate infusion today per MICU team due to concern for intravascular volume depletion. Chest imaging with diffuse pulmonary opacities, and given Fungitell >500 and recent corticosteroids, there is concern for opportunistic infection including possible fungal pneumonia or PJP. Appreciate Transplant ID input, and concur with MICU plan for Combicath today and possible bronchoscopy once stabilized. Continuing empiric broad-spectrum coverage with meropenem, caspofungin, IV Bactrim, and IV Solumedrol.    # Oliguric GINNY: Urine output 10-20 mL/hr overnight and not improving thus far today despite IVF. Given concomitant metabolic acidosis as well as component of pulmonary edema likely contributing to his hypoxemia, anticipate that he may need RRT if renal failure worsening.    # Hepatic encephalopathy: Continue lactulose and would add rifaximin 550 mg ng bid.    # Acute on chronic liver failure (ACLF): Current MELD-Na 34. Prognosis is very guarded given current multiorgan failure, but if he gradually recovers from septic shock and respiratory failure with treatment of his infection, then he may be an eventual candidate for early liver transplantation. He has good family support and his April hospitalization represented a first medical decompensation related to alcohol. Recommend SW / case management to please assess whether his family can apply on his behalf for full Medicaid (instead of emergency Medicaid) so that he would have coverage for transplant services if he recovers sufficiently. He is undocumented.    His plan of care was discussed at multidisciplinary rounds with his MICU team. Please don't hesitate to call with any questions or concerns.    Devon Goodwin M.D., Ph.D.  Transplant Hepatology  Cell: [phone]
39 y/o M w/ AUD  w/ recent admission for alcoholic hepatitis enrolled in clinical trial [id number] RCT (unblinded, received placebo infusion plus corticosteroids) readmitted 5/28/21 for sepsis, course now complicated by septic shock and hypoxic respiratory failure now intubated in MICU, with worsening GINNY, with suspicion for PJP pneumonia vs fungal pneumonia.    Liver function improving since starting antifungals and PCP treatment but continues to have high ventilator requirements.  Please consult case management to see if he can obtain full medicaid.
41 y/o M w/ AUD  w/ recent admission for alcoholic hepatitis enrolled in clinical trial [id number] RCT (unblinded, received placebo infusion plus corticosteroids) readmitted 5/28/21 for sepsis, course now complicated by septic shock and hypoxic respiratory failure now intubated in MICU, with worsening GINNY, with suspicion for PJP pneumonia vs fungal pneumonia.    Liver function improving since starting antifungals and PCP treatment.
41 y/o M w/ AUD  w/ recent admission for alcoholic hepatitis enrolled in clinical trial [id number] RCT (unblinded, received placebo infusion plus corticosteroids) readmitted 5/28/21 for sepsis, course now complicated by septic shock and hypoxic respiratory failure now intubated in MICU, with worsening GINNY, with suspicion for PJP pneumonia vs fungal pneumonia.    Liver function improving since starting antifungals and PCP treatment.  Vent requirements improving as well.  BAL + PCP PCR.  Please consult case management to see if he can obtain full medicaid.
41 y/o M w/ AUD  w/ recent admission for alcoholic hepatitis enrolled in clinical trial [id number] RCT (unblinded, received placebo infusion plus corticosteroids) readmitted 5/28/21 for sepsis, course now complicated by septic shock and hypoxic respiratory failure now intubated in MICU, with worsening GINNY, with suspicion for PJP pneumonia vs fungal pneumonia.    Liver function improving.  Vent requirements improving as well.  BAL + PCP PCR.  Please consult case management to see if he can obtain full medicaid.
41 y/o M w/ AUD  w/ recent admission for alcoholic hepatitis enrolled in clinical trial [id number] RCT (unblinded, received placebo infusion plus corticosteroids) readmitted 5/28/21 for sepsis, course now complicated by septic shock and hypoxic respiratory failure now intubated in MICU, with worsening GINNY, with suspicion for PJP pneumonia vs fungal pneumonia.    Liver function improving.  Vent requirements improving as well.  BAL + PCP PCR.  Please consult case management to see if he can obtain full medicaid.   Recommend stopping bumex drip. Agree with [facility]
41M, alcohol-related cirrhosis, ascites s/p LVP 4/22 2L on lasix/spironolactone 40/100 and on clinical trial, recent admission with severe alc. hepatitis 4/2021 treated with steroid, adm. with leg swelling, found pneumonia with fevers, leukocytosis    - PNA, ongoing fevers 101.7 on 5/31, leukocytosis WBC 27 - on zosyn, ID following  - dx para negative for SBP  - MELD 30  - ascites: small-moderate on exam, on lasix/spironolactone 40/100  - edema: 1 to 2+ on feet, 1+ shins - much improved  - hyponatremia resolved  - creat 1.13 high nl  - elevated LFTs bili 19, mildly elevated AST/ALT < 100   - HE: mild asterixis  - alcohol us: heavy drinking for 20 yrs, few weeks on, few off; lives w wife and sons  - transplant candidacy: not currently due to infection    -- f/u fungal cultures  -- can continue albumin for now, fluid restriction 1L /day, diuretics  -- d/c rifaximin, continue lactulose
GINNY ATN   CRRT   Continue and increase fluid removal to 50-100cc/hr  Increase clearances for acidosis.    Dw GEO Tijerina MD  Off: [phone]  Cell: [phone]
GINNY ATN  CRRT  Severe acidosis   Poor prognosis    Kailyn Tijerina MD  Off: [phone]  Cell: [phone]
GINNY/ATN, hypokalemia, metabolic acidosis with lactate    Intubated, edematous    Although kidney function difficult to assess in setting of cirrhosis + GINNY, best estimate is a GFR approximately 20 mL/min/1.73m2. Dose medications accordingly.    - Ginger potassium repletion  - No indication at this time for kidney replacement therapy  - Monitor UOP closely, diurese as needed  - Maintain ionized Ca >1  - MAP >65  - Avoid nephrotoxins  - Remainder per fellow, will follow
GINNY/ATN, hypokalemia, metabolic acidosis with lactate    Intubated, edematous    Although kidney function difficult to assess in setting of cirrhosis + GINNY, best estimate is a GFR approximately 20 mL/min/1.73m2. Dose medications accordingly.    - No indication at this time for kidney replacement therapy  - Agree with diuresis - try speaking with pharmacy to concentrate drips as currently has large obligate intake from IVF/medications  - Maintain ionized Ca >1  - MAP >65  - Avoid nephrotoxins  - Remainder per fellow, will follow
GINNY/ATN, hypokalemia, metabolic acidosis with lactate    Intubated, edematous    Currently has normal bicarbonate, but lactic acidosis + respiratory acidosis    Although kidney function difficult to assess in setting of cirrhosis + GINNY, best estimate is a GFR approximately 20 mL/min/1.73m2. Dose medications accordingly.    - Stop Bicitra  - Potassium repletion  - No indication at this time for kidney replacement therapy  - Monitor UOP closely, diurese as needed for net even to 1L negative  - Maintain ionized Ca >1  - MAP >65  - Avoid nephrotoxins  - Remainder per fellow, will follow
GINNY/ATN, metabolic acidosis    Intubated, edematous. UOP dropping, large obligate intake. Increasing pressor requirements.     - D/w ICU, as cannot keep up with intake, will transition to CRRT for volume management  - Adjust meds for CRRT dosing once initiated  - Can taper off diuretics while on CRRT  - Calcium repletion to maintain iCa >1  - Maintain MAP > 70  - Avoid nephrotoxins  - Remainder per fellow, will follow
Rash consistent w/ resolving exanthematous drug eruption. Would liberally moisturize at this point. Triamcinolone mostly helps provide symptomatic relief. Dermatology to sign off. Please reconsult with further concerns.    Rj Wheeler MD, PharmD, MPH  Co-Director, Inpatient Dermatology Consultation Service, [facility]
40 M with ETOH abuse here with jaundice, abdominal distention now found to have acute hypoxemic respiratory failure of unclear etiology.    Worsening hypoxemia today now requiring HFNC.  Bicarb still low, still with developing GINNY.  Overall appears ill, rash is stable from yesterday.    We were informed that he was enrolled in a study looking at steroids for autoimmune hepatitis, though the team does not know which arm he was on (steroid or placebo).  Of note, if he got steroids, he would've gotten equivalent of prednisone 40 mg po daily x 1 month, the last dose of which would've been 2-3 weeks ago.  These patients do not get PCP ppx.      Given the length of time and dose he could've been on steroids, it does raise the possibility this is PCP.  If he was not on steroids, PCP is much less likely.  Bronchoscopy at this point would require a biopsy, too, and given his high fio2 requirements, he may require intubation/remain intubated.  There is a risk of empiric pcp treatment without knowing if he got steroids - hyperkalemia, fluid load, etc.    Would unblind patient if possible - if he got steroids, then it raises the possibility he has PCP now and he should get PCP treatment, including steroids. Of note, he has a high Fungitell that is yet unexplained.    Reccs:  would c/w broad abx including antifungal  if he got steroids in the study, he should receive PCP treatment and steroids  does not appear to have pulmonary edema, can give fluids if needed  metabolic acidosis - consider bicarb  would get differential on cbc to assess for eosinophilia
GINNY/ATN, hypokalemia, metabolic acidosis    Intubated, edematous    Although kidney function difficult to assess in setting of cirrhosis + GINNY, best estimate is a GFR approximately 20 mL/min/1.73m2. Dose medications accordingly.    - No indication at this time for kidney replacement therapy  - Agree with diuresis, doing well here  - Maintain ionized Ca >1  - MAP >70  - Avoid nephrotoxins  - Remainder per fellow, will follow
GINNY/ATN, hypokalemia, metabolic acidosis    Intubated, edematous    Although kidney function difficult to assess in setting of cirrhosis + GINNY, best estimate is a GFR approximately 20 mL/min/1.73m2. Dose medications accordingly.    Rising potassium, may be Bactrim effect    - Lokelma/SOSA  - No indication at this time for kidney replacement therapy  - Lower diuretic dose to avoid hypovolemia  - Maintain ionized Ca >1  - MAP >70  - Avoid nephrotoxins  - Remainder per fellow, will follow
Hepatology Staff: Mervat Anne MD    I saw and examined the patient along with  Dr. Darden on  05-30-21 @ 11:03  Patient Medical Record, hosptial course was reviewed and summarized as below:    Vitals: Vital Signs Last 24 Hrs  T(C): 36.9 (30 May 2021 08:27), Max: 38.6 (30 May 2021 00:12)  T(F): 98.5 (30 May 2021 08:27), Max: 101.5 (30 May 2021 00:12)  HR: 115 (30 May 2021 08:27) (89 - 115)  BP: 115/72 (30 May 2021 08:27) (99/65 - 120/73)    RR: 19 (30 May 2021 08:27) (18 - 19)  SpO2: 98% (30 May 2021 08:27) (94% - 99%)  Medications:  IV Fluids: albumin human 25% IVPB 100 milliLiter(s) IV Intermittent every 6 hours    Diuretics:  Labs:Creatinine, Serum: 0.99 mg/dL (05-30-21 @ 07:15)  Bilirubin Total, Serum: 16.9 mg/dL (05-30-21 @ 07:15)  INR: 2.54 ratio (05-30-21 @ 07:15)    MELD Score:   Imaging Studies:  I/O: I&O's Summary    29 May 2021 07:01  -  30 May 2021 07:00  --------------------------------------------------------  IN: 1927 mL / OUT: 700 mL / NET: 1227 mL    30 May 2021 07:01  -  30 May 2021 11:03  --------------------------------------------------------  IN: 450 mL / OUT: 700 mL / NET: -250 mL      Nutritional Status:   Albumin, Serum: 3.0 g/dL (05-30-21 @ 07:15)    Recommendations: This is a 40-year-old young male with known history of EtOH abuse presents with jaundice and abdominal distention along with lower extremity edema for the last 2 weeks.  On examination patient has moderate abdominal distention and 3+ bilateral lower extremity edema.  Known history of cirrhosis and recent hospital admission with alcoholic hepatitis, currently enrolled in a clinical trial.  Admission labs are significant for severe hyponatremia serum sodium level now with 124 mEq/L.  Serum albumin level is 1.6 g/dL.    Now febrile.  Culture results are pending.  Patient already has completed a clinical trial for treatment of his alcoholic hepatitis.  Antibiotics were broadened to IV Zosyn as well as IV vancomycin.  Continues to be febrile and has increased leukocytosis which is persistent.  Noted recommendation from ID to discontinue IV vancomycin considering MRSA negative and does not appear to have any purulent cellulitis.  Would keep a low threshold to increase antibiotic coverage if needed.  Interestingly his total bilirubin has been downtrending which is a good sign.    Keep patient on IV albumin 25% 25 g every 6 hours fluid restriction to 1 L a day to correct hyponatremia (hypervolemic).     Noted worsening INR.  Recommend IV vitamin K 10 mg daily for 3 days.    Hyponatremia appears to have been improving with serum sodium is right now 132 mEq/L.  Agree with the rest of the recommendation as outlined in fellow note.      Plan discussed with Primary team.
Hepatology Staff: Mervat Anne MD    I saw and examined the patient along with  Dr. Darden on 05-31-21 @ 10:12  Patient Medical Record, hosptial course was reviewed and summarized as below:    Vitals: Vital Signs Last 24 Hrs  T(C): 37.1 (31 May 2021 09:27), Max: 38.4 (31 May 2021 04:48)  T(F): 98.7 (31 May 2021 09:27), Max: 101.2 (31 May 2021 04:48)  HR: 111 (31 May 2021 09:27) (76 - 117)  BP: 106/63 (31 May 2021 09:27) (103/71 - 125/78)    RR: 18 (31 May 2021 09:27) (18 - 18)  SpO2: 98% (31 May 2021 09:27) (94% - 98%)  Medications:  IV Fluids: albumin human 25% IVPB 100 milliLiter(s) IV Intermittent every 6 hours      Labs:Creatinine, Serum: 1.12 mg/dL (05-31-21 @ 06:41)  Bilirubin Total, Serum: 19.1 mg/dL (05-31-21 @ 06:41)  INR: 2.37 ratio (05-31-21 @ 06:41)    I/O: I&O's Summary    30 May 2021 07:01  -  31 May 2021 07:00  --------------------------------------------------------  IN: 2230 mL / OUT: 1500 mL / NET: 730 mL      Nutritional Status:   Albumin, Serum: 3.3 g/dL (05-31-21 @ 06:41)    Recommendations: This is a 40-year-old young male with known history of EtOH abuse presents with jaundice and abdominal distention along with lower extremity edema for the last 2 weeks.  On examination patient has moderate abdominal distention and 3+ bilateral lower extremity edema.  Known history of cirrhosis and recent hospital admission with alcoholic hepatitis, currently enrolled in a clinical trial.  Admission labs are significant for severe hyponatremia serum sodium level now with 134 mEq/L.      Now febrile.  Culture results negative so far ( bloood Cx). Fungal Cx pending. Patient already has completed a clinical trial for treatment of his alcoholic hepatitis ( unclearb if it included seroid).  Antibiotics were broadened to IV Zosyn (s/p IV vancomycin, discontinued based on ID recommendations).  Continues to be febrile and has increased leukocytosis which is persistent.     We will discuss with infectious disease team to broaden antibiotic coverage especially in light of new findings on the CT chest with findings concerning for pneumonia.  Agree with work-up including Fungitell as well as fungal culture (pending results).    Keep diuretics on hold. Serum albumin level 3.3/dL. At this point we can discontinue IV albumin.    Agree with the rest of the recommendation as outlined in fellow note.      Plan discussed with Primary team.
Hepatology Staff: Mervat Anne MD    I saw and examined the patient along with                        05-29-21 @ 10:19  Patient Medical Record, hosptial course was reviewed and summarized as below:    Hemodynamic Status:  Vitals: Vital Signs Last 24 Hrs  T(C): 37.3 (29 May 2021 09:03), Max: 38.5 (29 May 2021 05:21)  T(F): 99.2 (29 May 2021 09:03), Max: 101.3 (29 May 2021 05:21)  HR: 109 (29 May 2021 09:03) (80 - 118)  BP: 106/70 (29 May 2021 09:03) (98/56 - 108/67)  BP(mean): --  RR: 19 (29 May 2021 09:03) (16 - 20)  SpO2: 97% (29 May 2021 09:03) (93% - 99%)  Medications:  IV Fluids: albumin human 25% IVPB 100 milliLiter(s) IV Intermittent every 6 hours    Antibiotics:piperacillin/tazobactam IVPB.. 3.375 Gram(s) IV Intermittent every 8 hours    Diuretics:furosemide   Injectable 40 milliGRAM(s) IV Push daily    Labs:Creatinine, Serum: 1.02 mg/dL (05-29-21 @ 07:16)  Bilirubin Total, Serum: 18.0 mg/dL (05-29-21 @ 07:16)  INR: 2.19 ratio (05-29-21 @ 07:16)    I/O: I&O's Summary    28 May 2021 07:01  -  29 May 2021 07:00  --------------------------------------------------------  IN: 1100 mL / OUT: 200 mL / NET: 900 mL    29 May 2021 07:01  -  29 May 2021 10:19  --------------------------------------------------------  IN: 700 mL / OUT: 0 mL / NET: 700 mL      Nutritional Status: Weight (kg): 72.6 (05-28-21 @ 10:38)  BMI (kg/m2): 29.1 (05-28-21 @ 10:38)  Albumin, Serum: 2.7 g/dL (05-29-21 @ 07:16)    Recommendations: This is a 40-year-old young male with known history of EtOH abuse presents with jaundice and abdominal distention along with lower extremity edema for the last 2 weeks.  On examination patient has moderate abdominal distention and 3+ bilateral lower extremity edema.  Known history of cirrhosis and recent hospital admission with alcoholic hepatitis, currently enrolled in a clinical trial.  Admission labs are significant for severe hyponatremia serum sodium level now with 124 mEq/L.  Serum albumin level is 1.6 g/dL.  Now febrile. Would recommend culture (noted already sent).  Patient is on a clinical trial (completed treatment already per Dr. Melquiades Michele PI on the study).    Continue with IV ceftriaxone.  Keep patient on IV albumin 25% 25 g every 6 hours fluid restriction to 1 L a day to correct hyponatremia (hypervolemic).   Hyponatremia appears to have been improving with serum sodium is right now 129 mEq/L.  Agree with the rest of the recommendation as outlined in fellow note.      Plan discussed with Primary team.
Hepatology Staff: Mervat Anne MD    I saw and examined the patient along with  Dr. Funes on  05-28-21 @ 15:23  Patient Medical Record, hosptial course was reviewed and summarized as below:    Vitals: Vital Signs Last 24 Hrs  T(C): 36.9 (28 May 2021 14:34), Max: 37.2 (28 May 2021 00:31)  T(F): 98.5 (28 May 2021 14:34), Max: 98.9 (28 May 2021 00:31)  HR: 80 (28 May 2021 14:34) (80 - 103)  BP: 98/56 (28 May 2021 14:34) (98/56 - 108/65)    RR: 18 (28 May 2021 14:34) (16 - 18)  SpO2: 98% (28 May 2021 14:34) (94% - 99%)  Medications:  IV Fluids: albumin human 25% IVPB 100 milliLiter(s) IV Intermittent every 6 hours    Antibiotics:cefTRIAXone   IVPB      vancomycin  IVPB 1000 milliGRAM(s) IV Intermittent every 8 hours    Diuretics:  Labs:Creatinine, Serum: 0.75 mg/dL (05-28-21 @ 06:26)  Bilirubin Total, Serum: 19.0 mg/dL (05-28-21 @ 06:26)  INR: 1.85 ratio (05-28-21 @ 06:26)      I/O: I&O's Summary    28 May 2021 07:01  -  28 May 2021 15:23  --------------------------------------------------------  IN: 300 mL / OUT: 0 mL / NET: 300 mL      Nutritional Status: Weight (kg): 72.6 (05-28-21 @ 10:38)  BMI (kg/m2): 29.1 (05-28-21 @ 10:38)  Albumin, Serum: 2.1 g/dL (05-28-21 @ 06:26)    Recommendations: This is a 40-year-old young male with known history of EtOH abuse presents with jaundice and abdominal distention along with lower extremity edema for the last 2 weeks.  On examination patient has moderate abdominal distention and 3+ bilateral lower extremity edema.  Known history of cirrhosis and recent hospital admission with alcoholic hepatitis, currently enrolled in a clinical trial.  Admission labs are significant for severe hyponatremia serum sodium level now with 124 mEq/L.  Serum albumin level is 1.6 g/dL.  Additionally patient has significant leukocytosis although denies any fever, chills or rigor.  Noted worsening total bilirubin to 19 mg/dL from 17.4 mg/dL, AST 42, ALT 85, alkaline phosphatase 270.  Liver enzymes appears to be stable.  Infectious work-up so far has been negative.  Continue with IV ceftriaxone.  Keep patient on IV albumin 25% 25 g every 6 hours fluid restriction to 1 L a day to correct hyponatremia (hypervolemic).  Hyponatremia appears to have been improving with serum sodium is right now 127 mEq/L from 124 mEq/L.  Initiate IV Lasix 40 mg daily.  Agree with the rest of the recommendation as outlined in fellow note.      Plan discussed with Primary team.
1. Acute hypoxemic respiratory failure  due to GGO on CT chest likely representing multifocal pneumonia. Pt has been started on bactrim and solumedrol for PJP since pt was on steroids for cirrhosis trial. Pt also covered with broad spectrum abx and antifungal agents. Pt still remains dependent on Peep 14 and high  FIO2. P. Pt now sedated on 3 medications.  Pt with Plateau pressure  remain ~30 on 14 peep Vt 400. Will medically paralyze patient who is in ARDS. May need to prone patient. POCUS today with negative bubble study. No R to L shunt.    2. Renal : ATN; Good UO on BUMEX drip but still fluid net positive . Continue Bumex drip to 4mg/hr and  metolazone now  10mg mg bid.  3. ETOH cirrhosis with ascites. S/p paracentesis on 6/5. Continue lactulose.  4. Hypotension: Pt remains pressor dependent.
40 yo M with AUD and alcoholic hepatitis complicated by ascites and peripheral edema, previously hospitalized 4/20/21-4/27/21 and enrolled in [id number] RCT (now unblinded and received placebo infusion plus corticosteroids), re-admitted 5/28/21 and transferred to MICU 6/4/21, currently with septic shock, acute hypoxic respiratory failure (s/p intubation 6/4), GINNY, and acute on chronic liver failure (ACLF).    # Septic shock: Low norepinephrine requirement and now afebrile x24h after antimicrobial therapies were broadened, currently on meropenem (6/4- ), caspofungin (6/4- ), and IV Bactrim (6/4- ) with IV Solumedrol (6/4- ) for multifocal pneumonia with concern for opportunistic infection including possible fungal pneumonia (given Fungitell >500) or PJP. Follow-up culture results and would still consider bronchoscopy once more stable from pulmonary standpoint, to help guide management of the pneumonia.    # Acute hypoxic respiratory failure with ARDS: Multifocal pneumonia on imaging, with concern for opportunistic infection including possible fungal pneumonia or PJP, as above. There may also be a component of pulmonary edema, and would consider repeat dose of IV Lasix as needed to try to keep him net even today.    # GINNY: Urine output improved from ~20 mL/hr to 75 mL/hr after Lasix 40 mg iv x1 overnight. Recommend repeat IV Lasix as needed to keep net even, as above, given concern for pulmonary edema that may be contributing to his hypoxemia. Cr overall stable at 1.9, but still anticipate that he may eventually need RRT if renal failure worsening. He is at risk for hepatorenal syndrome (HRS) and can aim for MAP ~75 mmHg with norepinephrine, but recommend discontinuation of albumin for now given concern for pulmonary edema as above.    # Hepatic encephalopathy: Continue lactulose and would add rifaximin 550 mg ng bid.    # Acute on chronic liver failure (ACLF): Current MELD-Na 33. Prognosis is very guarded given current multiorgan failure, but if he gradually recovers from septic shock and respiratory failure with treatment of his infection, then he may be an eventual candidate for early liver transplantation. He has good family support and his April hospitalization represented a first medical decompensation related to alcohol. Recommend SW / case management to please assess whether his family can apply on his behalf for full Medicaid (instead of emergency Medicaid) so that he would have coverage for transplant services if he recovers sufficiently. He is undocumented.    His plan of care was discussed at multidisciplinary rounds with his MICU team. Please don't hesitate to call with any questions or concerns.    Devon Goodwin M.D., Ph.D.  Transplant Hepatology  Cell: [phone].
41M, alcohol-related cirrhosis, ascites s/p LVP 4/22 2L on lasix/spironolactone 40/100 and on clinical trial, recent admission with severe alc. hepatitis 4/2021 treated with steroid, adm. with leg swelling, found pneumonia with fevers, leukocytosis    - PNA, ongoing fevers 101.7 on 5/31, leukocytosis WBC 27 - on zosyn, ID following    - dx para negative for SBP  - MELD 30  - ascites: small-moderate on exam, on lasix/spironolactone 40/100  - edema: 1 to 2+ on feet, 1+ shins - much improved  - hyponatremia resolved  - creat 1.13 high nl  - elevated LFTs bili 19, mildly elevated AST/ALT < 100   - HE: mild asterixis  - alcohol us: heavy drinking for 20 yrs, few weeks on, few off; lives w wife and sons  - transplant candidacy: not currently due to infection    -- would add azithromycin  -- f/u fungal cultures  -- resume diuretic orders  -- would stop albumin  -- halve lactulose to 30g bid  -- d/c rifaximin, continue lactulose
41M, alcohol-related cirrhosis, ascites s/p LVP 4/22 2L on lasix/spironolactone 40/100, recent admission with severe alc. hepatitis 4/2021 treated with steroid and on clinical trial medication, adm. with leg swelling, found pneumonia with fevers, leukocytosis.    - PNA with bilat. infiltrates on CT 6/01  - last fever 6/02, on Zosyn 5/29-, azithromycin 6/02-, leukocytosis WBC 25. Fungitell positive. Fungal cx 5/30 pending.  - dx para negative for SBP  - MELD 32  - ascites: small-moderate on exam, was on lasix/spironolactone 40/100, now 40/0 per team  - edema: 1 to 2+ on feet, 1+ shins - much improved  - hyponatremia resolved  - creat ~1  - HE: mild asterixis resolved, on lactulose 30g qid  - alcohol us: heavy drinking for 20 yrs, few weeks on, few off; lives w wife and sons  - transplant candidacy: not currently due to infection    -- add micafungin - disussed with primary team. F/u fungal cx.   -- continue zosyn and azithromycin  -- would switch from lasix to spironolactone 100  -- would stop albumin  -- halve lactulose to 30g bid
Pt is a 40 yo HM with hx significant for EtOH cirrhosis, decompensated, presented with LE cellulitis, course c/b progressive hypoxic resp failure, septic shock, GINNY, and acute on chronic liver failure; today pt found to have PJP    Resp: Decrease FiO2 to 55%  ID: Bactrim + Steroids for PJP  CVS: Levophed to maintain MAP >65  HEME: DVT prophylaxis   FEN: Cont enteral feeds  GI: Transplant Hepatology f/u noted  Renal: Cont diuresis  Neuro: Cont sedation + paralytic
1. Acute hypoxemic respiratory failure  due to GGO on CT chest likely representing multifocal pneumonia. Pt has been started on bactrim and solumedrol for PJP since pt was on steroids for cirrhosis trial. Pt also covered with broad spectrum abx and antifungal agents. Pt remains dependent   on Peep 14 and high  FIO2. Pt desaturated when peep decreased slowly to 10. Pt now sedated on 4 medications.  Pt with Plateau pressure ~30 on 14 peep Vt 450. Decrease VT to 400mls. Repeat ABG. Taper off versed in this patient with cirrhosis.    2. Renal : ATN; Good UO on BUMEX drip but still fluid net positive . Increase Bumex drip to 4mg/hr and start metolazone 2.5mg bid.  3. ETOH cirrhosis with ascites. S/p paracentesis on 6/5. Continue lactulose.
1. Acute hypoxemic respiratory failure due to PCP pneumonia. Pt slowly improving. Decrease PEEP to 10cm H20. Fio2 at 50%. Trial off nimbex.  2. Decompensated  ETOH induced cirrhosis with ascites. Continue lactulose. Start Rifaximin.  3.ATN .Continue Bumex drip and Metolazone.  4. ID. Continue Bactrim and steroid regimen. D/c antifungal and meropenem.
1. Acute hypoxemic respiratory failure due to PCP pneumonia. Pt slowly improving. Decrease PEEP to 8cm H20. Fio2 at 45%. Off Nimbex.  2. Decompensated  ETOH induced cirrhosis with ascites. Hold lactulose. Start Rifaximin.  3.ATN. Now with decreasing UO despite Bumex and metolazone. Pressor requirements increasing. Will start CVVHD. ? AIN from bactrim Check urine for eosinophils.  4. ID. Continue Bactrim and steroid regimen.
41 yr old male with stated Hx significant for EtOH cirrhosis, decompensated, presented with LE cellulitis, course c/b progressive hypoxic resp failure / PJP PNA / ARDS, septic shock, GINNY /ATN. Condition cont worsened w/ VRE bacteremia leading to refractory shock. Prognosis very poor w/ decompensated cirrhosis and septic shock refractory to pressors. Cont sedation, too unstable for sedation vacation. Maintain vent synchrony. Cont pressors, now 3 pressors- titrate for MAP>65. COnt vent support, fio2/peep titrated for sat>90%. on mod vent settings now. Cont PJP tx w/ steroids and bactrim. Now w/ VRE bacteremia tx w/ daptomycon, cont antibacterials, antifungals. COnt CRRT, f/u renal reccs. Family aware of rapidly declining condition and are planning on coming in to see him.
41M, alcohol-related cirrhosis, ascites s/p LVP 4/22 2L on lasix/spironolactone 40/100, recent admission with severe alc. hepatitis 4/2021 treated with steroid and on clinical trial medication, adm. with leg swelling, found pneumonia with fevers, leukocytosis.  Course: 6/04 hypoxia, hypotension, started on high-flow nasal canula.    - PNA, severe sepsis with hypotension, now with hypoxic resp. failure requiring HFNC; bilat. infiltrates on CT 6/01  - Fungitell positive. Fungal cx 5/30 pending.  - dx para negative for SBP  - MELD 32 on 6/03 - high risk of dying during this hospitalization due to severe liver disease and severe infection  - ascites: small-moderate on exam, was on lasix/spironolactone 40/100, now off due to hypotension  - edema: 1 to 2+ on feet, 1+ shins   - hyponatremia resolved  - creat ~1 from 0.5, ie. GINNY  - HE: mild asterixis resolved, on lactulose   - alcohol us: heavy drinking for 20 yrs, few weeks on, few off; lives w wife and sons  - transplant candidacy: not currently due to infection      Pt. discussed several times with my colleagues, pumonology Dr. Flores and ID Dr. Jimenez. Study coordinators were informed and pt. was unblinded - he study drug was steroid. Given high risk for resp. failure during bronchoscopy, and pulmonary infiltrates not being typical of PCP, empiric treatment with caspofungin, PCP coverage, without bronchoscopy was made.     -- agree with caspofungin, continue abx as per ID      -- would switch from lasix to spironolactone 100  -- would stop albumin  -- halve lactulose to 30g bid
41 yr old male with stated Hx significant for EtOH cirrhosis, decompensated, presented with LE cellulitis, course c/b progressive hypoxic resp failure / PJP PNA / ARDS, septic shock, GINNY /ATN. Cont sedation, too unstable for sedation vacation. Maintain vent synchrony. Cont pressors, now 3 pressors- titrate for MAP>65. Cont vent support, fio2/peep titrated for sat>90%. on mod vent settings now. Cont PJP tx w/ steroids (stress dose) and bactrim. Now w/ VRE bacteremia tx w/ daptomycin, cont antibacterials, antifungals. Cont CRRT, f/u renal reccs.    Condition cont worsened w/ VRE bacteremia leading to refractory shock. Prognosis very poor w/ decompensated cirrhosis and septic shock refractory to pressors. Family visited yesterday evening and they understand his grave condition.
41M w/ EtOH misuse w/ recent alcoholic hepatitis/decompensated cirrhosis (April 2021). ADmitted w/ cellulitis initially w/ hospital course complicated by acute hypoxemic respiratory failure intubated and transferred to ICU. PT also has developed GINNY and likely decompensated cirrhosis/liver failure.     - pt currently sedated on propofol and ketamine; may require a 3rd agent or paralysis since pt needed multiple pushes of IV sedatives overnight for vent synchrony   - shock state on low dose pressors, likely vasoplegic shock; titrate to MAP >/65  - pt has had progressive acute hypoxemic respiratory failure now intubated on high FiO2 settings; given that he has been on high dose steroids for a prolonged period of time favor PCP or fungal infection as possible etiology; continue to titrate FiO2 as tolerated  - currently on meropenam and caspofungin for broad spectrum and fungal coverage; as well as bactrim + steroids for possible PCP pneumonia; combicath sent for PCP PCR, bacterial and fungal cx; will plan for bronchoscopy w/ BAL since FiO2 requirements are somewhat improved today  - GINNY possibly due to ATN, diuresed overnight; will give additional diuretics today; off bicarb gtt as pH and bicarb have normalized; d/c albumin  - pt with decompensated cirrhosis/liver failure likely in setting of new infection; s/p paracentesis w/ removal of 1.9L, on abx to cover for possible SBP; start TF   - persistently elevated INR likely in setting of liver disease/poor PO intake, will give additional vitamin K x1; plts stable in the 120s  - monitor FS, ISS coverage  - HSQ for DVT ppx  - prognosis very guarded; will need to get full medicaid to be considered for liver transplant here at [facility]
discussed above plan with resident on rounds. patient seen and examined. states LLE pain and redness improved  labs and imaging reviewed.  I agree with the above findings, assessment, and plan with the following additions and exceptions:  LLE swelling and erythema likely due to cellulitis - patient admits to tripping on foot about 1 week ago. LE doppler negative for DVT and no collection seen. Xray negative for fracture. podiatry f/u  c/w vancomycin for now, check vanco trough  MRSA swab  cirrhosis with ascites - MELD 30, hepatology following IR consult for paracentesis today, empiric ceftriaxone  asterixis on exam - started on lactulose, c/w rifaximin
#Sepsis, PNA – tmax 100.7 overnight w/ persistent leukocytosis. CT w/ evidence of multifocal PNA, f/u bcx, sputum cx, fungal studies. ID c/s appreciated  #Pulm nodule - 1.2 cm right lower lobe nodule noted. f/u chest CT in 3 months to determine stability.  #LLE swelling and erythema likely due to cellulitis – improved, completed abx  #cirrhosis with ascites – s/p clinical trial for alcoholic hepatitis. diuretics held. Albumin discontinued   Rest as above.
Patient seen and examined, case d/w house staff.  Continued fevers, CT with evidence of developing multifocal PNA, although patient has no pulmonary complaints, normal lung exam.  Will continue with Zosyn, f/u cultures.  Consider a RVP to r/o viral causes.
Patient seen and examined at bedside with team, pulm and ID teams    This morning with tachypnea and hypoxia requiring upto 6L now switched to HFNC. This monring also noted to have fevers and tachycardia. Patient already on broad coverage - vanco, inderjit and caspofungin. Patient also noted to have non blanching rash on dorsal foot, abdomen and proximal upper extremities.     #SIRS: Unclear if infectious vs inflammatory; however, tentatively on vanco, inderjit and caspofungin. Follow up blood ctx from 6/3. Fungitell elevated however unclear given appears of consolidation not entirely c/w fungal pna or PCP.     #Non-blanching rash: Unclear etiology, and patient believes rash is new. CRP elevated, ESR mildly. Awaiting complements, acute hep panel    #Cirrhosis: Worsening ascites today, awaiting paracentesis for offloading and helping breathing mechanics     #GINNY: Cr baseline 0.5 now 1 with soft BPs now with bump Cr to 1s. FENa 0.1 c/w prerenal, start albumin infusion and monitor    CC time 37 mins
discussed about plan with resident on rounds. no acute complaints. LE erythema improved. denies sob.   labs and imaging reviewed.  I agree with the above findings, assessment, and plan with the following additions and exceptions:  #Sepsis found to have multifocal pna on CT -persistent leukocytosis.  f/u bcx, sputum cx, fungal studies. c/w zosyn. having persistent fevers - ID f/u  #Pulm nodule - 1.2 cm right lower lobe nodule noted. f/u chest CT in 3 months to determine stability.  #LLE swelling and erythema likely due to cellulitis – improved  #cirrhosis with ascites – was supposed to be enrolled in clinical trial for alcoholic hepatitis. hepatology f/u. diuretics held. c/w albumin
PAtient seen and examined     #Hypoxic respiratory failure- multifocal pna spiking fevers; no broaden with antifungal coverage given fungitell > 500. f.u urine legionellaaury added in interim. Appreciate pulm and ID recs    #Rolanda Cr bump to 1.0 from baseline of 0.5, overall stable will continue to monitor    #Cirrhosis: currently with small volume ascites, switched from lasix to spirnolactone for now; appreciate hep recs
Patient seen and examined.  Case d/w house staff.  Continued fevers, exam and ROS remains non contributory in terms of revealing a source of infection.  Appreciate ID eval, CT A/P ordered.  Continue aurelio Muñoz d/c Bubba.
Patient seen and examined.  Case d/w house staff.  Fever this am, no localizing signs on exam or ROS.  Agree with expanding Abx coverage, f/u cultures.

## 2021-06-13 NOTE — PROGRESS NOTE ADULT - REASON FOR ADMISSION
cellulitis

## 2021-06-13 NOTE — PROGRESS NOTE ADULT - ASSESSMENT
Assessment and Plan:   · Assessment:41YOM w a significant Hx of ETOH Abuse / Cirrhosis, Decompensated Hepatic Alcoholic Cirrhosis who was now a/c 	  41 yr old male with stated Hx significant for EtOH cirrhosis, decompensated, presented with LE cellulitis, course c/b progressive hypoxic resp failure / multi focal PNA / ARDS, septic shock, GINNY /ATN, ACLF. The pt c/w declining now w severe metabolic / lactic acidosis, now on tri-pressor therapy with increasing doses, worsening leukocytosis, anuric on CVVHD, now infected with PCP and VRE bacteremia.     Plan:  #Neuro: sedated, pupils dilated with minimal reaction  -Neuro checks q 4 hrs and prn for changes  -plan CT of the brain stabilized - pt now to0 unstable to be transported to CT at this time  -c/w Propofol, ketamine, fent gtts - titrate to RASS of -2  -ammonia level worsening - 184 (157  <140) c/w rifaximin- hold lactulose now in the setting of high stool output    #Pulm: Hypoxic Resp Failure / ARDS (poor P/F ratio) s/p intubation c/w VC- now improving- compliant with vent off of Nimbex since yesterday  -VC 30 /410/50%/+8-= abg- 7.2 / 44/ 109 / 16 / 97%- decrease FIO2 to 45%  -c/w Bronchodilator therapy q 6 hrs prn sob/wheezes  -Lung protective therapy  / aspiration precautions    #CV: -Distributive Shock / septic shock / hepatic shock  -c/w norepinephrine / Vasopressin and start phenylephrine- maintain MAP >60  -bubble study negative - f/u echo    #GI: -EtOH cirrhosis, decompensated cirrhosis, Acute on Chronic Liver Failure,  s/p paracentesis 6/5/21 with removal of 1.9 liters, C/w raising ammonia levels / lactic acidosis  -c/w Rifaximin   -hold lactulose - pt w high volume output- consider sending c'diff in the setting of worsening leukocytosis and massive diarrhea  -c/w trending  LFT- c/w raising levels  -tube feeds as tolerated  -f/u hepatology     Renal: -GINNY/ATN, severe +AG Metabolic Acidosis. anuric, on CVVHD  -strict I & O's - keep negative  -c/w CVVHD- off bumex gtt/ metalazone X 24 hours  -f/u renal    #ID: septic shock / PNA / ARDS, -recent RCT steroids vs placebo, unblinded, febrile yesterday- worsening leukocytosis, + PCP , +  -6/7/21 Pneumocystis PCR -POSITIVE- c/w Bactrim  -+VRE Bacteremia - + 6/10-c /w Daptomycin  -restart meropenem, Vanco PO - C'Diff emperic coverage- collect specimen 48H s/p stop of lactulose  -restart caspofungin  [-d/c'd meropenem 6/10- -6/6/21+ BAL cx  with growth, -6/5/21 combicath - NGTD,-6/5/21 Paracentesis/peritoneal fluid - no organisms,  6/5/21 MRSA - neg.-6/4/21 sputum Cx - NGTD, 6/3 U. Legionella neg, 6/1 BC neg, ]    GOC: Call pt wife- pt prognosis is poor. Plan a face to face meeting    Assessment and Plan:   · Assessment:  41yoM w a significant Hx of ETOH Abuse / Cirrhosis, Decompensated Hepatic Alcoholic Cirrhosis who was admitted 5/27 with Cellulitis. The pt has encountered multiple complications is in multiorgan system failure. T	  The patients' hospital course as been c/b Hypoxic Respiratory Failure 2/2 PCP PNA that transitioned into ARDS, + Septic shock 2/2 PCP PNA, VRE Bacteremia, probable C'Diff, Severe +AG Metabolic / Lactic Acidosis, , GINNY /ATN no with Renal Failure on CV, and Acute on Chronic Liver Failure. The pt condition continues to worsen, has a poor prognosis with little chance of survival.     Plan:  #Neuro: sedated, pupils dilated appear fixed, no noted corneal reflex's- ammonia levels   -Neuro checks q 4 hrs and prn for changes  -plan CT of the brain if stabilized - pt now too unstable to be transported to CT at this time- plan of care would not change  c/w Ketamine, Fentanyl gtt's  -c/w holding Propofol in the setting of hyper triglyeridemia  -Ammonia levels continue to worsen 213 this am with noted lactate of 13.1- lactulose of hold 2/2 high stool outputs over the last 2 days- and plan for C'Diff stool specimen collection, c/w Rifaximin    #Pulm: Hypoxic Resp Failure 2/2 PCP PNA in  ARDS (poor P/F ratio) s/p intubation c/w VC-  -VC 30 /40/45%/+8-= abg- 7.22/35/99/14/96%- Improved form 7.14/44/121/14/92%  -c/w Bronchodilator therapy q 6 hrs prn sob/wheezes  -Lung protective therapy  / aspiration precautions    #CV: -Distributive Shock / septic shock / hepatic shock  -c/w norepinephrine / Vasopressin phenylephrine- maintain MAP >60  -bubble study negative - f/u echo    #GI: -EtOH cirrhosis, decompensated cirrhosis, Acute on Chronic Liver Failure,  s/p paracentesis 6/5/21 with removal of 1.9 liters, C/w raising ammonia levels / lactic acidosis  -c/w Rifaximin   -hold lactulose - pt w high volume output- consider sending c'diff in the setting of worsening leukocytosis and massive diarrhea  -c/w trending  LFT- c/w raising levels  -tube feeds as tolerated  -f/u hepatology     Renal: -GINNY/ATN, severe +AG Metabolic / Lactic Acidosis. is anuric, on CVVHD  -strict I & O's - keep negative  -c/w CVVHD- pulling off as much fluid as tolerated  -Albumin 25% /100 q 6 H x 3 days  -f/u renal    #ID: septic shock / PNA / ARDS, -recent RCT steroids vs placebo, unblinded, febrile yesterday- worsening leukocytosis, + PCP , +  -6/7/21 Pneumocystis PCR -POSITIVE- c/w Bactrim  -+VRE Bacteremia - + 6/10-c /w Daptomycin  -restart meropenem, Vanco PO - C'Diff emperic coverage- collect specimen 48H s/p stop of lactulose  -restart caspofungin  [-d/c'd meropenem 6/10- -6/6/21+ BAL cx  with growth, -6/5/21 combicath - NGTD,-6/5/21 Paracentesis/peritoneal fluid - no organisms,  6/5/21 MRSA - neg.-6/4/21 sputum Cx - NGTD, 6/3 U. Legionella neg, 6/1 BC neg, ]    GOC: Call pt wife- pt prognosis is poor. Plan a face to face meeting    Assessment and Plan:   · Assessment:  41yoM w a significant Hx of ETOH Abuse / Cirrhosis, Decompensated Hepatic Alcoholic Cirrhosis who was admitted 5/27 with Cellulitis. The pt has encountered multiple complications is in multiorgan system failure. T	  The patients' hospital course as been c/b Hypoxic Respiratory Failure 2/2 PCP PNA that transitioned into ARDS, + Septic shock 2/2 PCP PNA, VRE Bacteremia, probable C'Diff, Severe +AG Metabolic / Lactic Acidosis, , GINNY /ATN no with Renal Failure on CV, and Acute on Chronic Liver Failure. The pt condition continues to worsen, has a poor prognosis with little chance of survival.     Plan:  #Neuro: sedated, pupils dilated appear fixed, no noted corneal reflex's- ammonia levels   -Neuro checks q 4 hrs and prn for changes  -plan CT of the brain if stabilized - pt now too unstable to be transported to CT at this time- plan of care would not change  c/w Ketamine, Fentanyl gtt's  -c/w holding Propofol in the setting of hyper triglyeridemia  -Ammonia levels continue to worsen 213 this am with noted lactate of 13.1- lactulose of hold 2/2 high stool outputs over the last 2 days- and plan for C'Diff stool specimen collection, c/w Rifaximin    #Pulm: Hypoxic Resp Failure 2/2 PCP PNA in  ARDS (poor P/F ratio) s/p intubation c/w VC-  -VC 30 /40/45%/+8-= abg- 7.22/35/99/14/96%- Improved form 7.14/44/121/14/92%  -c/w Bronchodilator therapy q 6 hrs prn sob/wheezes  -Lung protective therapy  / aspiration precautions    #CV: -Distributive Shock / septic shock / hepatic shock  -c/w norepinephrine / Vasopressin phenylephrine- maintain MAP >60  -bubble study negative - f/u echo    #GI: -EtOH cirrhosis, decompensated cirrhosis, Acute on Chronic Liver Failure,  s/p paracentesis 6/5/21 with removal of 1.9 liters, C/w raising ammonia levels / lactic acidosis  -c/w Rifaximin   -hold lactulose - pt w high volume output- consider sending c'diff in the setting of worsening leukocytosis and massive diarrhea  -c/w trending  LFT- c/w raising levels  -tube feeds as tolerated  -f/u hepatology     Renal: -GINNY/ATN, severe +AG Metabolic / Lactic Acidosis. is anuric, on CVVHD  -strict I & O's - keep negative  -c/w CVVHD- pulling off as much fluid as tolerated  -Albumin 25% /100 q 6 H x 3 days  -f/u by Nephrology    #ID: septic shock / PCP  PNA / ARDS, -recent RCT steroids   -6/7/21 Pneumocystis PCR -POSITIVE- c/w Bactrim  -+VRE Bacteremia - + 6/10-c /w Daptomycin  -c/w meropenem,  -c/w  Vanco PO - C'Diff empiric coverage- collect specimen r/o C'Diff  -c/w caspofungin    GOC: Family meeting yesterday with pt wife and eldest daughter. They are aware of the patients poor prognosis and patients eminent demise- minimal chance of survival. Pt for now is a full code. Ongoing GOC conversations.

## 2021-06-13 NOTE — PROGRESS NOTE ADULT - ATTENDING SUPERVISION STATEMENT
Fellow
Resident
Fellow
Fellow
Resident
ACP
Resident

## 2021-06-13 NOTE — CHART NOTE - NSCHARTNOTEFT_GEN_A_CORE
GOC conversation last evening and addressed again this late afternoon by the undersigned with the patients wife. Full explanations of the patients medical condition, poor prognosis were fully addressed and time given for questions utilizing interpreters. The pt is on tri-pressor therapy with GOC conversation last evening and addressed again this late afternoon by the undersigned with the patients wife. Full explanations of the patients medical condition, poor prognosis were fully addressed and time given for questions utilizing interpreters. The pt is on high dose tri-pressor therapy, a NaHCO3 gtt, CVVHD, and full ventilator support, yet remains with tenuous VS, with severe +AG metabolic / lactic acidosis, with significantly high ammonia levels. The pt prognosis is poor with little chance of survival. The patients wife at this time wishes to continue all treat modalities including maintaining the patient with full code status.

## 2021-06-13 NOTE — PROGRESS NOTE ADULT - NSICDXPILOT_GEN_ALL_CORE
[place]

## 2021-06-13 NOTE — PROGRESS NOTE ADULT - NUTRITIONAL ASSESSMENT
This patient has been assessed with a concern for Malnutrition and has been determined to have a diagnosis/diagnoses of Moderate protein-calorie malnutrition.    This patient is being managed with:   Diet NPO-  Tube Feeding Modality: Orogastric  Vital AF (VITALAFRTH)  Total Volume for 24 Hours (mL): 960  Continuous  Starting Tube Feed Rate {mL per Hour}: 40  Increase Tube Feed Rate by (mL): 0  Until Goal Tube Feed Rate (mL per Hour): 40  Tube Feed Duration (in Hours): 24  Tube Feed Start Time: 11:00  Entered: Jun 9 2021  1:32PM    
This patient has been assessed with a concern for Malnutrition and has been determined to have a diagnosis/diagnoses of Moderate protein-calorie malnutrition.    This patient is being managed with:   Diet NPO-  Tube Feeding Modality: Orogastric  Vital AF (VITALAFRTH)  Total Volume for 24 Hours (mL): 960  Continuous  Starting Tube Feed Rate {mL per Hour}: 40  Increase Tube Feed Rate by (mL): 0  Until Goal Tube Feed Rate (mL per Hour): 40  Tube Feed Duration (in Hours): 24  Tube Feed Start Time: 11:00  Entered: Jun 9 2021  1:32PM    
This patient has been assessed with a concern for Malnutrition and has been determined to have a diagnosis/diagnoses of Moderate protein-calorie malnutrition.    This patient is being managed with:   Diet NPO with Tube Feed-  Tube Feeding Modality: Orogastric  Vital AF (VITALAFRTH)  Total Volume for 24 Hours (mL): 1260  Continuous  Starting Tube Feed Rate {mL per Hour}: 30  Increase Tube Feed Rate by (mL): 30     Every 6 hours  Until Goal Tube Feed Rate (mL per Hour): 70  Tube Feed Duration (in Hours): 18  Tube Feed Start Time: 11:00  Entered: Jun 7 2021  9:47AM    
This patient has been assessed with a concern for Malnutrition and has been determined to have a diagnosis/diagnoses of Moderate protein-calorie malnutrition.    This patient is being managed with:   Diet NPO with Tube Feed-  Tube Feeding Modality: Orogastric  Nepro with Carb Steady (NEPRORTH)  Total Volume for 24 Hours (mL): 1170  Intermittent  Starting Tube Feed Rate {mL per Hour}: 10  Increase Tube Feed Rate by (mL): 10    Every 4 hours  Until Goal Tube Feed Rate (mL per Hour): 65  Tube Feeding Hours ON: 18  Tube Feeding OFF (Hours): 6  Tube Feed Start Time: 11:00  Entered: Jun 5 2021  9:11AM    
This patient has been assessed with a concern for Malnutrition and has been determined to have a diagnosis/diagnoses of Moderate protein-calorie malnutrition.    This patient is being managed with:   Diet NPO-  Tube Feeding Modality: Orogastric  Vital AF (VITALAFRTH)  Total Volume for 24 Hours (mL): 960  Continuous  Starting Tube Feed Rate {mL per Hour}: 40  Increase Tube Feed Rate by (mL): 0  Until Goal Tube Feed Rate (mL per Hour): 40  Tube Feed Duration (in Hours): 24  Tube Feed Start Time: 11:00  Entered: Jun 9 2021  1:32PM    
This patient has been assessed with a concern for Malnutrition and has been determined to have a diagnosis/diagnoses of Moderate protein-calorie malnutrition.    This patient is being managed with:   Diet NPO with Tube Feed-  Tube Feeding Modality: Orogastric  Nepro with Carb Steady (NEPRORTH)  Total Volume for 24 Hours (mL): 1170  Intermittent  Starting Tube Feed Rate {mL per Hour}: 10  Increase Tube Feed Rate by (mL): 10    Every 4 hours  Until Goal Tube Feed Rate (mL per Hour): 65  Tube Feeding Hours ON: 18  Tube Feeding OFF (Hours): 6  Tube Feed Start Time: 11:00  Entered: Jun 5 2021  9:11AM    
This patient has been assessed with a concern for Malnutrition and has been determined to have a diagnosis/diagnoses of Moderate protein-calorie malnutrition.    This patient is being managed with:   Diet NPO-  Tube Feeding Modality: Orogastric  Vital AF (VITALAFRTH)  Total Volume for 24 Hours (mL): 240  Continuous  Starting Tube Feed Rate {mL per Hour}: 10  Increase Tube Feed Rate by (mL): 0     Every 4 hours  Until Goal Tube Feed Rate (mL per Hour): 10  Tube Feed Duration (in Hours): 24  Tube Feed Start Time: 11:00  Entered: Jun 8 2021 10:54PM    
This patient has been assessed with a concern for Malnutrition and has been determined to have a diagnosis/diagnoses of Moderate protein-calorie malnutrition.    This patient is being managed with:   Diet NPO-  Tube Feeding Modality: Orogastric  Vital AF (VITALAFRTH)  Total Volume for 24 Hours (mL): 960  Continuous  Starting Tube Feed Rate {mL per Hour}: 40  Increase Tube Feed Rate by (mL): 0  Until Goal Tube Feed Rate (mL per Hour): 40  Tube Feed Duration (in Hours): 24  Tube Feed Start Time: 11:00  Entered: Jun 9 2021  1:32PM    
GINNY/ATN  Hyponatremia  Metabolic acidosis with lactate  Respiratory acidosis    Intubated, edematous    - Monitor UOP closely, worsening kidney dysfunction  - If able, would attempt hyperventilation rather than IV bicarb for acidemia  - Maintain ionized Ca >1  - MAP >65  - Avoid nephrotoxins  - Adjust medications for renal and hepatic dysfunction  - Poor prognosis    ** DOSE MEDICATIONS FOR GFR 10 or less  Note, that with baseline creatinine 0.5, and now rise from 1.1 -> 1.5 in 9 hours, estimated GFR is ~10      - Remainder per fellow, will follow
This patient has been assessed with a concern for Malnutrition and has been determined to have a diagnosis/diagnoses of Moderate protein-calorie malnutrition.    This patient is being managed with:   Diet Regular-  1000mL Fluid Restriction ([id number])  Low Sodium  Entered: May 27 2021  9:40PM    
This patient has been assessed with a concern for Malnutrition and has been determined to have a diagnosis/diagnoses of Moderate protein-calorie malnutrition.    This patient is being managed with:   Diet Regular-  1000mL Fluid Restriction ([id number])  Low Sodium  Entered: May 27 2021  9:40PM

## 2021-06-16 LAB
CULTURE RESULTS: SIGNIFICANT CHANGE UP
SPECIMEN SOURCE: SIGNIFICANT CHANGE UP

## 2021-06-18 NOTE — DIETITIAN INITIAL EVALUATION ADULT. - NS FNS CHANGE IN WEIGHT
Letter by Kain Wu MD at      Author: Kain Wu MD Service: -- Author Type: --    Filed:  Encounter Date: 2/4/2019 Status: (Other)       Ciarra Lundy  [address] [address] [place] [id number]             February 4, 2019         Dear Ms. Lundy,    Below are the results from your recent visit:    Resulted Orders   XR Pelvis W 2 Vw Hips Bilateral    Narrative    [facility] RADIOLOGY    EXAM: XR PELVIS W 2 VW HIPS BILATERAL  LOCATION: [facility]  DATE/TIME: 2/1/2019 12:34 PM    INDICATION: Unspecified fall, initial encounter  COMPARISON: None.    FINDINGS: Negative hips. No fracture or dislocation.        Hi Ciarra:  The final report on your hip xrays showed no fracture.    Please call with questions or contact us using [employer].    Sincerely,        Electronically signed by Kain Wu MD        Loss

## 2021-06-22 PROCEDURE — 87150 DNA/RNA AMPLIFIED PROBE: CPT

## 2021-06-22 PROCEDURE — 82803 BLOOD GASES ANY COMBINATION: CPT

## 2021-06-22 PROCEDURE — 82947 ASSAY GLUCOSE BLOOD QUANT: CPT

## 2021-06-22 PROCEDURE — 84145 PROCALCITONIN (PCT): CPT

## 2021-06-22 PROCEDURE — 71045 X-RAY EXAM CHEST 1 VIEW: CPT

## 2021-06-22 PROCEDURE — 84478 ASSAY OF TRIGLYCERIDES: CPT

## 2021-06-22 PROCEDURE — 74018 RADEX ABDOMEN 1 VIEW: CPT

## 2021-06-22 PROCEDURE — 86038 ANTINUCLEAR ANTIBODIES: CPT

## 2021-06-22 PROCEDURE — 86901 BLOOD TYPING SEROLOGIC RH(D): CPT

## 2021-06-22 PROCEDURE — P9059: CPT

## 2021-06-22 PROCEDURE — 87086 URINE CULTURE/COLONY COUNT: CPT

## 2021-06-22 PROCEDURE — P9016: CPT

## 2021-06-22 PROCEDURE — 82962 GLUCOSE BLOOD TEST: CPT

## 2021-06-22 PROCEDURE — 87040 BLOOD CULTURE FOR BACTERIA: CPT

## 2021-06-22 PROCEDURE — 82042 OTHER SOURCE ALBUMIN QUAN EA: CPT

## 2021-06-22 PROCEDURE — 86900 BLOOD TYPING SEROLOGIC ABO: CPT

## 2021-06-22 PROCEDURE — 83010 ASSAY OF HAPTOGLOBIN QUANT: CPT

## 2021-06-22 PROCEDURE — 86036 ANCA SCREEN EACH ANTIBODY: CPT

## 2021-06-22 PROCEDURE — 82607 VITAMIN B-12: CPT

## 2021-06-22 PROCEDURE — 93970 EXTREMITY STUDY: CPT

## 2021-06-22 PROCEDURE — 80074 ACUTE HEPATITIS PANEL: CPT

## 2021-06-22 PROCEDURE — 87116 MYCOBACTERIA CULTURE: CPT

## 2021-06-22 PROCEDURE — 86160 COMPLEMENT ANTIGEN: CPT

## 2021-06-22 PROCEDURE — 81003 URINALYSIS AUTO W/O SCOPE: CPT

## 2021-06-22 PROCEDURE — 86140 C-REACTIVE PROTEIN: CPT

## 2021-06-22 PROCEDURE — 87305 ASPERGILLUS AG IA: CPT

## 2021-06-22 PROCEDURE — 87102 FUNGUS ISOLATION CULTURE: CPT

## 2021-06-22 PROCEDURE — 83735 ASSAY OF MAGNESIUM: CPT

## 2021-06-22 PROCEDURE — 74177 CT ABD & PELVIS W/CONTRAST: CPT

## 2021-06-22 PROCEDURE — 93971 EXTREMITY STUDY: CPT

## 2021-06-22 PROCEDURE — 93975 VASCULAR STUDY: CPT

## 2021-06-22 PROCEDURE — 87075 CULTR BACTERIA EXCEPT BLOOD: CPT

## 2021-06-22 PROCEDURE — 82570 ASSAY OF URINE CREATININE: CPT

## 2021-06-22 PROCEDURE — 86769 SARS-COV-2 COVID-19 ANTIBODY: CPT

## 2021-06-22 PROCEDURE — 82330 ASSAY OF CALCIUM: CPT

## 2021-06-22 PROCEDURE — 87103 BLOOD FUNGUS CULTURE: CPT

## 2021-06-22 PROCEDURE — 84133 ASSAY OF URINE POTASSIUM: CPT

## 2021-06-22 PROCEDURE — 87640 STAPH A DNA AMP PROBE: CPT

## 2021-06-22 PROCEDURE — 85014 HEMATOCRIT: CPT

## 2021-06-22 PROCEDURE — 94003 VENT MGMT INPAT SUBQ DAY: CPT

## 2021-06-22 PROCEDURE — 83615 LACTATE (LD) (LDH) ENZYME: CPT

## 2021-06-22 PROCEDURE — 87186 SC STD MICRODIL/AGAR DIL: CPT

## 2021-06-22 PROCEDURE — C1729: CPT

## 2021-06-22 PROCEDURE — 85018 HEMOGLOBIN: CPT

## 2021-06-22 PROCEDURE — 87635 SARS-COV-2 COVID-19 AMP PRB: CPT

## 2021-06-22 PROCEDURE — 81001 URINALYSIS AUTO W/SCOPE: CPT

## 2021-06-22 PROCEDURE — 87641 MR-STAPH DNA AMP PROBE: CPT

## 2021-06-22 PROCEDURE — 85610 PROTHROMBIN TIME: CPT

## 2021-06-22 PROCEDURE — P9045: CPT

## 2021-06-22 PROCEDURE — 36430 TRANSFUSION BLD/BLD COMPNT: CPT

## 2021-06-22 PROCEDURE — 82248 BILIRUBIN DIRECT: CPT

## 2021-06-22 PROCEDURE — 82140 ASSAY OF AMMONIA: CPT

## 2021-06-22 PROCEDURE — 82595 ASSAY OF CRYOGLOBULIN: CPT

## 2021-06-22 PROCEDURE — 82435 ASSAY OF BLOOD CHLORIDE: CPT

## 2021-06-22 PROCEDURE — 80048 BASIC METABOLIC PNL TOTAL CA: CPT

## 2021-06-22 PROCEDURE — 87205 SMEAR GRAM STAIN: CPT

## 2021-06-22 PROCEDURE — 71275 CT ANGIOGRAPHY CHEST: CPT

## 2021-06-22 PROCEDURE — 86923 COMPATIBILITY TEST ELECTRIC: CPT

## 2021-06-22 PROCEDURE — 86777 TOXOPLASMA ANTIBODY: CPT

## 2021-06-22 PROCEDURE — 87449 NOS EACH ORGANISM AG IA: CPT

## 2021-06-22 PROCEDURE — 86682 HELMINTH ANTIBODY: CPT

## 2021-06-22 PROCEDURE — 82565 ASSAY OF CREATININE: CPT

## 2021-06-22 PROCEDURE — 94640 AIRWAY INHALATION TREATMENT: CPT

## 2021-06-22 PROCEDURE — 80053 COMPREHEN METABOLIC PANEL: CPT

## 2021-06-22 PROCEDURE — 94799 UNLISTED PULMONARY SVC/PX: CPT

## 2021-06-22 PROCEDURE — 73630 X-RAY EXAM OF FOOT: CPT

## 2021-06-22 PROCEDURE — 99285 EMERGENCY DEPT VISIT HI MDM: CPT

## 2021-06-22 PROCEDURE — 86480 TB TEST CELL IMMUN MEASURE: CPT

## 2021-06-22 PROCEDURE — 86850 RBC ANTIBODY SCREEN: CPT

## 2021-06-22 PROCEDURE — 84132 ASSAY OF SERUM POTASSIUM: CPT

## 2021-06-22 PROCEDURE — 84560 ASSAY OF URINE/URIC ACID: CPT

## 2021-06-22 PROCEDURE — 88305 TISSUE EXAM BY PATHOLOGIST: CPT

## 2021-06-22 PROCEDURE — 86720 LEPTOSPIRA ANTIBODY: CPT

## 2021-06-22 PROCEDURE — 86431 RHEUMATOID FACTOR QUANT: CPT

## 2021-06-22 PROCEDURE — 86778 TOXOPLASMA ANTIBODY IGM: CPT

## 2021-06-22 PROCEDURE — 80202 ASSAY OF VANCOMYCIN: CPT

## 2021-06-22 PROCEDURE — 85025 COMPLETE CBC W/AUTO DIFF WBC: CPT

## 2021-06-22 PROCEDURE — P9011: CPT

## 2021-06-22 PROCEDURE — 0225U NFCT DS DNA&RNA 21 SARSCOV2: CPT

## 2021-06-22 PROCEDURE — 82945 GLUCOSE OTHER FLUID: CPT

## 2021-06-22 PROCEDURE — 87077 CULTURE AEROBIC IDENTIFY: CPT

## 2021-06-22 PROCEDURE — G0480: CPT

## 2021-06-22 PROCEDURE — 85730 THROMBOPLASTIN TIME PARTIAL: CPT

## 2021-06-22 PROCEDURE — 87015 SPECIMEN INFECT AGNT CONCNTJ: CPT

## 2021-06-22 PROCEDURE — 93005 ELECTROCARDIOGRAM TRACING: CPT

## 2021-06-22 PROCEDURE — 88112 CYTOPATH CELL ENHANCE TECH: CPT

## 2021-06-22 PROCEDURE — 85027 COMPLETE CBC AUTOMATED: CPT

## 2021-06-22 PROCEDURE — 87070 CULTURE OTHR SPECIMN AEROBIC: CPT

## 2021-06-22 PROCEDURE — 84157 ASSAY OF PROTEIN OTHER: CPT

## 2021-06-22 PROCEDURE — 49083 ABD PARACENTESIS W/IMAGING: CPT

## 2021-06-22 PROCEDURE — C8929: CPT

## 2021-06-22 PROCEDURE — 87206 SMEAR FLUORESCENT/ACID STAI: CPT

## 2021-06-22 PROCEDURE — 87594 PNEUMCYSTS JIROVECII AMP PRB: CPT

## 2021-06-22 PROCEDURE — 84100 ASSAY OF PHOSPHORUS: CPT

## 2021-06-22 PROCEDURE — 84295 ASSAY OF SERUM SODIUM: CPT

## 2021-06-22 PROCEDURE — 84300 ASSAY OF URINE SODIUM: CPT

## 2021-06-22 PROCEDURE — 76705 ECHO EXAM OF ABDOMEN: CPT

## 2021-06-22 PROCEDURE — 87328 CRYPTOSPORIDIUM AG IA: CPT

## 2021-06-22 PROCEDURE — 83605 ASSAY OF LACTIC ACID: CPT

## 2021-06-22 PROCEDURE — 87385 HISTOPLASMA CAPSUL AG IA: CPT

## 2021-06-22 PROCEDURE — 89051 BODY FLUID CELL COUNT: CPT

## 2021-06-22 PROCEDURE — P9047: CPT

## 2021-06-22 PROCEDURE — 94002 VENT MGMT INPAT INIT DAY: CPT

## 2021-06-22 PROCEDURE — 85652 RBC SED RATE AUTOMATED: CPT

## 2021-06-22 PROCEDURE — 86880 COOMBS TEST DIRECT: CPT

## 2021-06-26 LAB
CULTURE RESULTS: SIGNIFICANT CHANGE UP
SPECIMEN SOURCE: SIGNIFICANT CHANGE UP

## 2021-06-30 LAB
CULTURE RESULTS: SIGNIFICANT CHANGE UP
SPECIMEN SOURCE: SIGNIFICANT CHANGE UP

## 2021-07-05 LAB
CULTURE RESULTS: SIGNIFICANT CHANGE UP
SPECIMEN SOURCE: SIGNIFICANT CHANGE UP

## 2021-07-07 LAB
CULTURE RESULTS: SIGNIFICANT CHANGE UP
CULTURE RESULTS: SIGNIFICANT CHANGE UP
SPECIMEN SOURCE: SIGNIFICANT CHANGE UP
SPECIMEN SOURCE: SIGNIFICANT CHANGE UP

## 2021-08-27 NOTE — DIETITIAN NUTRITION RISK NOTIFICATION - PHYSICAL ASSESSMENT TRICEPS
moderate Solaraze Pregnancy And Lactation Text: This medication is Pregnancy Category B and is considered safe. There is some data to suggest avoiding during the third trimester. It is unknown if this medication is excreted in breast milk.

## 2022-05-21 NOTE — CONSULT NOTE ADULT - SUBJECTIVE AND OBJECTIVE BOX
Discharge teaching and paperwork provided regarding ETOH withdrawal and all questions/concerns answered. VSS, assessment stable and PIV removed. Given information regarding home care and reasons to follow up with ED or primary MD.    Chief Complaint:  Patient is a 40y old  Male who presents with a chief complaint of Jaundice, Malaise (2021 12:25)      HPI:  Mr. Thompson is a 39yo M with PMH of EtOH abuse who presents with jaundice and abdominal distention x 2 weeks.     Patient reports longstanding EtOH abuse since the age of 16 (1 pint of vodka every other day). He was able to remain abstinent for 2 years (3908-8019), however he relapsed 2017, with increased consumption in the past year (1 pint of vodka daily). No episodes of withdrawal.     Patient had noted 2 weeks of worsening abdominal distention, associated with jaundice and LE swelling. Reports no EtOH use for the past 2 weeks. No fever or chills. No cough, rhinorrhea, abdominal pain, nausea, vomiting, diarrhea or constipation. No urinary complaints. Never told he has a liver disease. No IVDU. No history of viral hepatitis. No family history of liver disease. No OTC supplements. Reports going to [place] for similar symptoms a week prior to admission - was told his liver tests are elevated but did not receive any medications.    In ED: afebrile, HR 70-80s, , saturating well on RA. Given benadryl, motrin, librium. Started on Zosyn.  Labs notable for WBC 19, 10% bandemia, Hb 11.4, plt 330, INR 1.25. CMP , , , Tb 26 (Db > 10). BAL 11.   s/p diagnostic tap: SAAG 2.1, Tp 0.8, TNC 20s.   CTAP showing cirrhotic liver morphology no biliary obstruction, varices but no thrombi.       Allergies:  No Known Allergies      Home Medications:    Hospital Medications:  enoxaparin Injectable 40 milliGRAM(s) SubCutaneous daily  folic acid 1 milliGRAM(s) Oral daily  lactulose Syrup 20 Gram(s) Oral two times a day  LORazepam     Tablet 1 milliGRAM(s) Oral every 2 hours PRN  LORazepam     Tablet 1 milliGRAM(s) Oral every 1 hour PRN  multivitamin 1 Tablet(s) Oral daily  piperacillin/tazobactam IVPB.. 3.375 Gram(s) IV Intermittent every 8 hours  thiamine 100 milliGRAM(s) Oral daily      PMHX/PSHX:  No pertinent past medical history    Alcohol abuse    No significant past surgical history        Family history:  No pertinent family history in first degree relatives        Denies family history of colon cancer/polyps, stomach cancer/polyps, pancreatic cancer/masses, liver cancer/disease, ovarian cancer and endometrial cancer.    Social History:     Tob: Denies  EtOH: Denies  Illicit Drugs: Denies    ROS:     General:  No wt loss, fevers, chills, night sweats, fatigue  Eyes:  Good vision, no reported pain  ENT:  No sore throat, pain, runny nose, dysphagia  CV:  No pain, palpitations, hypo/hypertension  Pulm:  No dyspnea, cough, tachypnea, wheezing  GI:  No pain, No nausea, No vomiting, No diarrhea, No constipation, No weight loss, No fever, No pruritis, No rectal bleeding, No tarry stools, No dysphagia,  :  No pain, bleeding, incontinence, nocturia  Muscle:  No pain, weakness  Neuro:  No weakness, tingling, memory problems  Psych:  No fatigue, insomnia, mood problems, depression  Endocrine:  No polyuria, polydipsia, cold/heat intolerance  Heme:  No petechiae, ecchymosis, easy bruisability  Skin:  No rash, tattoos, scars, edema    PHYSICAL EXAM:     GENERAL:  No acute distress  HEENT:  Normocephalic/atraumatic, no scleral icterus  CHEST:  Clear to auscultation bilaterally, no wheezes/rales/ronchi, no accessory muscle use  HEART:  Regular rate and rhythm, no murmurs/rubs/gallops  ABDOMEN:  Soft, non-tender, non-distended, normoactive bowel sounds,  no masses, no hepato-splenomegaly, no signs of chronic liver disease  EXTREMITIES: No cyanosis, clubbing, or edema  SKIN:  No rash/erythema/ecchymoses/petechiae/wounds/abscess/warm/dry  NEURO:  Alert and oriented x 3, no asterixis    Vital Signs:  Vital Signs Last 24 Hrs  T(C): 36.9 (2021 12:18), Max: 37 (2021 20:05)  T(F): 98.4 (2021 12:18), Max: 98.6 (2021 20:05)  HR: 79 (2021 12:18) (73 - 79)  BP: 125/77 (2021 12:18) (95/60 - 127/77)  BP(mean): --  RR: 18 (2021 12:18) (17 - 19)  SpO2: 95% (2021 12:18) (94% - 97%)  Daily Height in cm: 154.94 (2021 04:31)    Daily     LABS:                        11.4   19.72 )-----------( 361      ( 2021 13:15 )             33.9       04-21    134<L>  |  105  |  10  ----------------------------<  89  3.4<L>   |  20<L>  |  0.49<L>    Ca    7.6<L>      2021 06:50  Phos  1.2     04-20  Mg     2.1     04-20    TPro  5.6<L>  /  Alb  2.3<L>  /  TBili  24.4<H>  /  DBili  x   /  AST  223<H>  /  ALT  123<H>  /  AlkPhos  193<H>  04-21    LIVER FUNCTIONS - ( 2021 06:50 )  Alb: 2.3 g/dL / Pro: 5.6 g/dL / ALK PHOS: 193 U/L / ALT: 123 U/L / AST: 223 U/L / GGT: x           PT/INR - ( 2021 06:54 )   PT: 15.8 sec;   INR: 1.33 ratio         PTT - ( 2021 13:27 )  PTT:37.3 sec  Urinalysis Basic - ( 2021 20:37 )    Color: Dark Yellow / Appearance: Clear / S.053 / pH: x  Gluc: x / Ketone: Negative  / Bili: Large >10 / Urobili: Negative   Blood: x / Protein: Trace / Nitrite: Negative   Leuk Esterase: Negative / RBC: 2 /hpf / WBC 0 /HPF   Sq Epi: x / Non Sq Epi: 0 /hpf / Bacteria: 0.0                              11.4   19.72 )-----------( 361      ( 2021 13:15 )             33.9       Imaging:           Chief Complaint:  Patient is a 40y old  Male who presents with a chief complaint of Jaundice, Malaise (2021 12:25)      HPI:  Mr. Thompson is a 41yo M with PMH of EtOH abuse who presents with jaundice and abdominal distention x 2 weeks.     Patient reports longstanding EtOH abuse since the age of 16 (1 pint of vodka every other day). He was able to remain abstinent for 2 years ([id number]), however he relapsed 2017, with increased consumption in the past year (1 pint of vodka daily). No episodes of withdrawal.     Patient had noted 2 weeks of worsening abdominal distention, associated with jaundice and LE swelling. Reports no EtOH use for the past 2 weeks. No fever or chills. No cough, rhinorrhea, abdominal pain, nausea, vomiting, diarrhea or constipation. No urinary complaints. Never told he has a liver disease. No IVDU. No history of viral hepatitis. No family history of liver disease. No OTC supplements. Reports going to [place] for similar symptoms a week prior to admission - was told his liver tests are elevated but did not receive any medications.    In ED: afebrile, HR 70-80s, , saturating well on RA. Given benadryl, motrin, librium. Started on Zosyn.  Labs notable for WBC 19, 10% bandemia, Hb 11.4, plt 330, INR 1.25. CMP , , , Tb 26 (Db > 10). BAL 11.   s/p diagnostic tap: SAAG 2.1, Tp 0.8, TNC 20s.   CTAP showing cirrhotic liver morphology no biliary obstruction, varices but no thrombi.       Allergies:  No Known Allergies      Home Medications:    Hospital Medications:  enoxaparin Injectable 40 milliGRAM(s) SubCutaneous daily  folic acid 1 milliGRAM(s) Oral daily  lactulose Syrup 20 Gram(s) Oral two times a day  LORazepam     Tablet 1 milliGRAM(s) Oral every 2 hours PRN  LORazepam     Tablet 1 milliGRAM(s) Oral every 1 hour PRN  multivitamin 1 Tablet(s) Oral daily  piperacillin/tazobactam IVPB.. 3.375 Gram(s) IV Intermittent every 8 hours  thiamine 100 milliGRAM(s) Oral daily      PMHX/PSHX:  No pertinent past medical history    Alcohol abuse    No significant past surgical history        Family history:  No pertinent family history in first degree relatives        Denies family history of colon cancer/polyps, stomach cancer/polyps, pancreatic cancer/masses, liver cancer/disease, ovarian cancer and endometrial cancer.    Social History:     Tob: Denies  EtOH: Denies  Illicit Drugs: Denies    ROS:   General:  No wt loss, fevers, chills, night sweats, + fatigue  Eyes:  Good vision, no reported pain  ENT:  No sore throat, pain, runny nose, dysphagia  CV:  No pain, palpitations, hypo/hypertension  Pulm:  No dyspnea, cough, tachypnea, wheezing  GI:  No pain, No nausea, No vomiting, No diarrhea, No constipation, No weight loss, No fever, No pruritis, No rectal bleeding, No tarry stools, No dysphagia,  :  No pain, bleeding, incontinence, nocturia  Muscle:  No pain, weakness  Neuro:  No weakness, tingling, memory problems  Psych:  No fatigue, insomnia, mood problems, depression  Endocrine:  No polyuria, polydipsia, cold/heat intolerance  Heme:  No petechiae, ecchymosis, easy bruisability  Skin:  No rash, tattoos, scars, edema    PHYSICAL EXAM:   GENERAL:  No acute distress  HEENT:  Normocephalic/atraumatic, + scleral icterus  CHEST:  No accessory muscle use  HEART:  Regular rate and rhythm  ABDOMEN:  Soft, non-tender, distended, normoactive bowel sounds, hepatomegaly  EXTREMITIES: ++ pitting edema bilaterally  SKIN:  No rash/erythema/ecchymoses/petechiae/wounds/abscess/warm/dry  NEURO:  Alert and oriented x 3, no asterixis    Vital Signs:  Vital Signs Last 24 Hrs  T(C): 36.9 (2021 12:18), Max: 37 (2021 20:05)  T(F): 98.4 (2021 12:18), Max: 98.6 (2021 20:05)  HR: 79 (2021 12:18) (73 - 79)  BP: 125/77 (2021 12:18) (95/60 - 127/77)  BP(mean): --  RR: 18 (2021 12:18) (17 - 19)  SpO2: 95% (2021 12:18) (94% - 97%)  Daily Height in cm: 154.94 (2021 04:31)    Daily     LABS:                        11.4   19.72 )-----------( 361      ( 2021 13:15 )             33.9       04-21    134<L>  |  105  |  10  ----------------------------<  89  3.4<L>   |  20<L>  |  0.49<L>    Ca    7.6<L>      2021 06:50  Phos  1.2     04-20  Mg     2.1     04-20    TPro  5.6<L>  /  Alb  2.3<L>  /  TBili  24.4<H>  /  DBili  x   /  AST  223<H>  /  ALT  123<H>  /  AlkPhos  193<H>  04-21    LIVER FUNCTIONS - ( 2021 06:50 )  Alb: 2.3 g/dL / Pro: 5.6 g/dL / ALK PHOS: 193 U/L / ALT: 123 U/L / AST: 223 U/L / GGT: x           PT/INR - ( 2021 06:54 )   PT: 15.8 sec;   INR: 1.33 ratio         PTT - ( 2021 13:27 )  PTT:37.3 sec  Urinalysis Basic - ( 2021 20:37 )    Color: Dark Yellow / Appearance: Clear / S.053 / pH: x  Gluc: x / Ketone: Negative  / Bili: Large >10 / Urobili: Negative   Blood: x / Protein: Trace / Nitrite: Negative   Leuk Esterase: Negative / RBC: 2 /hpf / WBC 0 /HPF   Sq Epi: x / Non Sq Epi: 0 /hpf / Bacteria: 0.0                              11.4   19.72 )-----------( 361      ( 2021 13:15 )             33.9       Imaging:           Chief Complaint:  Patient is a 40y old  Male who presents with a chief complaint of Jaundice, Malaise (2021 12:25)      HPI:  Mr. Thompson is a 41yo M with PMH of EtOH abuse who presents with jaundice and abdominal distention x 2 weeks.     Patient reports longstanding EtOH abuse since the age of 16 (1 pint of vodka every other day). He was able to remain abstinent for 2 years ([id number]), however he relapsed 2017, with increased consumption in the past year (1 pint of vodka daily). No episodes of withdrawal.     Patient had noted 2 weeks of worsening abdominal distention, associated with jaundice and LE swelling. Reports no EtOH use for the past 2 weeks. No fever or chills. No cough, rhinorrhea, abdominal pain, nausea, vomiting, diarrhea or constipation. No urinary complaints. Never told he has a liver disease. No IVDU. No history of viral hepatitis. No family history of liver disease. No OTC supplements. Reports going to [place] for similar symptoms a week prior to admission - was told his liver tests are elevated but did not receive any medications.    In ED: afebrile, HR 70-80s, , saturating well on RA. Given benadryl, motrin, librium. Started on Zosyn.  Labs notable for WBC 19, 10% bandemia, Hb 11.4, plt 330, INR 1.25. CMP , , , Tb 26 (Db > 10). BAL 11.   s/p diagnostic tap: SAAG 2.1, Tp 0.8, TNC 20s.   CTAP showing cirrhotic liver morphology no biliary obstruction, varices but no thrombi.       Allergies:  No Known Allergies      Home Medications:    Hospital Medications:  enoxaparin Injectable 40 milliGRAM(s) SubCutaneous daily  folic acid 1 milliGRAM(s) Oral daily  lactulose Syrup 20 Gram(s) Oral two times a day  LORazepam     Tablet 1 milliGRAM(s) Oral every 2 hours PRN  LORazepam     Tablet 1 milliGRAM(s) Oral every 1 hour PRN  multivitamin 1 Tablet(s) Oral daily  piperacillin/tazobactam IVPB.. 3.375 Gram(s) IV Intermittent every 8 hours  thiamine 100 milliGRAM(s) Oral daily      PMHX/PSHX:  No pertinent past medical history    Alcohol abuse    No significant past surgical history        Family history:  No pertinent family history in first degree relatives        Denies family history of colon cancer/polyps, stomach cancer/polyps, pancreatic cancer/masses, liver cancer/disease, ovarian cancer and endometrial cancer.    Social History:     Tob: Denies  EtOH: Denies  Illicit Drugs: Denies    ROS:   General:  No wt loss, fevers, chills, night sweats, + fatigue  Eyes:  Good vision, no reported pain  ENT:  No sore throat, pain, runny nose, dysphagia  CV:  No pain, palpitations, hypo/hypertension  Pulm:  No dyspnea, cough, tachypnea, wheezing  GI:  No pain, No nausea, No vomiting, No diarrhea, No constipation, No weight loss, No fever, No pruritis, No rectal bleeding, No tarry stools, No dysphagia,  :  No pain, bleeding, incontinence, nocturia  Muscle:  No pain, weakness  Neuro:  No weakness, tingling, memory problems  Psych:  No fatigue, insomnia, mood problems, depression  Endocrine:  No polyuria, polydipsia, cold/heat intolerance  Heme:  No petechiae, ecchymosis, easy bruisability  Skin:  No rash, tattoos, scars, edema    PHYSICAL EXAM:   GENERAL:  No acute distress  HEENT:  Normocephalic/atraumatic, + scleral icterus  CHEST:  No accessory muscle use  HEART:  Regular rate and rhythm  ABDOMEN:  Soft, non-tender, distended, normoactive bowel sounds, hepatomegaly  EXTREMITIES: ++ pitting edema bilaterally  SKIN:  Jaundiced, No rash/erythema/ecchymoses/petechiae/wounds/abscess/warm/dry  NEURO:  Alert and oriented x 3, no asterixis    Vital Signs:  Vital Signs Last 24 Hrs  T(C): 36.9 (2021 12:18), Max: 37 (2021 20:05)  T(F): 98.4 (2021 12:18), Max: 98.6 (2021 20:05)  HR: 79 (2021 12:18) (73 - 79)  BP: 125/77 (2021 12:18) (95/60 - 127/77)  BP(mean): --  RR: 18 (2021 12:18) (17 - 19)  SpO2: 95% (2021 12:18) (94% - 97%)  Daily Height in cm: 154.94 (2021 04:31)    Daily     LABS:                        11.4   19.72 )-----------( 361      ( 2021 13:15 )             33.9       04-21    134<L>  |  105  |  10  ----------------------------<  89  3.4<L>   |  20<L>  |  0.49<L>    Ca    7.6<L>      2021 06:50  Phos  1.2     04-20  Mg     2.1     04-20    TPro  5.6<L>  /  Alb  2.3<L>  /  TBili  24.4<H>  /  DBili  x   /  AST  223<H>  /  ALT  123<H>  /  AlkPhos  193<H>  04-21    LIVER FUNCTIONS - ( 2021 06:50 )  Alb: 2.3 g/dL / Pro: 5.6 g/dL / ALK PHOS: 193 U/L / ALT: 123 U/L / AST: 223 U/L / GGT: x           PT/INR - ( 2021 06:54 )   PT: 15.8 sec;   INR: 1.33 ratio         PTT - ( 2021 13:27 )  PTT:37.3 sec  Urinalysis Basic - ( 2021 20:37 )    Color: Dark Yellow / Appearance: Clear / S.053 / pH: x  Gluc: x / Ketone: Negative  / Bili: Large >10 / Urobili: Negative   Blood: x / Protein: Trace / Nitrite: Negative   Leuk Esterase: Negative / RBC: 2 /hpf / WBC 0 /HPF   Sq Epi: x / Non Sq Epi: 0 /hpf / Bacteria: 0.0                              11.4   19.72 )-----------( 361      ( 2021 13:15 )             33.9       Imaging:

## 2022-10-07 NOTE — DIETITIAN INITIAL EVALUATION ADULT. - SIGNS/SYMPTOMS
pt with new decompensated cirrhosis pt with no recent nutrition education, new cirrhosis with questions regarding dietary changes ,[email or web address]

## 2023-05-16 NOTE — PRE-OP CHECKLIST - BSA (M2)
1.74 V-Y Flap Text: The defect edges were debeveled with a #15 scalpel blade. Given the location of the defect, shape of the defect and the proximity to free margins a V-Y flap was deemed most appropriate. Using a sterile surgical marker, an appropriate advancement flap was drawn incorporating the defect and placing the expected incisions within the relaxed skin tension lines where possible. The area thus outlined was incised deep to adipose tissue with a #15 scalpel blade. The skin margins were undermined to an appropriate distance in all directions utilizing iris scissors. Following this, the designed flap was advanced and carried over into the primary defect and sutured into place.

## 2023-09-22 NOTE — DIETITIAN INITIAL EVALUATION ADULT. - WEIGHT FOR BMI (LBS)
This is a 66-year-old male history of HLD CAD type 2 diabetes hypertension CVA who presents to the ED with bilateral lower extremity edema, acutely worsening over the last week. Patient recently presented to the ED for a similar complaint and was discharged, and instructed to follow up with Cardiology. He was started on diuretics outpatient. He also complaints of constipation with last BM 7 days ago, associated with abdominal pain. Patient denies chest pain, fever, SOB, or chills. Patient has now had a BM and endorses improvement in abdominal pain.     In the ED, patient is hemodynamically stable and afebrile. Labs were significant for Hgb 10.3, Na 125, BUN/Cr 32/1.6,  and trop negative x 1. CT abdomen and pelvis mild circumferential rectal wall thickening, and small bilateral pleural effusions and bibasilar atelectasis. Xray negative for acute pathologies. He was given furosemide IV 40 x1 and admitted to medicine for further management.     #Worsening b/l LLE likely 2/2 volume overload suspect HF  - BNP this admission 797, less than prior visit 1577  - CT abdomen and pelvis showing small bilateral pleural effusions  - S/p IV lasix in the ED  - Recent duplex completed - no evidence of DVT  - Echo showing EF of 62% and no hemodynamically significant valvular regurgitation or stenosis  - Treated with IV lasix 40mg qD    #Chronic microcytic anemia  - F/u iron panel, B12 and folate outpatient     #Abdominal pain associated with constipation - resolved  - Less likely infectious etiology  - Treated with senna and miralax prn, patient having BMs inpatient     #Hyponatremia 2/2 volume overload  - Monitored BMP  - Treated with fluid restriction     #CKD stage 3 baseline 1.5  - Cr around patient baseline    #HLD  #HTN  #DM  - Continue home medications     
191.1